# Patient Record
Sex: FEMALE | Race: WHITE | Employment: FULL TIME | URBAN - METROPOLITAN AREA
[De-identification: names, ages, dates, MRNs, and addresses within clinical notes are randomized per-mention and may not be internally consistent; named-entity substitution may affect disease eponyms.]

---

## 2019-05-04 ENCOUNTER — APPOINTMENT (OUTPATIENT)
Dept: CT IMAGING | Age: 39
End: 2019-05-04
Payer: COMMERCIAL

## 2019-05-04 ENCOUNTER — HOSPITAL ENCOUNTER (EMERGENCY)
Age: 39
Discharge: HOME | End: 2019-05-04
Attending: EMERGENCY MEDICINE
Payer: COMMERCIAL

## 2019-05-04 ENCOUNTER — APPOINTMENT (OUTPATIENT)
Dept: GENERAL RADIOLOGY | Age: 39
End: 2019-05-04
Payer: COMMERCIAL

## 2019-05-04 VITALS
HEIGHT: 63 IN | WEIGHT: 170 LBS | OXYGEN SATURATION: 100 % | RESPIRATION RATE: 16 BRPM | HEART RATE: 72 BPM | SYSTOLIC BLOOD PRESSURE: 103 MMHG | BODY MASS INDEX: 30.12 KG/M2 | TEMPERATURE: 98.6 F | DIASTOLIC BLOOD PRESSURE: 67 MMHG

## 2019-05-04 DIAGNOSIS — R07.89 ATYPICAL CHEST PAIN: Primary | ICD-10-CM

## 2019-05-04 DIAGNOSIS — R51.9 NONINTRACTABLE HEADACHE, UNSPECIFIED CHRONICITY PATTERN, UNSPECIFIED HEADACHE TYPE: ICD-10-CM

## 2019-05-04 LAB
A/G RATIO: 1.5 (ref 1.1–2.2)
ALBUMIN SERPL-MCNC: 4.5 G/DL (ref 3.4–5)
ALP BLD-CCNC: 65 U/L (ref 40–129)
ALT SERPL-CCNC: 15 U/L (ref 10–40)
ANION GAP SERPL CALCULATED.3IONS-SCNC: 14 MMOL/L (ref 3–16)
AST SERPL-CCNC: 16 U/L (ref 15–37)
BASOPHILS ABSOLUTE: 0.1 K/UL (ref 0–0.2)
BASOPHILS RELATIVE PERCENT: 1.2 %
BILIRUB SERPL-MCNC: 0.3 MG/DL (ref 0–1)
BUN BLDV-MCNC: 14 MG/DL (ref 7–20)
CALCIUM SERPL-MCNC: 9.4 MG/DL (ref 8.3–10.6)
CHLORIDE BLD-SCNC: 102 MMOL/L (ref 99–110)
CO2: 24 MMOL/L (ref 21–32)
CREAT SERPL-MCNC: 0.8 MG/DL (ref 0.6–1.1)
D DIMER: <200 NG/ML DDU (ref 0–229)
EOSINOPHILS ABSOLUTE: 0.1 K/UL (ref 0–0.6)
EOSINOPHILS RELATIVE PERCENT: 1.3 %
GFR AFRICAN AMERICAN: >60
GFR NON-AFRICAN AMERICAN: >60
GLOBULIN: 3 G/DL
GLUCOSE BLD-MCNC: 93 MG/DL (ref 70–99)
HCG QUALITATIVE: NEGATIVE
HCT VFR BLD CALC: 39.3 % (ref 36–48)
HEMOGLOBIN: 13.3 G/DL (ref 12–16)
LYMPHOCYTES ABSOLUTE: 2.5 K/UL (ref 1–5.1)
LYMPHOCYTES RELATIVE PERCENT: 27.3 %
MCH RBC QN AUTO: 30.5 PG (ref 26–34)
MCHC RBC AUTO-ENTMCNC: 34 G/DL (ref 31–36)
MCV RBC AUTO: 89.7 FL (ref 80–100)
MONOCYTES ABSOLUTE: 0.3 K/UL (ref 0–1.3)
MONOCYTES RELATIVE PERCENT: 3.8 %
NEUTROPHILS ABSOLUTE: 6.1 K/UL (ref 1.7–7.7)
NEUTROPHILS RELATIVE PERCENT: 66.4 %
PDW BLD-RTO: 14.3 % (ref 12.4–15.4)
PLATELET # BLD: 431 K/UL (ref 135–450)
PMV BLD AUTO: 8 FL (ref 5–10.5)
POTASSIUM SERPL-SCNC: 3.9 MMOL/L (ref 3.5–5.1)
RBC # BLD: 4.38 M/UL (ref 4–5.2)
SODIUM BLD-SCNC: 140 MMOL/L (ref 136–145)
TOTAL PROTEIN: 7.5 G/DL (ref 6.4–8.2)
TROPONIN: <0.01 NG/ML
WBC # BLD: 9.1 K/UL (ref 4–11)

## 2019-05-04 PROCEDURE — 80053 COMPREHEN METABOLIC PANEL: CPT

## 2019-05-04 PROCEDURE — 99285 EMERGENCY DEPT VISIT HI MDM: CPT

## 2019-05-04 PROCEDURE — 96361 HYDRATE IV INFUSION ADD-ON: CPT

## 2019-05-04 PROCEDURE — 93005 ELECTROCARDIOGRAM TRACING: CPT | Performed by: EMERGENCY MEDICINE

## 2019-05-04 PROCEDURE — 85379 FIBRIN DEGRADATION QUANT: CPT

## 2019-05-04 PROCEDURE — 84703 CHORIONIC GONADOTROPIN ASSAY: CPT

## 2019-05-04 PROCEDURE — 85025 COMPLETE CBC W/AUTO DIFF WBC: CPT

## 2019-05-04 PROCEDURE — 71046 X-RAY EXAM CHEST 2 VIEWS: CPT

## 2019-05-04 PROCEDURE — 36415 COLL VENOUS BLD VENIPUNCTURE: CPT

## 2019-05-04 PROCEDURE — 96366 THER/PROPH/DIAG IV INF ADDON: CPT

## 2019-05-04 PROCEDURE — 2580000003 HC RX 258: Performed by: EMERGENCY MEDICINE

## 2019-05-04 PROCEDURE — 6370000000 HC RX 637 (ALT 250 FOR IP): Performed by: EMERGENCY MEDICINE

## 2019-05-04 PROCEDURE — 70450 CT HEAD/BRAIN W/O DYE: CPT

## 2019-05-04 PROCEDURE — 96365 THER/PROPH/DIAG IV INF INIT: CPT

## 2019-05-04 PROCEDURE — 6360000002 HC RX W HCPCS: Performed by: EMERGENCY MEDICINE

## 2019-05-04 PROCEDURE — 96375 TX/PRO/DX INJ NEW DRUG ADDON: CPT

## 2019-05-04 PROCEDURE — 84484 ASSAY OF TROPONIN QUANT: CPT

## 2019-05-04 RX ORDER — MAGNESIUM SULFATE IN WATER 40 MG/ML
2 INJECTION, SOLUTION INTRAVENOUS ONCE
Status: COMPLETED | OUTPATIENT
Start: 2019-05-04 | End: 2019-05-04

## 2019-05-04 RX ORDER — METOCLOPRAMIDE HYDROCHLORIDE 5 MG/ML
10 INJECTION INTRAMUSCULAR; INTRAVENOUS ONCE
Status: COMPLETED | OUTPATIENT
Start: 2019-05-04 | End: 2019-05-04

## 2019-05-04 RX ORDER — DIAZEPAM 5 MG/1
10 TABLET ORAL ONCE
Status: COMPLETED | OUTPATIENT
Start: 2019-05-04 | End: 2019-05-04

## 2019-05-04 RX ORDER — AMLODIPINE BESYLATE 2.5 MG/1
5 TABLET ORAL DAILY
COMMUNITY
End: 2020-02-11

## 2019-05-04 RX ORDER — DIPHENHYDRAMINE HYDROCHLORIDE 50 MG/ML
25 INJECTION INTRAMUSCULAR; INTRAVENOUS ONCE
Status: COMPLETED | OUTPATIENT
Start: 2019-05-04 | End: 2019-05-04

## 2019-05-04 RX ORDER — MELOXICAM 7.5 MG/1
7.5 TABLET ORAL DAILY
COMMUNITY
End: 2019-10-22

## 2019-05-04 RX ORDER — 0.9 % SODIUM CHLORIDE 0.9 %
1000 INTRAVENOUS SOLUTION INTRAVENOUS ONCE
Status: COMPLETED | OUTPATIENT
Start: 2019-05-04 | End: 2019-05-04

## 2019-05-04 RX ORDER — CHLORTHALIDONE 50 MG/1
50 TABLET ORAL DAILY
COMMUNITY
End: 2020-02-11

## 2019-05-04 RX ORDER — ACETAMINOPHEN 500 MG
1000 TABLET ORAL ONCE
Status: DISCONTINUED | OUTPATIENT
Start: 2019-05-04 | End: 2019-05-04 | Stop reason: HOSPADM

## 2019-05-04 RX ORDER — KETOROLAC TROMETHAMINE 30 MG/ML
15 INJECTION, SOLUTION INTRAMUSCULAR; INTRAVENOUS ONCE
Status: COMPLETED | OUTPATIENT
Start: 2019-05-04 | End: 2019-05-04

## 2019-05-04 RX ADMIN — METOCLOPRAMIDE 10 MG: 5 INJECTION, SOLUTION INTRAMUSCULAR; INTRAVENOUS at 15:31

## 2019-05-04 RX ADMIN — SODIUM CHLORIDE 1000 ML: 9 INJECTION, SOLUTION INTRAVENOUS at 15:31

## 2019-05-04 RX ADMIN — DIAZEPAM 10 MG: 5 TABLET ORAL at 18:09

## 2019-05-04 RX ADMIN — MAGNESIUM SULFATE HEPTAHYDRATE 2 G: 40 INJECTION, SOLUTION INTRAVENOUS at 18:09

## 2019-05-04 RX ADMIN — KETOROLAC TROMETHAMINE 15 MG: 30 INJECTION, SOLUTION INTRAMUSCULAR at 15:31

## 2019-05-04 RX ADMIN — DIPHENHYDRAMINE HYDROCHLORIDE 25 MG: 50 INJECTION, SOLUTION INTRAMUSCULAR; INTRAVENOUS at 15:31

## 2019-05-04 ASSESSMENT — PAIN DESCRIPTION - LOCATION: LOCATION: ARM

## 2019-05-04 ASSESSMENT — PAIN SCALES - GENERAL
PAINLEVEL_OUTOF10: 3
PAINLEVEL_OUTOF10: 5

## 2019-05-04 ASSESSMENT — PAIN DESCRIPTION - PAIN TYPE: TYPE: ACUTE PAIN

## 2019-05-04 ASSESSMENT — PAIN DESCRIPTION - ORIENTATION: ORIENTATION: LEFT

## 2019-05-04 NOTE — ED PROVIDER NOTES
Emergency Department Attending Note    Ki Cheng DO    Date of ED VIsit: 5/4/2019    CHIEF COMPLAINT  Chest Pain (RECENT CHANGE IN BLOOD PRESSURE MEDICATION); Headache; and Arm Pain      HISTORY OF PRESENT ILLNESS  Eleno Ma is a 41 y.o. female  With Vital signs of /67   Pulse 72   Temp 98.6 °F (37 °C) (Oral)   Resp 16   Ht 5' 3\" (1.6 m)   Wt 170 lb (77.1 kg)   LMP 05/04/2019   SpO2 100%   BMI 30.11 kg/m²  who presents to the ED with a complaint of intermittent L sided sharp cp and shoulder pain. No exertional component. No change with deep breaths. No change with movement. No f/c. Assoc with gen ha wrapping around head worse with stress. No f/c. No neck stiffness. No sudden onset. Not most severe. .  No other complaints, modifying factors or associated symptoms. I have reviewed the following from the nursing documentation.     Past Medical History:   Diagnosis Date    Abnormal Pap smear     WNL since    Anxiety     Back pain     herniated disc    Back pain     had back surgery (fusion of 3 discs) in march 2011    Placenta previa     resolved at 28 weeks with this 3rd pregnancy    Tubal pregnancy      Past Surgical History:   Procedure Laterality Date    ANKLE FRACTURE SURGERY      BACK SURGERY  3-2011    ECTOPIC PREGNANCY SURGERY      LAPAROSCOPY  4/20/10    with salpingostomy  ( ectopic pregnancy)    LUMBAR DISC SURGERY      LUMBAR FUSION  3-2011    L3-L5 fusion    TONSILLECTOMY  2000    WISDOM TOOTH EXTRACTION  1998     Family History   Problem Relation Age of Onset    High Blood Pressure Father     Diabetes Maternal Aunt      Social History     Socioeconomic History    Marital status:      Spouse name: Not on file    Number of children: Not on file    Years of education: Not on file    Highest education level: Not on file   Occupational History    Not on file   Social Needs    Financial resource strain: Not on file    Food insecurity: Worry: Not on file     Inability: Not on file    Transportation needs:     Medical: Not on file     Non-medical: Not on file   Tobacco Use    Smoking status: Former Smoker     Packs/day: 0.50     Years: 12.00     Pack years: 6.00     Types: Cigarettes     Last attempt to quit: 2011     Years since quittin.7    Smokeless tobacco: Never Used   Substance and Sexual Activity    Alcohol use: No     Comment: every now and then    Drug use: No     Comment: vicodin use for back pain    Sexual activity: Yes     Partners: Male   Lifestyle    Physical activity:     Days per week: Not on file     Minutes per session: Not on file    Stress: Not on file   Relationships    Social connections:     Talks on phone: Not on file     Gets together: Not on file     Attends Mu-ism service: Not on file     Active member of club or organization: Not on file     Attends meetings of clubs or organizations: Not on file     Relationship status: Not on file    Intimate partner violence:     Fear of current or ex partner: Not on file     Emotionally abused: Not on file     Physically abused: Not on file     Forced sexual activity: Not on file   Other Topics Concern    Not on file   Social History Narrative    Not on file     Current Facility-Administered Medications   Medication Dose Route Frequency Provider Last Rate Last Dose    acetaminophen (TYLENOL) tablet 1,000 mg  1,000 mg Oral Once Dallin Garcia DO   Stopped at 19 1536     Current Outpatient Medications   Medication Sig Dispense Refill    meloxicam (MOBIC) 7.5 MG tablet Take 7.5 mg by mouth daily      chlorthalidone (HYGROTEN) 50 MG tablet Take 50 mg by mouth daily      amLODIPine (NORVASC) 2.5 MG tablet Take 2.5 mg by mouth daily      ibuprofen (ADVIL;MOTRIN) 800 MG tablet Take 1 tablet by mouth every 8 hours as needed.  120 tablet 1     Allergies   Allergen Reactions    Demerol Hives    Doxycycline Nausea And Vomiting    Lorazepam Other (See Comments)     Makes her anxiety worsen    Vancomycin Hives       REVIEW OF SYSTEMS  10 systems reviewed, pertinent positives per HPI otherwise noted to be negative     PHYSICAL EXAM  /67   Pulse 72   Temp 98.6 °F (37 °C) (Oral)   Resp 16   Ht 5' 3\" (1.6 m)   Wt 170 lb (77.1 kg)   LMP 05/04/2019   SpO2 100%   BMI 30.11 kg/m²   GENERAL APPEARANCE: Awake and alert. Cooperative. In mild pain distress. HEAD: Normocephalic. Atraumatic. EYES: PERRL. EOM's grossly intact. ENT: Mucous membranes are pink and moist.   NECK: Supple. Tender over paraspinals/scalenes/posterior insertion which reproduces sx  HEART: RRR. No murmurs. LUNGS: Respirations unlabored. CTAB. Good air exchange. ABDOMEN: Soft. Non-distended. Non-tender. No masses. No organomegaly. No guarding or rebound. EXTREMITIES: No peripheral edema. Moves all extremities equally. All extremities neurovascularly intact. SKIN: Warm and dry. No acute rashes. NEUROLOGICAL: Alert and oriented. Strength 5/5, sensation intact. Gait normal.   PSYCHIATRIC: Normal mood and affect. No HI or SI expressed to me. RADIOLOGY    See below     EKG:     See below      ED COURSE/MDM    No acute findings on labs/CT. Atypical cp intermittent for several days with none now. Will f/u cardio.   Ha pattern most c/w tension, trial migraine cocktail with minimal relief, muscle relaxant given, CT head reviewed    ED Course as of May 04 2010   Sat May 04, 2019   1514 Comprehensive Metabolic Panel:    Sodium 140   Potassium 3.9   Chloride 102   CO2 24   Anion Gap 14   Glucose 93   BUN 14   Creatinine 0.8   GFR Non- >60   GFR African American >60   Calcium 9.4   Total Protein 7.5   Albumin 4.5   Albumin/Globulin Ratio 1.5   Bilirubin 0.3   Alk Phos 65   ALT 15   AST 16   Globulin 3.0 [WL]   1514 Troponin:    Troponin <0.01 [WL]   1514 CBC Auto Differential:    WBC 9.1   RBC 4.38   Hemoglobin Quant 13.3   Hematocrit 39.3   MCV 89.7   MCH 30.5   MCHC 34.0   RDW 14.3   Platelet Count 060   MPV 8.0   Neutrophils % 66.4   Lymphocyte % 27.3   Monocytes % 3.8   Eosinophils % 1.3   Basophils % 1.2   Neutrophils # 6.1   Lymphocytes # 2.5   Monocytes # 0.3   Eosinophils # 0.1   Basophils # 0.1 [WL]   6049 [email or web address]. Nl axis. Qtc 110. No acute st t changes   EKG 12 Lead [WL]   1515 XR CHEST STANDARD (2 VW) [WL]   0886 D-Dimer, Quantitative:    D-Dimer, Quant <200 [WL]   2010 CT HEAD WO CONTRAST [WL]      ED Course User Index  [WL] Lin Aaron,        Old records were reviewed when applicable.  The ED course and plan were reviewed and results discussed with the pt    CLINICAL IMPRESSION and DISPOSITION  Omayra Freitas was stable and diagnosed with headache, chest pain    Patient was treated with  Ivf, tylenol, valium, benadryl, toradol, magnesium, reglan      CRITICAL CARE TIME:   N/A                      Lin Aaron,   05/04/19 [address] [facility],   05/04/19 2011

## 2019-05-05 LAB
EKG ATRIAL RATE: 110 BPM
EKG DIAGNOSIS: NORMAL
EKG P AXIS: 42 DEGREES
EKG P-R INTERVAL: 138 MS
EKG Q-T INTERVAL: 334 MS
EKG QRS DURATION: 76 MS
EKG QTC CALCULATION (BAZETT): 452 MS
EKG R AXIS: 28 DEGREES
EKG T AXIS: 31 DEGREES
EKG VENTRICULAR RATE: 110 BPM

## 2019-05-05 PROCEDURE — 93010 ELECTROCARDIOGRAM REPORT: CPT | Performed by: INTERNAL MEDICINE

## 2019-05-05 NOTE — ED NOTES
Verbal and written discharge instructions given. IV removed. Patient in stable condition, discharged home with parents.      Dylan Reynoso RN  05/04/19 2014

## 2019-06-30 ENCOUNTER — APPOINTMENT (OUTPATIENT)
Dept: GENERAL RADIOLOGY | Age: 39
End: 2019-06-30
Payer: COMMERCIAL

## 2019-06-30 ENCOUNTER — HOSPITAL ENCOUNTER (EMERGENCY)
Age: 39
Discharge: HOME | End: 2019-06-30
Attending: EMERGENCY MEDICINE
Payer: COMMERCIAL

## 2019-06-30 VITALS
OXYGEN SATURATION: 96 % | WEIGHT: 160 LBS | RESPIRATION RATE: 16 BRPM | HEART RATE: 76 BPM | TEMPERATURE: 97.9 F | BODY MASS INDEX: 28.35 KG/M2 | SYSTOLIC BLOOD PRESSURE: 98 MMHG | HEIGHT: 63 IN | DIASTOLIC BLOOD PRESSURE: 68 MMHG

## 2019-06-30 DIAGNOSIS — I10 HYPERTENSION, UNSPECIFIED TYPE: Primary | ICD-10-CM

## 2019-06-30 DIAGNOSIS — E83.42 HYPOMAGNESEMIA: ICD-10-CM

## 2019-06-30 DIAGNOSIS — R07.89 CHEST TIGHTNESS: ICD-10-CM

## 2019-06-30 DIAGNOSIS — E87.6 HYPOKALEMIA: ICD-10-CM

## 2019-06-30 LAB
A/G RATIO: 1.4 (ref 1.1–2.2)
ALBUMIN SERPL-MCNC: 4 G/DL (ref 3.4–5)
ALP BLD-CCNC: 60 U/L (ref 40–129)
ALT SERPL-CCNC: 10 U/L (ref 10–40)
AMPHETAMINE SCREEN, URINE: ABNORMAL
ANION GAP SERPL CALCULATED.3IONS-SCNC: 11 MMOL/L (ref 3–16)
AST SERPL-CCNC: 12 U/L (ref 15–37)
BACTERIA: ABNORMAL /HPF
BARBITURATE SCREEN URINE: ABNORMAL
BASOPHILS ABSOLUTE: 0.1 K/UL (ref 0–0.2)
BASOPHILS RELATIVE PERCENT: 1.2 %
BENZODIAZEPINE SCREEN, URINE: POSITIVE
BILIRUB SERPL-MCNC: <0.2 MG/DL (ref 0–1)
BILIRUBIN URINE: NEGATIVE
BLOOD, URINE: ABNORMAL
BUN BLDV-MCNC: 18 MG/DL (ref 7–20)
CALCIUM SERPL-MCNC: 9.6 MG/DL (ref 8.3–10.6)
CANNABINOID SCREEN URINE: ABNORMAL
CHLORIDE BLD-SCNC: 100 MMOL/L (ref 99–110)
CLARITY: CLEAR
CO2: 29 MMOL/L (ref 21–32)
COCAINE METABOLITE SCREEN URINE: ABNORMAL
COLOR: ABNORMAL
CREAT SERPL-MCNC: 0.8 MG/DL (ref 0.6–1.1)
D DIMER: <200 NG/ML DDU (ref 0–229)
EOSINOPHILS ABSOLUTE: 0.2 K/UL (ref 0–0.6)
EOSINOPHILS RELATIVE PERCENT: 1.4 %
EPITHELIAL CELLS, UA: ABNORMAL /HPF
GFR AFRICAN AMERICAN: >60
GFR NON-AFRICAN AMERICAN: >60
GLOBULIN: 2.9 G/DL
GLUCOSE BLD-MCNC: 81 MG/DL (ref 70–99)
GLUCOSE URINE: 100 MG/DL
HCG QUALITATIVE: NEGATIVE
HCT VFR BLD CALC: 37.9 % (ref 36–48)
HEMOGLOBIN: 12.5 G/DL (ref 12–16)
INR BLD: 1 (ref 0.86–1.14)
KETONES, URINE: NEGATIVE MG/DL
LEUKOCYTE ESTERASE, URINE: NEGATIVE
LYMPHOCYTES ABSOLUTE: 2.6 K/UL (ref 1–5.1)
LYMPHOCYTES RELATIVE PERCENT: 21.4 %
Lab: ABNORMAL
MAGNESIUM: 1.7 MG/DL (ref 1.8–2.4)
MCH RBC QN AUTO: 29.3 PG (ref 26–34)
MCHC RBC AUTO-ENTMCNC: 33 G/DL (ref 31–36)
MCV RBC AUTO: 88.7 FL (ref 80–100)
METHADONE SCREEN, URINE: ABNORMAL
MICROSCOPIC EXAMINATION: YES
MONOCYTES ABSOLUTE: 0.5 K/UL (ref 0–1.3)
MONOCYTES RELATIVE PERCENT: 4.3 %
NEUTROPHILS ABSOLUTE: 8.8 K/UL (ref 1.7–7.7)
NEUTROPHILS RELATIVE PERCENT: 71.7 %
NITRITE, URINE: NEGATIVE
OPIATE SCREEN URINE: ABNORMAL
OXYCODONE URINE: ABNORMAL
PDW BLD-RTO: 14 % (ref 12.4–15.4)
PH UA: 6
PH UA: 6 (ref 5–8)
PHENCYCLIDINE SCREEN URINE: ABNORMAL
PLATELET # BLD: 361 K/UL (ref 135–450)
PMV BLD AUTO: 8 FL (ref 5–10.5)
POTASSIUM SERPL-SCNC: 3.4 MMOL/L (ref 3.5–5.1)
PRO-BNP: 29 PG/ML (ref 0–124)
PROPOXYPHENE SCREEN: ABNORMAL
PROTEIN UA: NEGATIVE MG/DL
PROTHROMBIN TIME: 11.4 SEC (ref 9.8–13)
RBC # BLD: 4.28 M/UL (ref 4–5.2)
RBC UA: ABNORMAL /HPF (ref 0–2)
SODIUM BLD-SCNC: 140 MMOL/L (ref 136–145)
SPECIFIC GRAVITY UA: 1.01 (ref 1–1.03)
TOTAL PROTEIN: 6.9 G/DL (ref 6.4–8.2)
TROPONIN: <0.01 NG/ML
TROPONIN: <0.01 NG/ML
URINE TYPE: ABNORMAL
UROBILINOGEN, URINE: 0.2 E.U./DL
WBC # BLD: 12.2 K/UL (ref 4–11)
WBC UA: ABNORMAL /HPF (ref 0–5)

## 2019-06-30 PROCEDURE — 71046 X-RAY EXAM CHEST 2 VIEWS: CPT

## 2019-06-30 PROCEDURE — 84484 ASSAY OF TROPONIN QUANT: CPT

## 2019-06-30 PROCEDURE — 83735 ASSAY OF MAGNESIUM: CPT

## 2019-06-30 PROCEDURE — 83880 ASSAY OF NATRIURETIC PEPTIDE: CPT

## 2019-06-30 PROCEDURE — 85025 COMPLETE CBC W/AUTO DIFF WBC: CPT

## 2019-06-30 PROCEDURE — 80053 COMPREHEN METABOLIC PANEL: CPT

## 2019-06-30 PROCEDURE — 80307 DRUG TEST PRSMV CHEM ANLYZR: CPT

## 2019-06-30 PROCEDURE — 85610 PROTHROMBIN TIME: CPT

## 2019-06-30 PROCEDURE — 84703 CHORIONIC GONADOTROPIN ASSAY: CPT

## 2019-06-30 PROCEDURE — 81001 URINALYSIS AUTO W/SCOPE: CPT

## 2019-06-30 PROCEDURE — 96365 THER/PROPH/DIAG IV INF INIT: CPT

## 2019-06-30 PROCEDURE — 6360000002 HC RX W HCPCS: Performed by: EMERGENCY MEDICINE

## 2019-06-30 PROCEDURE — 6370000000 HC RX 637 (ALT 250 FOR IP): Performed by: EMERGENCY MEDICINE

## 2019-06-30 PROCEDURE — 85379 FIBRIN DEGRADATION QUANT: CPT

## 2019-06-30 PROCEDURE — 99283 EMERGENCY DEPT VISIT LOW MDM: CPT

## 2019-06-30 PROCEDURE — 93005 ELECTROCARDIOGRAM TRACING: CPT | Performed by: EMERGENCY MEDICINE

## 2019-06-30 RX ORDER — POTASSIUM CHLORIDE 20 MEQ/1
40 TABLET, EXTENDED RELEASE ORAL ONCE
Status: COMPLETED | OUTPATIENT
Start: 2019-06-30 | End: 2019-06-30

## 2019-06-30 RX ORDER — ACETAMINOPHEN 500 MG
1000 TABLET ORAL ONCE
Status: COMPLETED | OUTPATIENT
Start: 2019-06-30 | End: 2019-06-30

## 2019-06-30 RX ORDER — MAGNESIUM SULFATE 1 G/100ML
1 INJECTION INTRAVENOUS ONCE
Status: COMPLETED | OUTPATIENT
Start: 2019-06-30 | End: 2019-06-30

## 2019-06-30 RX ORDER — ALPRAZOLAM 1 MG/1
1 TABLET ORAL NIGHTLY
COMMUNITY
End: 2022-08-31

## 2019-06-30 RX ADMIN — MAGNESIUM SULFATE HEPTAHYDRATE 1 G: 1 INJECTION, SOLUTION INTRAVENOUS at 17:01

## 2019-06-30 RX ADMIN — POTASSIUM CHLORIDE 40 MEQ: 20 TABLET, EXTENDED RELEASE ORAL at 16:52

## 2019-06-30 RX ADMIN — ACETAMINOPHEN 1000 MG: 500 TABLET ORAL at 17:22

## 2019-06-30 ASSESSMENT — HEART SCORE: ECG: 0

## 2019-06-30 ASSESSMENT — PAIN SCALES - GENERAL: PAINLEVEL_OUTOF10: 3

## 2019-07-01 LAB
EKG ATRIAL RATE: 109 BPM
EKG DIAGNOSIS: NORMAL
EKG P AXIS: 46 DEGREES
EKG P-R INTERVAL: 146 MS
EKG Q-T INTERVAL: 336 MS
EKG QRS DURATION: 82 MS
EKG QTC CALCULATION (BAZETT): 452 MS
EKG R AXIS: 45 DEGREES
EKG T AXIS: 45 DEGREES
EKG VENTRICULAR RATE: 109 BPM

## 2019-07-01 PROCEDURE — 93010 ELECTROCARDIOGRAM REPORT: CPT | Performed by: INTERNAL MEDICINE

## 2019-07-02 ENCOUNTER — APPOINTMENT (OUTPATIENT)
Dept: CT IMAGING | Age: 39
End: 2019-07-02

## 2019-07-02 ENCOUNTER — HOSPITAL ENCOUNTER (EMERGENCY)
Age: 39
Discharge: HOME | End: 2019-07-02
Attending: EMERGENCY MEDICINE

## 2019-07-02 VITALS
WEIGHT: 160 LBS | SYSTOLIC BLOOD PRESSURE: 105 MMHG | BODY MASS INDEX: 28.35 KG/M2 | RESPIRATION RATE: 20 BRPM | HEIGHT: 63 IN | TEMPERATURE: 98.1 F | HEART RATE: 70 BPM | DIASTOLIC BLOOD PRESSURE: 71 MMHG | OXYGEN SATURATION: 100 %

## 2019-07-02 DIAGNOSIS — R53.1 GENERAL WEAKNESS: ICD-10-CM

## 2019-07-02 DIAGNOSIS — H53.143 PHOTOPHOBIA OF BOTH EYES: ICD-10-CM

## 2019-07-02 DIAGNOSIS — R00.2 PALPITATIONS: ICD-10-CM

## 2019-07-02 DIAGNOSIS — R11.0 NAUSEA WITHOUT VOMITING: ICD-10-CM

## 2019-07-02 DIAGNOSIS — R51.9 GENERALIZED HEADACHE: Primary | ICD-10-CM

## 2019-07-02 LAB
A/G RATIO: 1.3 (ref 1.1–2.2)
ALBUMIN SERPL-MCNC: 4.4 G/DL (ref 3.4–5)
ALP BLD-CCNC: 64 U/L (ref 40–129)
ALT SERPL-CCNC: 12 U/L (ref 10–40)
AMPHETAMINE SCREEN, URINE: ABNORMAL
ANION GAP SERPL CALCULATED.3IONS-SCNC: 11 MMOL/L (ref 3–16)
AST SERPL-CCNC: 16 U/L (ref 15–37)
BACTERIA: ABNORMAL /HPF
BARBITURATE SCREEN URINE: ABNORMAL
BASOPHILS ABSOLUTE: 0.1 K/UL (ref 0–0.2)
BASOPHILS RELATIVE PERCENT: 1.1 %
BENZODIAZEPINE SCREEN, URINE: POSITIVE
BILIRUB SERPL-MCNC: <0.2 MG/DL (ref 0–1)
BILIRUBIN URINE: NEGATIVE
BLOOD, URINE: ABNORMAL
BUN BLDV-MCNC: 10 MG/DL (ref 7–20)
CALCIUM SERPL-MCNC: 9.7 MG/DL (ref 8.3–10.6)
CANNABINOID SCREEN URINE: ABNORMAL
CHLORIDE BLD-SCNC: 100 MMOL/L (ref 99–110)
CLARITY: CLEAR
CO2: 28 MMOL/L (ref 21–32)
COCAINE METABOLITE SCREEN URINE: ABNORMAL
COLOR: ABNORMAL
CREAT SERPL-MCNC: 0.7 MG/DL (ref 0.6–1.1)
EOSINOPHILS ABSOLUTE: 0.1 K/UL (ref 0–0.6)
EOSINOPHILS RELATIVE PERCENT: 1.1 %
GFR AFRICAN AMERICAN: >60
GFR NON-AFRICAN AMERICAN: >60
GLOBULIN: 3.4 G/DL
GLUCOSE BLD-MCNC: 80 MG/DL (ref 70–99)
GLUCOSE URINE: NEGATIVE MG/DL
HCT VFR BLD CALC: 41.1 % (ref 36–48)
HEMOGLOBIN: 13.8 G/DL (ref 12–16)
KETONES, URINE: NEGATIVE MG/DL
LEUKOCYTE ESTERASE, URINE: NEGATIVE
LYMPHOCYTES ABSOLUTE: 2.4 K/UL (ref 1–5.1)
LYMPHOCYTES RELATIVE PERCENT: 32.6 %
Lab: ABNORMAL
MCH RBC QN AUTO: 29.4 PG (ref 26–34)
MCHC RBC AUTO-ENTMCNC: 33.5 G/DL (ref 31–36)
MCV RBC AUTO: 87.8 FL (ref 80–100)
METHADONE SCREEN, URINE: ABNORMAL
MICROSCOPIC EXAMINATION: YES
MONO TEST: NEGATIVE
MONOCYTES ABSOLUTE: 0.4 K/UL (ref 0–1.3)
MONOCYTES RELATIVE PERCENT: 5 %
NEUTROPHILS ABSOLUTE: 4.3 K/UL (ref 1.7–7.7)
NEUTROPHILS RELATIVE PERCENT: 60.2 %
NITRITE, URINE: NEGATIVE
OPIATE SCREEN URINE: ABNORMAL
OXYCODONE URINE: ABNORMAL
PDW BLD-RTO: 13.9 % (ref 12.4–15.4)
PH UA: 6
PH UA: 6 (ref 5–8)
PHENCYCLIDINE SCREEN URINE: ABNORMAL
PLATELET # BLD: 396 K/UL (ref 135–450)
PMV BLD AUTO: 8 FL (ref 5–10.5)
POTASSIUM SERPL-SCNC: 3.6 MMOL/L (ref 3.5–5.1)
PROPOXYPHENE SCREEN: ABNORMAL
PROTEIN UA: NEGATIVE MG/DL
RBC # BLD: 4.68 M/UL (ref 4–5.2)
RBC UA: ABNORMAL /HPF (ref 0–2)
SODIUM BLD-SCNC: 139 MMOL/L (ref 136–145)
SPECIFIC GRAVITY UA: <=1.005 (ref 1–1.03)
T3 TOTAL: 1 NG/ML (ref 0.8–2)
T4 TOTAL: 6.1 UG/DL (ref 4.5–10.9)
TOTAL PROTEIN: 7.8 G/DL (ref 6.4–8.2)
TSH SERPL DL<=0.05 MIU/L-ACNC: 1.6 UIU/ML (ref 0.27–4.2)
URINE TYPE: ABNORMAL
UROBILINOGEN, URINE: 0.2 E.U./DL
WBC # BLD: 7.2 K/UL (ref 4–11)
WBC UA: ABNORMAL /HPF (ref 0–5)

## 2019-07-02 PROCEDURE — 96374 THER/PROPH/DIAG INJ IV PUSH: CPT

## 2019-07-02 PROCEDURE — 96361 HYDRATE IV INFUSION ADD-ON: CPT

## 2019-07-02 PROCEDURE — 6360000004 HC RX CONTRAST MEDICATION: Performed by: NURSE PRACTITIONER

## 2019-07-02 PROCEDURE — 70450 CT HEAD/BRAIN W/O DYE: CPT

## 2019-07-02 PROCEDURE — 84436 ASSAY OF TOTAL THYROXINE: CPT

## 2019-07-02 PROCEDURE — 84443 ASSAY THYROID STIM HORMONE: CPT

## 2019-07-02 PROCEDURE — 99285 EMERGENCY DEPT VISIT HI MDM: CPT

## 2019-07-02 PROCEDURE — 6360000002 HC RX W HCPCS: Performed by: NURSE PRACTITIONER

## 2019-07-02 PROCEDURE — 82530 CORTISOL FREE: CPT

## 2019-07-02 PROCEDURE — 80053 COMPREHEN METABOLIC PANEL: CPT

## 2019-07-02 PROCEDURE — 70496 CT ANGIOGRAPHY HEAD: CPT

## 2019-07-02 PROCEDURE — 36415 COLL VENOUS BLD VENIPUNCTURE: CPT

## 2019-07-02 PROCEDURE — 96375 TX/PRO/DX INJ NEW DRUG ADDON: CPT

## 2019-07-02 PROCEDURE — 81001 URINALYSIS AUTO W/SCOPE: CPT

## 2019-07-02 PROCEDURE — 2580000003 HC RX 258: Performed by: NURSE PRACTITIONER

## 2019-07-02 PROCEDURE — 86308 HETEROPHILE ANTIBODY SCREEN: CPT

## 2019-07-02 PROCEDURE — 80307 DRUG TEST PRSMV CHEM ANLYZR: CPT

## 2019-07-02 PROCEDURE — 85025 COMPLETE CBC W/AUTO DIFF WBC: CPT

## 2019-07-02 PROCEDURE — 84480 ASSAY TRIIODOTHYRONINE (T3): CPT

## 2019-07-02 RX ORDER — DEXAMETHASONE SODIUM PHOSPHATE 4 MG/ML
10 INJECTION, SOLUTION INTRA-ARTICULAR; INTRALESIONAL; INTRAMUSCULAR; INTRAVENOUS; SOFT TISSUE ONCE
Status: COMPLETED | OUTPATIENT
Start: 2019-07-02 | End: 2019-07-02

## 2019-07-02 RX ORDER — METOCLOPRAMIDE HYDROCHLORIDE 5 MG/ML
10 INJECTION INTRAMUSCULAR; INTRAVENOUS ONCE
Status: COMPLETED | OUTPATIENT
Start: 2019-07-02 | End: 2019-07-02

## 2019-07-02 RX ORDER — BUTALBITAL, ASPIRIN, AND CAFFEINE 325; 50; 40 MG/1; MG/1; MG/1
1 CAPSULE ORAL EVERY 4 HOURS PRN
Qty: 20 CAPSULE | Refills: 0 | Status: SHIPPED | OUTPATIENT
Start: 2019-07-02 | End: 2019-10-24 | Stop reason: ALTCHOICE

## 2019-07-02 RX ORDER — ONDANSETRON 4 MG/1
4 TABLET, ORALLY DISINTEGRATING ORAL EVERY 8 HOURS PRN
Qty: 20 TABLET | Refills: 0 | Status: SHIPPED | OUTPATIENT
Start: 2019-07-02 | End: 2020-02-28 | Stop reason: ALTCHOICE

## 2019-07-02 RX ORDER — 0.9 % SODIUM CHLORIDE 0.9 %
1000 INTRAVENOUS SOLUTION INTRAVENOUS ONCE
Status: COMPLETED | OUTPATIENT
Start: 2019-07-02 | End: 2019-07-02

## 2019-07-02 RX ADMIN — DEXAMETHASONE SODIUM PHOSPHATE 10 MG: 4 INJECTION, SOLUTION INTRAMUSCULAR; INTRAVENOUS at 10:59

## 2019-07-02 RX ADMIN — SODIUM CHLORIDE 1000 ML: 9 INJECTION, SOLUTION INTRAVENOUS at 10:59

## 2019-07-02 RX ADMIN — METOCLOPRAMIDE 10 MG: 5 INJECTION, SOLUTION INTRAMUSCULAR; INTRAVENOUS at 10:59

## 2019-07-02 RX ADMIN — IOPAMIDOL 75 ML: 755 INJECTION, SOLUTION INTRAVENOUS at 12:14

## 2019-07-02 ASSESSMENT — PAIN SCALES - GENERAL: PAINLEVEL_OUTOF10: 5

## 2019-07-02 ASSESSMENT — PAIN DESCRIPTION - PAIN TYPE: TYPE: ACUTE PAIN

## 2019-07-02 NOTE — ED PROVIDER NOTES
available at the time of this note:    CTA HEAD NECK W CONTRAST   Final Result   Unremarkable CTA of the head and neck. CT HEAD WO CONTRAST   Final Result   Stable negative CT brain with no acute intracranial abnormality. Xr Chest Standard (2 Vw)    Result Date: 6/30/2019  EXAMINATION: TWO XRAY VIEWS OF THE CHEST 6/30/2019 3:47 pm COMPARISON: 05/04/2019 HISTORY: ORDERING SYSTEM PROVIDED HISTORY: Chest tightness TECHNOLOGIST PROVIDED HISTORY: Reason for exam:->Chest tightness Ordering Physician Provided Reason for Exam: CHEST TIGHTNESS Acuity: Acute Type of Exam: Initial FINDINGS: The lungs are without acute focal process. There is no effusion or pneumothorax. The cardiomediastinal silhouette is without acute process. The osseous structures are without acute process. No acute process. PROCEDURES   Unless otherwise noted below, none     Procedures    CRITICAL CARE TIME   N/A    CONSULTS:  None      EMERGENCY DEPARTMENT COURSE and DIFFERENTIAL DIAGNOSIS/MDM:   Vitals:    Vitals:    07/02/19 0933 07/02/19 1102 07/02/19 1343   BP: 126/81 107/79 105/71   Pulse: 102 77 70   Resp: 17 21 20   Temp: 98.1 °F (36.7 °C)     TempSrc: Oral     SpO2:  100% 100%   Weight: 160 lb (72.6 kg)     Height: 5' 3\" (1.6 m)         Patient was given thefollowing medications:  Medications   metoclopramide (REGLAN) injection 10 mg (10 mg Intravenous Given 7/2/19 1059)   Dexamethasone Sodium Phosphate injection 10 mg (10 mg Intravenous Given 7/2/19 1059)   0.9 % sodium chloride bolus (0 mLs Intravenous Stopped 7/2/19 1159)   iopamidol (ISOVUE-370) 76 % injection 75 mL (75 mLs Intravenous Given 7/2/19 1214)       Patient presents today with feeling fatigued and run down for the past month. She complains of HA, states the HA has been off and on for the past year. She states she was seen in the ER two nights ago and was told she had low magnesium and low sodium. She was discharged home but is not feeling any better. Her HA is is in the front of her head, where it always. After evaluation and estimates the patient she was given IV access, IV fluids, Decadron, Reglan for headache along with a CT scan of the head and neck and CTA of the head and neck, urinalysis and blood work. Urinalysis shows no acute infection. Drug screen is negative. CBC shows no sepsis or anemia. Metabolic panel shows no letter light disturbances or renal insufficiency. Liver functions are normal.  Because the patient has been having some palpitations, I did add on thyroid studies and an EKG. CT shows no acute intracranial abnormality. CTA of the head and neck is unremarkable. Please see attending physician documentation. Upon reevaluation vital signs are stable, she reports complete resolution of headache and is feeling much better. I then spoke with the attending about discharging the patient home. At this time I am no concerns for acute intracranial hemorrhage, mass-effect, metabolic disturbances or abnormalities, dehydration, sepsis or other emergent etiology. Therefore, shared medical decision was made between the attending physician, the patient myself and we agreed she would be discharged home. I discussed with her about following up with family doctor in regards to her headaches, also instructed her to follow-up with cardiology in regards to her palpitations, it was agreed to do a Holter monitor. She was given referral to cardiology and educated to call tomorrow for an appointment. She is also given referral to neurology secondary to headaches, educated to call tomorrow for an appointment. She was discharged home with Fioricet and Zofran to take as needed. Educated to return for any worsening symptoms. Patient verbalized understanding of discharge instructions and was discharged from the department in stable condition. FINAL IMPRESSION      1. Generalized headache    2. Palpitations    3. Photophobia of both eyes    4.

## 2019-07-02 NOTE — LETTER
[facility]  ED  [address] [address]  Phone: [phone]  Fax: [phone]             July 2, 2019    Patient: Michel Randhawa   YOB: 1980   Date of Visit: 7/2/2019       To Whom It May Concern:    Elsa Lara was seen and treated in our emergency department on 7/2/2019. She may return to work on 07/03/19.       Sincerely,       Nurse          Signature:__________________________________

## 2019-07-02 NOTE — ED PROVIDER NOTES
BONES: No lytic or blastic osseous lesions are identified. CTA HEAD: ANTERIOR CIRCULATION: The intracranial segments of the internal carotid arteries are normal.  There is no significant stenosis or aneurysm identified. The anterior and middle cerebral arteries are normal in appearance. No significant stenosis or aneurysm is identified. POSTERIOR CIRCULATION: The distal vertebral arteries are normal in appearance. The basilar artery is normal.  No significant stenosis or aneurysm is identified. The posterior cerebral arteries are normal in appearance. BRAIN: There is no vascular malformation identified. There is no venous sinus thrombosis evident. Unremarkable CTA of the head and neck.         Noelle Paniagua DO  07/04/19 1704

## 2019-07-05 ASSESSMENT — ENCOUNTER SYMPTOMS
WHEEZING: 0
BACK PAIN: 0
COLOR CHANGE: 0
VOMITING: 0
SORE THROAT: 0
ABDOMINAL PAIN: 0
SHORTNESS OF BREATH: 0
COUGH: 0
DIARRHEA: 0
PHOTOPHOBIA: 0
NAUSEA: 1

## 2019-07-06 LAB — CORTISOL FREE, SERUM: 0.71 UG/DL

## 2019-10-22 ENCOUNTER — APPOINTMENT (OUTPATIENT)
Dept: CT IMAGING | Age: 39
End: 2019-10-22
Payer: COMMERCIAL

## 2019-10-22 ENCOUNTER — HOSPITAL ENCOUNTER (EMERGENCY)
Age: 39
Discharge: HOME | End: 2019-10-22
Attending: EMERGENCY MEDICINE
Payer: COMMERCIAL

## 2019-10-22 ENCOUNTER — APPOINTMENT (OUTPATIENT)
Dept: GENERAL RADIOLOGY | Age: 39
End: 2019-10-22
Payer: COMMERCIAL

## 2019-10-22 VITALS
HEART RATE: 83 BPM | WEIGHT: 160 LBS | RESPIRATION RATE: 16 BRPM | OXYGEN SATURATION: 100 % | TEMPERATURE: 98.2 F | BODY MASS INDEX: 28.34 KG/M2 | DIASTOLIC BLOOD PRESSURE: 76 MMHG | SYSTOLIC BLOOD PRESSURE: 109 MMHG

## 2019-10-22 DIAGNOSIS — R03.0 ELEVATED BLOOD PRESSURE READING: ICD-10-CM

## 2019-10-22 DIAGNOSIS — M54.2 CHRONIC NECK PAIN: ICD-10-CM

## 2019-10-22 DIAGNOSIS — G89.29 CHRONIC NECK PAIN: ICD-10-CM

## 2019-10-22 DIAGNOSIS — R07.89 CHEST DISCOMFORT: ICD-10-CM

## 2019-10-22 DIAGNOSIS — R51.9 ACUTE NONINTRACTABLE HEADACHE, UNSPECIFIED HEADACHE TYPE: Primary | ICD-10-CM

## 2019-10-22 LAB
A/G RATIO: 1.4 (ref 1.1–2.2)
ALBUMIN SERPL-MCNC: 4.6 G/DL (ref 3.4–5)
ALP BLD-CCNC: 77 U/L (ref 40–129)
ALT SERPL-CCNC: 9 U/L (ref 10–40)
AMPHETAMINE SCREEN, URINE: ABNORMAL
ANION GAP SERPL CALCULATED.3IONS-SCNC: 16 MMOL/L (ref 3–16)
AST SERPL-CCNC: 13 U/L (ref 15–37)
BARBITURATE SCREEN URINE: ABNORMAL
BASOPHILS ABSOLUTE: 0.1 K/UL (ref 0–0.2)
BASOPHILS RELATIVE PERCENT: 0.6 %
BENZODIAZEPINE SCREEN, URINE: POSITIVE
BILIRUB SERPL-MCNC: <0.2 MG/DL (ref 0–1)
BILIRUBIN URINE: NEGATIVE
BLOOD, URINE: ABNORMAL
BUN BLDV-MCNC: 16 MG/DL (ref 7–20)
CALCIUM SERPL-MCNC: 9.5 MG/DL (ref 8.3–10.6)
CANNABINOID SCREEN URINE: ABNORMAL
CHLORIDE BLD-SCNC: 95 MMOL/L (ref 99–110)
CLARITY: CLEAR
CO2: 26 MMOL/L (ref 21–32)
COCAINE METABOLITE SCREEN URINE: ABNORMAL
COLOR: YELLOW
CREAT SERPL-MCNC: 0.8 MG/DL (ref 0.6–1.1)
EOSINOPHILS ABSOLUTE: 0.1 K/UL (ref 0–0.6)
EOSINOPHILS RELATIVE PERCENT: 0.5 %
ETHANOL: NORMAL MG/DL (ref 0–0.08)
GFR AFRICAN AMERICAN: >60
GFR NON-AFRICAN AMERICAN: >60
GLOBULIN: 3.4 G/DL
GLUCOSE BLD-MCNC: 79 MG/DL (ref 70–99)
GLUCOSE URINE: NEGATIVE MG/DL
HCG QUALITATIVE: NEGATIVE
HCT VFR BLD CALC: 42.4 % (ref 36–48)
HEMOGLOBIN: 14.1 G/DL (ref 12–16)
INR BLD: 1 (ref 0.86–1.14)
KETONES, URINE: NEGATIVE MG/DL
LEUKOCYTE ESTERASE, URINE: NEGATIVE
LYMPHOCYTES ABSOLUTE: 3.3 K/UL (ref 1–5.1)
LYMPHOCYTES RELATIVE PERCENT: 32.4 %
Lab: ABNORMAL
MAGNESIUM: 2.1 MG/DL (ref 1.8–2.4)
MCH RBC QN AUTO: 28.6 PG (ref 26–34)
MCHC RBC AUTO-ENTMCNC: 33.3 G/DL (ref 31–36)
MCV RBC AUTO: 85.9 FL (ref 80–100)
METHADONE SCREEN, URINE: ABNORMAL
MICROSCOPIC EXAMINATION: YES
MONOCYTES ABSOLUTE: 0.8 K/UL (ref 0–1.3)
MONOCYTES RELATIVE PERCENT: 7.5 %
NEUTROPHILS ABSOLUTE: 6.1 K/UL (ref 1.7–7.7)
NEUTROPHILS RELATIVE PERCENT: 59 %
NITRITE, URINE: NEGATIVE
OPIATE SCREEN URINE: ABNORMAL
OXYCODONE URINE: ABNORMAL
PDW BLD-RTO: 14.9 % (ref 12.4–15.4)
PH UA: 6
PH UA: 6 (ref 5–8)
PHENCYCLIDINE SCREEN URINE: ABNORMAL
PLATELET # BLD: 373 K/UL (ref 135–450)
PMV BLD AUTO: 8.8 FL (ref 5–10.5)
POTASSIUM SERPL-SCNC: 3.4 MMOL/L (ref 3.5–5.1)
PROPOXYPHENE SCREEN: ABNORMAL
PROTEIN UA: NEGATIVE MG/DL
PROTHROMBIN TIME: 11.4 SEC (ref 9.8–13)
RBC # BLD: 4.93 M/UL (ref 4–5.2)
RBC UA: NORMAL /HPF (ref 0–2)
SODIUM BLD-SCNC: 137 MMOL/L (ref 136–145)
SPECIFIC GRAVITY UA: <=1.005 (ref 1–1.03)
TOTAL PROTEIN: 8 G/DL (ref 6.4–8.2)
TROPONIN: <0.01 NG/ML
TROPONIN: <0.01 NG/ML
URINE REFLEX TO CULTURE: ABNORMAL
URINE TYPE: ABNORMAL
UROBILINOGEN, URINE: 0.2 E.U./DL
WBC # BLD: 10.3 K/UL (ref 4–11)
WBC UA: NORMAL /HPF (ref 0–5)

## 2019-10-22 PROCEDURE — 96374 THER/PROPH/DIAG INJ IV PUSH: CPT

## 2019-10-22 PROCEDURE — 99284 EMERGENCY DEPT VISIT MOD MDM: CPT

## 2019-10-22 PROCEDURE — 2580000003 HC RX 258: Performed by: NURSE PRACTITIONER

## 2019-10-22 PROCEDURE — 6360000002 HC RX W HCPCS: Performed by: EMERGENCY MEDICINE

## 2019-10-22 PROCEDURE — 81001 URINALYSIS AUTO W/SCOPE: CPT

## 2019-10-22 PROCEDURE — 84703 CHORIONIC GONADOTROPIN ASSAY: CPT

## 2019-10-22 PROCEDURE — G0480 DRUG TEST DEF 1-7 CLASSES: HCPCS

## 2019-10-22 PROCEDURE — 6370000000 HC RX 637 (ALT 250 FOR IP): Performed by: EMERGENCY MEDICINE

## 2019-10-22 PROCEDURE — 71046 X-RAY EXAM CHEST 2 VIEWS: CPT

## 2019-10-22 PROCEDURE — 70496 CT ANGIOGRAPHY HEAD: CPT

## 2019-10-22 PROCEDURE — 84484 ASSAY OF TROPONIN QUANT: CPT

## 2019-10-22 PROCEDURE — 85025 COMPLETE CBC W/AUTO DIFF WBC: CPT

## 2019-10-22 PROCEDURE — 83735 ASSAY OF MAGNESIUM: CPT

## 2019-10-22 PROCEDURE — 6360000002 HC RX W HCPCS: Performed by: NURSE PRACTITIONER

## 2019-10-22 PROCEDURE — 6360000004 HC RX CONTRAST MEDICATION: Performed by: EMERGENCY MEDICINE

## 2019-10-22 PROCEDURE — 85610 PROTHROMBIN TIME: CPT

## 2019-10-22 PROCEDURE — 80307 DRUG TEST PRSMV CHEM ANLYZR: CPT

## 2019-10-22 PROCEDURE — 70450 CT HEAD/BRAIN W/O DYE: CPT

## 2019-10-22 PROCEDURE — 80053 COMPREHEN METABOLIC PANEL: CPT

## 2019-10-22 PROCEDURE — 96361 HYDRATE IV INFUSION ADD-ON: CPT

## 2019-10-22 PROCEDURE — 93005 ELECTROCARDIOGRAM TRACING: CPT | Performed by: EMERGENCY MEDICINE

## 2019-10-22 PROCEDURE — 36415 COLL VENOUS BLD VENIPUNCTURE: CPT

## 2019-10-22 PROCEDURE — 96375 TX/PRO/DX INJ NEW DRUG ADDON: CPT

## 2019-10-22 RX ORDER — BUTALBITAL, ACETAMINOPHEN AND CAFFEINE 50; 325; 40 MG/1; MG/1; MG/1
1 TABLET ORAL ONCE
Status: COMPLETED | OUTPATIENT
Start: 2019-10-22 | End: 2019-10-22

## 2019-10-22 RX ORDER — ACETAMINOPHEN 325 MG/1
650 TABLET ORAL ONCE
Status: COMPLETED | OUTPATIENT
Start: 2019-10-22 | End: 2019-10-22

## 2019-10-22 RX ORDER — KETOROLAC TROMETHAMINE 30 MG/ML
30 INJECTION, SOLUTION INTRAMUSCULAR; INTRAVENOUS ONCE
Status: COMPLETED | OUTPATIENT
Start: 2019-10-22 | End: 2019-10-22

## 2019-10-22 RX ORDER — DEXAMETHASONE SODIUM PHOSPHATE 4 MG/ML
6 INJECTION, SOLUTION INTRA-ARTICULAR; INTRALESIONAL; INTRAMUSCULAR; INTRAVENOUS; SOFT TISSUE ONCE
Status: COMPLETED | OUTPATIENT
Start: 2019-10-22 | End: 2019-10-22

## 2019-10-22 RX ORDER — DIPHENHYDRAMINE HYDROCHLORIDE 50 MG/ML
12.5 INJECTION INTRAMUSCULAR; INTRAVENOUS ONCE
Status: COMPLETED | OUTPATIENT
Start: 2019-10-22 | End: 2019-10-22

## 2019-10-22 RX ORDER — 0.9 % SODIUM CHLORIDE 0.9 %
1000 INTRAVENOUS SOLUTION INTRAVENOUS ONCE
Status: COMPLETED | OUTPATIENT
Start: 2019-10-22 | End: 2019-10-22

## 2019-10-22 RX ORDER — METOCLOPRAMIDE HYDROCHLORIDE 5 MG/ML
10 INJECTION INTRAMUSCULAR; INTRAVENOUS ONCE
Status: COMPLETED | OUTPATIENT
Start: 2019-10-22 | End: 2019-10-22

## 2019-10-22 RX ORDER — BUTALBITAL, ACETAMINOPHEN AND CAFFEINE 50; 325; 40 MG/1; MG/1; MG/1
1 TABLET ORAL EVERY 6 HOURS PRN
Qty: 5 TABLET | Refills: 0 | Status: SHIPPED | OUTPATIENT
Start: 2019-10-22 | End: 2019-10-24 | Stop reason: ALTCHOICE

## 2019-10-22 RX ADMIN — ACETAMINOPHEN 650 MG: 325 TABLET ORAL at 18:52

## 2019-10-22 RX ADMIN — IOPAMIDOL 75 ML: 755 INJECTION, SOLUTION INTRAVENOUS at 18:27

## 2019-10-22 RX ADMIN — SODIUM CHLORIDE 1000 ML: 9 INJECTION, SOLUTION INTRAVENOUS at 15:20

## 2019-10-22 RX ADMIN — BUTALBITAL, ACETAMINOPHEN, AND CAFFEINE 1 TABLET: 50; 325; 40 TABLET ORAL at 18:52

## 2019-10-22 RX ADMIN — METOCLOPRAMIDE 10 MG: 5 INJECTION, SOLUTION INTRAMUSCULAR; INTRAVENOUS at 15:20

## 2019-10-22 RX ADMIN — DIPHENHYDRAMINE HYDROCHLORIDE 12.5 MG: 50 INJECTION, SOLUTION INTRAMUSCULAR; INTRAVENOUS at 15:20

## 2019-10-22 RX ADMIN — KETOROLAC TROMETHAMINE 30 MG: 30 INJECTION, SOLUTION INTRAMUSCULAR at 15:20

## 2019-10-22 RX ADMIN — DEXAMETHASONE SODIUM PHOSPHATE 6 MG: 4 INJECTION, SOLUTION INTRAMUSCULAR; INTRAVENOUS at 18:52

## 2019-10-22 ASSESSMENT — PAIN SCALES - GENERAL
PAINLEVEL_OUTOF10: 6
PAINLEVEL_OUTOF10: 4
PAINLEVEL_OUTOF10: 7

## 2019-10-22 ASSESSMENT — PAIN DESCRIPTION - LOCATION: LOCATION: NECK

## 2019-10-22 ASSESSMENT — HEART SCORE: ECG: 1

## 2019-10-23 LAB
EKG ATRIAL RATE: 117 BPM
EKG DIAGNOSIS: NORMAL
EKG P AXIS: 47 DEGREES
EKG P-R INTERVAL: 144 MS
EKG Q-T INTERVAL: 332 MS
EKG QRS DURATION: 74 MS
EKG QTC CALCULATION (BAZETT): 463 MS
EKG R AXIS: 36 DEGREES
EKG T AXIS: 39 DEGREES
EKG VENTRICULAR RATE: 117 BPM

## 2019-10-23 PROCEDURE — 93010 ELECTROCARDIOGRAM REPORT: CPT | Performed by: INTERNAL MEDICINE

## 2019-10-24 ENCOUNTER — HOSPITAL ENCOUNTER (EMERGENCY)
Age: 39
Discharge: HOME | End: 2019-10-24
Attending: EMERGENCY MEDICINE
Payer: COMMERCIAL

## 2019-10-24 ENCOUNTER — APPOINTMENT (OUTPATIENT)
Dept: CT IMAGING | Age: 39
End: 2019-10-24

## 2019-10-24 VITALS
WEIGHT: 160 LBS | HEIGHT: 63 IN | OXYGEN SATURATION: 97 % | DIASTOLIC BLOOD PRESSURE: 78 MMHG | BODY MASS INDEX: 28.35 KG/M2 | TEMPERATURE: 98.1 F | HEART RATE: 95 BPM | RESPIRATION RATE: 20 BRPM | SYSTOLIC BLOOD PRESSURE: 112 MMHG

## 2019-10-24 DIAGNOSIS — I10 HYPERTENSION, UNSPECIFIED TYPE: Primary | ICD-10-CM

## 2019-10-24 DIAGNOSIS — R07.9 CHEST PAIN, UNSPECIFIED TYPE: ICD-10-CM

## 2019-10-24 DIAGNOSIS — R42 DIZZINESS: ICD-10-CM

## 2019-10-24 LAB
A/G RATIO: 1.6 (ref 1.1–2.2)
ALBUMIN SERPL-MCNC: 4.7 G/DL (ref 3.4–5)
ALP BLD-CCNC: 69 U/L (ref 40–129)
ALT SERPL-CCNC: 10 U/L (ref 10–40)
ANION GAP SERPL CALCULATED.3IONS-SCNC: 16 MMOL/L (ref 3–16)
AST SERPL-CCNC: 15 U/L (ref 15–37)
BACTERIA: ABNORMAL /HPF
BASOPHILS ABSOLUTE: 0.2 K/UL (ref 0–0.2)
BASOPHILS RELATIVE PERCENT: 1.2 %
BILIRUB SERPL-MCNC: 0.3 MG/DL (ref 0–1)
BILIRUBIN URINE: NEGATIVE
BLOOD, URINE: ABNORMAL
BUN BLDV-MCNC: 17 MG/DL (ref 7–20)
CALCIUM SERPL-MCNC: 10.1 MG/DL (ref 8.3–10.6)
CHLORIDE BLD-SCNC: 97 MMOL/L (ref 99–110)
CLARITY: CLEAR
CO2: 25 MMOL/L (ref 21–32)
COLOR: YELLOW
CREAT SERPL-MCNC: 0.9 MG/DL (ref 0.6–1.1)
EOSINOPHILS ABSOLUTE: 0.1 K/UL (ref 0–0.6)
EOSINOPHILS RELATIVE PERCENT: 0.5 %
GFR AFRICAN AMERICAN: >60
GFR NON-AFRICAN AMERICAN: >60
GLOBULIN: 3 G/DL
GLUCOSE BLD-MCNC: 98 MG/DL (ref 70–99)
GLUCOSE URINE: NEGATIVE MG/DL
HCG QUALITATIVE: NEGATIVE
HCT VFR BLD CALC: 42.8 % (ref 36–48)
HEMOGLOBIN: 14.4 G/DL (ref 12–16)
KETONES, URINE: NEGATIVE MG/DL
LEUKOCYTE ESTERASE, URINE: NEGATIVE
LYMPHOCYTES ABSOLUTE: 3.7 K/UL (ref 1–5.1)
LYMPHOCYTES RELATIVE PERCENT: 28.6 %
MAGNESIUM: 1.6 MG/DL (ref 1.8–2.4)
MCH RBC QN AUTO: 28.7 PG (ref 26–34)
MCHC RBC AUTO-ENTMCNC: 33.6 G/DL (ref 31–36)
MCV RBC AUTO: 85.5 FL (ref 80–100)
MICROSCOPIC EXAMINATION: YES
MONOCYTES ABSOLUTE: 0.8 K/UL (ref 0–1.3)
MONOCYTES RELATIVE PERCENT: 6.6 %
NEUTROPHILS ABSOLUTE: 8.1 K/UL (ref 1.7–7.7)
NEUTROPHILS RELATIVE PERCENT: 63.1 %
NITRITE, URINE: NEGATIVE
PDW BLD-RTO: 14.9 % (ref 12.4–15.4)
PH UA: 6 (ref 5–8)
PLATELET # BLD: 392 K/UL (ref 135–450)
PMV BLD AUTO: 8.9 FL (ref 5–10.5)
POTASSIUM SERPL-SCNC: 3.4 MMOL/L (ref 3.5–5.1)
PROTEIN UA: NEGATIVE MG/DL
RBC # BLD: 5 M/UL (ref 4–5.2)
RBC UA: ABNORMAL /HPF (ref 0–2)
SODIUM BLD-SCNC: 138 MMOL/L (ref 136–145)
SPECIFIC GRAVITY UA: <=1.005 (ref 1–1.03)
TOTAL PROTEIN: 7.7 G/DL (ref 6.4–8.2)
TROPONIN: <0.01 NG/ML
URINE TYPE: ABNORMAL
UROBILINOGEN, URINE: 0.2 E.U./DL
WBC # BLD: 12.8 K/UL (ref 4–11)
WBC UA: ABNORMAL /HPF (ref 0–5)

## 2019-10-24 PROCEDURE — 6360000004 HC RX CONTRAST MEDICATION: Performed by: NURSE PRACTITIONER

## 2019-10-24 PROCEDURE — 93005 ELECTROCARDIOGRAM TRACING: CPT | Performed by: NURSE PRACTITIONER

## 2019-10-24 PROCEDURE — 96365 THER/PROPH/DIAG IV INF INIT: CPT

## 2019-10-24 PROCEDURE — 70450 CT HEAD/BRAIN W/O DYE: CPT

## 2019-10-24 PROCEDURE — 83735 ASSAY OF MAGNESIUM: CPT

## 2019-10-24 PROCEDURE — 6370000000 HC RX 637 (ALT 250 FOR IP): Performed by: NURSE PRACTITIONER

## 2019-10-24 PROCEDURE — 85025 COMPLETE CBC W/AUTO DIFF WBC: CPT

## 2019-10-24 PROCEDURE — 84703 CHORIONIC GONADOTROPIN ASSAY: CPT

## 2019-10-24 PROCEDURE — 99284 EMERGENCY DEPT VISIT MOD MDM: CPT

## 2019-10-24 PROCEDURE — 71275 CT ANGIOGRAPHY CHEST: CPT

## 2019-10-24 PROCEDURE — 84484 ASSAY OF TROPONIN QUANT: CPT

## 2019-10-24 PROCEDURE — 80053 COMPREHEN METABOLIC PANEL: CPT

## 2019-10-24 PROCEDURE — 2580000003 HC RX 258: Performed by: NURSE PRACTITIONER

## 2019-10-24 PROCEDURE — 96361 HYDRATE IV INFUSION ADD-ON: CPT

## 2019-10-24 PROCEDURE — 6360000002 HC RX W HCPCS: Performed by: NURSE PRACTITIONER

## 2019-10-24 PROCEDURE — 36415 COLL VENOUS BLD VENIPUNCTURE: CPT

## 2019-10-24 PROCEDURE — 81001 URINALYSIS AUTO W/SCOPE: CPT

## 2019-10-24 RX ORDER — MAGNESIUM SULFATE 1 G/100ML
1 INJECTION INTRAVENOUS ONCE
Status: COMPLETED | OUTPATIENT
Start: 2019-10-24 | End: 2019-10-24

## 2019-10-24 RX ORDER — MAGNESIUM SULFATE HEPTAHYDRATE 500 MG/ML
1 INJECTION, SOLUTION INTRAMUSCULAR; INTRAVENOUS ONCE
Status: DISCONTINUED | OUTPATIENT
Start: 2019-10-24 | End: 2019-10-24 | Stop reason: CLARIF

## 2019-10-24 RX ORDER — 0.9 % SODIUM CHLORIDE 0.9 %
1000 INTRAVENOUS SOLUTION INTRAVENOUS ONCE
Status: COMPLETED | OUTPATIENT
Start: 2019-10-24 | End: 2019-10-24

## 2019-10-24 RX ORDER — POTASSIUM CHLORIDE 20 MEQ/1
20 TABLET, EXTENDED RELEASE ORAL ONCE
Status: COMPLETED | OUTPATIENT
Start: 2019-10-24 | End: 2019-10-24

## 2019-10-24 RX ADMIN — SODIUM CHLORIDE 1000 ML: 9 INJECTION, SOLUTION INTRAVENOUS at 17:23

## 2019-10-24 RX ADMIN — POTASSIUM CHLORIDE 20 MEQ: 20 TABLET, EXTENDED RELEASE ORAL at 18:15

## 2019-10-24 RX ADMIN — MAGNESIUM SULFATE HEPTAHYDRATE 1 G: 1 INJECTION, SOLUTION INTRAVENOUS at 18:15

## 2019-10-24 RX ADMIN — IOPAMIDOL 75 ML: 755 INJECTION, SOLUTION INTRAVENOUS at 18:30

## 2019-10-24 ASSESSMENT — PAIN SCALES - GENERAL: PAINLEVEL_OUTOF10: 5

## 2019-10-24 ASSESSMENT — PAIN DESCRIPTION - ORIENTATION: ORIENTATION: LOWER

## 2019-10-24 ASSESSMENT — PAIN DESCRIPTION - PAIN TYPE: TYPE: ACUTE PAIN

## 2019-10-24 ASSESSMENT — PAIN DESCRIPTION - LOCATION: LOCATION: BACK

## 2019-10-25 LAB
EKG ATRIAL RATE: 136 BPM
EKG DIAGNOSIS: NORMAL
EKG P AXIS: 36 DEGREES
EKG P-R INTERVAL: 112 MS
EKG Q-T INTERVAL: 378 MS
EKG QRS DURATION: 74 MS
EKG QTC CALCULATION (BAZETT): 568 MS
EKG R AXIS: 44 DEGREES
EKG T AXIS: 52 DEGREES
EKG VENTRICULAR RATE: 136 BPM

## 2019-10-25 PROCEDURE — 93010 ELECTROCARDIOGRAM REPORT: CPT | Performed by: INTERNAL MEDICINE

## 2019-12-19 ENCOUNTER — OFFICE VISIT (OUTPATIENT)
Dept: CARDIOLOGY CLINIC | Age: 39
End: 2019-12-19
Payer: COMMERCIAL

## 2019-12-19 ENCOUNTER — TELEPHONE (OUTPATIENT)
Dept: CARDIOLOGY CLINIC | Age: 39
End: 2019-12-19

## 2019-12-19 VITALS
DIASTOLIC BLOOD PRESSURE: 68 MMHG | WEIGHT: 176.8 LBS | RESPIRATION RATE: 16 BRPM | HEART RATE: 116 BPM | OXYGEN SATURATION: 98 % | BODY MASS INDEX: 31.33 KG/M2 | SYSTOLIC BLOOD PRESSURE: 110 MMHG | HEIGHT: 63 IN

## 2019-12-19 DIAGNOSIS — R00.2 PALPITATIONS: ICD-10-CM

## 2019-12-19 DIAGNOSIS — R55 VASOVAGAL SYNCOPE: ICD-10-CM

## 2019-12-19 PROCEDURE — 99244 OFF/OP CNSLTJ NEW/EST MOD 40: CPT | Performed by: INTERNAL MEDICINE

## 2019-12-19 RX ORDER — METOPROLOL SUCCINATE 25 MG/1
25 TABLET, EXTENDED RELEASE ORAL DAILY
Qty: 30 TABLET | Refills: 11 | Status: SHIPPED | OUTPATIENT
Start: 2019-12-19 | End: 2020-02-11

## 2019-12-30 ENCOUNTER — TELEPHONE (OUTPATIENT)
Dept: CARDIOLOGY CLINIC | Age: 39
End: 2019-12-30

## 2020-01-07 ENCOUNTER — TELEPHONE (OUTPATIENT)
Dept: CARDIOLOGY CLINIC | Age: 40
End: 2020-01-07

## 2020-01-08 PROCEDURE — 93272 ECG/REVIEW INTERPRET ONLY: CPT | Performed by: INTERNAL MEDICINE

## 2020-01-14 ENCOUNTER — TELEPHONE (OUTPATIENT)
Dept: CARDIOLOGY CLINIC | Age: 40
End: 2020-01-14

## 2020-01-15 ENCOUNTER — TELEPHONE (OUTPATIENT)
Dept: CARDIOLOGY CLINIC | Age: 40
End: 2020-01-15

## 2020-01-15 ENCOUNTER — HOSPITAL ENCOUNTER (OUTPATIENT)
Dept: CARDIOLOGY | Age: 40
Discharge: HOME | End: 2020-01-15
Payer: COMMERCIAL

## 2020-01-15 LAB
LV EF: 60 %
LVEF MODALITY: NORMAL

## 2020-01-15 PROCEDURE — 93320 DOPPLER ECHO COMPLETE: CPT

## 2020-01-15 PROCEDURE — 93351 STRESS TTE COMPLETE: CPT

## 2020-01-16 ENCOUNTER — TELEPHONE (OUTPATIENT)
Dept: CARDIOLOGY CLINIC | Age: 40
End: 2020-01-16

## 2020-01-16 NOTE — TELEPHONE ENCOUNTER
Call  Everything checking out ok  Next step is to see EP for further eval possible SVT and have ILR placed  Sched OV w/ EP  In meantime, can try increasing toprol to 50 daily to see if helps negative Soft, non-tender, no hepatosplenomegaly, normal bowel sounds

## 2020-01-16 NOTE — TELEPHONE ENCOUNTER
I called and left patient a detailed message regarding EP apt and medication increase. Please call patient to schedule EP apt. New patient for EP.

## 2020-01-21 ENCOUNTER — OFFICE VISIT (OUTPATIENT)
Dept: CARDIOLOGY CLINIC | Age: 40
End: 2020-01-21
Payer: COMMERCIAL

## 2020-01-21 ENCOUNTER — TELEPHONE (OUTPATIENT)
Dept: CARDIOLOGY CLINIC | Age: 40
End: 2020-01-21

## 2020-01-21 VITALS
DIASTOLIC BLOOD PRESSURE: 74 MMHG | OXYGEN SATURATION: 99 % | HEART RATE: 120 BPM | BODY MASS INDEX: 31.01 KG/M2 | SYSTOLIC BLOOD PRESSURE: 136 MMHG | WEIGHT: 175 LBS | HEIGHT: 63 IN

## 2020-01-21 PROCEDURE — 99244 OFF/OP CNSLTJ NEW/EST MOD 40: CPT | Performed by: INTERNAL MEDICINE

## 2020-01-21 PROCEDURE — 93000 ELECTROCARDIOGRAM COMPLETE: CPT | Performed by: INTERNAL MEDICINE

## 2020-01-21 NOTE — PROGRESS NOTES
tenderness  · Bowel sounds present  Extremities:  ·  No Cyanosis or Clubbing  ·  Lower extremity edema: No  · Skin: Warm and dry  Neurological:  · Alert and oriented. · Moves all extremities well  · No abnormalities of mood, affect, memory, mentation, or behavior are noted    DATA:    ECG:  Sinus rhythm 90  ECHO: 1/15/2020    Summary   Normal left ventricle systolic function with an estimated ejection fraction   of 55%. No regional wall motion abnormalities are seen. Normal left ventricular diastolic filling pressure. Trace mitral, aortic and tricuspid regurgitation. Inadequate tricuspid regurgitation to estimate systolic pulmonary artery   pressure. IMPRESSION:    1. Sinus tachycardia. Mrs. Nida Hodgkins is a pleasant 46year old female with a medical history significant for palpitations. Her monitor showed predominantly sinus rhythm with asymptomatic PACs without significant arrhythmias. She states that during what looks like sinus tachycardia she wasn't very active. We discussed ablation and EPS for inappropriate sinus tachycardia verses atrial tachycardia. She would like to proceed with EPS to evaluate her sinus node function and possible AT. We will arrange given her symptoms. 2. PACs  - Plan as per above. 3. Hypertension   ~BP: (136)/(74)      RECOMMENDATIONS:  1. Discussed medical therapy vs EP study and possible ablation for your tachycardia.    2. Risk, benefit, alternative, rationale of the procedure were discussed with the patient which included but were not limited to allergic reaction to the medications, pain, bleeding, infection, nerve injury, injury to diaphragm(breathing muscle), pulmonary embolus(blood clot in lungs), deep vein blood clot, pneumothorax, hemothorax, acute renal failure, cardiac perforation,  tamponade, need for emergent surgery (open heart), need for any future surgery, pulmonary vein stenosis requiring stent or angioplasty, left atrial to esophageal fistula requiring emergent surgery, stroke, myocardial infarction, need for permanent pacemaker, lead dislodgement and death. Given the complex nature of the disease no guarantee was given on the success of the procedure. Explained to patient that discontinuation of anticoagulation is not an indication for the procedure. 3. Serafin Landau would like to proceed with an EP study at this time. 4. Follow up with me after your test.     QUALITY MEASURES  1. Tobacco Cessation Counseling: Yes  2. Retake of BP if >140/90:   NA  3. Documentation to PCP/referring for new patient:  Sent to PCP at close of office visit  4. CAD patient on anti-platelet: NA  5. CAD patient on STATIN therapy:  NA  6. Patient with CHF and aFib on anticoagulation:  NA     All questions and concerns were addressed to the patient/family. Alternatives to my treatment were discussed. Dr. Zoe Jones MD  Electrophysiology  [facility]. [address] [place] [address]. [address]. [place], [address]  Phone: [phone]  Fax: [phone]     This note was scribed in the presence of Dr. Alex Shah MD by Mikki Fleming, MINH. NOTE: This report was transcribed using voice recognition software. Every effort was made to ensure accuracy, however, inadvertent computerized transcription errors may be present. The above documentation has been prepared under my direction and personally reviewed by me in its entirety. I confirm the note above accurately reflects the work, physical examination, discussion of treatments and procedures, and medical decision making performed by the nurse and myself.      Electronically signed by Jordan Merino MD on 1/23/2020 at 7:02 PM

## 2020-01-21 NOTE — PATIENT INSTRUCTIONS
sure you have someone to drive you home. Anesthesia and pain medicine make it unsafe for you to drive.     · You will be given more specific instructions about recovering from your procedure. They will cover things like diet, wound care, follow-up care, driving, and getting back to your normal routine. When should you call your doctor? · You have questions or concerns.     · You don't understand how to prepare for your procedure.     · You become ill before the procedure (such as fever, flu, or a cold).     · You need to reschedule or have changed your mind about having the procedure. Where can you learn more? Go to https://[email or web address] [email or web address] org and sign in to your [employer] account. Enter T202 in the PingStamp box to learn more about \"Electrophysiology Study and Catheter Ablation: Before Your Procedure. \"     If you do not have an account, please click on the \"Sign Up Now\" link. Current as of: April 9, 2019  Content Version: 12.3  © 0558-7510 [employer]. Care instructions adapted under license by [facility] ([facility]). If you have questions about a medical condition or this instruction, always ask your healthcare professional. [place] [address] any warranty or liability for your use of this information. Patient Education        Supraventricular Tachycardia: Care Instructions  Your Care Instructions    Having supraventricular tachycardia (SVT) means that from time to time your heart beats abnormally fast. This fast rhythm is caused by changes in the electrical system of your heart. You may feel a fluttering in your chest (palpitations) and have a fast pulse. When your heart is beating fast, you may feel anxious and lightheaded, be short of breath, and feel discomfort in the chest.  Your doctor may prescribe medicines to help slow down your heartbeat. Your doctor may also suggest you try vagal maneuvers when having an episode of SVT.  These are things, like alcohol or drinks with caffeine. · Do not use over-the-counter decongestants, herbal remedies, diet pills, or \"pep\" pills, which often contain stimulants. · Do not use illegal drugs, such as cocaine, ecstasy, or methamphetamine, which can speed up your heart's rhythm. · Do not smoke. Smoking can make this condition worse. If you need help quitting, talk to your doctor about stop-smoking programs and medicines. These can increase your chances of quitting for good. · Be alert for new or worsening symptoms, such as shortness of breath, pounding of your heart, or unusual tiredness. If new symptoms develop or your symptoms become worse, call your doctor. When should you call for help? Call 911 anytime you think you may need emergency care. For example, call if:    · You passed out (lost consciousness).     · You are short of breath.    Call your doctor now or seek immediate medical care if:    · You have a fast heartbeat.     · You are dizzy or lightheaded, or feel like you may faint.    Watch closely for changes in your health, and be sure to contact your doctor if:    · You do not get better as expected. Where can you learn more? Go to https://[employer]. org and sign in to your LATTO account. Enter G244 in the [employer]W Squared box to learn more about \"Supraventricular Tachycardia: Care Instructions. \"     If you do not have an account, please click on the \"Sign Up Now\" link. Current as of: April 9, 2019  Content Version: 12.3  © 4291-0952 [employer]. Care instructions adapted under license by [facility] ([facility]). If you have questions about a medical condition or this instruction, always ask your healthcare professional. [place] [address] any warranty or liability for your use of this information.

## 2020-01-22 ENCOUNTER — TELEPHONE (OUTPATIENT)
Dept: CARDIOLOGY CLINIC | Age: 40
End: 2020-01-22

## 2020-01-22 NOTE — TELEPHONE ENCOUNTER
Pt was told at [address] that she would receive a call to schedule EP testing. Pt has not heard from our office. Pt wants to get scheduled ASAP, so she can adjust her work schedule.

## 2020-01-28 ENCOUNTER — TELEPHONE (OUTPATIENT)
Dept: CARDIOLOGY CLINIC | Age: 40
End: 2020-01-28

## 2020-01-28 NOTE — TELEPHONE ENCOUNTER
Pt seen [address] 1/21/2020. AGK are you ok with a letter stating this?
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
## 2020-02-05 ENCOUNTER — ANESTHESIA EVENT (OUTPATIENT)
Dept: CARDIAC CATH/INVASIVE PROCEDURES | Age: 40
End: 2020-02-05
Payer: COMMERCIAL

## 2020-02-05 ENCOUNTER — ANESTHESIA (OUTPATIENT)
Dept: CARDIAC CATH/INVASIVE PROCEDURES | Age: 40
End: 2020-02-05
Payer: COMMERCIAL

## 2020-02-05 ENCOUNTER — HOSPITAL ENCOUNTER (OUTPATIENT)
Dept: CARDIAC CATH/INVASIVE PROCEDURES | Age: 40
Discharge: HOME | End: 2020-02-05
Attending: INTERNAL MEDICINE | Admitting: INTERNAL MEDICINE
Payer: COMMERCIAL

## 2020-02-05 VITALS — OXYGEN SATURATION: 100 % | SYSTOLIC BLOOD PRESSURE: 106 MMHG | DIASTOLIC BLOOD PRESSURE: 76 MMHG

## 2020-02-05 VITALS — HEIGHT: 63 IN | WEIGHT: 171.5 LBS | BODY MASS INDEX: 30.39 KG/M2

## 2020-02-05 LAB
ANION GAP SERPL CALCULATED.3IONS-SCNC: 17 MMOL/L (ref 3–16)
BUN BLDV-MCNC: 13 MG/DL (ref 7–20)
CALCIUM SERPL-MCNC: 10.1 MG/DL (ref 8.3–10.6)
CHLORIDE BLD-SCNC: 92 MMOL/L (ref 99–110)
CO2: 26 MMOL/L (ref 21–32)
CREAT SERPL-MCNC: 0.7 MG/DL (ref 0.6–1.1)
EKG ATRIAL RATE: 67 BPM
EKG DIAGNOSIS: NORMAL
EKG P AXIS: 50 DEGREES
EKG P-R INTERVAL: 140 MS
EKG Q-T INTERVAL: 416 MS
EKG QRS DURATION: 88 MS
EKG QTC CALCULATION (BAZETT): 439 MS
EKG R AXIS: 40 DEGREES
EKG T AXIS: 38 DEGREES
EKG VENTRICULAR RATE: 67 BPM
GFR AFRICAN AMERICAN: >60
GFR NON-AFRICAN AMERICAN: >60
GLUCOSE BLD-MCNC: 108 MG/DL (ref 70–99)
HCT VFR BLD CALC: 43 % (ref 36–48)
HEMOGLOBIN: 14.5 G/DL (ref 12–16)
INR BLD: 1.03 (ref 0.86–1.14)
MAGNESIUM: 1.9 MG/DL (ref 1.8–2.4)
MCH RBC QN AUTO: 28.8 PG (ref 26–34)
MCHC RBC AUTO-ENTMCNC: 33.7 G/DL (ref 31–36)
MCV RBC AUTO: 85.4 FL (ref 80–100)
PDW BLD-RTO: 14.7 % (ref 12.4–15.4)
PLATELET # BLD: 406 K/UL (ref 135–450)
PMV BLD AUTO: 7.9 FL (ref 5–10.5)
POTASSIUM REFLEX MAGNESIUM: 2.7 MMOL/L (ref 3.5–5.1)
PREGNANCY, URINE: NEGATIVE
PROTHROMBIN TIME: 12 SEC (ref 10–13.2)
RBC # BLD: 5.03 M/UL (ref 4–5.2)
SODIUM BLD-SCNC: 135 MMOL/L (ref 136–145)
WBC # BLD: 9.2 K/UL (ref 4–11)

## 2020-02-05 PROCEDURE — 6360000002 HC RX W HCPCS

## 2020-02-05 PROCEDURE — 6360000002 HC RX W HCPCS: Performed by: NURSE ANESTHETIST, CERTIFIED REGISTERED

## 2020-02-05 PROCEDURE — 2580000003 HC RX 258

## 2020-02-05 PROCEDURE — C1894 INTRO/SHEATH, NON-LASER: HCPCS

## 2020-02-05 PROCEDURE — 2500000003 HC RX 250 WO HCPCS: Performed by: NURSE ANESTHETIST, CERTIFIED REGISTERED

## 2020-02-05 PROCEDURE — 2580000003 HC RX 258: Performed by: NURSE ANESTHETIST, CERTIFIED REGISTERED

## 2020-02-05 PROCEDURE — 6370000000 HC RX 637 (ALT 250 FOR IP)

## 2020-02-05 PROCEDURE — 93620 COMP EP EVL R AT VEN PAC&REC: CPT

## 2020-02-05 PROCEDURE — 6360000002 HC RX W HCPCS: Performed by: INTERNAL MEDICINE

## 2020-02-05 PROCEDURE — 85027 COMPLETE CBC AUTOMATED: CPT

## 2020-02-05 PROCEDURE — 93005 ELECTROCARDIOGRAM TRACING: CPT | Performed by: INTERNAL MEDICINE

## 2020-02-05 PROCEDURE — 2500000003 HC RX 250 WO HCPCS

## 2020-02-05 PROCEDURE — 83735 ASSAY OF MAGNESIUM: CPT

## 2020-02-05 PROCEDURE — 85610 PROTHROMBIN TIME: CPT

## 2020-02-05 PROCEDURE — 93623 PRGRMD STIMJ&PACG IV RX NFS: CPT

## 2020-02-05 PROCEDURE — 3700000000 HC ANESTHESIA ATTENDED CARE

## 2020-02-05 PROCEDURE — 93621 COMP EP EVL L PAC&REC C SINS: CPT

## 2020-02-05 PROCEDURE — 2709999900 HC NON-CHARGEABLE SUPPLY

## 2020-02-05 PROCEDURE — 93620 COMP EP EVL R AT VEN PAC&REC: CPT | Performed by: INTERNAL MEDICINE

## 2020-02-05 PROCEDURE — 84703 CHORIONIC GONADOTROPIN ASSAY: CPT

## 2020-02-05 PROCEDURE — 3700000001 HC ADD 15 MINUTES (ANESTHESIA)

## 2020-02-05 PROCEDURE — C1730 CATH, EP, 19 OR FEW ELECT: HCPCS

## 2020-02-05 PROCEDURE — 93623 PRGRMD STIMJ&PACG IV RX NFS: CPT | Performed by: INTERNAL MEDICINE

## 2020-02-05 PROCEDURE — 80048 BASIC METABOLIC PNL TOTAL CA: CPT

## 2020-02-05 RX ORDER — POTASSIUM CHLORIDE 20 MEQ/1
20 TABLET, EXTENDED RELEASE ORAL DAILY
Qty: 90 TABLET | Refills: 1 | Status: SHIPPED | OUTPATIENT
Start: 2020-02-05 | End: 2020-02-11

## 2020-02-05 RX ORDER — OXYCODONE HYDROCHLORIDE 5 MG/1
5 TABLET ORAL EVERY 4 HOURS PRN
Status: DISCONTINUED | OUTPATIENT
Start: 2020-02-05 | End: 2020-02-05 | Stop reason: HOSPADM

## 2020-02-05 RX ORDER — FENTANYL CITRATE 50 UG/ML
INJECTION, SOLUTION INTRAMUSCULAR; INTRAVENOUS PRN
Status: DISCONTINUED | OUTPATIENT
Start: 2020-02-05 | End: 2020-02-05 | Stop reason: SDUPTHER

## 2020-02-05 RX ORDER — POTASSIUM CHLORIDE 29.8 MG/ML
20 INJECTION INTRAVENOUS
Status: COMPLETED | OUTPATIENT
Start: 2020-02-05 | End: 2020-02-05

## 2020-02-05 RX ORDER — SODIUM CHLORIDE 0.9 % (FLUSH) 0.9 %
10 SYRINGE (ML) INJECTION EVERY 12 HOURS SCHEDULED
Status: DISCONTINUED | OUTPATIENT
Start: 2020-02-05 | End: 2020-02-05 | Stop reason: HOSPADM

## 2020-02-05 RX ORDER — LIDOCAINE HYDROCHLORIDE 20 MG/ML
INJECTION, SOLUTION INFILTRATION; PERINEURAL PRN
Status: DISCONTINUED | OUTPATIENT
Start: 2020-02-05 | End: 2020-02-05 | Stop reason: SDUPTHER

## 2020-02-05 RX ORDER — MIDAZOLAM HYDROCHLORIDE 1 MG/ML
INJECTION INTRAMUSCULAR; INTRAVENOUS PRN
Status: DISCONTINUED | OUTPATIENT
Start: 2020-02-05 | End: 2020-02-05 | Stop reason: SDUPTHER

## 2020-02-05 RX ORDER — MAGNESIUM SULFATE IN WATER 40 MG/ML
2 INJECTION, SOLUTION INTRAVENOUS ONCE
Status: COMPLETED | OUTPATIENT
Start: 2020-02-05 | End: 2020-02-05

## 2020-02-05 RX ORDER — OXYCODONE HYDROCHLORIDE 5 MG/1
5 TABLET ORAL EVERY 6 HOURS PRN
Qty: 10 TABLET | Refills: 0 | Status: SHIPPED | OUTPATIENT
Start: 2020-02-05 | End: 2020-02-08

## 2020-02-05 RX ORDER — SODIUM CHLORIDE 9 MG/ML
INJECTION, SOLUTION INTRAVENOUS CONTINUOUS PRN
Status: DISCONTINUED | OUTPATIENT
Start: 2020-02-05 | End: 2020-02-05 | Stop reason: SDUPTHER

## 2020-02-05 RX ORDER — SODIUM CHLORIDE 0.9 % (FLUSH) 0.9 %
10 SYRINGE (ML) INJECTION PRN
Status: DISCONTINUED | OUTPATIENT
Start: 2020-02-05 | End: 2020-02-05 | Stop reason: HOSPADM

## 2020-02-05 RX ADMIN — OXYCODONE HYDROCHLORIDE 5 MG: 5 TABLET ORAL at 13:33

## 2020-02-05 RX ADMIN — LIDOCAINE HYDROCHLORIDE 100 MG: 20 INJECTION, SOLUTION INFILTRATION; PERINEURAL at 10:51

## 2020-02-05 RX ADMIN — POTASSIUM CHLORIDE 20 MEQ: 29.8 INJECTION, SOLUTION INTRAVENOUS at 12:59

## 2020-02-05 RX ADMIN — FENTANYL CITRATE 50 MCG: 50 INJECTION INTRAMUSCULAR; INTRAVENOUS at 11:48

## 2020-02-05 RX ADMIN — FENTANYL CITRATE 50 MCG: 50 INJECTION, SOLUTION INTRAMUSCULAR; INTRAVENOUS at 11:58

## 2020-02-05 RX ADMIN — MIDAZOLAM HYDROCHLORIDE 2 MG: 2 INJECTION, SOLUTION INTRAMUSCULAR; INTRAVENOUS at 11:58

## 2020-02-05 RX ADMIN — FENTANYL CITRATE 50 MCG: 50 INJECTION INTRAMUSCULAR; INTRAVENOUS at 10:54

## 2020-02-05 RX ADMIN — MIDAZOLAM HYDROCHLORIDE 2 MG: 2 INJECTION, SOLUTION INTRAMUSCULAR; INTRAVENOUS at 10:54

## 2020-02-05 RX ADMIN — MAGNESIUM SULFATE IN WATER 2 G: 40 INJECTION, SOLUTION INTRAVENOUS at 11:50

## 2020-02-05 RX ADMIN — FENTANYL CITRATE 50 MCG: 50 INJECTION INTRAMUSCULAR; INTRAVENOUS at 11:06

## 2020-02-05 RX ADMIN — MIDAZOLAM HYDROCHLORIDE 2 MG: 2 INJECTION, SOLUTION INTRAMUSCULAR; INTRAVENOUS at 11:44

## 2020-02-05 RX ADMIN — POTASSIUM CHLORIDE 20 MEQ: 29.8 INJECTION, SOLUTION INTRAVENOUS at 12:00

## 2020-02-05 RX ADMIN — SODIUM CHLORIDE: 9 INJECTION, SOLUTION INTRAVENOUS at 10:51

## 2020-02-05 RX ADMIN — MIDAZOLAM HYDROCHLORIDE 5 MG: 2 INJECTION, SOLUTION INTRAMUSCULAR; INTRAVENOUS at 11:08

## 2020-02-05 RX ADMIN — FENTANYL CITRATE 50 MCG: 50 INJECTION INTRAMUSCULAR; INTRAVENOUS at 11:29

## 2020-02-05 RX ADMIN — MIDAZOLAM HYDROCHLORIDE 5 MG: 2 INJECTION, SOLUTION INTRAMUSCULAR; INTRAVENOUS at 11:18

## 2020-02-05 RX ADMIN — MIDAZOLAM HYDROCHLORIDE 2 MG: 2 INJECTION, SOLUTION INTRAMUSCULAR; INTRAVENOUS at 10:47

## 2020-02-05 RX ADMIN — SODIUM CHLORIDE: 9 INJECTION, SOLUTION INTRAVENOUS at 11:24

## 2020-02-05 ASSESSMENT — PULMONARY FUNCTION TESTS
PIF_VALUE: 0

## 2020-02-05 ASSESSMENT — PAIN SCALES - GENERAL: PAINLEVEL_OUTOF10: 0

## 2020-02-05 ASSESSMENT — LIFESTYLE VARIABLES: SMOKING_STATUS: 1

## 2020-02-05 NOTE — ANESTHESIA PRE PROCEDURE
CO2 25 10/24/2019    BUN 17 10/24/2019    CREATININE 0.9 10/24/2019    GFRAA >60 10/24/2019    GFRAA >60 04/17/2012    AGRATIO 1.6 10/24/2019    LABGLOM >60 10/24/2019    GLUCOSE 98 10/24/2019    PROT 7.7 10/24/2019    PROT 5.8 04/17/2012    CALCIUM 10.1 10/24/2019    BILITOT 0.3 10/24/2019    ALKPHOS 69 10/24/2019    AST 15 10/24/2019    ALT 10 10/24/2019       POC Tests: No results for input(s): POCGLU, POCNA, POCK, POCCL, POCBUN, POCHEMO, POCHCT in the last 72 hours. Coags:   Lab Results   Component Value Date    PROTIME 12.0 02/05/2020    INR 1.03 02/05/2020       HCG (If Applicable):   Lab Results   Component Value Date    PREGTESTUR Negative 02/05/2020        ABGs: No results found for: PHART, PO2ART, KBH9TTI, SPJ5VPY, BEART, T7WRXXCV     Type & Screen (If Applicable):  Lab Results   Component Value Date    LABABO A 04/15/2012    [address] Positive 04/15/2012       Anesthesia Evaluation  Patient summary reviewed and Nursing notes reviewed no history of anesthetic complications:   Airway: Mallampati: I  TM distance: >3 FB   Neck ROM: full  Mouth opening: > = 3 FB Dental: normal exam         Pulmonary: breath sounds clear to auscultation  (+) current smoker (quit 3 days ago  was 1/4 ppd )                           Cardiovascular:  Exercise tolerance: good (>4 METS),   (+) dysrhythmias: SVT,       NYHA Classification: I    Rhythm: regular  Rate: normal           Beta Blocker:  Not on Beta Blocker         Neuro/Psych:   Negative Neuro/Psych ROS              GI/Hepatic/Renal:        (-) GERD       Endo/Other: Negative Endo/Other ROS                    Abdominal:           Vascular: negative vascular ROS. Anesthesia Plan      general     ASA 2       Induction: intravenous. MIPS: Prophylactic antiemetics administered. Anesthetic plan and risks discussed with patient. Plan discussed with CRNA.     Attending anesthesiologist reviewed and agrees with Pre Eval

## 2020-02-07 ENCOUNTER — TELEPHONE (OUTPATIENT)
Dept: CARDIOLOGY CLINIC | Age: 40
End: 2020-02-07

## 2020-02-07 NOTE — TELEPHONE ENCOUNTER
Pt sts that she has a knot in her groin area from the entrance site from her cath.  Pt wants to know if this is normal? Please advise, thank you

## 2020-02-08 ENCOUNTER — HOSPITAL ENCOUNTER (EMERGENCY)
Age: 40
Discharge: HOME | End: 2020-02-08

## 2020-02-08 ENCOUNTER — APPOINTMENT (OUTPATIENT)
Dept: CT IMAGING | Age: 40
End: 2020-02-08

## 2020-02-08 VITALS
OXYGEN SATURATION: 98 % | HEART RATE: 71 BPM | WEIGHT: 168 LBS | BODY MASS INDEX: 29.77 KG/M2 | DIASTOLIC BLOOD PRESSURE: 71 MMHG | SYSTOLIC BLOOD PRESSURE: 113 MMHG | TEMPERATURE: 98.5 F | HEIGHT: 63 IN | RESPIRATION RATE: 16 BRPM

## 2020-02-08 LAB
A/G RATIO: 1.6 (ref 1.1–2.2)
ALBUMIN SERPL-MCNC: 4.2 G/DL (ref 3.4–5)
ALP BLD-CCNC: 87 U/L (ref 40–129)
ALT SERPL-CCNC: 35 U/L (ref 10–40)
ANION GAP SERPL CALCULATED.3IONS-SCNC: 16 MMOL/L (ref 3–16)
AST SERPL-CCNC: 15 U/L (ref 15–37)
BILIRUB SERPL-MCNC: <0.2 MG/DL (ref 0–1)
BUN BLDV-MCNC: 14 MG/DL (ref 7–20)
CALCIUM SERPL-MCNC: 9.1 MG/DL (ref 8.3–10.6)
CHLORIDE BLD-SCNC: 99 MMOL/L (ref 99–110)
CO2: 24 MMOL/L (ref 21–32)
CREAT SERPL-MCNC: 0.6 MG/DL (ref 0.6–1.1)
GFR AFRICAN AMERICAN: >60
GFR NON-AFRICAN AMERICAN: >60
GLOBULIN: 2.6 G/DL
GLUCOSE BLD-MCNC: 95 MG/DL (ref 70–99)
HCG QUALITATIVE: NEGATIVE
HCT VFR BLD CALC: 38.7 % (ref 36–48)
HEMOGLOBIN: 12.9 G/DL (ref 12–16)
INR BLD: 1.04 (ref 0.86–1.14)
MAGNESIUM: 1.6 MG/DL (ref 1.8–2.4)
MCH RBC QN AUTO: 28.5 PG (ref 26–34)
MCHC RBC AUTO-ENTMCNC: 33.3 G/DL (ref 31–36)
MCV RBC AUTO: 85.7 FL (ref 80–100)
PDW BLD-RTO: 15 % (ref 12.4–15.4)
PLATELET # BLD: 401 K/UL (ref 135–450)
PMV BLD AUTO: 8.5 FL (ref 5–10.5)
POTASSIUM SERPL-SCNC: 3.1 MMOL/L (ref 3.5–5.1)
PROTHROMBIN TIME: 12.1 SEC (ref 10–13.2)
RBC # BLD: 4.52 M/UL (ref 4–5.2)
SODIUM BLD-SCNC: 139 MMOL/L (ref 136–145)
TOTAL PROTEIN: 6.8 G/DL (ref 6.4–8.2)
WBC # BLD: 9.5 K/UL (ref 4–11)

## 2020-02-08 PROCEDURE — 85610 PROTHROMBIN TIME: CPT

## 2020-02-08 PROCEDURE — 80053 COMPREHEN METABOLIC PANEL: CPT

## 2020-02-08 PROCEDURE — 6360000004 HC RX CONTRAST MEDICATION: Performed by: PHYSICIAN ASSISTANT

## 2020-02-08 PROCEDURE — 96374 THER/PROPH/DIAG INJ IV PUSH: CPT

## 2020-02-08 PROCEDURE — 2580000003 HC RX 258: Performed by: PHYSICIAN ASSISTANT

## 2020-02-08 PROCEDURE — 84703 CHORIONIC GONADOTROPIN ASSAY: CPT

## 2020-02-08 PROCEDURE — 36415 COLL VENOUS BLD VENIPUNCTURE: CPT

## 2020-02-08 PROCEDURE — 6370000000 HC RX 637 (ALT 250 FOR IP): Performed by: PHYSICIAN ASSISTANT

## 2020-02-08 PROCEDURE — 6360000002 HC RX W HCPCS: Performed by: PHYSICIAN ASSISTANT

## 2020-02-08 PROCEDURE — 83735 ASSAY OF MAGNESIUM: CPT

## 2020-02-08 PROCEDURE — 74177 CT ABD & PELVIS W/CONTRAST: CPT

## 2020-02-08 PROCEDURE — 99284 EMERGENCY DEPT VISIT MOD MDM: CPT

## 2020-02-08 PROCEDURE — 96375 TX/PRO/DX INJ NEW DRUG ADDON: CPT

## 2020-02-08 PROCEDURE — 85027 COMPLETE CBC AUTOMATED: CPT

## 2020-02-08 RX ORDER — MORPHINE SULFATE 4 MG/ML
4 INJECTION, SOLUTION INTRAMUSCULAR; INTRAVENOUS ONCE
Status: COMPLETED | OUTPATIENT
Start: 2020-02-08 | End: 2020-02-08

## 2020-02-08 RX ORDER — 0.9 % SODIUM CHLORIDE 0.9 %
1000 INTRAVENOUS SOLUTION INTRAVENOUS ONCE
Status: COMPLETED | OUTPATIENT
Start: 2020-02-08 | End: 2020-02-08

## 2020-02-08 RX ORDER — ONDANSETRON 2 MG/ML
4 INJECTION INTRAMUSCULAR; INTRAVENOUS ONCE
Status: COMPLETED | OUTPATIENT
Start: 2020-02-08 | End: 2020-02-08

## 2020-02-08 RX ORDER — POTASSIUM CHLORIDE 20 MEQ/1
40 TABLET, EXTENDED RELEASE ORAL ONCE
Status: COMPLETED | OUTPATIENT
Start: 2020-02-08 | End: 2020-02-08

## 2020-02-08 RX ORDER — OXYCODONE HYDROCHLORIDE 5 MG/1
5 TABLET ORAL EVERY 6 HOURS PRN
Qty: 8 TABLET | Refills: 0 | Status: SHIPPED | OUTPATIENT
Start: 2020-02-08 | End: 2020-02-11

## 2020-02-08 RX ADMIN — IOPAMIDOL 75 ML: 755 INJECTION, SOLUTION INTRAVENOUS at 21:25

## 2020-02-08 RX ADMIN — POTASSIUM CHLORIDE 40 MEQ: 20 TABLET, EXTENDED RELEASE ORAL at 21:04

## 2020-02-08 RX ADMIN — SODIUM CHLORIDE 1000 ML: 9 INJECTION, SOLUTION INTRAVENOUS at 19:35

## 2020-02-08 RX ADMIN — MAGNESIUM GLUCONATE 500 MG ORAL TABLET 400 MG: 500 TABLET ORAL at 21:04

## 2020-02-08 RX ADMIN — MORPHINE SULFATE 4 MG: 4 INJECTION, SOLUTION INTRAMUSCULAR; INTRAVENOUS at 19:35

## 2020-02-08 RX ADMIN — ONDANSETRON 4 MG: 2 INJECTION INTRAMUSCULAR; INTRAVENOUS at 19:35

## 2020-02-08 ASSESSMENT — PAIN DESCRIPTION - PAIN TYPE: TYPE: ACUTE PAIN

## 2020-02-08 ASSESSMENT — PAIN SCALES - GENERAL
PAINLEVEL_OUTOF10: 4
PAINLEVEL_OUTOF10: 3
PAINLEVEL_OUTOF10: 4
PAINLEVEL_OUTOF10: 3

## 2020-02-08 ASSESSMENT — PAIN DESCRIPTION - DESCRIPTORS: DESCRIPTORS: ACHING

## 2020-02-08 ASSESSMENT — PAIN DESCRIPTION - LOCATION: LOCATION: GROIN

## 2020-02-08 ASSESSMENT — PAIN DESCRIPTION - ORIENTATION: ORIENTATION: RIGHT

## 2020-02-09 NOTE — ED PROVIDER NOTES
[facility] Emergency Department    CHIEF COMPLAINT  Other (EP study [place]; increase swelling to Right groin site; and tenderness)      HISTORY OF PRESENT ILLNESS  Jackelyn Daniel is a 46 y.o. female who presents to the ED complaining of right groin pain. Accompanied by boyfriend for evaluation. Patient states that she had EP procedure done 2 days ago. Bilateral groin approach. States that she had some discomfort at both sites and left has resolved. Right appears to be worsening. Has noted lump at site. She denies any redness or warmth. No fevers chills. No abdominal pain. No nausea or vomiting. No urinary symptoms. No other complaints, modifying factors or associated symptoms. Nursing notes reviewed.    Past Medical History:   Diagnosis Date    Abnormal Pap smear     WNL since    Anxiety     Back pain     herniated disc    Back pain     had back surgery (fusion of 3 discs) in march 2011    Placenta previa     resolved at 28 weeks with this 3rd pregnancy    Tubal pregnancy      Past Surgical History:   Procedure Laterality Date    ANKLE FRACTURE SURGERY      BACK SURGERY  3-2011    ECTOPIC PREGNANCY SURGERY      LAPAROSCOPY  4/20/10    with salpingostomy  ( ectopic pregnancy)    LUMBAR DISC SURGERY      LUMBAR FUSION  3-2011    L3-L5 fusion    TONSILLECTOMY  2000    WISDOM TOOTH EXTRACTION  1998     Family History   Problem Relation Age of Onset    High Blood Pressure Father     Diabetes Maternal Aunt      Social History     Socioeconomic History    Marital status:      Spouse name: Not on file    Number of children: Not on file    Years of education: Not on file    Highest education level: Not on file   Occupational History    Not on file   Social Needs    Financial resource strain: Not on file    Food insecurity:     Worry: Not on file     Inability: Not on file    Transportation needs:     Medical: Not on file     Non-medical: mouth every 8 hours as needed for Pain for up to 29 days. 87 tablet 0    metoprolol succinate (TOPROL XL) 25 MG extended release tablet Take 1 tablet by mouth daily 30 tablet 11    ondansetron (ZOFRAN ODT) 4 MG disintegrating tablet Take 1 tablet by mouth every 8 hours as needed for Nausea 20 tablet 0    ALPRAZolam (XANAX) 1 MG tablet Take 1 mg by mouth 4 times daily as needed for Anxiety.  chlorthalidone (HYGROTEN) 50 MG tablet Take 50 mg by mouth daily      amLODIPine (NORVASC) 2.5 MG tablet Take 5 mg by mouth daily        Allergies   Allergen Reactions    Demerol Hives    Doxycycline Nausea And Vomiting    Lorazepam Other (See Comments)     Makes her anxiety worsen    Vancomycin Hives       REVIEW OF SYSTEMS  10 systems reviewed, pertinent positives per HPI otherwise noted to be negative    PHYSICAL EXAM  /77   Pulse 87   Temp 98.5 °F (36.9 °C) (Oral)   Resp 18   Ht 5' 3\" (1.6 m)   Wt 168 lb (76.2 kg)   SpO2 100%   BMI 29.76 kg/m²   GENERAL APPEARANCE: Awake and alert. Cooperative. No acute distress. HEAD: Normocephalic. Atraumatic. EYES: PERRL. EOM's grossly intact. ENT: Mucous membranes are moist.   NECK: Supple. HEART: RRR. No murmurs. LUNGS: Respirations unlabored. CTAB. Good air exchange. Speaking comfortably in full sentences. ABDOMEN: Soft. Non-distended. Non-tender. No guarding or rebound. EXTREMITIES: No peripheral edema. Patient with some soft tissue swelling and bruising noted to right inguinal canal.  No obvious areas of fluctuance or induration. No overlying redness or warmth. Moves all extremities equally. All extremities neurovascularly intact. SKIN: Warm and dry. No acute rashes. NEUROLOGICAL: Alert and oriented. CN's 2-12 intact. No gross facial drooping. Strength 5/5, sensation intact. PSYCHIATRIC: Normal mood and affect.     RADIOLOGY  Ct Abdomen Pelvis W Iv Contrast Additional Contrast? None    Result Date: 2/8/2020  EXAMINATION: CT OF THE ABDOMEN hypokalemia at with potassium 3.1. She was given 40 mEq of K-Dur p.o. Magnesium found to be 1.6. She was also given dose of magnesium oxide. INR 1.04. Pregnancy was negative. CT abdomen pelvis without evidence for acute intra-abdominal findings. Mild induration of right groin which appears consistent with recent instrumentation. No evidence for drainable abscess. No evidence for infectious process at site. Discussed recommendations for continuation of warm compresses and anti-inflammatories. Patient questioning electrolyte abnormalities. She is on diuretics and was discussed that this is probably root of abnormal labs as she has experienced in the past.  She will follow with primary care physician regarding diuretic use. Have discussed return precautions otherwise and patient is in agreement and comfortable at discharge. A discussion was had with Ms. LYNNMattie regarding groin pain, ED findings and recommendations for follow-up. All questions were answered. Patient will follow up with PCP within 2 to 3 days for further evaluation/treatment. Patient will return to ED for new/worsening symptoms. Patient was informed to continue home medications as prescribed.     MDM  Results for orders placed or performed during the hospital encounter of 02/08/20   CBC   Result Value Ref Range    WBC 9.5 4.0 - 11.0 K/uL    RBC 4.52 4.00 - 5.20 M/uL    Hemoglobin 12.9 12.0 - 16.0 g/dL    Hematocrit 38.7 36.0 - 48.0 %    MCV 85.7 80.0 - 100.0 fL    MCH 28.5 26.0 - 34.0 pg    MCHC 33.3 31.0 - 36.0 g/dL    RDW 15.0 12.4 - 15.4 %    Platelets 742 175 - 598 K/uL    MPV 8.5 5.0 - 10.5 fL   Comprehensive metabolic panel   Result Value Ref Range    Sodium 139 136 - 145 mmol/L    Potassium 3.1 (L) 3.5 - 5.1 mmol/L    Chloride 99 99 - 110 mmol/L    CO2 24 21 - 32 mmol/L    Anion Gap 16 3 - 16    Glucose 95 70 - 99 mg/dL    BUN 14 7 - 20 mg/dL    CREATININE 0.6 0.6 - 1.1 mg/dL    GFR Non-African American >60 >60    GFR  American >60 >60    Calcium 9.1 8.3 - 10.6 mg/dL    Total Protein 6.8 6.4 - 8.2 g/dL    Alb 4.2 3.4 - 5.0 g/dL    Albumin/Globulin Ratio 1.6 1.1 - 2.2    Total Bilirubin <0.2 0.0 - 1.0 mg/dL    Alkaline Phosphatase 87 40 - 129 U/L    ALT 35 10 - 40 U/L    AST 15 15 - 37 U/L    Globulin 2.6 g/dL   Protime-INR   Result Value Ref Range    Protime 12.1 10.0 - 13.2 sec    INR 1.04 0.86 - 1.14   Magnesium   Result Value Ref Range    Magnesium 1.60 (L) 1.80 - 2.40 mg/dL   HCG Qualitative, Serum   Result Value Ref Range    hCG Qual Negative Detects HCG level >10 MIU/mL     I estimate there is LOW risk for ACUTE APPENDICITIS, BOWEL OBSTRUCTION, CHOLECYSTITIS, DIVERTICULITIS, INCARCERATED HERNIA, PANCREATITIS, or PERFORATED BOWEL or ULCER, thus I consider the discharge disposition reasonable. Also, there is no evidence or peritonitis, sepsis, or toxicity. Gina Fitzgerald and I have discussed the diagnosis and risks, and we agree with discharging home to follow-up with their primary doctor. We also discussed returning to the Emergency Department immediately if new or worsening symptoms occur. We have discussed the symptoms which are most concerning (e.g., bloody stool, fever, changing or worsening pain, vomiting) that necessitate immediate return. FINAL Impression  1. Right groin pain    2. Hypokalemia    3. Hypomagnesemia      Blood pressure 113/71, pulse 71, temperature 98.5 °F (36.9 °C), temperature source Oral, resp. rate 16, height 5' 3\" (1.6 m), weight 168 lb (76.2 kg), SpO2 98 %. DISPOSITION  Patient was discharged to home in good condition.          Ofelia Ortiz, [address]  02/09/20 [id number]

## 2020-02-09 NOTE — ED NOTES
Patient ready to be discharged. Daughter and son @ bedside to drive patient home.       Darylene Males, RN  02/08/20 [id number]

## 2020-02-10 ENCOUNTER — TELEPHONE (OUTPATIENT)
Dept: CARDIOLOGY CLINIC | Age: 40
End: 2020-02-10

## 2020-02-10 DIAGNOSIS — R10.31 CHRONIC PAIN OF RIGHT GROIN: Primary | ICD-10-CM

## 2020-02-10 DIAGNOSIS — G89.29 CHRONIC PAIN OF RIGHT GROIN: Primary | ICD-10-CM

## 2020-02-10 NOTE — TELEPHONE ENCOUNTER
Pt states that she has a knot in her groin that she called about Friday, which increased to double the size over the weekend. The on-call doctor told her to go to the ER; which she did. Pt wanted AGK to be aware. She states that it is currently the size of a couple marbles and is really hard and very painful, to the point that she is limp on the right side. Please call pt to advise.

## 2020-02-10 NOTE — TELEPHONE ENCOUNTER
Spoke to pt. She said that her cath site in her right groin has doubled in size, is very hard painful knot, no foul discharge, and pt got a ct scan at hospital on Friday resulting in no bleeding. Pt is concerned because her hematoma is not getting any better and seems to be getting worse. Pt can be reached at [phone].         TY

## 2020-02-11 ENCOUNTER — HOSPITAL ENCOUNTER (EMERGENCY)
Age: 40
Discharge: HOME | End: 2020-02-11
Attending: EMERGENCY MEDICINE
Payer: COMMERCIAL

## 2020-02-11 ENCOUNTER — TELEPHONE (OUTPATIENT)
Dept: CARDIOLOGY CLINIC | Age: 40
End: 2020-02-11

## 2020-02-11 VITALS
OXYGEN SATURATION: 100 % | HEART RATE: 81 BPM | DIASTOLIC BLOOD PRESSURE: 75 MMHG | WEIGHT: 168 LBS | BODY MASS INDEX: 29.77 KG/M2 | TEMPERATURE: 98.3 F | HEIGHT: 63 IN | RESPIRATION RATE: 16 BRPM | SYSTOLIC BLOOD PRESSURE: 117 MMHG

## 2020-02-11 LAB
ANION GAP SERPL CALCULATED.3IONS-SCNC: 13 MMOL/L (ref 3–16)
BUN BLDV-MCNC: 13 MG/DL (ref 7–20)
CALCIUM SERPL-MCNC: 9.3 MG/DL (ref 8.3–10.6)
CHLORIDE BLD-SCNC: 102 MMOL/L (ref 99–110)
CO2: 24 MMOL/L (ref 21–32)
CREAT SERPL-MCNC: 0.7 MG/DL (ref 0.6–1.1)
GFR AFRICAN AMERICAN: >60
GFR NON-AFRICAN AMERICAN: >60
GLUCOSE BLD-MCNC: 92 MG/DL (ref 70–99)
POTASSIUM SERPL-SCNC: 3.6 MMOL/L (ref 3.5–5.1)
SODIUM BLD-SCNC: 139 MMOL/L (ref 136–145)

## 2020-02-11 PROCEDURE — 93926 LOWER EXTREMITY STUDY: CPT

## 2020-02-11 PROCEDURE — 80048 BASIC METABOLIC PNL TOTAL CA: CPT

## 2020-02-11 PROCEDURE — 99284 EMERGENCY DEPT VISIT MOD MDM: CPT

## 2020-02-11 PROCEDURE — 96372 THER/PROPH/DIAG INJ SC/IM: CPT

## 2020-02-11 PROCEDURE — 6370000000 HC RX 637 (ALT 250 FOR IP): Performed by: EMERGENCY MEDICINE

## 2020-02-11 PROCEDURE — 6360000002 HC RX W HCPCS: Performed by: EMERGENCY MEDICINE

## 2020-02-11 PROCEDURE — 99245 OFF/OP CONSLTJ NEW/EST HI 55: CPT | Performed by: INTERNAL MEDICINE

## 2020-02-11 RX ORDER — CARVEDILOL 12.5 MG/1
12.5 TABLET ORAL 2 TIMES DAILY
Qty: 60 TABLET | Refills: 0 | Status: SHIPPED | OUTPATIENT
Start: 2020-02-11 | End: 2020-02-28 | Stop reason: ALTCHOICE

## 2020-02-11 RX ORDER — OXYCODONE HYDROCHLORIDE AND ACETAMINOPHEN 5; 325 MG/1; MG/1
1 TABLET ORAL EVERY 8 HOURS PRN
Qty: 9 TABLET | Refills: 0 | Status: SHIPPED | OUTPATIENT
Start: 2020-02-11 | End: 2020-02-14

## 2020-02-11 RX ORDER — OXYCODONE AND ACETAMINOPHEN 7.5; 325 MG/1; MG/1
1 TABLET ORAL ONCE
Status: COMPLETED | OUTPATIENT
Start: 2020-02-11 | End: 2020-02-11

## 2020-02-11 RX ORDER — MORPHINE SULFATE 4 MG/ML
INJECTION, SOLUTION INTRAMUSCULAR; INTRAVENOUS
Status: DISCONTINUED
Start: 2020-02-11 | End: 2020-02-11 | Stop reason: HOSPADM

## 2020-02-11 RX ORDER — MORPHINE SULFATE 2 MG/ML
4 INJECTION, SOLUTION INTRAMUSCULAR; INTRAVENOUS ONCE
Status: COMPLETED | OUTPATIENT
Start: 2020-02-11 | End: 2020-02-11

## 2020-02-11 RX ADMIN — MORPHINE SULFATE 4 MG: 2 INJECTION, SOLUTION INTRAMUSCULAR; INTRAVENOUS at 12:51

## 2020-02-11 RX ADMIN — OXYCODONE HYDROCHLORIDE AND ACETAMINOPHEN 1 TABLET: 7.5; 325 TABLET ORAL at 16:15

## 2020-02-11 ASSESSMENT — PAIN SCALES - GENERAL
PAINLEVEL_OUTOF10: 5
PAINLEVEL_OUTOF10: 8

## 2020-02-11 ASSESSMENT — PAIN DESCRIPTION - LOCATION
LOCATION: GROIN

## 2020-02-11 ASSESSMENT — PAIN DESCRIPTION - DESCRIPTORS
DESCRIPTORS: SHARP
DESCRIPTORS: SHARP

## 2020-02-11 ASSESSMENT — PAIN DESCRIPTION - ORIENTATION: ORIENTATION: RIGHT

## 2020-02-11 ASSESSMENT — PAIN DESCRIPTION - FREQUENCY: FREQUENCY: CONTINUOUS

## 2020-02-11 ASSESSMENT — PAIN DESCRIPTION - PAIN TYPE: TYPE: ACUTE PAIN

## 2020-02-11 NOTE — PLAN OF CARE
- Stop metoprolol.  - Stop chlorthaladone.  - Stop amlodipine.  - Order BMP. - Start coreg 12.5 mg BID.  - Pain control management. - Full note to come.     Orlin Huddleston MD  Cardiac Electrophysiology  [address] [facility]  [phone] [facility]

## 2020-02-11 NOTE — CONSULTS
77   Resp: 16   Temp: 98.3 °F (36.8 °C)   SpO2: 98%      No intake or output data in the 24 hours ending 20 1516  No intake/output data recorded. Wt Readings from Last 3 Encounters:   20 168 lb (76.2 kg)   20 168 lb (76.2 kg)   20 171 lb 8 oz (77.8 kg)     Temp  Av.2 °F (36.8 °C)  Min: 98.1 °F (36.7 °C)  Max: 98.3 °F (36.8 °C)  Pulse  Av.8  Min: 77  Max: 100  BP  Min: 109/73  Max: 114/77  SpO2  Av %  Min: 98 %  Max: 98 %    · Telemetry: Sinus rhythm   · Constitutional: Alert. Oriented to person, place, and time. No distress. · Head: Normocephalic and atraumatic. · Mouth/Throat: Lips appear moist. Oropharynx is clear and moist.  · Eyes: Conjunctivae normal. EOM are normal.   · Neck: Neck supple. No lymphadenopathy. No rigidity. No JVD present. · Cardiovascular: Normal rate, regular rhythm. Normal S1&S2. Carotid pulse 2+ bilaterally. · Pulmonary/Chest: Bilateral respiratory sounds present. No respiratory accessory muscle use. No wheezes, No rhonchi. · Abdominal: Soft. Normal bowel sounds present. No distension, No tenderness. · Musculoskeletal: No tenderness. No edema    · Lymphadenopathy: Right groin lymphadenopathy that is tender. · Neurological: Alert and oriented. Cranial nerve II-XII grossly intact. · Skin: Skin is warm and dry. No rash, lesions, ulcerations noted. · Psychiatric: No anxiety nor agitation. Labs:  Reviewed.    Recent Labs     20      K 3.1*   CL 99   CO2 24   BUN 14   CREATININE 0.6     Recent Labs     20   WBC 9.5   HGB 12.9   HCT 38.7   MCV 85.7        Lab Results   Component Value Date    TROPONINI <0.01 10/24/2019     No results found for: BNP  Lab Results   Component Value Date    PROTIME 12.1 2020    PROTIME 12.0 2020    PROTIME 11.4 10/22/2019    INR 1.04 2020    INR 1.03 2020    INR 1.00 10/22/2019     No results found for: CHOL, HDL, TRIG    Diagnostic and imaging results reviewed. I independently reviewed the ECG and telemetry. Scheduled Meds:   morphine         Continuous Infusions:  PRN Meds:. Assessment:   Patient Active Problem List    Diagnosis Date Noted    Vasovagal syncope 12/19/2019    Palpitations 12/19/2019    Displacement of lumbar intervertebral disc without myelopathy 05/10/2012    Displacement of lumbar intervertebral disc without myelopathy 05/10/2012    Sprain of neck 05/10/2012    Normal vaginal delivery 04/17/2012    False labor after 37 weeks of gestation without delivery 04/08/2012    Bursitis of hip 08/15/2011      There are no active hospital problems to display for this patient. Mrs. Jaimie Barlow is a pleasant 46year old female with a medical history significant for hypertension and palpitations who presents from home with pain at right groin near access points. Problem List:  1. Groin hematoma. 2. Hypokalemia. 3. Palpitations. Assessment and Plan:  1. Groin hematoma. Mrs. Jaimie Barlow is a pleasant 46year old female with a history of palpitations status post negative EPS who presents with groin pain consistent with reactive lymphadenopathy on the right. Vascular study reassuring. Exam site reassuring. Suspect pain secondary to reactive lymphadenopathy. This is improve with tincture of time. No further recommendations outside of pain control.   - Pain control. 2. Hypokalemia. Patient with history of hypokalemia on chlorthalidone. We repleted her during her EPS and started her on oral potassium however her potassium levels still low. We discussed options and turns out her chlorthalidone was started to counteract the swelling she was having with her amlodipine, which has been controlling her blood pressure. We will stop her chlorthalidone, metoprolol and amlodipine and start her on coreg 12.5 mg BID. She should monitor her blood pressure and pulse daily or every other day to see trend. - Please order BMP.   -

## 2020-02-11 NOTE — TELEPHONE ENCOUNTER
Pt states that she tried to call to schedule the ultrasound and they could not get her in until the 25th. She is wondering if she can just go through the emergency room and states that she will need something for pain anyway before anyone can even touch it for an ultrasound because she is in that mush pain. Please advise.

## 2020-02-11 NOTE — ED NOTES
--Patient provided with discharge instructions and prescriptions. --Instructions, prescriptions, and follow-up appointments reviewed with patient. No further questions or needs at this time. --Vital signs and patient stable upon discharge.   --Patient ambulatory to [facility] with shanda Downey RN  02/11/20 [id number]

## 2020-02-11 NOTE — ED PROVIDER NOTES
[facility]  ED  EMERGENCYDEPARTMENT ENCOUNTER      Pt Name: Susanne Hoover  MRN: [id number]  [place] 1980  Date of evaluation: 2/11/2020  Severa Sailors, MD    [address] [place] [address]       Chief Complaint   Patient presents with    Groin Pain     had EP study done on wednesday. MD went in through the R and L groin, per patient. The right site has hurt since then, came to the ER saturday because of the pain and had CT scan done. Was told she had a hematoma and it would go away. Did not go away so she went to her PCP who told her she needed to have an ultrasound done but they can't get her in for another week. Wants pain medication         HISTORY OF PRESENT ILLNESS   (Location/Symptom, Timing/Onset,Context/Setting, Quality, Duration, Modifying Factors, Severity)  Note limiting factors. Susanne Hoover is a 46 y.o. female who presents to the emergency department for right groin pain. Patient reports 'aching' to right groin with sharp pains that comes and goes. Patient states had R/L EP study this past Wednesday, and initially had pain to bilateral groin. Noticed knot on Friday to right side, called cardiologist who told her to go to ED. Had a CT study ad was told it was hematoma. However pain worsened throughout the weekend and called PCP who ordered an ultrasound to rule out a pseudoaneurysm, however could not be done until 2/25 and was told to go to ED. Denies numbness, tingling, however reports pain causes her to limp. HPI    Nursing Notes were reviewed. REVIEW OF SYSTEMS    (2-9 systems for level 4, 10 or more for level 5)     Review of Systems   Constitutional: Negative for activity change, appetite change, diaphoresis and fever. HENT: Negative for congestion, rhinorrhea, sore throat and trouble swallowing. Respiratory: Negative for cough, chest tightness, shortness of breath, wheezing and stridor. Cardiovascular: Negative for chest pain and palpitations. Gastrointestinal: Negative for abdominal pain, diarrhea, nausea and vomiting. Genitourinary: Positive for pelvic pain. Negative for dysuria and flank pain. Skin: Negative for rash and wound. Neurological: Negative for dizziness, weakness, light-headedness, numbness and headaches. Except as noted above the remainder of the review of systems was reviewedand negative. PAST MEDICAL HISTORY     Past Medical History:   Diagnosis Date    Abnormal Pap smear     WNL since    Anxiety     Back pain     herniated disc    Back pain     had back surgery (fusion of 3 discs) in march 2011    Placenta previa     resolved at 28 weeks with this 3rd pregnancy    Tubal pregnancy          SURGICAL HISTORY       Past Surgical History:   Procedure Laterality Date    ANKLE FRACTURE SURGERY      BACK SURGERY  3-2011    ECTOPIC PREGNANCY SURGERY      LAPAROSCOPY  4/20/10    with salpingostomy  ( ectopic pregnancy)    LUMBAR DISC SURGERY      LUMBAR FUSION  3-2011    L3-L5 fusion    TONSILLECTOMY  2000    WISDOM TOOTH EXTRACTION  1998         CURRENT MEDICATIONS       Discharge Medication List as of 2/11/2020  4:46 PM      CONTINUE these medications which have NOT CHANGED    Details   !! oxyCODONE-acetaminophen (PERCOCET) 5-325 MG per tablet Take 1 tablet by mouth every 8 hours as needed for Pain for up to 29 days. , Disp-87 tablet, R-0Normal      ondansetron (ZOFRAN ODT) 4 MG disintegrating tablet Take 1 tablet by mouth every 8 hours as needed for Nausea, Disp-20 tablet, R-0Print      ALPRAZolam (XANAX) 1 MG tablet Take 1 mg by mouth 4 times daily as needed for Anxiety. Historical Med       !! - Potential duplicate medications found. Please discuss with provider.           ALLERGIES     Demerol; Doxycycline; Lorazepam; and Vancomycin    FAMILY HISTORY       Family History   Problem Relation Age of Onset    High Blood Pressure Father     Diabetes Maternal Aunt           SOCIAL HISTORY       Social History Socioeconomic History    Marital status:      Spouse name: None    Number of children: None    Years of education: None    Highest education level: None   Occupational History    None   Social Needs    Financial resource strain: None    Food insecurity:     Worry: None     Inability: None    Transportation needs:     Medical: None     Non-medical: None   Tobacco Use    Smoking status: Current Every Day Smoker     Packs/day: 0.50     Years: 12.00     Pack years: 6.00     Types: Cigarettes     Last attempt to quit: 2011     Years since quittin.4    Smokeless tobacco: Never Used   Substance and Sexual Activity    Alcohol use: No     Comment: every now and then    Drug use: No     Comment: vicodin use for back pain    Sexual activity: Yes     Partners: Male   Lifestyle    Physical activity:     Days per week: None     Minutes per session: None    Stress: None   Relationships    Social connections:     Talks on phone: None     Gets together: None     Attends Mandaeism service: None     Active member of club or organization: None     Attends meetings of clubs or organizations: None     Relationship status: None    Intimate partner violence:     Fear of current or ex partner: None     Emotionally abused: None     Physically abused: None     Forced sexual activity: None   Other Topics Concern    None   Social History Narrative    None       SCREENINGS             PHYSICAL EXAM    (up to 7 for level 4, 8 ormore for level 5)     ED Triage Vitals [20 1135]   BP Temp Temp Source Pulse Resp SpO2 Height Weight   109/73 98.1 °F (36.7 °C) Oral 100 14 98 % 5' 3\" (1.6 m) 168 lb (76.2 kg)       Physical Exam  Vitals signs and nursing note reviewed. Constitutional:       General: She is not in acute distress. Appearance: She is well-developed. She is not ill-appearing, toxic-appearing or diaphoretic. Comments: Well-appearing, nontoxic, not in acute distress.   Answers questions in full sentences and following verbal commands appropriately   HENT:      Head: Normocephalic and atraumatic. Eyes:      General:         Right eye: No discharge. Left eye: No discharge. Conjunctiva/sclera: Conjunctivae normal.   Neck:      Musculoskeletal: Normal range of motion and neck supple. Cardiovascular:      Rate and Rhythm: Normal rate and regular rhythm. Pulses:           Radial pulses are 2+ on the right side and 2+ on the left side. Heart sounds: Normal heart sounds. No murmur. No friction rub. No gallop. Pulmonary:      Effort: Pulmonary effort is normal. No accessory muscle usage or respiratory distress. Breath sounds: Normal breath sounds. No decreased breath sounds, wheezing, rhonchi or rales. Chest:      Chest wall: No tenderness. Abdominal:       Musculoskeletal: Normal range of motion. Skin:     Coloration: Skin is not pale. Neurological:      Mental Status: She is alert and oriented to person, place, and time. Psychiatric:         Behavior: Behavior normal. Behavior is cooperative.          DIAGNOSTIC RESULTS     EKG: All EKG's are interpreted by the Emergency Department Physicianwho either signs or Co-signs this chart in the absence of a cardiologist.      RADIOLOGY:   Non-plain film images such as CT, Ultrasound and MRI are read by the radiologist. Plain radiographic images are visualized and preliminarily interpreted by the emergency physician with the below findings:      Interpretation per the Radiologist below, if available at the time of this note:    VL DUP LOWER EXTREMITY ARTERIES RIGHT   Final Result            ED BEDSIDE ULTRASOUND:   Performed by ED Physician - none    LABS:  Labs Reviewed   BASIC METABOLIC PANEL    Narrative:     Performed at:  [facility]) - [address], [address] [place] [address]   Phone [phone]       All other labs were within normal range ornot returned as of this dictation. EMERGENCY DEPARTMENT COURSE and DIFFERENTIAL DIAGNOSIS/MDM:   Vitals:    Vitals:    02/11/20 1357 02/11/20 1507 02/11/20 1615 02/11/20 1617   BP: 112/77 111/70 118/75 117/75   Pulse: 79 77 83 81   Resp: 18 16 16 16   Temp:  98.3 °F (36.8 °C)     TempSrc:       SpO2: 98% 98% 98% 100%   Weight:       Height:             [facility]    ED COURSE/MDM    -Mariusz Kent is a 46 y.o. female with a history of HTN presents to the ED for right groin pain. Patient had EP study done on Wednesday, started to have right groin pain on Friday was seen in the ED on Saturday with a CT scan showing possible hematoma. However patient states the pain has worsened over the weekend and is now causing her to limp.  -On exam, palpable swelling to right groin with tenderness. 2+ DP pulses bilaterally.  -Dr. Concepcion Srinivasan had originally ordered an ultrasound of the right groin to rule out a pseudoaneurysm however as patient states it could not be done for another few days she came to the ED.  -Lower extremity ultrasound shows there is no evidence of pseudoaneurysm in the right groin/pelvis. There is normal multiphasic flow noted in the right common femoral artery and normal spontaneous and phasic venous flow noted in the right common femoral vein. There is evidence of a small fluid collection measuring 1.2 x 0.37 involving the right groin.  -Consulted cardiology spoke with Dr. Lauren Pham, whom results of the ultrasound. States that it is not common to have enlarged lymph nodes after the study however it can happen especially if there was a residual hematoma which may have caused inflammation to the surrounding area. States he will come and evaluate patient in the ED.   After evaluating the patient, recommends that she get a BMP to evaluate for possible hypokalemia as she had it in the past, states that he recommend she be taken off her cardiology medication and started on carvedilol and discharged with pain medication  -BMP was negative for hypokalemia.  -Per Bailey Clancy, to be discharged instructions to discontinue triple amlodipine and Chlorthalidone as well as supplemental potassium. Start carvedilol 12.5 mg twice daily.  -Labs and imaging reviewed and results discussed with patient. Will discharge as noted above. Strict ED return precautions given for new/worsending symptoms. Patient in agreement withplan, verbally confirm understanding and have no further questions/concerns. REASSESSMENT      Well appearing, non toxic, alert, oriented speaking in full sentences and hemodynamically stable upon discharge      CRITICAL CARE TIME   Total Critical Care time was 0 minutes, excluding separately reportableprocedures. There was a high probability of clinicallysignificant/life threatening deterioration in the patient's condition which required my urgent intervention. CONSULTS:  IP CONSULT TO CARDIOLOGY    PROCEDURES:  Unless otherwise noted below, none     Procedures    FINAL IMPRESSION      1. Right groin pain    2. Lymph nodes enlarged          DISPOSITION/PLAN   DISPOSITION Decision To Discharge 02/11/2020 04:11:47 PM      PATIENT REFERREDTO:  DAQUAN Chua - CNP  [address]  [phone]    In 1 day        DISCHARGE MEDICATIONS:  Discharge Medication List as of 2/11/2020  4:46 PM      START taking these medications    Details   carvedilol (COREG) 12.5 MG tablet Take 1 tablet by mouth 2 times daily, Disp-60 tablet, R-0Print      !! oxyCODONE-acetaminophen (PERCOCET) 5-325 MG per tablet Take 1 tablet by mouth every 8 hours as needed for Pain for up to 3 days. Intended supply: 3 days. Take lowest dose possible to manage pain, Disp-9 tablet, R-0Print       !! - Potential duplicate medications found. Please discuss with provider.              (Please note that portions of this note were completed with a voice recognition program.  Efforts were made to edit the dictations but occasionally

## 2020-02-12 ENCOUNTER — TELEPHONE (OUTPATIENT)
Dept: CARDIOLOGY CLINIC | Age: 40
End: 2020-02-12

## 2020-02-12 ASSESSMENT — ENCOUNTER SYMPTOMS
COUGH: 0
RHINORRHEA: 0
CHEST TIGHTNESS: 0
ABDOMINAL PAIN: 0
WHEEZING: 0
SHORTNESS OF BREATH: 0
SORE THROAT: 0
DIARRHEA: 0
STRIDOR: 0
TROUBLE SWALLOWING: 0
NAUSEA: 0
VOMITING: 0

## 2020-02-13 ENCOUNTER — TELEPHONE (OUTPATIENT)
Dept: CARDIOLOGY CLINIC | Age: 40
End: 2020-02-13

## 2020-02-13 RX ORDER — OXYCODONE HYDROCHLORIDE 5 MG/1
5 TABLET ORAL EVERY 6 HOURS PRN
Qty: 12 TABLET | Refills: 0 | Status: SHIPPED | OUTPATIENT
Start: 2020-02-13 | End: 2020-02-16

## 2020-02-13 NOTE — TELEPHONE ENCOUNTER
[address] [place],[address] I would love to help, but unfortunately I do not have access to the oars report.         TY

## 2020-02-13 NOTE — TELEPHONE ENCOUNTER
Per Dr Cornell Kendrick, due to multiple refills from multiple providers filled at multiple pharmacies, will not prescribe narcotics from this point onward.

## 2020-02-20 ENCOUNTER — TELEPHONE (OUTPATIENT)
Dept: CARDIOLOGY CLINIC | Age: 40
End: 2020-02-20

## 2020-02-20 NOTE — TELEPHONE ENCOUNTER
Pt states that she is experiencing a headache since starting carvedilol. Also, she states that she is still sore in the groin area from EP study. She states she works at [employer] and is on her feet all day, which aggravates it. She states that the pain is not as intense as it was, but is still there and she wants to know when this will go away. Please call to advise about these 2 things.

## 2020-02-25 NOTE — TELEPHONE ENCOUNTER
Unfortunately time will make pain go away. I know it hurts and I'm sorry about that. As far as pain control I can not prescribe anything else for pain control beyond what I've already prescribed. If she would like to discuss with me personally let me know and I'll give her a call. Thanks.

## 2020-02-28 ENCOUNTER — HOSPITAL ENCOUNTER (OUTPATIENT)
Age: 40
Setting detail: OBSERVATION
Discharge: LEFT BEFORE DISCHARGE | End: 2020-02-29
Attending: EMERGENCY MEDICINE | Admitting: INTERNAL MEDICINE
Payer: COMMERCIAL

## 2020-02-28 ENCOUNTER — HOSPITAL ENCOUNTER (EMERGENCY)
Age: 40
Discharge: LEFT BEFORE DISCHARGE | End: 2020-02-28
Payer: COMMERCIAL

## 2020-02-28 ENCOUNTER — APPOINTMENT (OUTPATIENT)
Dept: GENERAL RADIOLOGY | Age: 40
End: 2020-02-28
Payer: COMMERCIAL

## 2020-02-28 ENCOUNTER — APPOINTMENT (OUTPATIENT)
Dept: CT IMAGING | Age: 40
End: 2020-02-28
Payer: COMMERCIAL

## 2020-02-28 VITALS
WEIGHT: 168 LBS | TEMPERATURE: 98.6 F | HEIGHT: 63 IN | SYSTOLIC BLOOD PRESSURE: 90 MMHG | OXYGEN SATURATION: 93 % | RESPIRATION RATE: 14 BRPM | DIASTOLIC BLOOD PRESSURE: 60 MMHG | HEART RATE: 79 BPM | BODY MASS INDEX: 29.77 KG/M2

## 2020-02-28 LAB
A/G RATIO: 1.5 (ref 1.1–2.2)
ACETAMINOPHEN LEVEL: <5 UG/ML (ref 10–30)
ALBUMIN SERPL-MCNC: 4.4 G/DL (ref 3.4–5)
ALP BLD-CCNC: 167 U/L (ref 40–129)
ALT SERPL-CCNC: 76 U/L (ref 10–40)
AMPHETAMINE SCREEN, URINE: ABNORMAL
ANION GAP SERPL CALCULATED.3IONS-SCNC: 14 MMOL/L (ref 3–16)
AST SERPL-CCNC: 74 U/L (ref 15–37)
BACTERIA: ABNORMAL /HPF
BARBITURATE SCREEN URINE: ABNORMAL
BASE EXCESS VENOUS: -7.2 MMOL/L (ref -3–3)
BASOPHILS ABSOLUTE: 0 K/UL (ref 0–0.2)
BASOPHILS RELATIVE PERCENT: 0.5 %
BENZODIAZEPINE SCREEN, URINE: POSITIVE
BILIRUB SERPL-MCNC: <0.2 MG/DL (ref 0–1)
BILIRUBIN URINE: NEGATIVE
BLOOD, URINE: ABNORMAL
BUN BLDV-MCNC: 12 MG/DL (ref 7–20)
CALCIUM SERPL-MCNC: 9.1 MG/DL (ref 8.3–10.6)
CANNABINOID SCREEN URINE: ABNORMAL
CARBOXYHEMOGLOBIN: 2.2 % (ref 0–1.5)
CHLORIDE BLD-SCNC: 108 MMOL/L (ref 99–110)
CLARITY: CLEAR
CO2: 21 MMOL/L (ref 21–32)
COCAINE METABOLITE SCREEN URINE: ABNORMAL
COLOR: ABNORMAL
CREAT SERPL-MCNC: 1 MG/DL (ref 0.6–1.1)
EOSINOPHILS ABSOLUTE: 0 K/UL (ref 0–0.6)
EOSINOPHILS RELATIVE PERCENT: 0.4 %
EPITHELIAL CELLS, UA: ABNORMAL /HPF (ref 0–5)
GFR AFRICAN AMERICAN: >60
GFR NON-AFRICAN AMERICAN: >60
GLOBULIN: 2.9 G/DL
GLUCOSE BLD-MCNC: 138 MG/DL (ref 70–99)
GLUCOSE URINE: NEGATIVE MG/DL
HCG QUALITATIVE: NEGATIVE
HCO3 VENOUS: 18.4 MMOL/L (ref 23–29)
HCT VFR BLD CALC: 42.5 % (ref 36–48)
HEMOGLOBIN: 13.9 G/DL (ref 12–16)
KETONES, URINE: NEGATIVE MG/DL
LACTIC ACID: 0.7 MMOL/L (ref 0.4–2)
LEUKOCYTE ESTERASE, URINE: ABNORMAL
LYMPHOCYTES ABSOLUTE: 0.9 K/UL (ref 1–5.1)
LYMPHOCYTES RELATIVE PERCENT: 19 %
Lab: ABNORMAL
MAGNESIUM: 1.9 MG/DL (ref 1.8–2.4)
MCH RBC QN AUTO: 29 PG (ref 26–34)
MCHC RBC AUTO-ENTMCNC: 32.8 G/DL (ref 31–36)
MCV RBC AUTO: 88.5 FL (ref 80–100)
METHADONE SCREEN, URINE: ABNORMAL
METHEMOGLOBIN VENOUS: 0.6 %
MICROSCOPIC EXAMINATION: YES
MONOCYTES ABSOLUTE: 0.3 K/UL (ref 0–1.3)
MONOCYTES RELATIVE PERCENT: 6.1 %
MUCUS: ABNORMAL /LPF
NEUTROPHILS ABSOLUTE: 3.5 K/UL (ref 1.7–7.7)
NEUTROPHILS RELATIVE PERCENT: 74 %
NITRITE, URINE: NEGATIVE
O2 CONTENT, VEN: 12 VOL %
O2 SAT, VEN: 67 %
O2 THERAPY: ABNORMAL
OPIATE SCREEN URINE: POSITIVE
OXYCODONE URINE: ABNORMAL
PCO2, VEN: 37.2 MMHG (ref 40–50)
PDW BLD-RTO: 14.5 % (ref 12.4–15.4)
PH UA: 5.5
PH UA: 5.5 (ref 5–8)
PH VENOUS: 7.31 (ref 7.35–7.45)
PHENCYCLIDINE SCREEN URINE: ABNORMAL
PLATELET # BLD: 170 K/UL (ref 135–450)
PMV BLD AUTO: 9.5 FL (ref 5–10.5)
PO2, VEN: 36.1 MMHG (ref 25–40)
POTASSIUM SERPL-SCNC: 3.5 MMOL/L (ref 3.5–5.1)
PROPOXYPHENE SCREEN: ABNORMAL
PROTEIN UA: NEGATIVE MG/DL
RAPID INFLUENZA  B AGN: NEGATIVE
RAPID INFLUENZA A AGN: NEGATIVE
RBC # BLD: 4.8 M/UL (ref 4–5.2)
RBC UA: ABNORMAL /HPF (ref 0–4)
S PYO AG THROAT QL: NEGATIVE
SODIUM BLD-SCNC: 143 MMOL/L (ref 136–145)
SPECIFIC GRAVITY UA: 1.02 (ref 1–1.03)
TCO2 CALC VENOUS: 20 MMOL/L
TOTAL PROTEIN: 7.3 G/DL (ref 6.4–8.2)
TROPONIN: <0.01 NG/ML
URINE REFLEX TO CULTURE: YES
URINE TYPE: ABNORMAL
UROBILINOGEN, URINE: 0.2 E.U./DL
WBC # BLD: 4.8 K/UL (ref 4–11)
WBC UA: ABNORMAL /HPF (ref 0–5)

## 2020-02-28 PROCEDURE — 87081 CULTURE SCREEN ONLY: CPT

## 2020-02-28 PROCEDURE — 96375 TX/PRO/DX INJ NEW DRUG ADDON: CPT

## 2020-02-28 PROCEDURE — 99285 EMERGENCY DEPT VISIT HI MDM: CPT

## 2020-02-28 PROCEDURE — 82803 BLOOD GASES ANY COMBINATION: CPT

## 2020-02-28 PROCEDURE — 6370000000 HC RX 637 (ALT 250 FOR IP): Performed by: NURSE PRACTITIONER

## 2020-02-28 PROCEDURE — 84703 CHORIONIC GONADOTROPIN ASSAY: CPT

## 2020-02-28 PROCEDURE — 84484 ASSAY OF TROPONIN QUANT: CPT

## 2020-02-28 PROCEDURE — 85025 COMPLETE CBC W/AUTO DIFF WBC: CPT

## 2020-02-28 PROCEDURE — 83735 ASSAY OF MAGNESIUM: CPT

## 2020-02-28 PROCEDURE — 6360000002 HC RX W HCPCS: Performed by: NURSE PRACTITIONER

## 2020-02-28 PROCEDURE — 70450 CT HEAD/BRAIN W/O DYE: CPT

## 2020-02-28 PROCEDURE — 71046 X-RAY EXAM CHEST 2 VIEWS: CPT

## 2020-02-28 PROCEDURE — 83605 ASSAY OF LACTIC ACID: CPT

## 2020-02-28 PROCEDURE — 87086 URINE CULTURE/COLONY COUNT: CPT

## 2020-02-28 PROCEDURE — 87804 INFLUENZA ASSAY W/OPTIC: CPT

## 2020-02-28 PROCEDURE — 80053 COMPREHEN METABOLIC PANEL: CPT

## 2020-02-28 PROCEDURE — 94640 AIRWAY INHALATION TREATMENT: CPT

## 2020-02-28 PROCEDURE — 2580000003 HC RX 258: Performed by: NURSE PRACTITIONER

## 2020-02-28 PROCEDURE — 96374 THER/PROPH/DIAG INJ IV PUSH: CPT

## 2020-02-28 PROCEDURE — 87880 STREP A ASSAY W/OPTIC: CPT

## 2020-02-28 PROCEDURE — 36415 COLL VENOUS BLD VENIPUNCTURE: CPT

## 2020-02-28 PROCEDURE — 93005 ELECTROCARDIOGRAM TRACING: CPT | Performed by: EMERGENCY MEDICINE

## 2020-02-28 PROCEDURE — G0480 DRUG TEST DEF 1-7 CLASSES: HCPCS

## 2020-02-28 PROCEDURE — 81001 URINALYSIS AUTO W/SCOPE: CPT

## 2020-02-28 PROCEDURE — 99283 EMERGENCY DEPT VISIT LOW MDM: CPT

## 2020-02-28 PROCEDURE — 80307 DRUG TEST PRSMV CHEM ANLYZR: CPT

## 2020-02-28 RX ORDER — 0.9 % SODIUM CHLORIDE 0.9 %
1000 INTRAVENOUS SOLUTION INTRAVENOUS ONCE
Status: DISCONTINUED | OUTPATIENT
Start: 2020-02-28 | End: 2020-02-28

## 2020-02-28 RX ORDER — KETOROLAC TROMETHAMINE 30 MG/ML
15 INJECTION, SOLUTION INTRAMUSCULAR; INTRAVENOUS ONCE
Status: COMPLETED | OUTPATIENT
Start: 2020-02-28 | End: 2020-02-29

## 2020-02-28 RX ORDER — ACETAMINOPHEN 500 MG
1000 TABLET ORAL ONCE
Status: DISCONTINUED | OUTPATIENT
Start: 2020-02-28 | End: 2020-02-28

## 2020-02-28 RX ORDER — BUTALBITAL, ACETAMINOPHEN AND CAFFEINE 50; 325; 40 MG/1; MG/1; MG/1
1 TABLET ORAL ONCE
Status: COMPLETED | OUTPATIENT
Start: 2020-02-28 | End: 2020-02-28

## 2020-02-28 RX ORDER — 0.9 % SODIUM CHLORIDE 0.9 %
1000 INTRAVENOUS SOLUTION INTRAVENOUS ONCE
Status: COMPLETED | OUTPATIENT
Start: 2020-02-28 | End: 2020-02-28

## 2020-02-28 RX ORDER — DIPHENHYDRAMINE HYDROCHLORIDE 50 MG/ML
25 INJECTION INTRAMUSCULAR; INTRAVENOUS ONCE
Status: COMPLETED | OUTPATIENT
Start: 2020-02-28 | End: 2020-02-28

## 2020-02-28 RX ORDER — METOCLOPRAMIDE HYDROCHLORIDE 5 MG/ML
10 INJECTION INTRAMUSCULAR; INTRAVENOUS ONCE
Status: COMPLETED | OUTPATIENT
Start: 2020-02-28 | End: 2020-02-28

## 2020-02-28 RX ORDER — IPRATROPIUM BROMIDE AND ALBUTEROL SULFATE 2.5; .5 MG/3ML; MG/3ML
1 SOLUTION RESPIRATORY (INHALATION) ONCE
Status: COMPLETED | OUTPATIENT
Start: 2020-02-28 | End: 2020-02-28

## 2020-02-28 RX ORDER — KETOROLAC TROMETHAMINE 30 MG/ML
15 INJECTION, SOLUTION INTRAMUSCULAR; INTRAVENOUS ONCE
Status: COMPLETED | OUTPATIENT
Start: 2020-02-28 | End: 2020-02-28

## 2020-02-28 RX ORDER — DROPERIDOL 2.5 MG/ML
0.62 INJECTION, SOLUTION INTRAMUSCULAR; INTRAVENOUS ONCE
Status: DISCONTINUED | OUTPATIENT
Start: 2020-02-28 | End: 2020-02-28

## 2020-02-28 RX ADMIN — BUTALBITAL, ACETAMINOPHEN, AND CAFFEINE 1 TABLET: 50; 325; 40 TABLET ORAL at 20:29

## 2020-02-28 RX ADMIN — DIPHENHYDRAMINE HYDROCHLORIDE 25 MG: 50 INJECTION, SOLUTION INTRAMUSCULAR; INTRAVENOUS at 13:40

## 2020-02-28 RX ADMIN — SODIUM CHLORIDE 1000 ML: 9 INJECTION, SOLUTION INTRAVENOUS at 13:36

## 2020-02-28 RX ADMIN — IPRATROPIUM BROMIDE AND ALBUTEROL SULFATE 1 AMPULE: .5; 3 SOLUTION RESPIRATORY (INHALATION) at 13:12

## 2020-02-28 RX ADMIN — METOCLOPRAMIDE 10 MG: 5 INJECTION, SOLUTION INTRAMUSCULAR; INTRAVENOUS at 13:36

## 2020-02-28 RX ADMIN — KETOROLAC TROMETHAMINE 15 MG: 30 INJECTION, SOLUTION INTRAMUSCULAR at 13:36

## 2020-02-28 RX ADMIN — SODIUM CHLORIDE 1000 ML: 9 INJECTION, SOLUTION INTRAVENOUS at 21:10

## 2020-02-28 RX ADMIN — SODIUM CHLORIDE 1000 ML: 9 INJECTION, SOLUTION INTRAVENOUS at 15:19

## 2020-02-28 ASSESSMENT — ENCOUNTER SYMPTOMS
VOMITING: 0
ABDOMINAL DISTENTION: 0
BACK PAIN: 0
WHEEZING: 0
DIARRHEA: 0
COUGH: 1
WHEEZING: 0
ABDOMINAL PAIN: 0
DIARRHEA: 0
SHORTNESS OF BREATH: 0
ABDOMINAL DISTENTION: 0
ABDOMINAL PAIN: 0
SORE THROAT: 1
CONSTIPATION: 0
SHORTNESS OF BREATH: 0
COUGH: 1
VOMITING: 0
EYES NEGATIVE: 1
NAUSEA: 1
NAUSEA: 0
BACK PAIN: 0
EYES NEGATIVE: 1
ALLERGIC/IMMUNOLOGIC NEGATIVE: 1
CONSTIPATION: 0
ALLERGIC/IMMUNOLOGIC NEGATIVE: 1

## 2020-02-28 ASSESSMENT — PAIN DESCRIPTION - LOCATION
LOCATION: HEAD
LOCATION: GENERALIZED
LOCATION: HEAD

## 2020-02-28 ASSESSMENT — PAIN SCALES - GENERAL
PAINLEVEL_OUTOF10: 8
PAINLEVEL_OUTOF10: 5
PAINLEVEL_OUTOF10: 8
PAINLEVEL_OUTOF10: 6
PAINLEVEL_OUTOF10: 8
PAINLEVEL_OUTOF10: 10

## 2020-02-28 ASSESSMENT — PAIN DESCRIPTION - PAIN TYPE
TYPE: ACUTE PAIN

## 2020-02-28 ASSESSMENT — PAIN DESCRIPTION - DESCRIPTORS: DESCRIPTORS: ACHING

## 2020-02-28 NOTE — ED PROVIDER NOTES
Systems   Constitutional: Positive for chills and fever. Negative for activity change, appetite change, diaphoresis and fatigue. HENT: Positive for sore throat. Eyes: Negative. Respiratory: Positive for cough. Negative for shortness of breath and wheezing. Cardiovascular: Negative for chest pain. Gastrointestinal: Positive for nausea. Negative for abdominal distention, abdominal pain, constipation, diarrhea and vomiting. Endocrine: Negative. Genitourinary: Negative for dysuria, flank pain and hematuria. Musculoskeletal: Positive for myalgias. Negative for back pain, neck pain and neck stiffness. Skin: Negative. Allergic/Immunologic: Negative. Neurological: Positive for headaches. Negative for dizziness, seizures, syncope, speech difficulty, weakness, light-headedness and numbness. Hematological: Negative. Psychiatric/Behavioral: Negative. Positives and Pertinent negatives as per HPI. Except as noted above in the ROS, problem specific ROS was completed and is negative. Physical Exam:  Physical Exam  Constitutional:       General: She is not in acute distress. Appearance: Normal appearance. She is normal weight. She is not ill-appearing, toxic-appearing or diaphoretic. HENT:      Head: Normocephalic and atraumatic. Right Ear: Tympanic membrane normal.      Left Ear: Tympanic membrane normal.      Nose: Nose normal.      Mouth/Throat:      Mouth: Mucous membranes are moist.      Pharynx: Oropharynx is clear. No oropharyngeal exudate or posterior oropharyngeal erythema. Eyes:      Extraocular Movements: Extraocular movements intact. Conjunctiva/sclera: Conjunctivae normal.      Pupils: Pupils are equal, round, and reactive to light. Neck:      Musculoskeletal: Normal range of motion and neck supple. No muscular tenderness. Cardiovascular:      Rate and Rhythm: Regular rhythm. Tachycardia present. Pulses: Normal pulses.       Heart sounds: Normal heart Blood, Urine MODERATE (*)     Leukocyte Esterase, Urine TRACE (*)     All other components within normal limits    Narrative:     Performed at:  [place] [address] [facility]   Phone [phone]   [address] - Abnormal; Notable for the following components:    Benzodiazepine Screen, Urine POSITIVE (*)     Opiate Scrn, Ur POSITIVE (*)     All other components within normal limits    Narrative:     Performed at:  [place] [address] [facility]   Phone [phone]   MICROSCOPIC URINALYSIS - Abnormal; Notable for the following components:    Mucus, UA Rare (*)     WBC, UA 6-10 (*)     Epithelial Cells, UA 6-10 (*)     Bacteria, UA 2+ (*)     All other components within normal limits    Narrative:     Performed at:  [place] [address] [facility]   Phone [phone]   RAPID INFLUENZA A/B ANTIGENS    Narrative:     Performed at:  [place] [address] [facility]   Phone [phone] A THROAT    Narrative:     Performed at:  [place] [address] [facility]   Phone [phone]   CULTURE, BETA STREP CONFIRM PLATES   CULTURE, URINE   HCG, SERUM, QUALITATIVE    Narrative:     Performed at:  [place] [address] [facility]   Phone [phone]   LACTIC ACID, PLASMA    Narrative:     Performed at:  [place] [address] [facility]   Phone  of labs reviewed and werenegative at this time or not returned at the time of this note.     RADIOLOGY:   Non-plain film images such as CT, Ultrasound and MRI are read by the radiologist. Monica TURPIN, APRN - CNP have directly visualized the radiologic REFERRED TO:  No follow-up provider specified.     DISCHARGE MEDICATIONS:  New Prescriptions    No medications on file       DISCONTINUED MEDICATIONS:  Discontinued Medications    No medications on file              (Please note the MDM and HPI sections of this note were completed with a voice recognition program.  Efforts were made to edit the dictations but occasionally words are mis-transcribed.)    Electronically signed, DAQUAN Castro CNP,        DAQUAN Castro CNP  02/28/20 [address] Elfrieda Iraj Harkins CNP  02/28/20 0780

## 2020-02-28 NOTE — ED NOTES
Pt c/o generalized body aches, intermittent fever, cough and HA x3 days. 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 Afebrile. No s/s of respiratory distress. Friend at bedside, call light within reach. Will continue to monitor.       Mimi Martinez RN  02/28/20 7731

## 2020-02-28 NOTE — ED NOTES
Patient resting comfortably upon entry into room. Easily awakened for medication administration/assessment. Patient stating pain level 8/10. Easily falling back asleep upon completion of assessment/medication administration.       Calvin Echeverria RN  02/28/20 6139

## 2020-02-28 NOTE — ED PROVIDER NOTES
Smoker     Packs/day: 0.50     Years: 12.00     Pack years: 6.00     Types: Cigarettes     Last attempt to quit: 2011     Years since quittin.5    Smokeless tobacco: Never Used   Substance Use Topics    Alcohol use: No     Comment: every now and then    Drug use: No     Comment: vicodin use for back pain       SCREENINGS             PHYSICAL EXAM    (up to 7 for level 4, 8 or more for level 5)     ED Triage Vitals [20 1823]   BP Temp Temp Source Pulse Resp SpO2 Height Weight   93/67 97.8 °F (36.6 °C) Oral 92 16 97 % 5' 3\" (1.6 m) 160 lb (72.6 kg)       Physical Exam  Constitutional:       General: She is not in acute distress. Appearance: Normal appearance. She is normal weight. She is not ill-appearing or diaphoretic. HENT:      Head: Normocephalic and atraumatic. Right Ear: Tympanic membrane normal.      Left Ear: Tympanic membrane normal.      Nose: Nose normal. No congestion. Mouth/Throat:      Mouth: Mucous membranes are moist.      Pharynx: Oropharynx is clear. No oropharyngeal exudate or posterior oropharyngeal erythema. Eyes:      Extraocular Movements: Extraocular movements intact. Conjunctiva/sclera: Conjunctivae normal.      Pupils: Pupils are equal, round, and reactive to light. Neck:      Musculoskeletal: Normal range of motion and neck supple. No muscular tenderness. Cardiovascular:      Rate and Rhythm: Normal rate and regular rhythm. Pulses: Normal pulses. Heart sounds: Normal heart sounds. No murmur. No friction rub. No gallop. Pulmonary:      Effort: Pulmonary effort is normal.      Breath sounds: Examination of the right-upper field reveals rhonchi. Examination of the left-upper field reveals rhonchi. Rhonchi present. Abdominal:      General: Abdomen is flat. Bowel sounds are normal. There is no distension. Tenderness: There is no abdominal tenderness. There is no guarding. Musculoskeletal: Normal range of motion. Lymphadenopathy:      Cervical: No cervical adenopathy. Skin:     General: Skin is dry. Capillary Refill: Capillary refill takes less than 2 seconds. Neurological:      General: No focal deficit present. Mental Status: She is alert and oriented to person, place, and time. Psychiatric:         Mood and Affect: Mood normal.         Behavior: Behavior normal.         Thought Content: Thought content normal.         Judgment: Judgment normal.         DIAGNOSTIC RESULTS   LABS:    Labs Reviewed   BLOOD GAS, VENOUS - Abnormal; Notable for the following components:       Result Value    pH, Timothy 7.311 (*)     pCO2, Timothy 37.2 (*)     HCO3, Venous 18.4 (*)     Base Excess, Timothy -7.2 (*)     Carboxyhemoglobin 2.2 (*)     All other components within normal limits    Narrative:     Performed at:  [place] [address] [facility]   Phone [phone]   MAGNESIUM    Narrative:     Performed at:  [place] [address] [facility]   Phone [phone]   TROPONIN    Narrative:     Performed at:  [place] [address] [facility]   Phone [phone]   D-DIMER, QUANTITATIVE       All other labs were within normal range or not returned as of this dictation. EKG: All EKG's are interpreted by the Emergency Department Physician in the absence of a cardiologist.  Please see their note for interpretation of EKG.       RADIOLOGY:   Non-plain film images such as CT, Ultrasound and MRI are read by the radiologist. Plain radiographic images are visualized and preliminarily interpreted by the ED Provider with the below findings:        Interpretation per the Radiologist below, if available at the time of this note:    No orders to display     Xr Chest Standard (2 Vw)    Result Date: 2/28/2020  EXAMINATION: TWO XRAY VIEWS OF THE CHEST 2/28/2020 12:38 pm COMPARISON: 10/24/2018 HISTORY: ORDERING SYSTEM PROVIDED HISTORY: cough TECHNOLOGIST PROVIDED HISTORY: Reason for exam:->cough Reason for Exam: body aches, cough Acuity: Acute Type of Exam: Initial FINDINGS: The cardiomediastinal silhouette is normal in size and contour. Minimal bibasilar atelectasis. The lungs are clear. No pleural effusion or pneumothorax is present. No acute cardiopulmonary process           PROCEDURES   Unless otherwise noted below, none     Procedures    CRITICAL CARE TIME   N/A    CONSULTS:  None      EMERGENCY DEPARTMENT COURSE and DIFFERENTIAL DIAGNOSIS/MDM:   Vitals:    Vitals:    02/28/20 1823 02/28/20 2027   BP: 93/67 96/66   Pulse: 92 78   Resp: 16 21   Temp: 97.8 °F (36.6 °C)    TempSrc: Oral    SpO2: 97% 99%   Weight: 160 lb (72.6 kg)    Height: 5' 3\" (1.6 m)        Patient was given the following medications:  Medications   0.9 % sodium chloride bolus (has no administration in time range)   butalbital-acetaminophen-caffeine (FIORICET, ESGIC) per tablet 1 tablet (has no administration in time range)     Patient did leave earlier after being told she needed to stay a little longer to get her blood pressure up and re-evaluate. However; Patient took out her IV and left without telling anyone. Nurse did see her walk outside and get into a white vehicle and pull away. Prior labs done a couple hours ago include:   Cbc, cmp, lactic, flu swab, strep swab, chest xray, urine and urine drug screen    Patient is alert and oriented x4. Skin is pwd, no cyanosis or pallor. PERRLA, EOMI. Bilateral TMs appear normal. Heart with RRR. Lungs with rhonchi in RUF and LUF. Abdomen is soft, non-tender and non-distended. Bilateral upper and lower extremities with equal strength. Distal pulses are intact  Differentials: hypotension, ACS, influenza, pneumonia, meningitis, PE, headache    Labs: normal magnesium and troponin    NS bolus ordered along with Fioricet    Patient signed out to Dr. Bob Rodriguez at 2030.  Please

## 2020-02-28 NOTE — ED NOTES
This RN entered room to recheck pt's blood pressure. Pt not in room. PIV laying on pillow on stretcher. Pt not noted to be in bathroom. This RN saw pt getting in white SUV outside but was not able to communicate with pt prior to departure. Informed Shantell Manrique NP regarding. Will consider pt to have left AMA. Informed carlie Villegas RN.       Nia Solo RN  02/28/20 2336

## 2020-02-28 NOTE — ED NOTES
Patient requesting pain medication for a headache, RN told patient that the doctor has to see her first and patient went into her purse and took out a prescription bottle and took pills, RN asked patient want she was taking and she didn't answer RN. Ignored RN. RN informed NP and Patients nurse.       Mateusz Diaz RN  02/28/20 Mo Tian RN  02/28/20 0896

## 2020-02-29 VITALS
HEART RATE: 71 BPM | HEIGHT: 63 IN | RESPIRATION RATE: 16 BRPM | OXYGEN SATURATION: 100 % | DIASTOLIC BLOOD PRESSURE: 62 MMHG | BODY MASS INDEX: 28.35 KG/M2 | WEIGHT: 160 LBS | SYSTOLIC BLOOD PRESSURE: 103 MMHG | TEMPERATURE: 98 F

## 2020-02-29 PROBLEM — R51.9 HEADACHE: Status: ACTIVE | Noted: 2020-02-29

## 2020-02-29 LAB
ANION GAP SERPL CALCULATED.3IONS-SCNC: 12 MMOL/L (ref 3–16)
BUN BLDV-MCNC: 7 MG/DL (ref 7–20)
CALCIUM SERPL-MCNC: 7.9 MG/DL (ref 8.3–10.6)
CHLORIDE BLD-SCNC: 111 MMOL/L (ref 99–110)
CO2: 15 MMOL/L (ref 21–32)
CREAT SERPL-MCNC: 0.7 MG/DL (ref 0.6–1.1)
EKG ATRIAL RATE: 86 BPM
EKG DIAGNOSIS: NORMAL
EKG P AXIS: 46 DEGREES
EKG P-R INTERVAL: 150 MS
EKG Q-T INTERVAL: 370 MS
EKG QRS DURATION: 80 MS
EKG QTC CALCULATION (BAZETT): 442 MS
EKG R AXIS: 12 DEGREES
EKG T AXIS: 23 DEGREES
EKG VENTRICULAR RATE: 86 BPM
GFR AFRICAN AMERICAN: >60
GFR NON-AFRICAN AMERICAN: >60
GLUCOSE BLD-MCNC: 80 MG/DL (ref 70–99)
LACTIC ACID: 0.7 MMOL/L (ref 0.4–2)
POTASSIUM SERPL-SCNC: 5.6 MMOL/L (ref 3.5–5.1)
SODIUM BLD-SCNC: 138 MMOL/L (ref 136–145)
URINE CULTURE, ROUTINE: NORMAL

## 2020-02-29 PROCEDURE — 2580000003 HC RX 258: Performed by: EMERGENCY MEDICINE

## 2020-02-29 PROCEDURE — 96374 THER/PROPH/DIAG INJ IV PUSH: CPT

## 2020-02-29 PROCEDURE — 80048 BASIC METABOLIC PNL TOTAL CA: CPT

## 2020-02-29 PROCEDURE — G0378 HOSPITAL OBSERVATION PER HR: HCPCS

## 2020-02-29 PROCEDURE — 93010 ELECTROCARDIOGRAM REPORT: CPT | Performed by: INTERNAL MEDICINE

## 2020-02-29 PROCEDURE — 6360000002 HC RX W HCPCS: Performed by: EMERGENCY MEDICINE

## 2020-02-29 PROCEDURE — 96375 TX/PRO/DX INJ NEW DRUG ADDON: CPT

## 2020-02-29 PROCEDURE — 83605 ASSAY OF LACTIC ACID: CPT

## 2020-02-29 RX ORDER — SODIUM CHLORIDE 9 MG/ML
1000 INJECTION, SOLUTION INTRAVENOUS CONTINUOUS
Status: DISCONTINUED | OUTPATIENT
Start: 2020-02-29 | End: 2020-02-29 | Stop reason: HOSPADM

## 2020-02-29 RX ORDER — SODIUM CHLORIDE 0.9 % (FLUSH) 0.9 %
10 SYRINGE (ML) INJECTION PRN
Status: CANCELLED | OUTPATIENT
Start: 2020-02-29

## 2020-02-29 RX ORDER — POLYETHYLENE GLYCOL 3350 17 G/17G
17 POWDER, FOR SOLUTION ORAL DAILY PRN
Status: CANCELLED | OUTPATIENT
Start: 2020-02-29

## 2020-02-29 RX ORDER — SODIUM CHLORIDE 9 MG/ML
INJECTION, SOLUTION INTRAVENOUS CONTINUOUS
Status: CANCELLED | OUTPATIENT
Start: 2020-02-29

## 2020-02-29 RX ORDER — ONDANSETRON 2 MG/ML
4 INJECTION INTRAMUSCULAR; INTRAVENOUS EVERY 6 HOURS PRN
Status: CANCELLED | OUTPATIENT
Start: 2020-02-29

## 2020-02-29 RX ORDER — ACETAMINOPHEN 650 MG/1
650 SUPPOSITORY RECTAL EVERY 6 HOURS PRN
Status: CANCELLED | OUTPATIENT
Start: 2020-02-29

## 2020-02-29 RX ORDER — PROMETHAZINE HYDROCHLORIDE 25 MG/1
12.5 TABLET ORAL EVERY 6 HOURS PRN
Status: CANCELLED | OUTPATIENT
Start: 2020-02-29

## 2020-02-29 RX ORDER — ACETAMINOPHEN 325 MG/1
650 TABLET ORAL EVERY 6 HOURS PRN
Status: CANCELLED | OUTPATIENT
Start: 2020-02-29

## 2020-02-29 RX ORDER — SODIUM CHLORIDE 0.9 % (FLUSH) 0.9 %
10 SYRINGE (ML) INJECTION EVERY 12 HOURS SCHEDULED
Status: CANCELLED | OUTPATIENT
Start: 2020-02-29

## 2020-02-29 RX ORDER — METOCLOPRAMIDE HYDROCHLORIDE 5 MG/ML
10 INJECTION INTRAMUSCULAR; INTRAVENOUS ONCE
Status: COMPLETED | OUTPATIENT
Start: 2020-02-29 | End: 2020-02-29

## 2020-02-29 RX ADMIN — SODIUM CHLORIDE 1000 ML: 9 INJECTION, SOLUTION INTRAVENOUS at 03:02

## 2020-02-29 RX ADMIN — METOCLOPRAMIDE HYDROCHLORIDE 10 MG: 5 INJECTION INTRAMUSCULAR; INTRAVENOUS at 03:02

## 2020-02-29 RX ADMIN — KETOROLAC TROMETHAMINE 15 MG: 30 INJECTION, SOLUTION INTRAMUSCULAR at 03:02

## 2020-02-29 ASSESSMENT — PAIN DESCRIPTION - FREQUENCY: FREQUENCY: CONTINUOUS

## 2020-02-29 ASSESSMENT — PAIN DESCRIPTION - PAIN TYPE
TYPE: ACUTE PAIN
TYPE: ACUTE PAIN

## 2020-02-29 ASSESSMENT — PAIN SCALES - GENERAL
PAINLEVEL_OUTOF10: 5
PAINLEVEL_OUTOF10: 6
PAINLEVEL_OUTOF10: 8

## 2020-02-29 ASSESSMENT — PAIN DESCRIPTION - LOCATION
LOCATION: HEAD
LOCATION: ABDOMEN;HEAD
LOCATION: HEAD

## 2020-02-29 ASSESSMENT — PAIN DESCRIPTION - ONSET: ONSET: ON-GOING

## 2020-02-29 ASSESSMENT — PAIN DESCRIPTION - PROGRESSION: CLINICAL_PROGRESSION: GRADUALLY WORSENING

## 2020-02-29 NOTE — ED NOTES
Patient reports that only percocet helps relieve her headache. Patient reports that pain is a 5 on 1-10 scale.  Patient reports that she has a prescription for \"breakthrough headaches\" for percocet      Radha Lam RN  02/28/20 2112

## 2020-02-29 NOTE — ED NOTES
Unable to obtain IV access at this time with US device. Pt to restroom to urinate. Labs obtained sent for analysis. MD notified.       Sumi Krueger RN  02/28/20 0664

## 2020-02-29 NOTE — ED NOTES
Nurse to room. Patient pulled IV out of her arm and fluids running on floor. Patient on cell phone and her personal belongings on the floor.  Patient sitting in stretcher texting on cell phone     Jessica Kelsey RN  02/28/20 212       Jessica Kelsey[address]  02/28/20 3144

## 2020-02-29 NOTE — PROGRESS NOTES
RN went to do admission for patient and she was upset about not getting narcotics and wants to leave AMA. RNs and pt sign and she pulled her own IV out. She is getting dressed and leaving at the moment.

## 2020-02-29 NOTE — ED NOTES
Tramaine Brizuela MD updated patient on plan of care. Patient unsteady during ortho bp.  Patient very drowsy and reports that headache is worse     Ashish Forbes RN  02/29/20 0000

## 2020-02-29 NOTE — ED NOTES

## 2020-02-29 NOTE — ED NOTES
Final set of vitals charted prior to admission. Pt asked this tech for her 2 morning Xanax and her morning blood pressure medications. I asked her if she felt ok because she seemed pretty groggy. She stated \"Well that is because I didn't sleep well last night. I just woke up\". Pt then requested the phone to call her work. She attempted 4 times to dial the phone number, but was not awake enough to dial the numbers, and was unable to turn the phone on even after coaching from tech. I dialed the number for her, and MINH Milan was notified of encounter.      Marlena White  02/29/20 0800

## 2020-02-29 NOTE — H&P
Hospital Medicine History & Physical      PCP: DAQUAN Ewing - CNP    Date of Admission: 2/28/2020    Date of Service: Pt seen/examined on 02/29/20 and placed in Observation. Chief Complaint: Headache      History Of Present Illness:     46 y.o. female who presented to [facility] with headache and low blood pressure. Was evaluated yesterday. After initial evaluation, patient left the emergency room [address] and then came back for concern of low blood pressure. At the time of the evaluation, patient's blood pressure is normal.  Headache is generalized and throbbing, no specific pattern. States Percocet is the only medication that helps her. She was prescribed some oxycodone and Percocet over past months. PDMP report shows erratic and high risk behavior, with prescriptions from multiple emergency room providers in addition to a pain specialist.   Was found hypotensive in the ED on arrival, then BP normalized with observation and IV fluids  Awake, alert and oriented, asking for narcotic medications at the time of this evaluation    Past Medical History:          Diagnosis Date    Abnormal Pap smear     WNL since    Anxiety     Back pain     herniated disc    Back pain     had back surgery (fusion of 3 discs) in march 2011    Placenta previa     resolved at 28 weeks with this 3rd pregnancy    Tubal pregnancy        Past Surgical History:          Procedure Laterality Date    ANKLE FRACTURE SURGERY      BACK SURGERY  3-2011    ECTOPIC PREGNANCY SURGERY      LAPAROSCOPY  4/20/10    with salpingostomy  ( ectopic pregnancy)    LUMBAR DISC SURGERY      LUMBAR FUSION  3-2011    L3-L5 fusion    TONSILLECTOMY  2000    WISDOM TOOTH EXTRACTION  1998       Medications Prior to Admission:      Prior to Admission medications    Medication Sig Start Date End Date Taking?  Authorizing Provider   ALPRAZolam Nemesio Schwab) 1 MG tablet Take 1 mg by mouth 4 times daily as needed for

## 2020-02-29 NOTE — ED NOTES
Patient to CT scan at this time via [address] [place] [address] [address], [address] [facility]  02/28/20 6771

## 2020-02-29 NOTE — ED NOTES
Pt is extremely drowsy, asking repetitive questions, thought today was Thursday. Asking for a work note for today, stating she works Friday. Informed patient today is Saturday. Patient pulled empty Percocet bottle from her purse and states this is the only medication that helps her headache. Pt states she was recently started on Carvedilol for blood pressure and it gives her a pounding headache.        Geo Moran RN  02/29/20 3433

## 2020-02-29 NOTE — ED PROVIDER NOTES
I independently performed a history and physical on [facility]. All diagnostic, treatment, and disposition decisions were made by myself in conjunction with the advanced practice provider. For further details of [facility] emergency department encounter, please see Christopher Cervantes NP's documentation. Patient is a 52-year-old female presenting today due to concern for chronic headaches although worsening over the last couple of days associated with reported fever as high as 103 Fahrenheit yesterday including pain from her headache extending into her neck. She denies this being the worst headache of her life but it is up there. It did come on gradually. No family history of brain aneurysm. She states that she did have a spinal fusion from L3-L5. She also was seen initially earlier today but subsequently was upset with the pain medication she was receiving per nurse practitioner Yni Tovar and ultimately ripped out her IV and left but then called her cardiologist who recommended she come back to the emergency department and reportedly was told that she needs to be admitted due to concern for hypotension with recent cardiac procedure a few weeks ago. She does have a cough but denies any current chest pain or shortness of breath. She does complain of right groin pain from the recent cardiac catheterization. No abdominal pain or vomiting or diarrhea. There was concern that her blood pressure was low. She initially denied to Jimy Giraldo, nurse practitioner, that she had been taking any benzodiazepines or opiates but after her drug screen came back positive for these, she changed her story per Jimy Giraldo. Her friend is also concerned with her based on how lethargic she is. Due to concern for significant headache and low blood pressure, she decided to return to the emergency department, supposedly at the request of her cardiologist for further evaluation. Physical:   Gen: No acute distress. Lethargic. Able to tell me name, place and situation   Psych: Depressed mood and affect  HEENT: NCAT, EOMI, PERRL, MMM  Neck: supple, normal ROM, NTTP  Cardiac: RRR, pulses 2+ in upper extremities  Lungs: C2AB, no R/R/W, occasionally coughs during exam  Abdomen: soft and nontender with no R/D/G  Neuro: no focal neuro deficits with strength and sensation 5/5 in all 4 extremities, lethargic but GCS = 15, very unsteady when attempting to stand patient and unsafe for walking    The Ekg interpreted by me shows  normal sinus rhythm with a rate of 86  Axis is   Normal  QTc is  normal  Intervals and Durations are unremarkable. ST Segments: no acute change and nonspecific changes  No significant change from prior EKG dated - 2/5/20  No STEMI       MDM: Patient was evaluated due to concern for significant headache along with reported fever at home. No documented fever in the emergency department. She was significantly lethargic. No nuchal rigidity. Story not suggestive of subarachnoid hemorrhage. CT of the head did not show any significant findings per radiologist.  She later told me that she has been having headaches since she was 10years old and states that the only thing that helps with these are Percocet. She has multiple prescriptions filled on [facility] report including benzodiazepines and opiates. Her friend ultimately found a empty pill bottle of opiates in her purse but there is also reportedly Lorazepam in the purse and [facility] did not show this. There was concern the right before she came back to the emergency department she may have taken some of these pills which would explain her lethargy, but at this time she is unsafe to go home. I did initially place an IV in her right brachial vein on first attempt but she subsequently pulled it out and stated it was an accident, which I wonder if this is the case and she was upset that I was not giving her narcotics for her headache.   I ultimately was able to place an ultrasound-guided IV into her left upper arm muscle vein and this worked appropriately and she is aware that she needs to protect this with all her might. Otherwise, she was still significantly lethargic in the emergency department and was ultimately admitted. I do not feel comfortable giving her opiates. I discussed with her if it was meningitis, it could cause severe disability or death. I feel this is unlikely with no fever in the ED and no leukocytosis. I do wonder if she is ultimately telling the true story or is trying to get medications. I would have done a lumbar puncture to evaluate for this but she stated she had a spinal fusion from L3-L5 and therefore I do not feel comfortable doing this procedure and if she does need to have a lumbar puncture, I feel that she would be better served by having radiology do it. Her headache and cough with sore throat are most likely related to a viral illness. She does not appear septic at this time. Lactic acid was negative. I spoke with Dr. José Manuel Valle at 0300 for admission. She denied any chest pain or shortness of breath to me and story not suggestive of pulmonary embolism. She was mainly concerned about her intractable headache. PROCEDURE:   Peripheral venous access using ultrasound guidance    I personally placed an 18-gauge long angiocatheter under ultrasound guidance with sterile probe cover into the right brachial vein on first attempt with good return of blood and it flushed without difficulty. Patient tolerated the procedure well. This was obtained for IV access due to poor IV access elsewhere when nurses tried.       Ultrasound guided peripheral venous access  Procedure note:  Indication: medication administration, fluid resuscitation    Billable study: yes,    Views:  Long access view of target vein: Reduced: not obtained  Short access view of target vein: Adequate      Images obtained with findings:   Vein compressible: yes  Needle tip within

## 2020-02-29 NOTE — ED NOTES
Patient sleeping at this time. Friend at bedside.       Jorge Healy [facility]  02/28/20 6565

## 2020-02-29 NOTE — ED NOTES
RN to bedside for rounding; patient sitting up in bed attempting to dial on phone. Patient states to RN \"This phone doesn't work\" while eyes closed. RN explained to patient how to dial on phone and patient fell asleep during encounter. Patient awoke to RN voice, eyes remained closed. RN asked patient if she was feeling okay, she stated \"I am just so tired and I need my medicines\".       Sriram Gonzalez RN  02/29/20 8968

## 2020-03-01 LAB — S PYO THROAT QL CULT: NORMAL

## 2020-05-18 NOTE — PROGRESS NOTES
Patient is having a/an injection wih Dr. Alirio Britton on 5/21/20 and was seen at clinic for pre op covid test. . Patient instructed to self quarantine until the procedure.

## 2020-05-19 LAB — SARS-COV-2, PCR: NOT DETECTED

## 2020-05-21 ENCOUNTER — HOSPITAL ENCOUNTER (OUTPATIENT)
Age: 40
Setting detail: OUTPATIENT SURGERY
Discharge: HOME | End: 2020-05-21
Attending: PAIN MEDICINE | Admitting: PAIN MEDICINE
Payer: COMMERCIAL

## 2020-05-21 VITALS
BODY MASS INDEX: 31.01 KG/M2 | SYSTOLIC BLOOD PRESSURE: 120 MMHG | WEIGHT: 175 LBS | TEMPERATURE: 97.9 F | HEART RATE: 75 BPM | HEIGHT: 63 IN | RESPIRATION RATE: 16 BRPM | OXYGEN SATURATION: 100 % | DIASTOLIC BLOOD PRESSURE: 87 MMHG

## 2020-05-21 LAB — PREGNANCY, URINE: NEGATIVE

## 2020-05-21 PROCEDURE — 7100000010 HC PHASE II RECOVERY - FIRST 15 MIN: Performed by: PAIN MEDICINE

## 2020-05-21 PROCEDURE — 6360000002 HC RX W HCPCS: Performed by: PAIN MEDICINE

## 2020-05-21 PROCEDURE — 3600000002 HC SURGERY LEVEL 2 BASE: Performed by: PAIN MEDICINE

## 2020-05-21 PROCEDURE — 6370000000 HC RX 637 (ALT 250 FOR IP)

## 2020-05-21 PROCEDURE — 2500000003 HC RX 250 WO HCPCS: Performed by: PAIN MEDICINE

## 2020-05-21 PROCEDURE — 84703 CHORIONIC GONADOTROPIN ASSAY: CPT

## 2020-05-21 PROCEDURE — 20610 DRAIN/INJ JOINT/BURSA W/O US: CPT | Performed by: PAIN MEDICINE

## 2020-05-21 RX ORDER — IBUPROFEN 400 MG/1
400 TABLET ORAL EVERY 6 HOURS PRN
COMMUNITY
End: 2020-10-24 | Stop reason: SDUPTHER

## 2020-05-21 RX ORDER — METHYLPREDNISOLONE ACETATE 80 MG/ML
INJECTION, SUSPENSION INTRA-ARTICULAR; INTRALESIONAL; INTRAMUSCULAR; SOFT TISSUE
Status: DISCONTINUED
Start: 2020-05-21 | End: 2020-05-21 | Stop reason: HOSPADM

## 2020-05-21 RX ORDER — METHYLPREDNISOLONE ACETATE 80 MG/ML
INJECTION, SUSPENSION INTRA-ARTICULAR; INTRALESIONAL; INTRAMUSCULAR; SOFT TISSUE PRN
Status: DISCONTINUED | OUTPATIENT
Start: 2020-05-21 | End: 2020-05-21 | Stop reason: ALTCHOICE

## 2020-05-21 RX ORDER — MIDAZOLAM HYDROCHLORIDE 2 MG/ML
SYRUP ORAL
Status: COMPLETED
Start: 2020-05-21 | End: 2020-05-21

## 2020-05-21 RX ORDER — MIDAZOLAM HYDROCHLORIDE 2 MG/ML
5 SYRUP ORAL ONCE
Status: COMPLETED | OUTPATIENT
Start: 2020-05-21 | End: 2020-05-21

## 2020-05-21 RX ORDER — METHYLPREDNISOLONE ACETATE 80 MG/ML
INJECTION, SUSPENSION INTRA-ARTICULAR; INTRALESIONAL; INTRAMUSCULAR; SOFT TISSUE
Status: DISCONTINUED
Start: 2020-05-21 | End: 2020-05-21 | Stop reason: WASHOUT

## 2020-05-21 RX ADMIN — MIDAZOLAM HYDROCHLORIDE 5 MG: 2 SYRUP ORAL at 12:08

## 2020-05-21 ASSESSMENT — PAIN - FUNCTIONAL ASSESSMENT
PAIN_FUNCTIONAL_ASSESSMENT: 0-10
PAIN_FUNCTIONAL_ASSESSMENT: ACTIVITIES ARE NOT PREVENTED

## 2020-05-21 ASSESSMENT — PAIN DESCRIPTION - DESCRIPTORS: DESCRIPTORS: ACHING;DULL

## 2020-05-21 ASSESSMENT — PAIN SCALES - GENERAL: PAINLEVEL_OUTOF10: 0

## 2020-07-25 ENCOUNTER — APPOINTMENT (OUTPATIENT)
Dept: GENERAL RADIOLOGY | Age: 40
End: 2020-07-25
Payer: MEDICARE

## 2020-07-25 ENCOUNTER — HOSPITAL ENCOUNTER (EMERGENCY)
Age: 40
Discharge: HOME | End: 2020-07-25
Payer: MEDICARE

## 2020-07-25 VITALS
RESPIRATION RATE: 18 BRPM | BODY MASS INDEX: 31.01 KG/M2 | HEIGHT: 63 IN | TEMPERATURE: 98.2 F | DIASTOLIC BLOOD PRESSURE: 80 MMHG | SYSTOLIC BLOOD PRESSURE: 113 MMHG | HEART RATE: 112 BPM | OXYGEN SATURATION: 95 % | WEIGHT: 175 LBS

## 2020-07-25 PROCEDURE — 99283 EMERGENCY DEPT VISIT LOW MDM: CPT

## 2020-07-25 PROCEDURE — 6360000002 HC RX W HCPCS: Performed by: PHYSICIAN ASSISTANT

## 2020-07-25 PROCEDURE — 96372 THER/PROPH/DIAG INJ SC/IM: CPT

## 2020-07-25 PROCEDURE — 73610 X-RAY EXAM OF ANKLE: CPT

## 2020-07-25 PROCEDURE — 6370000000 HC RX 637 (ALT 250 FOR IP): Performed by: PHYSICIAN ASSISTANT

## 2020-07-25 RX ORDER — KETOROLAC TROMETHAMINE 30 MG/ML
30 INJECTION, SOLUTION INTRAMUSCULAR; INTRAVENOUS ONCE
Status: COMPLETED | OUTPATIENT
Start: 2020-07-25 | End: 2020-07-25

## 2020-07-25 RX ORDER — OXYCODONE HYDROCHLORIDE 5 MG/1
5 TABLET ORAL ONCE
Status: COMPLETED | OUTPATIENT
Start: 2020-07-25 | End: 2020-07-25

## 2020-07-25 RX ADMIN — KETOROLAC TROMETHAMINE 30 MG: 30 INJECTION, SOLUTION INTRAMUSCULAR at 18:35

## 2020-07-25 RX ADMIN — OXYCODONE 5 MG: 5 TABLET ORAL at 18:35

## 2020-07-25 ASSESSMENT — PAIN DESCRIPTION - LOCATION
LOCATION: ANKLE
LOCATION: ANKLE

## 2020-07-25 ASSESSMENT — PAIN SCALES - GENERAL
PAINLEVEL_OUTOF10: 4

## 2020-07-25 ASSESSMENT — ENCOUNTER SYMPTOMS
SINUS PAIN: 0
SORE THROAT: 0
VOMITING: 0
COUGH: 0
ABDOMINAL PAIN: 0
DIARRHEA: 0
CHEST TIGHTNESS: 0
SHORTNESS OF BREATH: 0
EYE DISCHARGE: 0
RHINORRHEA: 0
CONSTIPATION: 0
NAUSEA: 0
SINUS PRESSURE: 0
EYE REDNESS: 0

## 2020-07-25 ASSESSMENT — PAIN DESCRIPTION - PAIN TYPE
TYPE: ACUTE PAIN
TYPE: ACUTE PAIN

## 2020-07-25 ASSESSMENT — PAIN DESCRIPTION - ORIENTATION
ORIENTATION: LEFT
ORIENTATION: LEFT

## 2020-07-25 NOTE — ED NOTES
--Patient provided with discharge instructions and any prescriptions. --Instructions, dosing, and follow-up appointments reviewed with patient/family. No further questions or needs at this time. --Vital signs and patient stable upon discharge.   --Patient ambulatory to lobby with      Hadley Walter RN  07/25/20 8933

## 2020-07-25 NOTE — ED PROVIDER NOTES
arthralgias, gait problem and myalgias. Skin: Negative. Neurological: Negative for dizziness, weakness, numbness and headaches. Psychiatric/Behavioral: Negative. All other systems reviewed and are negative. Positives and Pertinent negatives as per HPI. Except as noted above in the ROS, all other systems were reviewed and negative. PAST MEDICAL HISTORY     Past Medical History:   Diagnosis Date    Abnormal Pap smear     WNL since    Anxiety     Back pain     herniated disc    Back pain     had back surgery (fusion of 3 discs) in march 2011    Placenta previa     resolved at 28 weeks with this 3rd pregnancy    Tubal pregnancy          SURGICAL HISTORY       Past Surgical History:   Procedure Laterality Date    ANESTHESIA NERVE BLOCK Right 5/21/2020    RIGHT HIP INJECTION SITE CONFIRMED BY FLUOROSCOPY performed by Franklin Castellano MD at [place] [address]  3-2011    ECTOPIC PREGNANCY SURGERY      LAPAROSCOPY  4/20/10    with salpingostomy  ( ectopic pregnancy)    LUMBAR DISC SURGERY      LUMBAR FUSION  3-2011    L3-L5 fusion    TONSILLECTOMY  2000    WISDOM TOOTH EXTRACTION  1998         CURRENT MEDICATIONS       Discharge Medication List as of 7/25/2020  6:56 PM      CONTINUE these medications which have NOT CHANGED    Details   oxyCODONE-acetaminophen (PERCOCET) 5-325 MG per tablet Take 1 tablet by mouth 2 times daily for 30 days. , Disp-60 tablet,R-0Normal      ibuprofen (ADVIL;MOTRIN) 400 MG tablet Take 400 mg by mouth every 6 hours as needed for PainHistorical Med      ALPRAZolam (XANAX) 1 MG tablet Take 1 mg by mouth 4 times daily as needed for Anxiety.  Historical Med               ALLERGIES     Demerol; Doxycycline; Lorazepam; and Vancomycin    FAMILY HISTORY       Family History   Problem Relation Age of Onset    High Blood Pressure Father     Diabetes Maternal Aunt           SOCIAL HISTORY       Social History     Socioeconomic History    Marital status:      Spouse name: Not on file    Number of children: Not on file    Years of education: Not on file    Highest education level: Not on file   Occupational History    Not on file   Social Needs    Financial resource strain: Not on file    Food insecurity     Worry: Not on file     Inability: Not on file    Transportation needs     Medical: Not on file     Non-medical: Not on file   Tobacco Use    Smoking status: Current Every Day Smoker     Packs/day: 0.50     Years: 12.00     Pack years: 6.00     Types: Cigarettes     Last attempt to quit: 2011     Years since quittin.9    Smokeless tobacco: Never Used   Substance and Sexual Activity    Alcohol use: No     Comment: every now and then    Drug use: No     Comment: vicodin use for back pain    Sexual activity: Yes     Partners: Male   Lifestyle    Physical activity     Days per week: Not on file     Minutes per session: Not on file    Stress: Not on file   Relationships    Social connections     Talks on phone: Not on file     Gets together: Not on file     Attends Scientologist service: Not on file     Active member of club or organization: Not on file     Attends meetings of clubs or organizations: Not on file     Relationship status: Not on file    Intimate partner violence     Fear of current or ex partner: Not on file     Emotionally abused: Not on file     Physically abused: Not on file     Forced sexual activity: Not on file   Other Topics Concern    Not on file   Social History Narrative    Not on file       SCREENINGS     NIH Score       Glascow Keiko Coma Scale  Eye Opening: Spontaneous  Best Verbal Response: Oriented  Best Motor Response: Obeys commands  Brock Coma Scale Score: 15    [facility]     Heart Score         PHYSICAL EXAM  (up to 7 for level 4, 8 or more for level 5)     ED Triage Vitals [20 1726]   BP Temp Temp Source Pulse Resp SpO2 Height Weight   113/80 98.2 °F (36.8 °C) EKG.      RADIOLOGY:     Interpretation per the Radiologist below, if available at the time of this note:    XR ANKLE LEFT (MIN 3 VIEWS)   Final Result   No acute abnormality. 2 small calcific structure seen which may be loose   bodies in the joint. Advanced osteochondritis dissecans in the medial talar   dome may be present. RECOMMENDATION:   MRI would be beneficial for more thorough evaluation. EMERGENCYDEPARTMENT COURSE and DIFFERENTIAL DIAGNOSIS/MDM:   Vitals:    Vitals:    07/25/20 1726   BP: 113/80   Pulse: 112   Resp: 18   Temp: 98.2 °F (36.8 °C)   TempSrc: Oral   SpO2: 95%   Weight: 175 lb (79.4 kg)   Height: 5' 3\" (1.6 m)       Patient was given the following medications:  Medications   ketorolac (TORADOL) injection 30 mg (30 mg Intramuscular Given 7/25/20 1835)   oxyCODONE (ROXICODONE) immediate release tablet 5 mg (5 mg Oral Given 7/25/20 1835)       Afebrile, stable, patient presents to the ED for evaluation. Seen on my own, per my scope of practice, with attending ED provider available for consultation who agrees with assessment and plan. Patients SPO2 is 95% on room air they are not hypoxic, injury is improving however pain is stilll intense. Works at [employer] for 14 hours a day on her feet, she advised very painful. Tried nsaids. Patient advised that she has numerous NSAIDs at home and pain medication at home. She advised she went to a urgent care prior to coming to the ER where they told her there was fracture and it would likely heal okay on its own. They gave her a ankle support. Patient is unhappy with this. We did additional x-rays which do reveal 2 areas with callus formation. Patient insists on having an OCL placed as she advised that none of the x-ray that ankle supports are appropriate I do provide her with this and will give her referral for orthopedic follow-up.   As patient is waited 6 weeks for evaluation of her wound I do not feel any further indication for encounter          DISPOSITION/PLAN   DISPOSITION Decision To Discharge 07/25/2020 07:04:34 PM      PATIENT REFERRED TO:  [facility] [address]  [phone]    for a recheck in 1-2  days      DISCHARGE MEDICATIONS:  Discharge Medication List as of 7/25/2020  6:56 PM          DISCONTINUED MEDICATIONS:  Discharge Medication List as of 7/25/2020  6:56 PM                 (Please note that portions of this note were completed with a voice recognition program.  Efforts were made to edit the dictations but occasionally words are mis-transcribed.)    Dmitry Landaverde PA-C (electronically signed)          Dmitry Landaverde PA-C  07/26/20 9757

## 2020-07-25 NOTE — ED TRIAGE NOTES
Chief Complaint   Patient presents with    Ankle Pain     Pt states that she injured her left ankle 6 weeks ago. Pt states that she just came from urgent care.

## 2020-07-27 ENCOUNTER — OFFICE VISIT (OUTPATIENT)
Dept: ORTHOPEDIC SURGERY | Age: 40
End: 2020-07-27
Payer: MEDICARE

## 2020-07-27 PROCEDURE — 4004F PT TOBACCO SCREEN RCVD TLK: CPT | Performed by: ORTHOPAEDIC SURGERY

## 2020-07-27 PROCEDURE — 99203 OFFICE O/P NEW LOW 30 MIN: CPT | Performed by: ORTHOPAEDIC SURGERY

## 2020-07-27 PROCEDURE — G8428 CUR MEDS NOT DOCUMENT: HCPCS | Performed by: ORTHOPAEDIC SURGERY

## 2020-07-27 PROCEDURE — L4361 PNEUMA/VAC WALK BOOT PRE OTS: HCPCS | Performed by: ORTHOPAEDIC SURGERY

## 2020-07-27 PROCEDURE — G8417 CALC BMI ABV UP PARAM F/U: HCPCS | Performed by: ORTHOPAEDIC SURGERY

## 2020-07-27 NOTE — PROGRESS NOTES
Chief Complaint    Ankle Pain (LEFT ANKLE PAIN )      History of Present Illness:  Sarah Dominguez is a 46 y.o. female for evaluation of chief complaint left ankle pain. This started approximately 6 weeks ago when she had an inversion injury. .  Symptoms are worse with prolonged weightbearing and better with rest. Patient endorses ankle pain but denies numbness tingling. Treatment to date includes: Immobilization and protected weightbearing      Medical History:  Patient's medications, allergies, past medical, surgical, social and family histories were reviewed and updated as appropriate. Review of Systems:  Relevant review of systems reviewed and available in the patient's chart. They are negative or noncontributory except as per HPI. Vital Signs: There were no vitals filed for this visit. General: well-developed, well-nourished  CV: RR  RESP: Respirations unlabored on RA  Skin: No rashes or ulcerations appreciated  Psych: appropriate mood and affect  Musculoskeletal:    Extremity: Left lower    Inspection: Skin intact no swelling    Palpation: Tenderness to palpation over anterior medial joint    Range of Motion: Ankle stiff    Stability: Negative drawer    Strength: 5 out of 5    Special Tests: Negative drawer    Gait: Antalgic    Pulse/perfusion: 2+pulse foot warm and perfused    Neurological:    Sensation: Sensation intact light touch throughout left lower extremity    Radiology:     X-rays obtained and reviewed in office:  Views AP lateral oblique views weightbearing of left ankle obtained  Location left ankle  Impression on radiographs there is appears to be a large shoulder lesion of the talus. Evidence of avulsion fracture of distal fibula. 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 6 weeks out from injury    Impression:  Encounter Diagnosis   Name Primary?     Acute left ankle pain Yes       Office Procedures:  Orders Placed This Encounter   Procedures    XR ANKLE LEFT (MIN 3 VIEWS)     Order Specific Question:   Reason for exam:     Answer:   ANKLE PAIN    Breg Tall Margareth Walking Boot     Patient was prescribed a Breg Tall Margareth Walking Boot. The left ankle will require stabilization / immobilization from this semi-rigid / rigid orthosis to improve their function. The orthosis will assist in protecting the affected area, provide functional support and facilitate healing. Patient was instructed to progress ambulation  as non weight bearing in the device. The plan of care is to progress the patient to full weight bearing status. The patient was educated and fit by a healthcare professional with expert knowledge and specialization in brace application while under the direct supervision of the physician. Verbal and written instructions for the use of and application of this item were provided. They were instructed to contact the office immediately should the brace result in increased pain, decreased sensation, increased swelling or worsening of the condition. Treatment Plan:      Had a nice discussion with Ms. Daniel Gould today. She is having significant symptoms especially at night and I think this is because she has been doing too much on this, running and doing impact activities. Like cool down a bit. During that time I would like to obtain both a CT scan and an MRI of this area. The CT scan is for assessment of bony involvement. MRI is necessary for evaluation of ligamentous injuries as well as of the cartilage. These are both necessary for the treatment of this complex injury. I would like to see her back in clinic for further discussion evaluation after she has gotten the scans and after she has had a. Trial period of nonweightbearing to see how she is doing. Provide her with a boot today as well as a work note.

## 2020-07-28 ENCOUNTER — TELEPHONE (OUTPATIENT)
Dept: ORTHOPEDIC SURGERY | Age: 40
End: 2020-07-28

## 2020-07-28 NOTE — TELEPHONE ENCOUNTER
07/28/2020  SPLIT CODE  DME . NPA, B&B ONE PER MEDICAL EVENT, PER JAYNE @ 5:04PM ON 7/28/2020.  BONNY

## 2020-08-05 ENCOUNTER — TELEPHONE (OUTPATIENT)
Dept: ORTHOPEDIC SURGERY | Age: 40
End: 2020-08-05

## 2020-08-06 ENCOUNTER — TELEPHONE (OUTPATIENT)
Dept: ORTHOPEDIC SURGERY | Age: 40
End: 2020-08-06

## 2020-08-06 NOTE — TELEPHONE ENCOUNTER
I called and spoke with the patient who sounded like I just woke her up. She was offered an appointment for tomorrow with the doctor but kep asking me for pills to help her sleep. She stated the pain is unbearable. She just kept asking for something to help. I told her to ice, elevate and try Tylenol PM at night. She then stated she has en entire drug cabinet full of medication and nothing is helping. She states it was fine until we but her in the damn boot.

## 2020-08-07 ENCOUNTER — OFFICE VISIT (OUTPATIENT)
Dept: ORTHOPEDIC SURGERY | Age: 40
End: 2020-08-07
Payer: MEDICARE

## 2020-08-07 VITALS — HEIGHT: 63 IN | WEIGHT: 175 LBS | BODY MASS INDEX: 31.01 KG/M2

## 2020-08-07 PROBLEM — M25.572 CHRONIC PAIN OF LEFT ANKLE: Status: ACTIVE | Noted: 2020-08-07

## 2020-08-07 PROBLEM — G89.29 CHRONIC PAIN OF LEFT ANKLE: Status: ACTIVE | Noted: 2020-08-07

## 2020-08-07 PROCEDURE — 4004F PT TOBACCO SCREEN RCVD TLK: CPT | Performed by: ORTHOPAEDIC SURGERY

## 2020-08-07 PROCEDURE — 99213 OFFICE O/P EST LOW 20 MIN: CPT | Performed by: ORTHOPAEDIC SURGERY

## 2020-08-07 PROCEDURE — G8417 CALC BMI ABV UP PARAM F/U: HCPCS | Performed by: ORTHOPAEDIC SURGERY

## 2020-08-07 PROCEDURE — G8427 DOCREV CUR MEDS BY ELIG CLIN: HCPCS | Performed by: ORTHOPAEDIC SURGERY

## 2020-08-07 RX ORDER — KETOROLAC TROMETHAMINE 10 MG/1
10 TABLET, FILM COATED ORAL EVERY 6 HOURS PRN
Qty: 20 TABLET | Refills: 0 | Status: SHIPPED | OUTPATIENT
Start: 2020-08-07 | End: 2020-10-24

## 2020-08-07 RX ORDER — METHYLPREDNISOLONE 4 MG/1
TABLET ORAL
Qty: 21 TABLET | Refills: 0 | Status: SHIPPED | OUTPATIENT
Start: 2020-08-07 | End: 2020-10-24

## 2020-08-07 NOTE — PROGRESS NOTES
History of Present Illness:  Nava Wong is a 46 y.o. female here for evaluation chief complaint of left ankle pain. I saw her approximately 2 weeks ago. At that time she was having some increasing pain after significant sprain with a distal fibular avulsion fracture. There is some incongruity in her medial talus and she had pain over that area so we were working this up further with CT and MRI. The meantime I put her in a boot to try and knock her down until we have further information as she was attempting to get back to running and I thought that overactivity might be giving more pain since then she feels that the boot may have been making this worse and she is now concerned mostly with posterior ankle pain. She says she cannot sleep at night and her back physician will not give her further narcotics as this is the only thing that can help her sleep at night. She says that her pain is so bad that she wants me to cut her foot off. There is been no interval injury or change to this. Since her pain was worsening and I did not feel it appropriate to call her in further narcotics I offered her to come in and see me sooner so she is here today for repeat evaluation prior to obtaining the studies. She is unable to clearly tell me what seems to make the pain worse but it is in the back and is extremely bad. The boot does not help and it seems to hurt mostly at night. .        Medical History:  Patient's medications, allergies, past medical, surgical, social and family histories were reviewed and updated as appropriate.     Review of Systems:  Pertinent items are noted in HPI    Vital Signs:  Ht 5' 3\" (1.6 m)   Wt 175 lb (79.4 kg)   BMI 31.00 kg/m²     General Exam:   Constitutional: Patient is adequately groomed with no evidence of malnutrition  Cardiovascular: Regular Rate   Respiratory: Unlabored on RA       Physical Examination:    Inspection: No swelling deformity or bruising    Palpation: There is tenderness to palpation about the Achilles tendon but it is in his continuity. She also some tenderness over the peroneal tendons but there is no swelling ecchymosis    Range of Motion: Ankle range of motion largely full but patient was unable to tolerate full range of motion exam    Strength: Unable to assess secondary disc    Special Tests: None    Skin: Intact without    Gait: Not assessed secondary to discomfort    Neuro    Sensation intact but hypersensitive throughout the posterior portion of the ankle    Radiology:     No new radiographs are obtained today          Assessment : 26-DPIJ-BPJ female with uncertain for talar OCD after lateral ankle sprain equivalent almost 8 weeks ago. She is now having new and increasing pain about the back of her ankle but not her calf. Impression:  No diagnosis found. Office Procedures:  No orders of the defined types were placed in this encounter. Treatment Plan: I had a long discussion with this Elier Drake today. She certainly seems to be in significant discomfort and quite miserable. She is tried multiple pain medications in the past.  I do not think it is appropriate to consider narcotics in the setting of an 6week old injury that at one point she was trying to run on and there is not clear reason why this is getting worse. I did tell her to discontinue the boot since that did not seem to be helping and that she may walk on this as tolerated. Those things may help. We already getting a CT and an MRI which would be able to fully evaluate any further pathology in the hindfoot. Lastly I offered her Toradol and to see if that would get her a bit better. She said whatever they gave her at the ER at her visit prior to seeing me did seem to help. She was given Toradol and oxycodone. I gave her Toradol today as well as a Medrol Dosepak if that did not help.   I reiterated multiple times that I did not feel it appropriate to write her prescription for narcotics given that she has had no new clear injury and I do not have a diagnosis. The patient did not seem very happy with this but accepted the plan of treatment. I will see her back as scheduled after she is obtained her CT and MRI. Over 30 minutes was spent in direct face-to-face conversation with the patient trying to figure out this complex scenario.

## 2020-08-10 ENCOUNTER — TELEPHONE (OUTPATIENT)
Dept: ORTHOPEDIC SURGERY | Age: 40
End: 2020-08-10

## 2020-08-10 NOTE — TELEPHONE ENCOUNTER
[facility] called and left a message stating the patient was using her [facility] for the scan and not [facility].   They need an auth faxed to them or clinicals to #[phone]

## 2020-08-14 ENCOUNTER — OFFICE VISIT (OUTPATIENT)
Dept: ORTHOPEDIC SURGERY | Age: 40
End: 2020-08-14
Payer: MEDICARE

## 2020-08-14 VITALS — WEIGHT: 175 LBS | BODY MASS INDEX: 31.01 KG/M2 | HEIGHT: 63 IN

## 2020-08-14 PROBLEM — M95.8 OSTEOCHONDRAL DEFECT OF ANKLE: Status: ACTIVE | Noted: 2020-08-14

## 2020-08-14 PROCEDURE — G8427 DOCREV CUR MEDS BY ELIG CLIN: HCPCS | Performed by: ORTHOPAEDIC SURGERY

## 2020-08-14 PROCEDURE — 4004F PT TOBACCO SCREEN RCVD TLK: CPT | Performed by: ORTHOPAEDIC SURGERY

## 2020-08-14 PROCEDURE — G8417 CALC BMI ABV UP PARAM F/U: HCPCS | Performed by: ORTHOPAEDIC SURGERY

## 2020-08-14 PROCEDURE — 99212 OFFICE O/P EST SF 10 MIN: CPT | Performed by: ORTHOPAEDIC SURGERY

## 2020-08-14 NOTE — PROGRESS NOTES
History of Present Illness:  Giovanna Montilla is a 46 y.o. femalehere for evaluation chief complaint of left ankle pain after ankle sprain. She is here for follow-up of her MRI and review of those results and discussion. At her last visit I had a lengthy discussion with her about pain management and ways to manage her symptoms. She was given a prescription for oral Toradol and a Medrol Dosepak in the instance that that did not help. The Toradol worked wonders for her and she is significantly more comfortable today. Medical History:  Patient's medications, allergies, past medical, surgical, social and family histories were reviewed and updated as appropriate. Review of Systems:  Pertinent items are noted in HPI    Vital Signs:  Ht 5' 3\" (1.6 m)   Wt 175 lb (79.4 kg)   BMI 31.00 kg/m²     General Exam:   Constitutional: Patient is adequately groomed with no evidence of malnutrition  Cardiovascular: Regular Rate   Respiratory: Unlabored on RA       Physical Examination:    Inspection: No swelling or ecchymosis    Palpation: Mild tenderness to palpation over the ATFL CFL and over the Achilles tendon posteriorly but no defect appreciate    Range of Motion: Full ankle range    Strength: 5 out of 5 throughout     Special Tests: None    Skin: Intact    Gait: Antalgic    Neuro    Sensation intact light touch throughout left foot    Radiology:     No radius obtained today but MRI scan was reviewed. As well as the CT scan. 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     Chronic pain of left ankle Yes    Osteochondral defect of ankle        Office Procedures:  Orders Placed This Encounter   Procedures    [facility]     Referral Priority:   Routine     Referral Type:   Eval and Treat Referral Reason:   Specialty Services Required     Requested Specialty:   Physical Therapy     Number of Visits Requested:   1        Treatment Plan: I had a nice discussion with Ms. Daniel Gould today who is feeling a lot better. I cannot give her more Toradol because of the renal risks but I did ask explained to her to take Aleve 2 in the morning and 1 at night for the next 10 days and to stay very well-hydrated to finish off a good course of anti-inflammatories as whenever was bothering her that seems to be working. Given the fact that I am unable to find a clear source for her pain posteriorly and laterally, which actually is been bothering her more than the anterior ankle joint pain that she has from the talar lesion I would like to refer her to one of my [address] [place] [address] colleagues. I would also like her to start therapy as some of this may be from guarding. I would like her to see me back in 4 to 6 weeks as needed if she is not better does not find appropriate solution from the [address] [place] [address].

## 2020-09-22 ENCOUNTER — TELEPHONE (OUTPATIENT)
Dept: ORTHOPEDIC SURGERY | Age: 40
End: 2020-09-22

## 2020-09-22 NOTE — TELEPHONE ENCOUNTER
CALL REGARDING LT ANKLE THAT DR. Harmeet Serrano HAS TREATED IN THE PASS.  [phone] Patient is calling stating that he is having shoulder pain. Patient state that he is having pain when he bends his head to his chest it hurts his shoulder. Patient denied any injury. Patient states that it just started hurting.  Informed patient that XR

## 2020-10-24 ENCOUNTER — HOSPITAL ENCOUNTER (EMERGENCY)
Age: 40
Discharge: HOME | End: 2020-10-24
Attending: EMERGENCY MEDICINE
Payer: MEDICARE

## 2020-10-24 ENCOUNTER — APPOINTMENT (OUTPATIENT)
Dept: GENERAL RADIOLOGY | Age: 40
End: 2020-10-24
Payer: MEDICARE

## 2020-10-24 VITALS
OXYGEN SATURATION: 100 % | DIASTOLIC BLOOD PRESSURE: 87 MMHG | SYSTOLIC BLOOD PRESSURE: 138 MMHG | BODY MASS INDEX: 28.34 KG/M2 | TEMPERATURE: 98.7 F | RESPIRATION RATE: 14 BRPM | WEIGHT: 160 LBS | HEART RATE: 114 BPM

## 2020-10-24 PROCEDURE — 99283 EMERGENCY DEPT VISIT LOW MDM: CPT

## 2020-10-24 PROCEDURE — 73610 X-RAY EXAM OF ANKLE: CPT

## 2020-10-24 RX ORDER — IBUPROFEN 400 MG/1
400 TABLET ORAL EVERY 6 HOURS PRN
Qty: 40 TABLET | Refills: 0 | Status: ON HOLD | OUTPATIENT
Start: 2020-10-24 | End: 2021-08-02 | Stop reason: HOSPADM

## 2020-10-24 ASSESSMENT — PAIN DESCRIPTION - ORIENTATION: ORIENTATION: LEFT

## 2020-10-24 ASSESSMENT — PAIN DESCRIPTION - LOCATION: LOCATION: ANKLE

## 2020-10-24 ASSESSMENT — PAIN DESCRIPTION - DESCRIPTORS: DESCRIPTORS: ACHING;SHARP;SHOOTING

## 2020-10-24 ASSESSMENT — PAIN DESCRIPTION - PAIN TYPE: TYPE: ACUTE PAIN

## 2020-10-24 ASSESSMENT — PAIN SCALES - GENERAL: PAINLEVEL_OUTOF10: 6

## 2020-10-24 NOTE — ED PROVIDER NOTES
Emergency Physician Note    Chief Complaint  Ankle Pain (Left ankle pain after injury. Pt states she has broken same ankle in the past. Pt states she stepped off of the curb wrong. )       History of Present Illness  Juan Garcia is a 45 y.o. female who presents to the ED for ankle injury. Patient reports that she sustained an inversion injury of the left ankle and now has pain. She states that she injured that ankle few months ago and was scheduled for surgery but never followed up. She denies any other pain complaints or injuries. The pain is worsened by weightbearing and range of motion. 10 systems reviewed, pertinent positives per HPI otherwise noted to be negative    I have reviewed the following from the nursing documentation:      Prior to Admission medications    Medication Sig Start Date End Date Taking? Authorizing Provider   LAMOTRIGINE PO Take 100 mg by mouth   Yes Historical Provider, MD   ibuprofen (ADVIL;MOTRIN) 400 MG tablet Take 400 mg by mouth every 6 hours as needed for Pain   Yes Historical Provider, MD   ALPRAZolam (XANAX) 1 MG tablet Take 1 mg by mouth 4 times daily as needed for Anxiety.     Yes Historical Provider, MD       Allergies as of 10/24/2020 - Review Complete 10/24/2020   Allergen Reaction Noted    Demerol Hives 12/30/2009    Doxycycline Nausea And Vomiting 12/30/2009    Lorazepam Other (See Comments) 08/04/2010    Vancomycin Hives 12/22/2011       Past Medical History:   Diagnosis Date    Abnormal Pap smear     WNL since    Anxiety     Back pain     herniated disc    Back pain     had back surgery (fusion of 3 discs) in march 2011    Placenta previa     resolved at 28 weeks with this 3rd pregnancy    Tubal pregnancy         Surgical History:   Past Surgical History:   Procedure Laterality Date    ANESTHESIA NERVE BLOCK Right 5/21/2020    RIGHT HIP INJECTION SITE CONFIRMED BY FLUOROSCOPY performed by Jostin Mohr MD at [address] FRACTURE SURGERY      BACK SURGERY  3-2011    ECTOPIC PREGNANCY SURGERY      LAPAROSCOPY  4/20/10    with salpingostomy  ( ectopic pregnancy)    LUMBAR DISC SURGERY      LUMBAR FUSION  3-2011    L3-L5 fusion    TONSILLECTOMY  2000    WISDOM TOOTH EXTRACTION          Family History:    Family History   Problem Relation Age of Onset    High Blood Pressure Father     Diabetes Maternal Aunt        Social History     Socioeconomic History    Marital status:      Spouse name: Not on file    Number of children: Not on file    Years of education: Not on file    Highest education level: Not on file   Occupational History    Not on file   Social Needs    Financial resource strain: Not on file    Food insecurity     Worry: Not on file     Inability: Not on file    Transportation needs     Medical: Not on file     Non-medical: Not on file   Tobacco Use    Smoking status: Current Every Day Smoker     Packs/day: 0.50     Years: 12.00     Pack years: 6.00     Types: Cigarettes     Last attempt to quit: 2011     Years since quittin.1    Smokeless tobacco: Never Used   Substance and Sexual Activity    Alcohol use: Yes     Comment: every now and then    Drug use: No     Comment: vicodin use for back pain    Sexual activity: Yes     Partners: Male   Lifestyle    Physical activity     Days per week: Not on file     Minutes per session: Not on file    Stress: Not on file   Relationships    Social connections     Talks on phone: Not on file     Gets together: Not on file     Attends [employer] service: Not on file     Active member of club or organization: Not on file     Attends meetings of clubs or organizations: Not on file     Relationship status: Not on file    Intimate partner violence     Fear of current or ex partner: Not on file     Emotionally abused: Not on file     Physically abused: Not on file     Forced sexual activity: Not on file   Other Topics Concern    Not on file Social History Narrative    Not on file       Nursing notes reviewed. ED Triage Vitals [10/24/20 1524]   Enc Vitals Group      /87      Pulse 114      Resp 14      Temp 98.7 °F (37.1 °C)      Temp Source Oral      SpO2 100 %      Weight 160 lb (72.6 kg)      Height       Head Circumference       Peak Flow       Pain Score       Pain Loc       Pain Edu? Excl. in [address]? GENERAL:  Awake, alert. Well developed, well nourished with no apparent distress. HENT:  Normocephalic, Atraumatic, moist mucous membranes. EXTREMITIES:  Normal range of motion, slight edema of the lateral malleolus of the left ankle, neurovascular intact to the toes, Achilles tendon nontender with no palpable defect and negative Mckeon test, no proximal fibular tenderness, no other tenderness throughout the left lower extremity, no bony tenderness, no deformity, distal pulses present. SKIN: Warm, dry and intact. NEUROLOGIC: Normal mental status. Moving all extremities to command. RADIOLOGY  X-RAYS:  I have reviewed radiologic plain film image(s). ALL OTHER NON-PLAIN FILM IMAGES SUCH AS CT, ULTRASOUND AND MRI HAVE BEEN READ BY THE RADIOLOGIST. XR ANKLE LEFT (MIN 3 VIEWS)   Final Result   No acute osseous abnormality of the left ankle. Redemonstration of   osteochondral defect in the medial talar dome with probable intra-articular   loose bodies. MEDICAL DECISION MAKING       I advised the patient to return to the emergency department immediately for any new or worsening symptoms, such as increased swelling or pain. The patient voiced agreement and understanding of the treatment plan. No results found for this visit on 10/24/20. I estimate there is LOW risk for COMPARTMENT SYNDROME, DEEP VENOUS THROMBOSIS, SEPTIC ARTHRITIS, TENDON OR NEUROVASCULAR INJURY, thus I consider the discharge disposition reasonable.  Uriel Tenorio and I have discussed the diagnosis and risks, and we agree with discharging home to follow-up with their primary doctor or the referral orthopedist. We also discussed returning to the Emergency Department immediately if new or worsening symptoms occur. We have discussed the symptoms which are most concerning (e.g., changing or worsening pain, numbness, weakness) that necessitate immediate return. Final Impression    1. Sprain of left ankle, unspecified ligament, initial encounter        Blood pressure 138/87, pulse 114, temperature 98.7 °F (37.1 °C), temperature source Oral, resp. rate 14, weight 160 lb (72.6 kg), last menstrual period 09/27/2020, SpO2 100 %. Patient was given scripts for the following medications. I counseled patient how to take these medications. Discharge Medication List as of 10/24/2020  3:59 PM          Disposition  Pt is in good condition upon Discharge to home. This chart was generated using the [address] [place] [address] dictation system. I created this record but it may contain dictation errors.          Netta Ward MD  10/24/20 4554

## 2020-10-24 NOTE — ED NOTES
Wrapped pt left ankle with 4 inch ace wrap and placed ankle brace. Pt tolerated well, RN aware.      Alberto Lindacal  10/24/20 3100

## 2020-10-26 ENCOUNTER — TELEPHONE (OUTPATIENT)
Dept: ORTHOPEDIC SURGERY | Age: 40
End: 2020-10-26

## 2020-10-26 ENCOUNTER — OFFICE VISIT (OUTPATIENT)
Dept: ORTHOPEDIC SURGERY | Age: 40
End: 2020-10-26
Payer: MEDICARE

## 2020-10-26 VITALS — HEIGHT: 63 IN | WEIGHT: 160 LBS | BODY MASS INDEX: 28.35 KG/M2

## 2020-10-26 PROCEDURE — G8417 CALC BMI ABV UP PARAM F/U: HCPCS | Performed by: ORTHOPAEDIC SURGERY

## 2020-10-26 PROCEDURE — 4004F PT TOBACCO SCREEN RCVD TLK: CPT | Performed by: ORTHOPAEDIC SURGERY

## 2020-10-26 PROCEDURE — L1902 AFO ANKLE GAUNTLET PRE OTS: HCPCS | Performed by: ORTHOPAEDIC SURGERY

## 2020-10-26 PROCEDURE — 99214 OFFICE O/P EST MOD 30 MIN: CPT | Performed by: ORTHOPAEDIC SURGERY

## 2020-10-26 PROCEDURE — G8428 CUR MEDS NOT DOCUMENT: HCPCS | Performed by: ORTHOPAEDIC SURGERY

## 2020-10-26 PROCEDURE — G8484 FLU IMMUNIZE NO ADMIN: HCPCS | Performed by: ORTHOPAEDIC SURGERY

## 2020-10-26 RX ORDER — KETOROLAC TROMETHAMINE 10 MG/1
10 TABLET, FILM COATED ORAL EVERY 6 HOURS PRN
Qty: 12 TABLET | Refills: 0 | Status: SHIPPED | OUTPATIENT
Start: 2020-10-26 | End: 2020-11-03

## 2020-10-26 NOTE — TELEPHONE ENCOUNTER
Patient was referred to Dr. Cabrera  for left foot (possible rfa) and chronic back pain  MRI CT L FOOT- ORDERED. MRI L FOOT IN MEDIA 8/13/20     PT no. ORDERED  MEDICAL HX NEGATIVE  Patient is APPROVED to be seen at this time. Message sent to schedulers   Please notify referring physician if denied.

## 2020-10-26 NOTE — PROGRESS NOTES
History of Present Illness:  Bhaskar Moreno is a 45 y.o. femalehere for evaluation chief complaint of ankle pain. I initially treated her for distal fibular avulsion fracture and associated medial OCD lesion. MRI and CT were obtained which did not show any loose bodies but did show a medial shoulder lesion about the talus. She was gradually insulted but surely getting better although she did have some stiffness and occasional locking symptoms the last couple weeks which was not there in her prior visit. In the interim she had a repeat inversion type injury to her ankle which is reaggravated the pain in the front. Prior to this her anterior medial ankle pain is pretty much entirely gone and she had some anterior lateral ankle pain that responded to oral Toradol. Here for further evaluation. Per her the emergency department doctor told her that she should have had surgery prior and that she should come back and see me for surgery. Of note I never offered this patient surgical intervention. Medical History:  Patient's medications, allergies, past medical, surgical, social and family histories were reviewed and updated as appropriate. Review of Systems:  Pertinent items are noted in HPI    Vital Signs:  Ht 5' 3\" (1.6 m)   Wt 160 lb (72.6 kg)   LMP 09/27/2020   BMI 28.34 kg/m²     General Exam:   Constitutional: Patient is adequately groomed with no evidence of malnutrition  Cardiovascular: Regular Rate   Respiratory: Unlabored on RA       Physical Examination:    Inspection: There is some swelling mildly and ecchymosis to the lateral ankle    Palpation: Palpation about the lateral ankle as well as over the anterior ankle and anterior medial ankle joint. Range of Motion: Limited secondary to    Strength: Not assessed secondary to pain    Special Tests: Anterior drawer attempted but not tolerated.     Skin: Intact    Gait: Antalgic    Neuro    Sensation intact light touch throughout the left lower extremity    Radiology:     X-rays obtained and reviewed in office:  Views AP lateral oblique weightbearing left ankle  Impression there is not appear to be any interval progression at least on radiography of the talar OCD lesion. There has been some maturation of calcification of what is potentially a loose body in the medial gutter. This seems to have been present on the prior radiograph from July but was extremely faint and difficult to proceed. Assessment : 78-year-old female with 3 injury to left ankle. Known distal fibular avulsion fracture and OCD lesion. Has now started develop mechanical symptoms as well as new pain related to this reinjury. Impression:  Encounter Diagnosis   Name Primary?  Left ankle pain, unspecified chronicity Yes       Office Procedures:  Orders Placed This Encounter   Procedures    XR ANKLE LEFT (MIN 3 VIEWS)     Standing Status:   Future     Number of Occurrences:   1     Standing Expiration Date:   10/26/2021        Treatment Plan: I had a nice discussion with the patient today. She was slowly but surely getting better but was having some mechanical locking type symptoms prior to this reinjury. She is certainly exacerbated the an injury that was just taking some time to improve. Because of the mechanical symptoms I would recommend a repeat CT scan to look for loose body. However I would also like her to give this a few weeks to let her ankle come back down if she may continue to improve nonetheless. I would like to see her back in 3 to 4 weeks after we have gotten the CT and this is been able to calm down. I will give her a new PT prescription for this. We will also give her a Chris brace so that she can wean out of the boot. If there is a good surgical indication on the CT and despite giving this some more time to calm down she really still has symptoms then I may consider offering her surgery.   I am quite reticent to offer this patient surgical intervention because I have significant concerns that she may not improve. Having said that the question patient has been dealing with this injury for a number of months at this time is quite frustrated that she is not getting better. We will revisit the potential for surgical intervention the next time I see her. In the meantime the patient does have some chronic low back issues. She also may have ankle pain that I cannot improve surgically. To that extent I would like her to refer to one of my partners Dr. Tamiko Steele for further evaluation and potential treatment. She may also be appropriate for an RF nerve ablation type procedure in this left ankle if I do not feel like I can improve her symptoms surgically. For now from a pain standpoint we will provide her with a 3-day prescription of Toradol. Stands to stay well-hydrated and that she cannot take further doses of this. Greater than 25 minutes were spent in direct face-to-face time and coordination of care for this patient that is a complex issue.

## 2020-10-27 ENCOUNTER — TELEPHONE (OUTPATIENT)
Dept: ORTHOPEDIC SURGERY | Age: 40
End: 2020-10-27

## 2020-10-27 NOTE — TELEPHONE ENCOUNTER
Patient is not able to sleep or relax because of the pain and is asking for something to hold her over until she sees the pain doctor.

## 2020-10-27 NOTE — TELEPHONE ENCOUNTER
Patient called complaining of left ankle pain. She tried to schedule an appointment with pain management but was told they do not treat ankle only spine. She has called several pain management physicians and is not able to be seen for 3 weeks due to her insurance and being a new patient. She is desperate for pain meds. She is not able to sleep. She would like pain meds for 3 days to help her relax and sleep. She called into scheduling and was told this was not an appropriate referrall and would not schedule her. The patient thought she was talking to Dr. Jayne Webb called and spoke with Lydia Hampton who referenced the chart and had approved this to be scheduled in the chart. I called the patient back and got her scheduled.    She is still wanting pain meds to hold her over until she sees the pain specialist.

## 2020-10-28 ENCOUNTER — TELEPHONE (OUTPATIENT)
Dept: ORTHOPEDIC SURGERY | Age: 40
End: 2020-10-28

## 2020-10-28 NOTE — TELEPHONE ENCOUNTER
Spoke to patient and informed them that their MRI/CT has been authorized and that they can call and schedule scan at their convenience. Also told them that they can call and schedule a f/u with Dr. Lucio Ortega once they have MRI/CT scheduled, leaving at least 2-3 days for our office to receive their results.

## 2020-10-30 ENCOUNTER — HOSPITAL ENCOUNTER (OUTPATIENT)
Dept: PHYSICAL THERAPY | Age: 40
Setting detail: THERAPIES SERIES
Discharge: HOME | End: 2020-10-30
Payer: MEDICARE

## 2020-10-30 NOTE — FLOWSHEET NOTE
[facility], [address]        Physical Therapy  Cancellation/No-show Note  Patient Name:  Daniela Garcia  :  1980   Date:  10/30/2020  Cancelled visits to date: 0  No-shows to date: 1    For today's appointment patient:  ?  Cancelled  ? Rescheduled appointment  ? No-show     Reason given by patient:  ?  Patient ill  ? Conflicting appointment  ? No transportation    ? Conflict with work  ? No reason given  ?   Other:     Comments:      Electronically signed by:  Che Martinez, [address] [place] [address]

## 2020-11-03 ENCOUNTER — OFFICE VISIT (OUTPATIENT)
Dept: ORTHOPEDIC SURGERY | Age: 40
End: 2020-11-03
Payer: MEDICARE

## 2020-11-03 VITALS — WEIGHT: 160 LBS | TEMPERATURE: 98.1 F | RESPIRATION RATE: 12 BRPM | HEIGHT: 63 IN | BODY MASS INDEX: 28.35 KG/M2

## 2020-11-03 PROCEDURE — G8484 FLU IMMUNIZE NO ADMIN: HCPCS | Performed by: PHYSICIAN ASSISTANT

## 2020-11-03 PROCEDURE — G8427 DOCREV CUR MEDS BY ELIG CLIN: HCPCS | Performed by: PHYSICIAN ASSISTANT

## 2020-11-03 PROCEDURE — G8417 CALC BMI ABV UP PARAM F/U: HCPCS | Performed by: PHYSICIAN ASSISTANT

## 2020-11-03 PROCEDURE — 99244 OFF/OP CNSLTJ NEW/EST MOD 40: CPT | Performed by: PHYSICIAN ASSISTANT

## 2020-11-03 RX ORDER — TRAMADOL HYDROCHLORIDE 50 MG/1
50 TABLET ORAL 2 TIMES DAILY
Qty: 28 TABLET | Refills: 0 | Status: SHIPPED | OUTPATIENT
Start: 2020-11-03 | End: 2020-11-06

## 2020-11-03 NOTE — PROGRESS NOTES
New Patient: SPINE    Referring Provider:  Jung Roth MD    Chief Complaint   Patient presents with    Ankle Pain     NP, Left ankle pain. MRI Left ankle ordered and scheduled for 11/5/20       HISTORY OF PRESENT ILLNESS:      · The patient is being sent at the request of Jung Roth MD in consultation as a new spine patient for left ankle pain. The patient is a 45 y.o. female whom reports symptoms for 6 months. Symptoms progressed over the last  2 weeks. Patient reports there was a significant event to cause the symptoms. The patient stepped off of a curb and \"rolled\" her ankle. Today discomfort is report at 5 out of 10, describing it as sharp, aching. Symptoms are aggravated by: walking. Patient has undergone recent treatment including, brace, heat/ice, oral medications (Toradol, Ibuprofen). Patient denies previous cervical or left ankle surgery. She did have lumbar spine surgery including a lumbar discectomy and then a lumbar fusion in 2013 with Dr. Monet Sood. · The patient presents today as a new patient with left ankle pain. The patient has had pain for 6 months when she stepped off a curb and rolled her ankle. She describes that her pain began approximately 2 weeks ago again when she rolled her ankle a second time. The patient has been seen and evaluated by Dr. Bere Berrios. Two surgical options were recommended to her one more noninvasive than the other the patient is considering the least invasive option concerning surgery for her ankle. The patient does have a history of lumbar spine surgery most recently in 2013. She describes that she does not have any lower back or radiating leg pain that she has had in the past and describes that her back is feeling great at this time. The patient describes that she does have issues with walking due to the left ankle pain. The patient describes that walking increases her pain most significantly.     Pain Assessment  Location of Pain: Ankle  Location Modifiers: Left  Severity of Pain: 5  Quality of Pain: Sharp, Aching, Other (Comment)(\"Locks up\")  Duration of Pain: Persistent  Frequency of Pain: Constant  Date Pain First Started: 10/24/20  Aggravating Factors: Walking  Limiting Behavior: Yes  Relieving Factors: Rest  Result of Injury: Yes(Fx of left ankle 6 months ago)  Work-Related Injury: No  Are there other pain locations you wish to document?: No      Associated signs and symptoms:   Neurogenic bowel or bladder symptoms:  no   Perceived weakness:  yes   Difficulty walking:  yes    Recent Imaging (within past one year)   Xrays: yes   MRI or CT of spine: yes    Current/Past Treatment:   · Physical Therapy:  none  · Chiropractic:  none  · Injection:  none  · Medications:   NSAIDS:  yes , Ibuprofen   Muscle relaxer:  none   Steriods:  none   Neuropathic medications:  none   Opioids:  none  · Previous surgery:  no  · Previous surgical consult:  no  · Other:  · Infection control  · Tested positive for MRSA in past 12 months:  no  · Tested positive for MSSA \"staph infection\" in past 12 months: no  · Tested positive for VRE (Vancomycin Resistant Enterococci) in past 12 months:   no  · Currently on any antibiotics for an infection: no  · Anticoagulants:  · On a blood thinner:  no   · Any history of bleeding disorder: no   · MRI Contraindication: no   · Previous Pain Management: no       Past medical, surgical, social and family history reviewed with the patient.      Past Medical History:   Past Medical History:   Diagnosis Date    Abnormal Pap smear     WNL since    Anxiety     Back pain     herniated disc    Back pain     had back surgery (fusion of 3 discs) in march 2011    Placenta previa     resolved at 28 weeks with this 3rd pregnancy    Tubal pregnancy       Past Surgical History:     Past Surgical History:   Procedure Laterality Date    ANESTHESIA NERVE BLOCK Right 5/21/2020    RIGHT HIP INJECTION SITE CONFIRMED BY FLUOROSCOPY performed by Arnold DELATORRE MD [facility] at [place] [address]  3-2011    ECTOPIC PREGNANCY SURGERY      LAPAROSCOPY  4/20/10    with salpingostomy  ( ectopic pregnancy)    LUMBAR DISC SURGERY      LUMBAR FUSION  3-2011    L3-L5 fusion    TONSILLECTOMY  2000    WISDOM TOOTH EXTRACTION  1998     Current Medications:     Current Outpatient Medications:     LAMOTRIGINE PO, Take 100 mg by mouth, Disp: , Rfl:     ibuprofen (ADVIL;MOTRIN) 400 MG tablet, Take 1 tablet by mouth every 6 hours as needed for Pain, Disp: 40 tablet, Rfl: 0    ALPRAZolam (XANAX) 1 MG tablet, Take 1 mg by mouth 4 times daily as needed for Anxiety. , Disp: , Rfl:   Allergies:  Demerol; Doxycycline; Lorazepam; and Vancomycin  Social History:    reports that she has been smoking cigarettes. She has a 6.00 pack-year smoking history. She has never used smokeless tobacco. She reports current alcohol use. She reports that she does not use drugs. Family History:   Family History   Problem Relation Age of Onset    High Blood Pressure Father     Diabetes Maternal Aunt          REVIEW OF SYSTEMS: ROS - 14 point    Constitutional: No fevers, chills, night sweats, unexplained weight loss  Eye: No vision changes or diplopia  ENT: No nasal congestion, postnasal drip or sore throat. No tinnitus  Respiratory: No cough or SOB  CV: No chest pain or palpitations  GI: No nausea, abdominal pain, stool changes  : No dysuria or hematuria  Skin: No new or changing skin lesions, no rashes  MSK: No joint swelling, morning stiffness, unusual joint pain, + left ankle pain  Neurological: No headache, confusion, syncope  Psychiatric: No excessive anxiety or depression  Endocrine: No polyuria or polydipsia  Hematologic: No lymph node enlargement or excessive bleeding  Immunologic:No history of immune deficiency or immunomodulating drugs           PHYSICAL EXAM:    Vitals: Temperature 98.1 °F (36.7 °C), resp.  rate 12, height 5' 3\" (1.6 m), weight 160 lb (72.6 kg). GENERAL EXAM:  · General Apparence: Patient is adequately groomed with no evidence of malnutrition. · Psychiatric: Orientation: The patient is oriented to time, place and person. The patient's mood and affect are appropriate   · Vascular: Examination reveals no swelling and palpation reveals no tenderness in upper or lower extremities. Good capillary refill. · The lymphatic examination of the neck, axillae and groin reveals all areas to be without enlargement or induration. · Sensation is intact without deficit in the upper and lower extremities to light touch. · Coordination of the upper and lower extremities are normal.    CERVICAL EXAMINATION:  · Inspection: Local inspection shows no step-off or bruising. Cervical alignment is normal. No instability is noted. · Palpation and Percussion: No evidence of tenderness at the midline. Paraspinal tenderness is not present. There is no paraspinal spasm. · Range of Motion:  pain-free ROM   · Strength: 5/5 bilateral upper extremities  · Special Tests:   Spurling's and Rojas's are negative bilaterally. Kelsey and Impingement tests are negative bilaterally. · Skin:There are no rashes, ulcerations or lesions. · Reflexes: Bilaterally triceps, biceps and brachioradialis are 2+. Clonus absent bilaterally at the feet. No pathological reflexes are noted. · Gait & station:  antalgic on the left and no ataxia  · Additional Examinations:  · RIGHT UPPER EXTREMITY:  Inspection/examination of the right upper extremity does not show any tenderness, deformity or injury. Range of motion is normal and pain-free. There is no gross instability. There are no rashes, ulcerations or lesions. Strength and tone are normal. No atrophy or abnormal movements are noted. · LEFT UPPER EXTREMITY: Inspection/examination of the left upper extremity does not show any tenderness, deformity or injury. Range of motion is normal and pain-free. There is no gross instability. Ankle completed on 10/24/20:   FINDINGS:    No acute fracture or dislocation.  The ankle mortise is maintained.  There is    redemonstration of an osteochondral defect in the medial talar dome. Probable small intra-articular loose bodies adjacent to the talus medially    are noted.  Small ossicles distal to the tip of the malleoli suggesting    remote injury.  No focal soft tissue abnormality.              Impression    No acute osseous abnormality of the left ankle.  Redemonstration of    osteochondral defect in the medial talar dome with probable intra-articular    loose bodies. MRI or CT:  None     EMG:  None     Results for orders placed or performed during the hospital encounter of 20   POC Pregnancy Urine Qual   Result Value Ref Range    Pregnancy, Urine Negative Detects HCG level >20 MIU/mL       Impression (Medical Decision Making):       1. Osteochondral defect of ankle    2. Lumbar post-laminectomy syndrome    3. S/P lumbar fusion        Plan (Medical Decision Making):    I discussed the diagnosis and the treatment options with Gaston Espinoza today. In Summary:  The various treatment options were outlined and discussed with Gaston Espinoza including:  Conservative care options: physical therapy, ice, medications, bracing, and activity modification. The indications for therapeutic injections. The indications for additional imaging/laboratory studies. The indications for (possible future) interventions. After considering the various options discussed, Gaston Espinoza elected to pursue a course of treatment that includes the followin. Medications:   OARRS reviewed and appropriate. Low risk on ORT. Begin Tramadol 50 mg 1 po BID #14. Informed verbal consent was obtained.    Goals of the current treatment regimen include: improvement in pain, improvement in overall in physical performance, ability to perform daily activities, work or disability, emotional distress, health care utilization and decreased medication consumption. Progress will be monitored towards achieving/maintaining therapeutic goals:    1. Improving perceived interference of pain with ADL's, ability to perform HEP, continue to improve in overall flexibility, ROM, strength and endurance, ability to do household activities indoor and/or outdoor, work and social/leisure activities. Improve psychosocial and physical functioning. 2. Improving sleep to 6-7 hours a night. Improve mood/ anxiety and depression symptoms    3. Reduction of reliance on opioid analgesia/more appropriate opioid use. Utilization of adjuvant medications as appropriate. Risks and benefits of the medications and alternative treatments have been discussed with the patient. The patient was advised against drinking alcohol with the opioid pain medicines, advised against driving or handling machinery when starting or adjusting the dose of medicines, feeling groggy or drowsy, or if having any cognitive issues related to the current medications. The patient is fully aware of the risk of overdose and death, if medicines are misused and not taken as prescribed. If the patient develops new symptoms or if the symptoms worsen, the patient was told to call the office. RX Monitoring 6/10/2020   Periodic Controlled Substance Monitoring Possible medication side effects, risk of tolerance/dependence & alternative treatments discussed. ;No signs of potential drug abuse or diversion identified. ;Assessed functional status. ;Obtaining appropriate analgesic effect of treatment. 2. PT:  Encouraged to continue with Home exercise program.    3. Further studies: No further studies. The patient will obtain a CT Scan of the Left Ankle ordered by Dr. Sylvia Hearn. We did discuss an EMG of the left lower extremity however the patient has had these in the past and does not tolerate them well.      4. Interventional:  At this point, no interventional options are recommended. We did discuss the potential for a spinal cord stimulator. The patient was not interested at this time. She is considering ankle surgery. 5. Healthy Lifestyle Measures:  Patient education material reviewing the following was distributed to Sheri Westbrook  Anatomic drawings  Healthy lifestyle education  Osteoporosis prevention,   Back and neck pain educational information   Advanced imaging preparedness    Posture education   Proper lifting and carrying techniques,   Weight management  Quitting smoking and   Minor ways to treat back pain  For further information regarding the spine conditions and to review interventional treatments the patient was directed to [facility].    6.  Follow up:  4-6 weeks    Sheri Westbrook was instructed to call the office if her symptoms worsen or if new symptoms appear prior to the next scheduled visit. She is specifically instructed to contact the office between now & her scheduled appointment if she has concerns related to her condition or if she needs assistance in scheduling the above tests. She is welcome to call for an appointment sooner if she has any additional concerns or questions. WEN Walker, ANAHY  Board Certified by the [employer] on Certification of Physician Assistants  Asaf Dodson 59  Partner of [facility] ([facility])       This dictation was performed with a verbal recognition program [facility]) and it was checked for errors. It is possible that there are still dictated errors within this office note. If so, please bring any errors to my attention for an addendum. All efforts were made to ensure that this office note is accurate.

## 2020-11-04 ENCOUNTER — TELEPHONE (OUTPATIENT)
Dept: ORTHOPEDIC SURGERY | Age: 40
End: 2020-11-04

## 2020-11-05 ENCOUNTER — HOSPITAL ENCOUNTER (OUTPATIENT)
Dept: CT IMAGING | Age: 40
Discharge: HOME | End: 2020-11-05
Payer: MEDICARE

## 2020-11-05 ENCOUNTER — TELEPHONE (OUTPATIENT)
Dept: ORTHOPEDIC SURGERY | Age: 40
End: 2020-11-05

## 2020-11-05 PROCEDURE — 73700 CT LOWER EXTREMITY W/O DYE: CPT

## 2020-11-05 NOTE — TELEPHONE ENCOUNTER
S/W PATIENT does not wish to try non-pain medication.  Patient was transferred to schedule an OV to discuss medication request.

## 2020-11-06 ENCOUNTER — OFFICE VISIT (OUTPATIENT)
Dept: ORTHOPEDIC SURGERY | Age: 40
End: 2020-11-06
Payer: MEDICARE

## 2020-11-06 ENCOUNTER — TELEPHONE (OUTPATIENT)
Dept: ORTHOPEDIC SURGERY | Age: 40
End: 2020-11-06

## 2020-11-06 VITALS — TEMPERATURE: 97.9 F | WEIGHT: 160.05 LBS | HEIGHT: 63 IN | BODY MASS INDEX: 28.36 KG/M2

## 2020-11-06 PROCEDURE — 4004F PT TOBACCO SCREEN RCVD TLK: CPT | Performed by: PHYSICIAN ASSISTANT

## 2020-11-06 PROCEDURE — G8484 FLU IMMUNIZE NO ADMIN: HCPCS | Performed by: PHYSICIAN ASSISTANT

## 2020-11-06 PROCEDURE — G8427 DOCREV CUR MEDS BY ELIG CLIN: HCPCS | Performed by: PHYSICIAN ASSISTANT

## 2020-11-06 PROCEDURE — 99214 OFFICE O/P EST MOD 30 MIN: CPT | Performed by: PHYSICIAN ASSISTANT

## 2020-11-06 PROCEDURE — G8417 CALC BMI ABV UP PARAM F/U: HCPCS | Performed by: PHYSICIAN ASSISTANT

## 2020-11-06 RX ORDER — HYDROCODONE BITARTRATE AND IBUPROFEN 5; 200 MG/1; MG/1
1 TABLET ORAL 2 TIMES DAILY
Qty: 10 TABLET | Refills: 0 | Status: SHIPPED | OUTPATIENT
Start: 2020-11-06 | End: 2020-11-09 | Stop reason: CLARIF

## 2020-11-06 NOTE — TELEPHONE ENCOUNTER
S/W Bear Valadez at pharmacy who states Rx is not going to be discontinued they just can't get it in until Monday. S/W PATIENT who requests rx to be sent to [facility] in [place].

## 2020-11-06 NOTE — PROGRESS NOTES
Follow up: SPINE    Ruthe Lipoma  1980  [id number]         Chief Complaint   Patient presents with    Medication Check         HISTORY OF PRESENT ILLNESS:  Ms. Teresa Arnold is a 45 y.o. female returns for a follow up visit for multiple medical problems. Her current presenting problems are   1. Osteochondral defect of ankle    2. Lumbar post-laminectomy syndrome    3. S/P lumbar fusion    . As per information/history obtained from the PADT(patient assessment and documentation tool) - She complains of pain in the lower back with radiation to the ankles Left She rates the pain 5/10 and describes it as sharp, aching, throbbing. Pain is made worse by: walking, standing. She denies side effects from the current pain regimen. Patient reports that since the last follow-up visit the physical functioning is worse, family/social relationships are worse, mood is worse and sleep patterns are worse, and that overall functioning is worse. Patient denies neurological bowel or bladder. The patient presents today in follow-up after last being seen on 11/3/2020. The patient was provided with tramadol however the patient describes that this was giving her heartburn even with taking Pepcid and Tums. The patient describes that she has taken Vicoprofen in the past which does seem to work best for her as a pain reliever. She did have the CT scan of the left ankle completed yesterday and will need to follow-up with Dr. Duncan Cormier for further evaluation and possible surgical treatment. She describes that she has occasional lower back pain but today it is under control. Associated signs and symptoms:   Neurogenic bowel or bladder symptoms:  no   Perceived weakness:  yes   Difficulty walking:  yes            Past medical, surgical, social and family history reviewed with the patient.      Past Medical History:   Past Medical History:   Diagnosis Date    Abnormal Pap smear     WNL since    Anxiety     Back pain herniated disc    Back pain     had back surgery (fusion of 3 discs) in march 2011    Placenta previa     resolved at 28 weeks with this 3rd pregnancy    Tubal pregnancy       Past Surgical History:     Past Surgical History:   Procedure Laterality Date    ANESTHESIA NERVE BLOCK Right 5/21/2020    RIGHT HIP INJECTION SITE CONFIRMED BY FLUOROSCOPY performed by Grace Farley MD at [place] [address]  3-2011    ECTOPIC PREGNANCY SURGERY      LAPAROSCOPY  4/20/10    with salpingostomy  ( ectopic pregnancy)    LUMBAR DISC SURGERY      LUMBAR FUSION  3-2011    L3-L5 fusion    TONSILLECTOMY  2000    WISDOM TOOTH EXTRACTION  1998     Current Medications:     Current Outpatient Medications:     LAMOTRIGINE PO, Take 100 mg by mouth, Disp: , Rfl:     ibuprofen (ADVIL;MOTRIN) 400 MG tablet, Take 1 tablet by mouth every 6 hours as needed for Pain, Disp: 40 tablet, Rfl: 0    ALPRAZolam (XANAX) 1 MG tablet, Take 1 mg by mouth 4 times daily as needed for Anxiety. , Disp: , Rfl:     traMADol (ULTRAM) 50 MG tablet, Take 1 tablet by mouth 2 times daily for 14 days. (Patient not taking: Reported on 11/6/2020), Disp: 28 tablet, Rfl: 0  Allergies:  Demerol; Doxycycline; Lorazepam; and Vancomycin  Social History:    reports that she has been smoking cigarettes. She has a 6.00 pack-year smoking history. She has never used smokeless tobacco. She reports current alcohol use. She reports that she does not use drugs.   Family History:   Family History   Problem Relation Age of Onset    High Blood Pressure Father     Diabetes Maternal Aunt        REVIEW OF SYSTEMS:   CONSTITUTIONAL: Denies unexplained weight loss, fevers, chills or fatigue  NEUROLOGICAL: Denies unsteady gait or progressive weakness  MUSCULOSKELETAL: Denies joint swelling or redness  GI: Denies nausea, vomiting, diarrhea   : Denies bowel or bladder issues       PHYSICAL EXAM:    Vitals: Temperature 97.9 °F (36.6 °C), temperature source Infrared, height 5' 2.99\" (1.6 m), weight 160 lb 0.9 oz (72.6 kg). GENERAL EXAM:  · General Apparence: Patient is adequately groomed with no evidence of malnutrition. · Psychiatric: Orientation: The patient is oriented to time, place and person. The patient's mood and affect are appropriate   · Vascular: Examination reveals no swelling and palpation reveals no tenderness in upper or lower extremities. Good capillary refill. · The lymphatic examination of the neck and axillae reveals all areas to be without enlargement or induration. · Sensation is intact without deficit in the upper and lower extremities to light touch. · Coordination of the upper and lower extremities are normal.  · RIGHT UPPER EXTREMITY:  Inspection/examination of the right upper extremity does not show any tenderness, deformity or injury. Range of motion is unremarkable and pain-free. There is no gross instability. There are no rashes, ulcerations or lesions. Strength and tone are normal. No atrophy or abnormal movements are noted. · LEFT UPPER EXTREMITY: Inspection/examination of the left upper extremity does not show any tenderness, deformity or injury. Range of motion is unremarkable and pain-free. There is no gross instability. There are no rashes, ulcerations or lesions. Strength and tone are normal. No atrophy or abnormal movements are noted. LUMBAR/SACRAL EXAMINATION:  · Inspection: Local inspection shows no step-off or bruising. Lumbar alignment is normal. No instability is noted. · Palpation:   No evidence of tenderness at the midline. Lumbar paraspinal tenderness: No tenderness to palpation of the lumbar paraspinals  Bursal tenderness No tenderness bilaterally  There is no paraspinal spasm. · Range of Motion: pain-free ROM  · Strength:  Strength testing is 5/5 in all muscle groups tested except with plantar and dorsal flexion at the ankle at a 4/5.    · Special Tests:   Straight leg raise and crossed SLR negative. Abdirahman's testing is negative bilaterally. FADIR's testing is negative bilaterally. Bowstring test negative. Slump test negative. · Skin: There are no rashes, ulcerations or lesions. · Reflexes: Reflexes are symmetrically 2+ at the patellar and ankle tendons. Clonus absent bilaterally at the feet. · Gait & station: antalgic on the left and no ataxia  · Additional Examinations:  · RIGHT LOWER EXTREMITY: Inspection/examination of the right lower extremity does not show any tenderness, deformity or injury. Range of motion is normal and pain-free. There is no gross instability. There are no rashes, ulcerations or lesions. Strength and tone are normal. No atrophy or abnormal movements are noted. · LEFT LOWER EXTREMITY:  Inspection/examination of the left lower extremity does show left ankle tenderness, with no deformity or injury. Range of motion is limited with increased pain with ROM of the ankle including plantar and dorsal flexion as well as inversion and eversion. There is no gross instability. There are no rashes, ulcerations or lesions. Strength is 4/5 with plantar and dorsal flexion at the left ankle, but tone is normal. No atrophy or abnormal movements are noted. Diagnostic Testing:    CT Scan of the Left Ankle without contrast: From 11/5/20:       FINDINGS:    Bones: Os trigonum.  Osseous alignment is normal.  No acute fracture or    dislocation.         Osteochondral lesion at the medial talar dome measuring up to 9 mm    transversely on image 58, series 601 and image 41, series 603 measuring up to    1.3 cm.         Tiny well corticated ossific densities along the posterior joint recess    measuring up to 3 mm on image 36, series 2.  Similar tiny ossific densities    along the posterior tibialis tendon insertion on image 46, series 2.  Tiny    ossific density within the intraosseous membrane on image 28, series 2.         Soft Tissue:  Preservation of the sinus tarsi fat.  Visualized Achilles,    peroneal, flexor, and extensor tendons are seen in their expected locations.         Joint:  Small tibiotalar joint effusion.         No subtalar joint effusion.              Impression    1. Osteochondral lesion at the medial talar dome measuring 9.0 x 1.3 cm. Small loose bodies measuring up to 3 mm. 2. Small tibiotalar joint effusion. 3. No acute osseous abnormality. Results for orders placed or performed during the hospital encounter of 20   POC Pregnancy Urine Qual   Result Value Ref Range    Pregnancy, Urine Negative Detects HCG level >20 MIU/mL     Impression:       1. Osteochondral defect of ankle    2. Lumbar post-laminectomy syndrome    3. S/P lumbar fusion        Plan:  Clinical Course: Above diagnoses are worsening    I discussed the diagnosis and the treatment options with Page Shen today. In Summary:  The various treatment options were outlined and discussed with Page Shen including:  Conservative care options: physical therapy, ice, medications, bracing, and activity modification. The indications for therapeutic injections. The indications for additional imaging/laboratory studies. The indications for (possible future) interventions. After considering the various options discussed, Page Shen elected to pursue a course of treatment that includes the followin. Medications:    OARRS reviewed and appropriate. Low risk on ORT. Being Vicoprofen 5-200 mg 1 po BID #10. The patient will stop Tramadol at this time as the patient has side effects to the medication. Informed verbal consent was obtained. Goals of the current treatment regimen include: improvement in pain, improvement in overall in physical performance, ability to perform daily activities, work or disability, emotional distress, health care utilization and decreased medication consumption.      Progress will be monitored towards achieving/maintaining therapeutic goals:    1. Improving perceived interference of pain with ADL's, ability to perform HEP, continue to improve in overall flexibility, ROM, strength and endurance, ability to do household activities indoor and/or outdoor, work and social/leisure activities. Improve psychosocial and physical functioning. 2. Improving sleep to 6-7 hours a night. Improve mood/ anxiety and depression symptoms    3. Reduction of reliance on opioid analgesia/more appropriate opioid use. Utilization of adjuvant medications as appropriate. Risks and benefits of the medications and alternative treatments have been discussed with the patient. The patient was advised against drinking alcohol with the opioid pain medicines, advised against driving or handling machinery when starting or adjusting the dose of medicines, feeling groggy or drowsy, or if having any cognitive issues related to the current medications. The patient is fully aware of the risk of overdose and death, if medicines are misused and not taken as prescribed. If the patient develops new symptoms or if the symptoms worsen, the patient was told to call the office. RX Monitoring 6/10/2020   Periodic Controlled Substance Monitoring Possible medication side effects, risk of tolerance/dependence & alternative treatments discussed. ;No signs of potential drug abuse or diversion identified. ;Assessed functional status. ;Obtaining appropriate analgesic effect of treatment. 2. PT:  Encouraged to continue with Home exercise program.    3. Further studies: No further studies. 4. Interventional:  The patient will also following up with Dr. Olivier Connors for further evaluation of the Left Ankle. 5. Follow up:  4-6 weeks      Nicole Stewart was instructed to call the office if her symptoms worsen or if new symptoms appear prior to the next scheduled visit.  She is specifically instructed to contact the office between now & her scheduled appointment if she has concerns related to her condition or if she needs assistance in scheduling the above tests. She is welcome to call for an appointment sooner if she has any additional concerns or questions. WEN Borja, ANAHY  Board Certified by the [employer] on Certification of Physician Assistants  [address] [facility]  Partner of [facility] ([facility])         This dictation was performed with a verbal recognition program [facility]) and it was checked for errors. It is possible that there are still dictated errors within this office note. If so, please bring any errors to my attention for an addendum. All efforts were made to ensure that this office note is accurate.

## 2020-11-09 ENCOUNTER — OFFICE VISIT (OUTPATIENT)
Dept: ORTHOPEDIC SURGERY | Age: 40
End: 2020-11-09
Payer: MEDICARE

## 2020-11-09 ENCOUNTER — TELEPHONE (OUTPATIENT)
Dept: ORTHOPEDIC SURGERY | Age: 40
End: 2020-11-09

## 2020-11-09 PROCEDURE — G8484 FLU IMMUNIZE NO ADMIN: HCPCS | Performed by: ORTHOPAEDIC SURGERY

## 2020-11-09 PROCEDURE — 99213 OFFICE O/P EST LOW 20 MIN: CPT | Performed by: ORTHOPAEDIC SURGERY

## 2020-11-09 PROCEDURE — G8417 CALC BMI ABV UP PARAM F/U: HCPCS | Performed by: ORTHOPAEDIC SURGERY

## 2020-11-09 PROCEDURE — G8428 CUR MEDS NOT DOCUMENT: HCPCS | Performed by: ORTHOPAEDIC SURGERY

## 2020-11-09 PROCEDURE — 4004F PT TOBACCO SCREEN RCVD TLK: CPT | Performed by: ORTHOPAEDIC SURGERY

## 2020-11-09 RX ORDER — HYDROCODONE BITARTRATE AND IBUPROFEN 7.5; 2 MG/1; MG/1
1 TABLET, FILM COATED ORAL 2 TIMES DAILY
Qty: 10 TABLET | Refills: 0 | Status: SHIPPED | OUTPATIENT
Start: 2020-11-09 | End: 2020-11-14

## 2020-11-09 NOTE — PROGRESS NOTES
History of Present Illness:  Candido Miranda is a 45 y.o. femalehere for evaluation chief complaint of follow-up left ankle. We recently got another CT as the patient has had persistent symptoms. She has symptoms kind of all over the ankle joint. She also was describing mechanical symptoms we will get a CT to rule out loose body. Wanted to try Chris brace but it was too hard for her to get on once she took it off the first time. She still having significant pain throughout the ankle joint and is not getting better. Tried significant amounts of physical therapy and operative management. She saw my pain management partner who did not feel that she had an anything to offer. In short we have done everything to get this better without surgery. .        Medical History:  Patient's medications, allergies, past medical, surgical, social and family histories were reviewed and updated as appropriate. Review of Systems:  Pertinent items are noted in HPI    Vital Signs: There were no vitals taken for this visit. General Exam:   Constitutional: Patient is adequately groomed with no evidence of malnutrition  Cardiovascular: Regular Rate   Respiratory: Unlabored on RA   Psych: Appropriate mood and affect  Skin: No appreciated rashes lesions or ulcerations  Circulatory: 2+ distal pulse with warm well perfused extremities    Physical Examination: Left lower extremity    Inspection: Mild swelling about the left ankle, no ecchymosis or bruising    Palpation: Tenderness to palpation about the medial ankle joint plantarflexion about the ankle joint diffusely.   No locking or catching on exam today    Range of Motion: A little limited due to pain    Strength: 5 out of 5 throughout but with pain    Special Tests: None    Gait: Antalgic    Neuro    Sensation: Sensation is intact to light touch throughout the extremity     Reflexes: Deep tendon reflexes intact    Radiology:     X-rays obtained and reviewed in office:  No new radiographs were obtained today but CT scan was reviewed. In comparison with the prior [facility] from [facility] and with the MRI this now shows some fragmentation of the medial edge of this OCD lesion with loose bodies. Assessment : 72-year-old female with medial talar dome OCD with interval development of loose bodies. Impression:  Encounter Diagnoses   Name Primary?  Osteochondral defect of ankle Yes    Chronic pain of left ankle        Office Procedures:  No orders of the defined types were placed in this encounter. Treatment Plan: I had a nice discussion with the patient today. I do think that this new CT showed some meaningful light on exactly what is been going on. These loose bodies certainly explain the general inflammation of her ankle which would explain her ankle joint pain because of synovitis. The intermittent locking and painful symptoms she has had would also be explained by the fragmentation of this OCD lesion. Unfortunately management of this OCD lesion is talar shoulder lesion is quite complicated. The patient absolutely does not want any kind of allograft procedure. I do think that we can help with the locking and synovitis with an ankle arthroscopy for removal of loose bodies and synovectomy. I would consider a microfracture at that time if arthroscopically this lesion appears to warranted as it is about 1 cm in diameter and is right at the border of when it might be held. Nonetheless noncontained medial tibial OCD lesion I would not expect to do substantially better with microfracture, but the results of treating these with any type of modality is somewhat mixed    Plan for surgery will be left ankle arthroscopy with loose body removal and synovectomy as well as possible microfracture for OCD lesion.   I did explain to the patient at length that I am not 100% convinced that this will make her better but I do think there is enough of a chance that I offered her surgery. She really has done everything that she possibly can nonoperatively and we have been trying to manage this nonoperatively without months and her ankle is no better at all. In addition to the risks of surgery below there is always a risk of neuroma with ankle arthroscopy. We will plan for this the next time of mutual convenience. I have evaluated the patient myself and completed the examination of the patient on date of visit. Have discussed the case and reviewed all pertinent data with the patient. Risks of surgery include but are not limited to the possibility of;   Infection  Wound healing problems  Nerve Tendon or Vessel Damage  Incomplete pain relief  Need for further surgery in the future  Reflex Sympathetic Dystrophy  DVT or PE (blood clots)  Amputation  Death  No guarantees were given or implied

## 2020-11-09 NOTE — TELEPHONE ENCOUNTER
Prescription Refill     Medication Name:  Peter Ulrich: [facility] [address] [place] [address] [phone]  Patient Contact Number:  [phone]        PATIENT CALLED REGARDING THE MEDICATION PRESCRIBED BY KATHY. PHARMACY STATES THAT STRENGTH 5-200 IS NO LONGER AVAILABLE. STRENGTH FOR VICOPROFEN NEEDS TO BE CHANGED TO 7.5-200. PATIENT HAS GONE WITHOUT MEDICATION AND IS IN PAIN.

## 2020-11-12 ENCOUNTER — TELEPHONE (OUTPATIENT)
Dept: ORTHOPEDIC SURGERY | Age: 40
End: 2020-11-12

## 2020-11-12 NOTE — PROGRESS NOTES
Garth Lux    Age 45 y.o.    female    1980    MRN [id number]    11/19/2020  Arrival Time_____________  OR Time____________90 [address] Never     Procedure(s):  VIDEO ARTHROSCOPY LEFT ANKLE, SYNOVECTOMY, LOOSE BODY REMOVAL, MICROFRACTURE -BLOCK-                      Monitor Anesthesia Care    Surgeon(s):  Lyssa Downey, MD       Phone [phone] ([place])     [address] [place] [address] [facility]  Cell Work  _________________________________________________________________  _________________________________________________________________  _________________________________________________________________  _________________________________________________________________  _________________________________________________________________      PCP _____________________________ Phone_________________       H&P__________________Bringing    Chart            Epic  DOS     Called_______  EKG__________________Bringing    Chart            Epic  DOS     Called_______  LAB__________________ Bringing    Chart            Epic  DOS     Called_______  Cardiac Clearance_______Bringing    Chart            Epic      DOS       Called_______    Cardiologist________________________ Phone___________________________      ? Zoroastrianism concerns / Waiver on Chart            PAT Communications_____________  ? Pre-op Instructions Given [place]          ______________________________  ? Directions to Surgery Center                          ______________________________  ? Transportation Home_______________      _______________________________  ?  Crutches/Walker__________________        _______________________________      ________Pre-op Orders   _______Transcribed    _______Wt.  ________Pharmacy          _______SCD  ______VTE     ______Beta Blocker  ________Consent             ________TED Rosezetta Sarai

## 2020-11-12 NOTE — PROGRESS NOTES
Obstructive Sleep Apnea (ALVAREZ) Screening     Patient:  Pk Singer    YOB: 1980      Medical Record #:  [id number]                     Date:  11/12/2020     1. Are you a loud and/or regular snorer? []  Yes       [x] No    2. Have you been observed to gasp or stop breathing during sleep? []  Yes       [x] No    3. Do you feel tired or groggy upon awakening or do you awaken with a headache?           []  Yes       [x] No    4. Are you often tired or fatigued during the wake time hours? []  Yes       [x] No    5. Do you fall asleep sitting, reading, watching TV or driving? []  Yes       [x] No    6. Do you often have problems with memory or concentration? []  Yes       [x] No    **If patient's score is ? 3 they are considered high risk for ALVAREZ. An Anesthesia provider will evaluate the patient and develop a plan of care the day of surgery. Note:  If the patient's BMI is more than 35 kg m¯² , has neck circumference > 40 cm, and/or high blood pressure the risk is greater (© American Sleep Apnea Association, 2006).

## 2020-11-13 ENCOUNTER — TELEPHONE (OUTPATIENT)
Dept: ORTHOPEDIC SURGERY | Age: 40
End: 2020-11-13

## 2020-11-13 NOTE — TELEPHONE ENCOUNTER
PATIENT CALLING BACK AGAIN WANTING MEDICATION THAT DOES NOT HAVE IBUPROFEN IN IT. HE SAID HE WON'T TAKE OVER TIL AFTER HER SX. HE STATES SHE CAN HAVE REGULAR VICODIN
PATIENT IS WANTING TO KNOW WHAT OTHER MEDICATION SHE CAN HAVE SINCE DR. Valdemar Atwood CAN'T TAKE MEDICATION WITH IBUPROFEN. PLEASE CALL.
Please see all messages related to this patient. Please see all messages concerning medication.  Please call this patient, she is asking for medication to relieve her pain
Spoke with patient about request for pain medication. Recommended emergency room for short term pain relief prior to her surgery with Dr. Bucky Hernandez on 11/19/2020.
Unable to assess

## 2020-11-14 ENCOUNTER — OFFICE VISIT (OUTPATIENT)
Dept: ORTHOPEDIC SURGERY | Age: 40
End: 2020-11-14
Payer: MEDICARE

## 2020-11-14 VITALS — BODY MASS INDEX: 30.12 KG/M2 | HEIGHT: 63 IN | WEIGHT: 170 LBS

## 2020-11-14 PROCEDURE — 99213 OFFICE O/P EST LOW 20 MIN: CPT | Performed by: NURSE PRACTITIONER

## 2020-11-14 PROCEDURE — G8484 FLU IMMUNIZE NO ADMIN: HCPCS | Performed by: NURSE PRACTITIONER

## 2020-11-14 PROCEDURE — G8428 CUR MEDS NOT DOCUMENT: HCPCS | Performed by: NURSE PRACTITIONER

## 2020-11-14 PROCEDURE — 4004F PT TOBACCO SCREEN RCVD TLK: CPT | Performed by: NURSE PRACTITIONER

## 2020-11-14 PROCEDURE — G8417 CALC BMI ABV UP PARAM F/U: HCPCS | Performed by: NURSE PRACTITIONER

## 2020-11-14 NOTE — PROGRESS NOTES
Patient presents today for pain medication. She is scheduled to have surgery with Dr. Silver Espinoza on 11/19/2020. She had requested pain medication from him during her last visit, he advised her to go to Dr. Anca Jimenez for pain management. At that time she was prescribed tramadol. She reports that the tramadol had not been helping her pain so they gave her a prescription for Vicodin with ibuprofen. She reports that Dr. Silver Espinoza told her she is unable to take ibuprofen a week prior to surgery so she is requesting something else for pain medication. I discussed this with the , Rosa who discussed this with Dr. Silver Espinoza and patient is able to take ibuprofen at this time. The patient reports that she does not have any of his pain medication left. However after an OARRS report was run, it was noted that the patient had 14 days worth of tramadol filled on 11/3/2020 and 5 days worth of Vicodin with ibuprofen filled on 11/9/2020. I advised the patient that I cannot give her any further pain medication at this time and to continue with the pain medication she has at home as well as supplement with Tylenol. She will follow up with Dr. Silver Espinoza as scheduled for her surgery.

## 2020-11-16 ENCOUNTER — TELEPHONE (OUTPATIENT)
Dept: ORTHOPEDIC SURGERY | Age: 40
End: 2020-11-16

## 2020-11-16 NOTE — TELEPHONE ENCOUNTER
CPT: [facility]  AUTH: NPR  DATE RANGE: NONE  INSURANCE: PARAMOUNT  NOTE: ARJUN WERNER CPT ON SX LETTER SAYS CPT 89139 WHICH IS A KNEE SCOPE CPT SHOULD BE 547[address]  DOCUMENT SCANNED

## 2020-11-18 ENCOUNTER — ANESTHESIA EVENT (OUTPATIENT)
Dept: OPERATING ROOM | Age: 40
End: 2020-11-18
Payer: MEDICARE

## 2020-11-18 NOTE — ANESTHESIA PRE PROCEDURE
Department of Anesthesiology  Preprocedure Note       Name:  [place]   Age:  45 y.o.  :  1980                                          MRN:  [id number]         Date:  2020      Surgeon: Viv Slade):  Polo Rasmussen MD    Procedure: Procedure(s):  VIDEO ARTHROSCOPY LEFT ANKLE, SYNOVECTOMY, LOOSE BODY REMOVAL, MICROFRACTURE -BLOCK-    Medications prior to admission:   Prior to Admission medications    Medication Sig Start Date End Date Taking? Authorizing Provider   LAMOTRIGINE PO Take 100 mg by mouth daily For anxiety    Historical Provider, MD   ibuprofen (ADVIL;MOTRIN) 400 MG tablet Take 1 tablet by mouth every 6 hours as needed for Pain 10/24/20   Bobby Silverio MD   ALPRAZolam Trenia Fury) 1 MG tablet Take 1 mg by mouth 4 times daily as needed for Anxiety. Historical Provider, MD       Current medications:    No current facility-administered medications for this encounter. Current Outpatient Medications   Medication Sig Dispense Refill    LAMOTRIGINE PO Take 100 mg by mouth daily For anxiety      ibuprofen (ADVIL;MOTRIN) 400 MG tablet Take 1 tablet by mouth every 6 hours as needed for Pain 40 tablet 0    ALPRAZolam (XANAX) 1 MG tablet Take 1 mg by mouth 4 times daily as needed for Anxiety. Allergies: Allergies   Allergen Reactions    Demerol Hives    Doxycycline Nausea And Vomiting    Lorazepam Other (See Comments)     Makes her anxiety worsen    Vancomycin Hives       Problem List:    Patient Active Problem List   Diagnosis Code    Bursitis of hip M70.70    False labor after 37 weeks of gestation without delivery O47.1    Normal vaginal delivery O80    Displacement of lumbar intervertebral disc without myelopathy M51.26    Displacement of lumbar intervertebral disc without myelopathy M51.26    Sprain of neck S13. 9XXA    Vasovagal syncope R55    Palpitations R00.2    Lymphadenopathy, inguinal R59.0    Headache R51.9    Right hip pain M25.551    Chronic pain of left ankle M25.572, G89.29    Osteochondral defect of ankle M95.8       Past Medical History:        Diagnosis Date    Abnormal Pap smear     WNL since    Anxiety     Back pain     herniated disc    Back pain     had back surgery (fusion of 3 discs) in 2011    Placenta previa     resolved at 28 weeks with this 3rd pregnancy    Tubal pregnancy        Past Surgical History:        Procedure Laterality Date    ANESTHESIA NERVE BLOCK Right 2020    RIGHT HIP INJECTION SITE CONFIRMED BY FLUOROSCOPY performed by Lizett Sullivan MD at [place] [address] Right     CHOLECYSTECTOMY      laparoscopic   [address]  10/2017    LAPAROSCOPY  4/20/10    with salpingostomy  ( ectopic pregnancy)    LUMBAR [address]      LUMBAR FUSION  3-    L3-L5 fusion    TONSILLECTOMY      WISDOM TOOTH EXTRACTION         Social History:    Social History     Tobacco Use    Smoking status: Current Every Day Smoker     Packs/day: 0.50     Years: 12.00     Pack years: 6.00     Types: Cigarettes     Start date: 1999     Last attempt to quit: 2011     Years since quittin.2    Smokeless tobacco: Never Used   Substance Use Topics    Alcohol use: Yes     Comment: every now and then                                Ready to quit: Yes  Counseling given: Yes      Vital Signs (Current):   Vitals:    20 1243   Weight: 170 lb (77.1 kg)   Height: 5' 3\" (1.6 m)                                              BP Readings from Last 3 Encounters:   10/24/20 138/87   20 113/80   20 120/87       NPO Status:                                                                                 BMI:   Wt Readings from Last 3 Encounters:   20 170 lb (77.1 kg)   20 160 lb 0.9 oz (72.6 kg)   20 160 lb (72.6 kg)     Body mass index is 30.11 kg/m².     CBC:   Lab Results   Component Value Date    WBC 4.8 2020    RBC 4.80 2020 HGB 13.9 02/28/2020    HCT 42.5 02/28/2020    MCV 88.5 02/28/2020    RDW 14.5 02/28/2020     02/28/2020       CMP:   Lab Results   Component Value Date     02/29/2020    K 5.6 02/29/2020    K 2.7 02/05/2020     02/29/2020    CO2 15 02/29/2020    BUN 7 02/29/2020    CREATININE 0.7 02/29/2020    GFRAA >60 02/29/2020    GFRAA >60 04/17/2012    AGRATIO 1.5 02/28/2020    LABGLOM >60 02/29/2020    GLUCOSE 80 02/29/2020    PROT 7.3 02/28/2020    PROT 5.8 04/17/2012    CALCIUM 7.9 02/29/2020    BILITOT <0.2 02/28/2020    ALKPHOS 167 02/28/2020    AST 74 02/28/2020    ALT 76 02/28/2020       POC Tests: No results for input(s): POCGLU, POCNA, POCK, POCCL, POCBUN, POCHEMO, POCHCT in the last 72 hours.     Coags:   Lab Results   Component Value Date    PROTIME 12.1 02/08/2020    INR 1.04 02/08/2020       HCG (If Applicable):   Lab Results   Component Value Date    PREGTESTUR Negative 05/21/2020        ABGs: No results found for: PHART, PO2ART, WIT0UXW, FIY8APT, BEART, A7PUSWCO     Type & Screen (If Applicable):  Lab Results   Component Value Date    LABABO A 04/15/2012    [address] Positive 04/15/2012       Drug/Infectious Status (If Applicable):  No results found for: HIV, HEPCAB    COVID-19 Screening (If Applicable):   Lab Results   Component Value Date    COVID19 Not Detected 05/18/2020         Anesthesia Evaluation  Patient summary reviewed no history of anesthetic complications:   Airway: Mallampati: III  TM distance: >3 FB   Neck ROM: full  Mouth opening: > = 3 FB Dental: normal exam         Pulmonary:Negative Pulmonary ROS and normal exam  breath sounds clear to auscultation                             Cardiovascular:Negative CV ROS  Exercise tolerance: good (>4 METS),   (+) dysrhythmias:,         Rhythm: regular  Rate: normal                    Neuro/Psych:   Negative Neuro/Psych ROS              GI/Hepatic/Renal: Neg GI/Hepatic/Renal ROS            Endo/Other: Negative Endo/Other ROS Abdominal:           Vascular: negative vascular ROS. Pre-Operative Diagnosis: Chronic pain of left ankle [M25.572, G89.29]    45 y.o.   BMI:  Body mass index is 30.11 kg/m². Vitals:    20 1243   Weight: 170 lb (77.1 kg)   Height: 5' 3\" (1.6 m)       Allergies   Allergen Reactions    Demerol Hives    Doxycycline Nausea And Vomiting    Lorazepam Other (See Comments)     Makes her anxiety worsen    Vancomycin Hives       Social History     Tobacco Use    Smoking status: Current Every Day Smoker     Packs/day: 0.50     Years: 12.00     Pack years: 6.00     Types: Cigarettes     Start date: 1999     Last attempt to quit: 2011     Years since quittin.2    Smokeless tobacco: Never Used   Substance Use Topics    Alcohol use: Yes     Comment: every now and then       LABS:    CBC  Lab Results   Component Value Date/Time    WBC 4.8 2020 11:35 AM    HGB 13.9 2020 11:35 AM    HCT 42.5 2020 11:35 AM     2020 11:35 AM     RENAL  Lab Results   Component Value Date/Time     2020 01:08 AM    K 5.6 (H) 2020 01:08 AM    K 2.7 (LL) 2020 09:15 AM     (H) 2020 01:08 AM    CO2 15 (L) 2020 01:08 AM    BUN 7 2020 01:08 AM    CREATININE 0.7 2020 01:08 AM    GLUCOSE 80 2020 01:08 AM     COAGS  Lab Results   Component Value Date/Time    PROTIME 12.1 2020 07:42 PM    INR 1.04 2020 07:42 PM     EKG 2020  Normal sinus rhythmNormal ECGWhen compared with ECG of 2020 10:12,No significant change was foundConfirmed by Rossy Shine MD, Robert Menendez ([id number]) on 2020 3:37:20 PM      1/15/2020 stress echo  Summary   Normal stress ECHO     Anesthesia Plan      general     ASA 2     (I discussed with the patient the risks and benefits of regional anesthesia (inlcuding infection, bleeding, damage to nerves and surrounding structures) PIV, General, IV Narcotics, PACU.   All questions were answered the patient agrees with the plan and wishes to proceed.)  Induction: intravenous.                           Luisa Loomis MD   11/18/2020

## 2020-11-19 ENCOUNTER — HOSPITAL ENCOUNTER (OUTPATIENT)
Age: 40
Setting detail: OUTPATIENT SURGERY
Discharge: HOME | End: 2020-11-19
Attending: ORTHOPAEDIC SURGERY | Admitting: ORTHOPAEDIC SURGERY
Payer: MEDICARE

## 2020-11-19 ENCOUNTER — ANESTHESIA (OUTPATIENT)
Dept: OPERATING ROOM | Age: 40
End: 2020-11-19
Payer: MEDICARE

## 2020-11-19 VITALS
SYSTOLIC BLOOD PRESSURE: 105 MMHG | RESPIRATION RATE: 20 BRPM | TEMPERATURE: 97.4 F | HEART RATE: 80 BPM | OXYGEN SATURATION: 99 % | WEIGHT: 180 LBS | DIASTOLIC BLOOD PRESSURE: 85 MMHG | BODY MASS INDEX: 31.89 KG/M2 | HEIGHT: 63 IN

## 2020-11-19 VITALS
RESPIRATION RATE: 10 BRPM | DIASTOLIC BLOOD PRESSURE: 78 MMHG | SYSTOLIC BLOOD PRESSURE: 140 MMHG | OXYGEN SATURATION: 99 %

## 2020-11-19 LAB — PREGNANCY, URINE: NEGATIVE

## 2020-11-19 PROCEDURE — 6360000002 HC RX W HCPCS: Performed by: NURSE ANESTHETIST, CERTIFIED REGISTERED

## 2020-11-19 PROCEDURE — 84703 CHORIONIC GONADOTROPIN ASSAY: CPT

## 2020-11-19 PROCEDURE — 3600000014 HC SURGERY LEVEL 4 ADDTL 15MIN: Performed by: ORTHOPAEDIC SURGERY

## 2020-11-19 PROCEDURE — 6360000002 HC RX W HCPCS: Performed by: ORTHOPAEDIC SURGERY

## 2020-11-19 PROCEDURE — 2580000003 HC RX 258: Performed by: ORTHOPAEDIC SURGERY

## 2020-11-19 PROCEDURE — 3600000004 HC SURGERY LEVEL 4 BASE: Performed by: ORTHOPAEDIC SURGERY

## 2020-11-19 PROCEDURE — 6360000002 HC RX W HCPCS: Performed by: ANESTHESIOLOGY

## 2020-11-19 PROCEDURE — 6370000000 HC RX 637 (ALT 250 FOR IP): Performed by: ANESTHESIOLOGY

## 2020-11-19 PROCEDURE — 3700000001 HC ADD 15 MINUTES (ANESTHESIA): Performed by: ORTHOPAEDIC SURGERY

## 2020-11-19 PROCEDURE — 7100000010 HC PHASE II RECOVERY - FIRST 15 MIN: Performed by: ORTHOPAEDIC SURGERY

## 2020-11-19 PROCEDURE — 76942 ECHO GUIDE FOR BIOPSY: CPT | Performed by: ANESTHESIOLOGY

## 2020-11-19 PROCEDURE — 7100000001 HC PACU RECOVERY - ADDTL 15 MIN: Performed by: ORTHOPAEDIC SURGERY

## 2020-11-19 PROCEDURE — 64447 NJX AA&/STRD FEMORAL NRV IMG: CPT | Performed by: ANESTHESIOLOGY

## 2020-11-19 PROCEDURE — 2500000003 HC RX 250 WO HCPCS: Performed by: NURSE ANESTHETIST, CERTIFIED REGISTERED

## 2020-11-19 PROCEDURE — 2580000003 HC RX 258: Performed by: ANESTHESIOLOGY

## 2020-11-19 PROCEDURE — 7100000000 HC PACU RECOVERY - FIRST 15 MIN: Performed by: ORTHOPAEDIC SURGERY

## 2020-11-19 PROCEDURE — 2709999900 HC NON-CHARGEABLE SUPPLY: Performed by: ORTHOPAEDIC SURGERY

## 2020-11-19 PROCEDURE — 3700000000 HC ANESTHESIA ATTENDED CARE: Performed by: ORTHOPAEDIC SURGERY

## 2020-11-19 PROCEDURE — 7100000011 HC PHASE II RECOVERY - ADDTL 15 MIN: Performed by: ORTHOPAEDIC SURGERY

## 2020-11-19 RX ORDER — LIDOCAINE HYDROCHLORIDE 20 MG/ML
INJECTION, SOLUTION INFILTRATION; PERINEURAL PRN
Status: DISCONTINUED | OUTPATIENT
Start: 2020-11-19 | End: 2020-11-19 | Stop reason: SDUPTHER

## 2020-11-19 RX ORDER — OXYCODONE HYDROCHLORIDE AND ACETAMINOPHEN 5; 325 MG/1; MG/1
1 TABLET ORAL EVERY 6 HOURS PRN
Qty: 28 TABLET | Refills: 0 | Status: SHIPPED | OUTPATIENT
Start: 2020-11-19 | End: 2020-11-26

## 2020-11-19 RX ORDER — SODIUM CHLORIDE, SODIUM LACTATE, POTASSIUM CHLORIDE, CALCIUM CHLORIDE 600; 310; 30; 20 MG/100ML; MG/100ML; MG/100ML; MG/100ML
INJECTION, SOLUTION INTRAVENOUS CONTINUOUS
Status: DISCONTINUED | OUTPATIENT
Start: 2020-11-19 | End: 2020-11-19 | Stop reason: HOSPADM

## 2020-11-19 RX ORDER — FENTANYL CITRATE 50 UG/ML
INJECTION, SOLUTION INTRAMUSCULAR; INTRAVENOUS
Status: COMPLETED
Start: 2020-11-19 | End: 2020-11-19

## 2020-11-19 RX ORDER — FENTANYL CITRATE 50 UG/ML
INJECTION, SOLUTION INTRAMUSCULAR; INTRAVENOUS PRN
Status: DISCONTINUED | OUTPATIENT
Start: 2020-11-19 | End: 2020-11-19 | Stop reason: SDUPTHER

## 2020-11-19 RX ORDER — MIDAZOLAM HYDROCHLORIDE 1 MG/ML
INJECTION INTRAMUSCULAR; INTRAVENOUS PRN
Status: DISCONTINUED | OUTPATIENT
Start: 2020-11-19 | End: 2020-11-19 | Stop reason: SDUPTHER

## 2020-11-19 RX ORDER — DIPHENHYDRAMINE HYDROCHLORIDE 50 MG/ML
12.5 INJECTION INTRAMUSCULAR; INTRAVENOUS
Status: DISCONTINUED | OUTPATIENT
Start: 2020-11-19 | End: 2020-11-19 | Stop reason: HOSPADM

## 2020-11-19 RX ORDER — SODIUM CHLORIDE, SODIUM LACTATE, POTASSIUM CHLORIDE, AND CALCIUM CHLORIDE .6; .31; .03; .02 G/100ML; G/100ML; G/100ML; G/100ML
IRRIGANT IRRIGATION PRN
Status: DISCONTINUED | OUTPATIENT
Start: 2020-11-19 | End: 2020-11-19 | Stop reason: ALTCHOICE

## 2020-11-19 RX ORDER — HYDRALAZINE HYDROCHLORIDE 20 MG/ML
5 INJECTION INTRAMUSCULAR; INTRAVENOUS EVERY 10 MIN PRN
Status: DISCONTINUED | OUTPATIENT
Start: 2020-11-19 | End: 2020-11-19 | Stop reason: HOSPADM

## 2020-11-19 RX ORDER — DEXAMETHASONE SODIUM PHOSPHATE 10 MG/ML
INJECTION INTRAMUSCULAR; INTRAVENOUS PRN
Status: DISCONTINUED | OUTPATIENT
Start: 2020-11-19 | End: 2020-11-19 | Stop reason: SDUPTHER

## 2020-11-19 RX ORDER — KETOROLAC TROMETHAMINE 30 MG/ML
INJECTION, SOLUTION INTRAMUSCULAR; INTRAVENOUS PRN
Status: DISCONTINUED | OUTPATIENT
Start: 2020-11-19 | End: 2020-11-19 | Stop reason: SDUPTHER

## 2020-11-19 RX ORDER — LIDOCAINE HYDROCHLORIDE 10 MG/ML
2 INJECTION, SOLUTION INFILTRATION; PERINEURAL
Status: DISCONTINUED | OUTPATIENT
Start: 2020-11-19 | End: 2020-11-19 | Stop reason: HOSPADM

## 2020-11-19 RX ORDER — MIDAZOLAM HYDROCHLORIDE 1 MG/ML
INJECTION INTRAMUSCULAR; INTRAVENOUS
Status: COMPLETED
Start: 2020-11-19 | End: 2020-11-19

## 2020-11-19 RX ORDER — PROPOFOL 10 MG/ML
INJECTION, EMULSION INTRAVENOUS PRN
Status: DISCONTINUED | OUTPATIENT
Start: 2020-11-19 | End: 2020-11-19 | Stop reason: SDUPTHER

## 2020-11-19 RX ORDER — MORPHINE SULFATE 2 MG/ML
2 INJECTION, SOLUTION INTRAMUSCULAR; INTRAVENOUS EVERY 5 MIN PRN
Status: DISCONTINUED | OUTPATIENT
Start: 2020-11-19 | End: 2020-11-19 | Stop reason: HOSPADM

## 2020-11-19 RX ORDER — ROCURONIUM BROMIDE 10 MG/ML
INJECTION, SOLUTION INTRAVENOUS PRN
Status: DISCONTINUED | OUTPATIENT
Start: 2020-11-19 | End: 2020-11-19 | Stop reason: SDUPTHER

## 2020-11-19 RX ORDER — ONDANSETRON 2 MG/ML
4 INJECTION INTRAMUSCULAR; INTRAVENOUS PRN
Status: DISCONTINUED | OUTPATIENT
Start: 2020-11-19 | End: 2020-11-19 | Stop reason: HOSPADM

## 2020-11-19 RX ORDER — OXYCODONE HYDROCHLORIDE AND ACETAMINOPHEN 5; 325 MG/1; MG/1
2 TABLET ORAL PRN
Status: COMPLETED | OUTPATIENT
Start: 2020-11-19 | End: 2020-11-19

## 2020-11-19 RX ORDER — OXYCODONE HYDROCHLORIDE AND ACETAMINOPHEN 5; 325 MG/1; MG/1
1 TABLET ORAL PRN
Status: COMPLETED | OUTPATIENT
Start: 2020-11-19 | End: 2020-11-19

## 2020-11-19 RX ORDER — ERGOCALCIFEROL 1.25 MG/1
50000 CAPSULE ORAL WEEKLY
Qty: 12 CAPSULE | Refills: 0 | Status: SHIPPED | OUTPATIENT
Start: 2020-11-19 | End: 2021-07-24 | Stop reason: ALTCHOICE

## 2020-11-19 RX ORDER — PROMETHAZINE HYDROCHLORIDE 25 MG/ML
6.25 INJECTION, SOLUTION INTRAMUSCULAR; INTRAVENOUS
Status: DISCONTINUED | OUTPATIENT
Start: 2020-11-19 | End: 2020-11-19 | Stop reason: HOSPADM

## 2020-11-19 RX ORDER — LABETALOL HYDROCHLORIDE 5 MG/ML
5 INJECTION, SOLUTION INTRAVENOUS EVERY 10 MIN PRN
Status: DISCONTINUED | OUTPATIENT
Start: 2020-11-19 | End: 2020-11-19 | Stop reason: HOSPADM

## 2020-11-19 RX ORDER — MORPHINE SULFATE 2 MG/ML
1 INJECTION, SOLUTION INTRAMUSCULAR; INTRAVENOUS EVERY 5 MIN PRN
Status: DISCONTINUED | OUTPATIENT
Start: 2020-11-19 | End: 2020-11-19 | Stop reason: HOSPADM

## 2020-11-19 RX ORDER — ONDANSETRON 2 MG/ML
INJECTION INTRAMUSCULAR; INTRAVENOUS PRN
Status: DISCONTINUED | OUTPATIENT
Start: 2020-11-19 | End: 2020-11-19 | Stop reason: SDUPTHER

## 2020-11-19 RX ADMIN — CEFAZOLIN SODIUM 2 G: 10 INJECTION, POWDER, FOR SOLUTION INTRAVENOUS at 07:21

## 2020-11-19 RX ADMIN — ONDANSETRON 4 MG: 2 INJECTION INTRAMUSCULAR; INTRAVENOUS at 08:23

## 2020-11-19 RX ADMIN — FENTANYL CITRATE 100 MCG: 50 INJECTION INTRAMUSCULAR; INTRAVENOUS at 07:21

## 2020-11-19 RX ADMIN — LIDOCAINE HYDROCHLORIDE 60 MG: 20 INJECTION, SOLUTION INFILTRATION; PERINEURAL at 07:21

## 2020-11-19 RX ADMIN — MIDAZOLAM HYDROCHLORIDE 2 MG: 2 INJECTION, SOLUTION INTRAMUSCULAR; INTRAVENOUS at 07:16

## 2020-11-19 RX ADMIN — HYDROMORPHONE HYDROCHLORIDE 0.5 MG: 1 INJECTION, SOLUTION INTRAMUSCULAR; INTRAVENOUS; SUBCUTANEOUS at 08:40

## 2020-11-19 RX ADMIN — KETOROLAC TROMETHAMINE 30 MG: 30 INJECTION, SOLUTION INTRAMUSCULAR; INTRAVENOUS at 08:23

## 2020-11-19 RX ADMIN — FENTANYL CITRATE 100 MCG: 50 INJECTION INTRAMUSCULAR; INTRAVENOUS at 06:57

## 2020-11-19 RX ADMIN — DEXAMETHASONE SODIUM PHOSPHATE 10 MG: 10 INJECTION INTRAMUSCULAR; INTRAVENOUS at 07:37

## 2020-11-19 RX ADMIN — SUGAMMADEX 200 MG: 100 INJECTION, SOLUTION INTRAVENOUS at 08:23

## 2020-11-19 RX ADMIN — HYDROMORPHONE HYDROCHLORIDE 0.5 MG: 1 INJECTION, SOLUTION INTRAMUSCULAR; INTRAVENOUS; SUBCUTANEOUS at 08:45

## 2020-11-19 RX ADMIN — MIDAZOLAM HYDROCHLORIDE 2 MG: 2 INJECTION, SOLUTION INTRAMUSCULAR; INTRAVENOUS at 06:57

## 2020-11-19 RX ADMIN — OXYCODONE HYDROCHLORIDE AND ACETAMINOPHEN 2 TABLET: 5; 325 TABLET ORAL at 09:01

## 2020-11-19 RX ADMIN — ROCURONIUM BROMIDE 50 MG: 10 SOLUTION INTRAVENOUS at 07:21

## 2020-11-19 RX ADMIN — PROPOFOL 200 MG: 10 INJECTION, EMULSION INTRAVENOUS at 07:21

## 2020-11-19 RX ADMIN — ONDANSETRON 4 MG: 2 INJECTION INTRAMUSCULAR; INTRAVENOUS at 07:37

## 2020-11-19 RX ADMIN — SODIUM CHLORIDE, POTASSIUM CHLORIDE, SODIUM LACTATE AND CALCIUM CHLORIDE: 600; 310; 30; 20 INJECTION, SOLUTION INTRAVENOUS at 06:50

## 2020-11-19 ASSESSMENT — PULMONARY FUNCTION TESTS
PIF_VALUE: 17
PIF_VALUE: 18
PIF_VALUE: 2
PIF_VALUE: 17
PIF_VALUE: 18
PIF_VALUE: 17
PIF_VALUE: 18
PIF_VALUE: 17
PIF_VALUE: 24
PIF_VALUE: 17
PIF_VALUE: 3
PIF_VALUE: 17
PIF_VALUE: 15
PIF_VALUE: 17
PIF_VALUE: 17
PIF_VALUE: 4
PIF_VALUE: 17
PIF_VALUE: 17
PIF_VALUE: 18
PIF_VALUE: 17
PIF_VALUE: 14
PIF_VALUE: 17
PIF_VALUE: 17
PIF_VALUE: 15
PIF_VALUE: 17
PIF_VALUE: 24
PIF_VALUE: 17

## 2020-11-19 ASSESSMENT — PAIN SCALES - GENERAL
PAINLEVEL_OUTOF10: 8
PAINLEVEL_OUTOF10: 4
PAINLEVEL_OUTOF10: 6
PAINLEVEL_OUTOF10: 7
PAINLEVEL_OUTOF10: 8
PAINLEVEL_OUTOF10: 7
PAINLEVEL_OUTOF10: 5
PAINLEVEL_OUTOF10: 4

## 2020-11-19 ASSESSMENT — PAIN - FUNCTIONAL ASSESSMENT
PAIN_FUNCTIONAL_ASSESSMENT: ACTIVITIES ARE NOT PREVENTED
PAIN_FUNCTIONAL_ASSESSMENT: 0-10

## 2020-11-19 ASSESSMENT — PAIN DESCRIPTION - DESCRIPTORS: DESCRIPTORS: THROBBING

## 2020-11-19 NOTE — ANESTHESIA PROCEDURE NOTES
Peripheral Block    Patient location during procedure: pre-op  Staffing  Anesthesiologist: Lu Geronimo MD  Performed: anesthesiologist   Preanesthetic Checklist  Completed: patient identified, site marked, surgical consent, pre-op evaluation, timeout performed, IV checked, risks and benefits discussed, monitors and equipment checked, anesthesia consent given, oxygen available and patient being monitored  Peripheral Block  Prep: ChloraPrep  Patient monitoring: cardiac monitor, continuous pulse ox, frequent blood pressure checks and IV access  Block type: Sciatic  Laterality: left  Injection technique: single-shot  Procedures: ultrasound guided  Local infiltration: lidocaine  Infiltration strength: 1 %  Dose: 3 mL  Popliteal  Provider prep: mask and sterile gloves  Local infiltration: lidocaine  Needle  Needle gauge: 21 G  Needle length: 10 cm  Needle localization: ultrasound guidance  Assessment  Injection assessment: negative aspiration for heme, no paresthesia on injection and local visualized surrounding nerve on ultrasound  Paresthesia pain: none  Slow fractionated injection: yes  Hemodynamics: stable  Additional Notes  Immediately prior to procedure a \"time out\" was called to verify the correct patient, allergies, laterality, procedure and equipment. Time out performed with  RN    Local Anesthetic: 0.25 %  Bupivacaine   Amount: 20 ml  in 5 ml increments after negative aspiration each time. Biceps Femoris muscle (long head), Vastus lateralis muscle, Sciatic nerve (Tibia and Common Peroneal Nerves) and Popliteal artery are identified; the tip of the needle and the spread of the local anesthetic around the Tibial and Common Peroneal Nerve are visualized. The Sciatic Nerve (Tibia and Common Peroneal Nerve) appeared to be anatomically normal and there were no abnormal pathologically findings seen.          Reason for block: post-op pain management and at surgeon's request

## 2020-11-19 NOTE — ANESTHESIA PROCEDURE NOTES
Peripheral Block    Patient location during procedure: pre-op  Staffing  Anesthesiologist: Tamara Mandel MD  Performed: anesthesiologist   Preanesthetic Checklist  Completed: patient identified, site marked, surgical consent, pre-op evaluation, timeout performed, IV checked, risks and benefits discussed, monitors and equipment checked, anesthesia consent given, oxygen available and patient being monitored  Peripheral Block  Patient position: supine  Prep: ChloraPrep  Patient monitoring: cardiac monitor, continuous pulse ox, frequent blood pressure checks and IV access  Block type: Femoral  Laterality: left  Injection technique: single-shot  Procedures: ultrasound guided  Local infiltration: lidocaine  Infiltration strength: 1 %  Dose: 3 mL  Approach to block: Low Femoral.  Provider prep: mask and sterile gloves  Local infiltration: lidocaine  Needle  Needle gauge: 21 G  Needle length: 10 cm  Needle localization: ultrasound guidance  Assessment  Injection assessment: negative aspiration for heme, no paresthesia on injection and local visualized surrounding nerve on ultrasound  Paresthesia pain: none  Slow fractionated injection: yes  Hemodynamics: stable  Additional Notes  Immediately prior to procedure a \"time out\" was called to verify the correct patient, allergies, laterality, procedure and equipment. Time out performed with  RN    Local Anesthetic: 0.25 %  Bupivacaine   Amount: 15 ml  in 5 ml increments after negative aspiration each time. Iliopsoas Muscle and Fascia Iliaca, Femoral artery (Deep artery to the thigh take off), Femoral Vein and Femoral Nerve are identified; the tip of the need and the spread of the local anesthetic around the Femoral nerve are visualized. The Femoral nerve appeared to be anatomically normal and there were no abnormal pathologically findings seen.          Reason for block: post-op pain management and at surgeon's request

## 2020-11-19 NOTE — OP NOTE
Operative Note      Patient: Buck Valle  YOB: 1980  MRN: [id number]    Date of Procedure: 11/19/2020    Pre-Op Diagnosis: Left ankle osteochondral defect and loose bodies     Post-Op Diagnosis: Same       Procedure(s):  VIDEO ARTHROSCOPY LEFT ANKLE, SYNOVECTOMY, LOOSE BODY REMOVAL, MICROFRACTURE -BLOCK-   #1 left ankle arthroscopy with extensive synovectomy and debridement, CPT 60099  #2 microfracture for osteochondral defect, CPT 80220        Surgeon(s):  Adams Carrizales MD     Assistant:  Surgical Assistant: Kourtney Vasquez     Anesthesia: Monitor Anesthesia Care     Estimated Blood Loss (mL): Minimal     Complications: None     Specimens:   * No specimens in log *     Implants:  * No implants in log *      Drains: * No LDAs found *     Findings: 5 loose bodies appreciated and removed, 2 of which brought out in whole and 3 of which were removed via the shaver. Large talar medial shoulder lesion debrided of loose cartilage flaps. Microfracture performed for osteochondral defect. Indications for procedure: This is a 18-year-old female who sustained a severe left ankle sprain a number of months ago. She initially was doing better but then started having worsening mechanical symptoms and pain that seem to migrate about her ankle. Repeat CT imaging was performed which showed interval fracturing of the OCD lesion with multiple loose bodies. A lengthy discussion was had with the patient over her numerous visits over the last couple months. Her OCD lesion is a shoulder lesion which would do poorly with most joint sparing procedures. We did discuss that the best surgery for this at this time would likely be an osteochondral allograft. However the patient was not interested in surgery with that kind of recovery so we elected to undergo a left ankle arthroscopy with debridement of the OCD lesion and microfracture despite her understanding that that microfracture may not be successful. Risk-benefit and alternatives have been discussed with the patient at length and are indicated in my clinic note prior to surgery. Detailed Description of Procedure: On the day of surgery the patient was identified in the preoperative holding area. Informed consent was reviewed and verified. History and physical examination was updated. Regional anesthesia block was performed by the anesthesia team.  The operative site was marked with the assistance of the patient. The patient was then transported back to the operative suite where she was positioned supine with all bony prominences well-padded. Appropriate antibiotics were given at the appropriate time. A left thigh tourniquet was applied. The operative extremity was then prepped and draped in the usual sterile fashion and a formal surgical timeout was performed. The tourniquet was then insufflated to 250 millimeters mercury. Ankle arthroscopy noninvasive distractor was placed and distraction was applied. A standard anterior medial portal was established and a diagnostic arthroscopy of the ankle was performed which immediately revealed extensive synovitis, multiple loose bodies, and a large osteochondral defect of the medial talus with significant loose cartilage flaps. An anterolateral portal was established and a shaver was inserted and a synovectomy was performed. I was able to get 3 smaller loose bodies using an aggressive shaver but the larger ones had osseous portions. Using a spinal needle I then established a posterior lateral portal.  The larger loose bodies removed in this posterior lateral portal.  I then turned my attention to the osteochondral defect. A curette was used to debride the edges of the obviously loose flaps of cartilage in these areas. A shaver was then used as well as a grasper to fully debride this.   Satisfied with the debridement of the loose cartilage flaps of this area a microfracture was then performed after switching the camera to the lateral portal and establishing the all through the anteromedial portal.  Satisfied with the removal of the loose bodies and after 1 final diagnostic scope to make sure that there were not any further loose bodies in the joint the arthroscopic instruments were removed and excess fluid expressed. The portals were closed with horizontal mattress sutures with 3-0 nylon. Tourniquet was let down. Tourniquet time was approximately 40 minutes. Sterile dressing was then applied consisting of Xeroform 4 x 4 ABD cast padding and Ace. Patient was then awoken from anesthesia and transported to the postoperative care unit in stable condition. A boot was placed in the postoperative care unit. Postoperative plan: She will be nonweightbearing for 2 weeks but will come out of her boot a couple times a day for gentle ankle range of motion. Other than that she should be in the boot at all times. She will keep her dressings clean and dry. I will see her back in 2 weeks time for repeat evaluation and suture removal and commencement of weightbearing.     Electronically signed by Arley Martinez MD on 11/19/2020 at 8:28 AM

## 2020-11-19 NOTE — ANESTHESIA POSTPROCEDURE EVALUATION
Department of Anesthesiology  Postprocedure Note    Patient: Rober Haley  MRN: [id number]  YOB: 1980  Date of evaluation: 11/19/2020  Time:  11:38 AM     Procedure Summary     Date:  11/19/20 Room / Location:  [place] [address] / [facility]    Anesthesia Start:  7241 Anesthesia Stop:  4269    Procedure:  VIDEO ARTHROSCOPY LEFT ANKLE, SYNOVECTOMY, LOOSE BODY REMOVAL, MICROFRACTURE -BLOCK- (Left Ankle) Diagnosis:       Chronic pain of left ankle      (Chronic pain of left ankle [M25.572, G89.29])    Surgeon:  Rena Lai MD Responsible Provider:  Markos Pierre MD    Anesthesia Type:  general ASA Status:  2          Anesthesia Type: general    Hannah Phase I: Hannah Score: 8    Hannah Phase II: Hannah Score: 9    Last vitals: Reviewed and per EMR flowsheets.        Anesthesia Post Evaluation    Comments: Postoperative Anesthesia Note    Name:    Rober Haley  MRN:      [id number]    Patient Vitals in the past 12 hrs:  11/19/20 0955, BP:105/85, Temp:97.4 °F (36.3 °C), Temp src:Tympanic, Pulse:80, Resp:20, SpO2:99 %  11/19/20 0910, BP:126/80, Pulse:88, Resp:20  11/19/20 0905, BP:139/85, Pulse:91, Resp:19, SpO2:99 %  11/19/20 0901, SpO2:100 %  11/19/20 0900, BP:(!) 140/88, Pulse:91, Resp:19, SpO2:99 %  11/19/20 0855, BP:139/85, Temp:97.2 °F (36.2 °C), Temp src:Tympanic, Pulse:90, Resp:14, SpO2:100 %  11/19/20 0845, BP:134/81, Pulse:95, Resp:14, SpO2:100 %  11/19/20 0840, BP:133/77, Pulse:87, Resp:14, SpO2:100 %  11/19/20 0835, BP:(!) 121/97, Pulse:98, Resp:14, SpO2:100 %  11/19/20 0830, BP:122/62, Temp:97.4 °F (36.3 °C), Temp src:Tympanic, Pulse:94, Resp:12, SpO2:99 %  11/19/20 0647, BP:117/75, Temp:97 °F (36.1 °C), Temp src:Tympanic, Pulse:84, Resp:16, SpO2:98 %, Height:5' 3\" (1.6 m), Weight:180 lb (81.6 kg)     LABS:    CBC  Lab Results       Component                Value               Date/Time                  WBC                      4.8                 02/28/2020 11:35

## 2020-12-01 ENCOUNTER — TELEPHONE (OUTPATIENT)
Dept: ORTHOPEDIC SURGERY | Age: 40
End: 2020-12-01

## 2020-12-02 RX ORDER — OXYCODONE HYDROCHLORIDE AND ACETAMINOPHEN 5; 325 MG/1; MG/1
1 TABLET ORAL EVERY 8 HOURS PRN
Qty: 21 TABLET | Refills: 0 | Status: SHIPPED | OUTPATIENT
Start: 2020-12-02 | End: 2020-12-16

## 2020-12-04 PROBLEM — M93.20 OSTEOCHONDRITIS DISSECANS: Status: ACTIVE | Noted: 2020-12-04

## 2020-12-07 ENCOUNTER — OFFICE VISIT (OUTPATIENT)
Dept: ORTHOPEDIC SURGERY | Age: 40
End: 2020-12-07

## 2020-12-07 VITALS — WEIGHT: 180 LBS | BODY MASS INDEX: 31.89 KG/M2 | HEIGHT: 63 IN

## 2020-12-07 PROCEDURE — 99024 POSTOP FOLLOW-UP VISIT: CPT | Performed by: ORTHOPAEDIC SURGERY

## 2020-12-07 NOTE — PROGRESS NOTES
Patient is here for 2-week postop after left ankle arthroscopy with treatment of osteochondral defect and loose body removal.  She is doing well. She has had some posterior ankle pain most likely from the distractor but this is gone away. Overall she is doing well and has no complaints. She denies any numbness or tingling including in the superficial peroneal nerve distributions. She has been compliant nonweightbearing. We got her sutures out today. She is okay to shower but not soak the incisions for another couple of weeks. We will get her therapy for working on range of motion. She is okay to be weightbearing as tolerated and can wean out of the boot but I would like her to refrain from any kind of high-impact activity for at least 3 months after surgery. I will see her back in another 4 weeks for postop check.

## 2020-12-09 ENCOUNTER — HOSPITAL ENCOUNTER (OUTPATIENT)
Dept: PHYSICAL THERAPY | Age: 40
Setting detail: THERAPIES SERIES
Discharge: HOME | End: 2020-12-09
Payer: MEDICARE

## 2020-12-09 NOTE — FLOWSHEET NOTE
[place] [facility] [facility] [facility], [address], [address] [place] [address]        Physical Therapy  Cancellation/No-show Note  Patient Name:  Mitzi Aranda  :  1980   Date:  2020  Cancelled visits to date: 0  No-shows to date: 1    For today's appointment patient:  ?  Cancelled  ? Rescheduled appointment  ? No-show     Reason given by patient:  ?  Patient ill  ? Conflicting appointment  ? No transportation    ? Conflict with work  ? No reason given  ? Other:     Comments:   will be calling pt to reschedule her initial evaluation. If not rescheduled MD will be made aware.     Electronically signed by:  Toro Clark

## 2020-12-22 ENCOUNTER — TELEPHONE (OUTPATIENT)
Dept: ORTHOPEDIC SURGERY | Age: 40
End: 2020-12-22

## 2020-12-22 RX ORDER — LIDOCAINE 50 MG/G
1 PATCH TOPICAL DAILY
Qty: 30 PATCH | Refills: 1 | Status: SHIPPED | OUTPATIENT
Start: 2020-12-22 | End: 2021-08-09 | Stop reason: SDUPTHER

## 2020-12-22 NOTE — TELEPHONE ENCOUNTER
Patient left a message requesting we call in lidocaine patches for her ankle pain.   She has some left over from a back injury and states they help her lateral ankle pain

## 2021-07-24 ENCOUNTER — HOSPITAL ENCOUNTER (EMERGENCY)
Age: 41
Discharge: HOME | End: 2021-07-25
Payer: COMMERCIAL

## 2021-07-24 ENCOUNTER — APPOINTMENT (OUTPATIENT)
Dept: CT IMAGING | Age: 41
End: 2021-07-24
Payer: COMMERCIAL

## 2021-07-24 DIAGNOSIS — K21.9 GASTROESOPHAGEAL REFLUX DISEASE, UNSPECIFIED WHETHER ESOPHAGITIS PRESENT: ICD-10-CM

## 2021-07-24 DIAGNOSIS — R10.13 ABDOMINAL PAIN, EPIGASTRIC: Primary | ICD-10-CM

## 2021-07-24 LAB
A/G RATIO: 2.1 (ref 1.1–2.2)
ALBUMIN SERPL-MCNC: 5.2 G/DL (ref 3.4–5)
ALP BLD-CCNC: 65 U/L (ref 40–129)
ALT SERPL-CCNC: 11 U/L (ref 10–40)
ANION GAP SERPL CALCULATED.3IONS-SCNC: 18 MMOL/L (ref 3–16)
AST SERPL-CCNC: 15 U/L (ref 15–37)
BASOPHILS ABSOLUTE: 0.1 K/UL (ref 0–0.2)
BASOPHILS RELATIVE PERCENT: 1.1 %
BILIRUB SERPL-MCNC: 0.4 MG/DL (ref 0–1)
BUN BLDV-MCNC: 7 MG/DL (ref 7–20)
CALCIUM SERPL-MCNC: 10.9 MG/DL (ref 8.3–10.6)
CHLORIDE BLD-SCNC: 99 MMOL/L (ref 99–110)
CO2: 21 MMOL/L (ref 21–32)
CREAT SERPL-MCNC: 0.9 MG/DL (ref 0.6–1.1)
EOSINOPHILS ABSOLUTE: 0 K/UL (ref 0–0.6)
EOSINOPHILS RELATIVE PERCENT: 0.6 %
GFR AFRICAN AMERICAN: >60
GFR NON-AFRICAN AMERICAN: >60
GLOBULIN: 2.5 G/DL
GLUCOSE BLD-MCNC: 113 MG/DL (ref 70–99)
HCT VFR BLD CALC: 42.6 % (ref 36–48)
HEMOGLOBIN: 14.6 G/DL (ref 12–16)
LIPASE: 39 U/L (ref 13–60)
LYMPHOCYTES ABSOLUTE: 2.5 K/UL (ref 1–5.1)
LYMPHOCYTES RELATIVE PERCENT: 31.1 %
MCH RBC QN AUTO: 29.7 PG (ref 26–34)
MCHC RBC AUTO-ENTMCNC: 34.3 G/DL (ref 31–36)
MCV RBC AUTO: 86.3 FL (ref 80–100)
MONOCYTES ABSOLUTE: 0.7 K/UL (ref 0–1.3)
MONOCYTES RELATIVE PERCENT: 8.9 %
NEUTROPHILS ABSOLUTE: 4.6 K/UL (ref 1.7–7.7)
NEUTROPHILS RELATIVE PERCENT: 58.3 %
PDW BLD-RTO: 13.9 % (ref 12.4–15.4)
PLATELET # BLD: 283 K/UL (ref 135–450)
PMV BLD AUTO: 8.6 FL (ref 5–10.5)
POTASSIUM SERPL-SCNC: 4 MMOL/L (ref 3.5–5.1)
RBC # BLD: 4.93 M/UL (ref 4–5.2)
SODIUM BLD-SCNC: 138 MMOL/L (ref 136–145)
TOTAL PROTEIN: 7.7 G/DL (ref 6.4–8.2)
WBC # BLD: 7.9 K/UL (ref 4–11)

## 2021-07-24 PROCEDURE — 80053 COMPREHEN METABOLIC PANEL: CPT

## 2021-07-24 PROCEDURE — 74177 CT ABD & PELVIS W/CONTRAST: CPT

## 2021-07-24 PROCEDURE — 84484 ASSAY OF TROPONIN QUANT: CPT

## 2021-07-24 PROCEDURE — 81001 URINALYSIS AUTO W/SCOPE: CPT

## 2021-07-24 PROCEDURE — 85025 COMPLETE CBC W/AUTO DIFF WBC: CPT

## 2021-07-24 PROCEDURE — 6360000002 HC RX W HCPCS: Performed by: PHYSICIAN ASSISTANT

## 2021-07-24 PROCEDURE — 99284 EMERGENCY DEPT VISIT MOD MDM: CPT

## 2021-07-24 PROCEDURE — 96374 THER/PROPH/DIAG INJ IV PUSH: CPT

## 2021-07-24 PROCEDURE — 6370000000 HC RX 637 (ALT 250 FOR IP): Performed by: PHYSICIAN ASSISTANT

## 2021-07-24 PROCEDURE — 83690 ASSAY OF LIPASE: CPT

## 2021-07-24 PROCEDURE — 96375 TX/PRO/DX INJ NEW DRUG ADDON: CPT

## 2021-07-24 RX ORDER — SUCRALFATE 1 G/1
1 TABLET ORAL EVERY 6 HOURS SCHEDULED
Status: DISCONTINUED | OUTPATIENT
Start: 2021-07-25 | End: 2021-07-25 | Stop reason: HOSPADM

## 2021-07-24 RX ORDER — SODIUM CHLORIDE, SODIUM LACTATE, POTASSIUM CHLORIDE, CALCIUM CHLORIDE 600; 310; 30; 20 MG/100ML; MG/100ML; MG/100ML; MG/100ML
1000 INJECTION, SOLUTION INTRAVENOUS ONCE
Status: COMPLETED | OUTPATIENT
Start: 2021-07-24 | End: 2021-07-25

## 2021-07-24 RX ORDER — ONDANSETRON 2 MG/ML
4 INJECTION INTRAMUSCULAR; INTRAVENOUS ONCE
Status: COMPLETED | OUTPATIENT
Start: 2021-07-24 | End: 2021-07-24

## 2021-07-24 RX ADMIN — ONDANSETRON 4 MG: 2 INJECTION INTRAMUSCULAR; INTRAVENOUS at 23:44

## 2021-07-24 RX ADMIN — MAGNESIUM HYDROXIDE/ALUMINUM HYDROXICE/SIMETHICONE: 120; 1200; 1200 SUSPENSION ORAL at 23:45

## 2021-07-24 RX ADMIN — SUCRALFATE 1 G: 1 TABLET ORAL at 23:44

## 2021-07-24 ASSESSMENT — PAIN DESCRIPTION - LOCATION: LOCATION: ABDOMEN

## 2021-07-24 ASSESSMENT — PAIN DESCRIPTION - PAIN TYPE: TYPE: ACUTE PAIN

## 2021-07-24 ASSESSMENT — PAIN DESCRIPTION - PROGRESSION: CLINICAL_PROGRESSION: NOT CHANGED

## 2021-07-24 ASSESSMENT — PAIN SCALES - GENERAL: PAINLEVEL_OUTOF10: 5

## 2021-07-24 ASSESSMENT — PAIN DESCRIPTION - ORIENTATION: ORIENTATION: MID;UPPER

## 2021-07-24 ASSESSMENT — PAIN DESCRIPTION - ONSET: ONSET: ON-GOING

## 2021-07-24 ASSESSMENT — PAIN DESCRIPTION - DESCRIPTORS: DESCRIPTORS: OTHER (COMMENT)

## 2021-07-24 ASSESSMENT — PAIN DESCRIPTION - FREQUENCY: FREQUENCY: CONTINUOUS

## 2021-07-25 VITALS
BODY MASS INDEX: 31.89 KG/M2 | SYSTOLIC BLOOD PRESSURE: 119 MMHG | HEIGHT: 63 IN | WEIGHT: 180 LBS | TEMPERATURE: 98.4 F | DIASTOLIC BLOOD PRESSURE: 86 MMHG | OXYGEN SATURATION: 99 % | RESPIRATION RATE: 19 BRPM | HEART RATE: 79 BPM

## 2021-07-25 LAB
BACTERIA: ABNORMAL /HPF
BILIRUBIN URINE: ABNORMAL
BLOOD, URINE: ABNORMAL
CLARITY: CLEAR
COLOR: YELLOW
CRYSTALS, UA: ABNORMAL /HPF
EKG ATRIAL RATE: 68 BPM
EKG DIAGNOSIS: NORMAL
EKG P AXIS: 55 DEGREES
EKG P-R INTERVAL: 144 MS
EKG Q-T INTERVAL: 406 MS
EKG QRS DURATION: 88 MS
EKG QTC CALCULATION (BAZETT): 431 MS
EKG R AXIS: 38 DEGREES
EKG T AXIS: 34 DEGREES
EKG VENTRICULAR RATE: 68 BPM
EPITHELIAL CELLS, UA: ABNORMAL /HPF (ref 0–5)
GLUCOSE URINE: NEGATIVE MG/DL
KETONES, URINE: 15 MG/DL
LEUKOCYTE ESTERASE, URINE: NEGATIVE
MICROSCOPIC EXAMINATION: YES
MUCUS: ABNORMAL /LPF
NITRITE, URINE: NEGATIVE
PH UA: 5 (ref 5–8)
PROTEIN UA: 30 MG/DL
RBC UA: ABNORMAL /HPF (ref 0–4)
SPECIFIC GRAVITY UA: >=1.03 (ref 1–1.03)
TROPONIN: <0.01 NG/ML
URINE REFLEX TO CULTURE: ABNORMAL
URINE TYPE: ABNORMAL
UROBILINOGEN, URINE: 0.2 E.U./DL
WBC UA: ABNORMAL /HPF (ref 0–5)

## 2021-07-25 PROCEDURE — 2500000003 HC RX 250 WO HCPCS: Performed by: PHYSICIAN ASSISTANT

## 2021-07-25 PROCEDURE — 2580000003 HC RX 258: Performed by: PHYSICIAN ASSISTANT

## 2021-07-25 PROCEDURE — 6360000004 HC RX CONTRAST MEDICATION: Performed by: PHYSICIAN ASSISTANT

## 2021-07-25 PROCEDURE — 93010 ELECTROCARDIOGRAM REPORT: CPT | Performed by: INTERNAL MEDICINE

## 2021-07-25 PROCEDURE — C9113 INJ PANTOPRAZOLE SODIUM, VIA: HCPCS | Performed by: PHYSICIAN ASSISTANT

## 2021-07-25 PROCEDURE — 93005 ELECTROCARDIOGRAM TRACING: CPT | Performed by: PHYSICIAN ASSISTANT

## 2021-07-25 PROCEDURE — 6360000002 HC RX W HCPCS: Performed by: PHYSICIAN ASSISTANT

## 2021-07-25 RX ORDER — DIPHENHYDRAMINE HYDROCHLORIDE 50 MG/ML
25 INJECTION INTRAMUSCULAR; INTRAVENOUS ONCE
Status: COMPLETED | OUTPATIENT
Start: 2021-07-25 | End: 2021-07-25

## 2021-07-25 RX ORDER — METOCLOPRAMIDE HYDROCHLORIDE 5 MG/ML
10 INJECTION INTRAMUSCULAR; INTRAVENOUS ONCE
Status: COMPLETED | OUTPATIENT
Start: 2021-07-25 | End: 2021-07-25

## 2021-07-25 RX ORDER — METOCLOPRAMIDE HYDROCHLORIDE 5 MG/ML
INJECTION INTRAMUSCULAR; INTRAVENOUS
Status: DISCONTINUED
Start: 2021-07-25 | End: 2021-07-25 | Stop reason: HOSPADM

## 2021-07-25 RX ORDER — OMEPRAZOLE 20 MG/1
20 CAPSULE, DELAYED RELEASE ORAL
Qty: 30 CAPSULE | Refills: 0 | Status: ON HOLD | OUTPATIENT
Start: 2021-07-25 | End: 2021-08-02 | Stop reason: HOSPADM

## 2021-07-25 RX ORDER — DIPHENHYDRAMINE HYDROCHLORIDE 50 MG/ML
INJECTION INTRAMUSCULAR; INTRAVENOUS
Status: DISCONTINUED
Start: 2021-07-25 | End: 2021-07-25 | Stop reason: HOSPADM

## 2021-07-25 RX ORDER — PANTOPRAZOLE SODIUM 40 MG/10ML
INJECTION, POWDER, LYOPHILIZED, FOR SOLUTION INTRAVENOUS
Status: DISCONTINUED
Start: 2021-07-25 | End: 2021-07-25 | Stop reason: HOSPADM

## 2021-07-25 RX ORDER — PANTOPRAZOLE SODIUM 40 MG/10ML
40 INJECTION, POWDER, LYOPHILIZED, FOR SOLUTION INTRAVENOUS ONCE
Status: COMPLETED | OUTPATIENT
Start: 2021-07-25 | End: 2021-07-25

## 2021-07-25 RX ADMIN — FAMOTIDINE 20 MG: 10 INJECTION, SOLUTION INTRAVENOUS at 01:27

## 2021-07-25 RX ADMIN — DIPHENHYDRAMINE HYDROCHLORIDE 25 MG: 50 INJECTION, SOLUTION INTRAMUSCULAR; INTRAVENOUS at 01:34

## 2021-07-25 RX ADMIN — METOCLOPRAMIDE HYDROCHLORIDE 10 MG: 5 INJECTION INTRAMUSCULAR; INTRAVENOUS at 01:28

## 2021-07-25 RX ADMIN — PANTOPRAZOLE SODIUM 40 MG: 40 INJECTION, POWDER, FOR SOLUTION INTRAVENOUS at 01:31

## 2021-07-25 RX ADMIN — IOPAMIDOL 75 ML: 755 INJECTION, SOLUTION INTRAVENOUS at 00:00

## 2021-07-25 RX ADMIN — SODIUM CHLORIDE, POTASSIUM CHLORIDE, SODIUM LACTATE AND CALCIUM CHLORIDE 1000 ML: 600; 310; 30; 20 INJECTION, SOLUTION INTRAVENOUS at 00:14

## 2021-07-25 NOTE — ED NOTES
Returned from CT scan, reporting some improvement in abdominal pain and discomfort, awaiting disposition      Yazmin Saunders RN  07/25/21 5635

## 2021-07-25 NOTE — ED PROVIDER NOTES
EKG Interpretation    Interpreted by emergency department physician    Rhythm: normal sinus   Rate: normal  Axis: normal  Ectopy: none  Conduction: normal  ST Segments: normal  T Waves: normal  Q Waves: none    Clinical Impression: no acute changes    MD Ashlyn Alcocer MD  07/25/21 9041

## 2021-07-25 NOTE — ED PROVIDER NOTES
[address] Emergency Department    CHIEF COMPLAINT  Abdominal Pain (Mid upper abd pain since Wednesday.) and Nausea & Vomiting      SHARED SERVICE VISIT  Evaluated by KARMA. My supervising physician was available for consultation. HISTORY OF PRESENT ILLNESS  Talib Samuel is a 45 y.o. female history of tobacco use, status post cholecystectomy; presents emergency department for evaluation of remittent epigastric pain has been ongoing since Wednesday. She states the pain first originated after she ate a meal for lunch. Since then she has been unable to tolerate much oral intake. She states she has intermittent waves of burning and pressure in her epigastric region. States these waves usually result in vomiting and acid reflux-like symptoms. She has been taking Pepto-Bismol around-the-clock with minimal relief. Denies any known history of gastric ulcers. No change in bowel movements. Able to pass gas. Denies any fevers. Denies any prior endoscopy evaluation. No other complaints, modifying factors or associated symptoms. Nursing notes reviewed.    Past Medical History:   Diagnosis Date    Abnormal Pap smear     WNL since    Anxiety     Back pain     herniated disc    Back pain     had back surgery (fusion of 3 discs) in march 2011    Placenta previa     resolved at 28 weeks with this 3rd pregnancy    Tubal pregnancy     Pt states bilateral fallopian tubes removed     Past Surgical History:   Procedure Laterality Date    ANESTHESIA NERVE BLOCK Right 5/21/2020    RIGHT HIP INJECTION SITE CONFIRMED BY FLUOROSCOPY performed by Madeleine Kendrick MD at [address] ARTHROSCOPY Left 11/19/2020    VIDEO ARTHROSCOPY LEFT ANKLE, SYNOVECTOMY, LOOSE BODY REMOVAL, MICROFRACTURE -BLOCK- performed by Elvin Zavala MD at [facility] Right     CHOLECYSTECTOMY      laparoscopic    ECTOPIC PREGNANCY SURGERY  10/2017    LAPAROSCOPY Facility-Administered Medications   Medication Dose Route Frequency Provider Last Rate Last Admin    famotidine (PEPCID) injection 20 mg  20 mg Intravenous Once Lady IzquierdoANAHY        pantoprazole (PROTONIX) injection 40 mg  40 mg Intravenous Once Lady RomeroANAHY so        metoclopramide (REGLAN) injection 10 mg  10 mg Intravenous Once Lady RomeroANAHY so        diphenhydrAMINE (BENADRYL) injection 25 mg  25 mg Intravenous Once Lady IzquierdoANAHY        sucralfate (CARAFATE) tablet 1 g  1 g Oral 4 times per day Lady RomeroANAHY so   1 g at 07/24/21 2344    lactated ringers infusion 1,000 mL  1,000 mL Intravenous Once Donisgabbie ANAHY Izquierdo 1,000 mL/hr at 07/25/21 0014 1,000 mL at 07/25/21 0014     Current Outpatient Medications   Medication Sig Dispense Refill    omeprazole (PRILOSEC) 20 MG delayed release capsule Take 1 capsule by mouth every morning (before breakfast) 30 capsule 0    lidocaine (LIDODERM) 5 % Place 1 patch onto the skin daily 12 hours on, 12 hours off. 30 patch 1    LAMOTRIGINE PO Take 100 mg by mouth daily For anxiety      ibuprofen (ADVIL;MOTRIN) 400 MG tablet Take 1 tablet by mouth every 6 hours as needed for Pain 40 tablet 0    ALPRAZolam (XANAX) 1 MG tablet Take 1 mg by mouth nightly. Allergies   Allergen Reactions    Demerol Hives    Doxycycline Nausea And Vomiting    Lorazepam Other (See Comments)     Makes her anxiety worsen    Vancomycin Hives       REVIEW OF SYSTEMS  10 systems reviewed, pertinent positives per HPI otherwise noted to be negative    PHYSICAL EXAM  /77   Pulse 79   Temp 98.4 °F (36.9 °C) (Oral)   Resp 18   Ht 5' 3\" (1.6 m)   Wt 180 lb (81.6 kg)   SpO2 97%   BMI 31.89 kg/m²   GENERAL APPEARANCE: Awake and alert. Cooperative. HEAD: Normocephalic. Atraumatic. EYES: PERRL. EOM's grossly intact. ENT: Mucous membranes are moist.   NECK: Supple. HEART: RRR. No murmurs. LUNGS: Respirations unlabored. CTAB.  Good air exchange. Speaking comfortably in full sentences. ABDOMEN: Soft. Nondistended significant tenderness to palpation in the epigastric regions. No rigidity. No guarding or rebound. No masses. No organomegaly. EXTREMITIES: No peripheral edema. Moves all extremities equally. All extremities neurovascularly intact. SKIN: Warm and dry. No acute rashes. NEUROLOGICAL: Alert and oriented. CN's 2-12 intact. No gross facial drooping. Strength 5/5, sensation intact. PSYCHIATRIC: Normal mood and affect. RADIOLOGY  CT ABDOMEN PELVIS W IV CONTRAST Additional Contrast? None   Final Result   Mild circumferential mucosal thickening along the left colon which could be   due to poor distention or possible early mild colitis with no bowel   obstruction. Recommend clinical follow-up      Previous cholecystectomy and mild chronic liver changes which is unchanged. Unremarkable uterus with no pelvic mass. Postop changes lower lumbar spine which is unchanged.                LABS  Labs Reviewed   COMPREHENSIVE METABOLIC PANEL - Abnormal; Notable for the following components:       Result Value    Anion Gap 18 (*)     Glucose 113 (*)     Calcium 10.9 (*)     Albumin 5.2 (*)     All other components within normal limits    Narrative:     Performed at:  [address], [facility] [employer]   Phone [phone]   URINE RT REFLEX TO CULTURE - Abnormal; Notable for the following components:    Bilirubin Urine MODERATE (*)     Ketones, Urine 15 (*)     Blood, Urine MODERATE (*)     Protein, UA 30 (*)     All other components within normal limits    Narrative:     Performed at:  [facility] EMERGENCY [address], [facility] [employer]   Phone [phone]   MICROSCOPIC URINALYSIS - Abnormal; Notable for the following components:    Mucus, UA Rare (*)     RBC, UA 5-10 (*)     Epithelial Cells, UA 11-20 (*)     Bacteria, UA 1+ (*)     Crystals, UA 3+ Ca. Oxalate (*)     All other components within normal limits    Narrative:     Performed at:  [facility]) [address],  [place], [facility] [employer]   Phone [phone]   CBC WITH AUTO DIFFERENTIAL    Narrative:     Performed at:  [facility]) [address],  [place], [address] [employer] [place]   Phone [phone]   LIPASE    Narrative:     Performed at:  [facility]) [address], [facility] [employer]   Phone [phone]   TROPONIN       PROCEDURES  Unless otherwise noted below, none  Procedures         CRITICAL CARE TIME  0 Minutes of critical care time spent not including separately billable procedures. MDM  MDM  Is a 36-year-old female who presented emergency department for evaluation of epigastric pain nausea and vomiting has been ongoing since Wednesday. Pain is intermittent. Arrived emergency department vitals within normal limits. On exam patient did experience significant amount of discomfort. Primarily tenderness to the epigastric region. I do not appreciate any peritoneal signs however on my exam.  Blood was obtained which demonstrated no leukocytosis hemoglobin 14, lipase within normal limits no kidney injury electrolyte malady or renal dysfunction. However given patient's significant level of discomfort will obtain a CT of the abdomen pelvis with contrast to rule out any perforated gastric ulcer. Given patient's symptomology and location differential includes but not limited to gastritis versus gastric ulcers versus perforation. Patient was symptomatically treated with Carafate, GI cocktail and Zofran. ET of them pelvis was returned demonstrating some possible early mild colitis with no bowel obstruction. Given patient's physical exam and history I state there is a low likelihood of this. Patient does not have no diarrhea. Patient is primarily pain is epigastric.   On reevaluation patient she did have very well relief with the medication administered however upon laying flat the symptoms returned and now patient is uncomfortable again. At this time I believe the most likely diagnosis for this patient is gastritis however will obtain a troponin to evaluate risk of ACS. Will give patient PPI as well as Pepcid. Is refer to attending note for EKG interpretation. Opponent was within normal limits. Patient received relief of her symptoms with the Protonix, Pepcid, Reglan and Benadryl combination. Patient is discharged home to follow-up with GI for evaluation as soon as possible. DISPOSITION  Patient was discharged to home in good condition. CLINICAL IMPRESSION  1.  Abdominal pain, epigastric            Isra Dumont PA-C  07/26/21 7927

## 2021-07-25 NOTE — ED NOTES
Provided d/c instructions, medication education, and follow up plan of care.  Verbalizes understandingAmbulated out with a steady gait      Adrianna [facility]  07/25/21 2944

## 2021-07-27 ENCOUNTER — HOSPITAL ENCOUNTER (INPATIENT)
Age: 41
LOS: 4 days | Discharge: HOME | DRG: 384 | End: 2021-08-02
Attending: EMERGENCY MEDICINE | Admitting: INTERNAL MEDICINE
Payer: COMMERCIAL

## 2021-07-27 ENCOUNTER — ANESTHESIA (OUTPATIENT)
Dept: ENDOSCOPY | Age: 41
DRG: 384 | End: 2021-07-27
Payer: COMMERCIAL

## 2021-07-27 ENCOUNTER — ANESTHESIA EVENT (OUTPATIENT)
Dept: ENDOSCOPY | Age: 41
DRG: 384 | End: 2021-07-27
Payer: COMMERCIAL

## 2021-07-27 VITALS — DIASTOLIC BLOOD PRESSURE: 78 MMHG | SYSTOLIC BLOOD PRESSURE: 122 MMHG | OXYGEN SATURATION: 95 %

## 2021-07-27 DIAGNOSIS — R10.13 ABDOMINAL PAIN, EPIGASTRIC: Primary | ICD-10-CM

## 2021-07-27 DIAGNOSIS — K25.9 GASTRIC ULCER WITHOUT HEMORRHAGE, PERFORATION, OR OBSTRUCTION, UNSPECIFIED ULCER CHRONICITY: ICD-10-CM

## 2021-07-27 DIAGNOSIS — E83.42 HYPOMAGNESEMIA: ICD-10-CM

## 2021-07-27 DIAGNOSIS — R11.0 NAUSEA: ICD-10-CM

## 2021-07-27 PROBLEM — R10.9 ABDOMINAL PAIN: Status: ACTIVE | Noted: 2021-07-27

## 2021-07-27 LAB
A/G RATIO: 1.5 (ref 1.1–2.2)
ALBUMIN SERPL-MCNC: 4.7 G/DL (ref 3.4–5)
ALP BLD-CCNC: 58 U/L (ref 40–129)
ALT SERPL-CCNC: 15 U/L (ref 10–40)
ANION GAP SERPL CALCULATED.3IONS-SCNC: 17 MMOL/L (ref 3–16)
AST SERPL-CCNC: 21 U/L (ref 15–37)
BACTERIA: ABNORMAL /HPF
BASOPHILS ABSOLUTE: 0.1 K/UL (ref 0–0.2)
BASOPHILS RELATIVE PERCENT: 1 %
BILIRUB SERPL-MCNC: <0.2 MG/DL (ref 0–1)
BILIRUBIN URINE: ABNORMAL
BLOOD, URINE: ABNORMAL
BUN BLDV-MCNC: 8 MG/DL (ref 7–20)
CALCIUM SERPL-MCNC: 11 MG/DL (ref 8.3–10.6)
CHLORIDE BLD-SCNC: 101 MMOL/L (ref 99–110)
CLARITY: CLEAR
CO2: 22 MMOL/L (ref 21–32)
COLOR: YELLOW
CREAT SERPL-MCNC: 0.9 MG/DL (ref 0.6–1.1)
EOSINOPHILS ABSOLUTE: 0.1 K/UL (ref 0–0.6)
EOSINOPHILS RELATIVE PERCENT: 0.8 %
EPITHELIAL CELLS, UA: ABNORMAL /HPF (ref 0–5)
GFR AFRICAN AMERICAN: >60
GFR NON-AFRICAN AMERICAN: >60
GLOBULIN: 3.1 G/DL
GLUCOSE BLD-MCNC: 108 MG/DL (ref 70–99)
GLUCOSE URINE: NEGATIVE MG/DL
HCG QUALITATIVE: NEGATIVE
HCT VFR BLD CALC: 41.9 % (ref 36–48)
HEMOGLOBIN: 14.5 G/DL (ref 12–16)
KETONES, URINE: 40 MG/DL
LACTIC ACID: 1.3 MMOL/L (ref 0.4–2)
LEUKOCYTE ESTERASE, URINE: NEGATIVE
LIPASE: 36 U/L (ref 13–60)
LYMPHOCYTES ABSOLUTE: 2.1 K/UL (ref 1–5.1)
LYMPHOCYTES RELATIVE PERCENT: 27.7 %
MAGNESIUM: 1.6 MG/DL (ref 1.8–2.4)
MCH RBC QN AUTO: 30.3 PG (ref 26–34)
MCHC RBC AUTO-ENTMCNC: 34.6 G/DL (ref 31–36)
MCV RBC AUTO: 87.6 FL (ref 80–100)
MICROSCOPIC EXAMINATION: YES
MONOCYTES ABSOLUTE: 0.5 K/UL (ref 0–1.3)
MONOCYTES RELATIVE PERCENT: 7 %
NEUTROPHILS ABSOLUTE: 4.9 K/UL (ref 1.7–7.7)
NEUTROPHILS RELATIVE PERCENT: 63.5 %
NITRITE, URINE: NEGATIVE
PDW BLD-RTO: 13.9 % (ref 12.4–15.4)
PH UA: 7 (ref 5–8)
PLATELET # BLD: 303 K/UL (ref 135–450)
PMV BLD AUTO: 8.3 FL (ref 5–10.5)
POTASSIUM REFLEX MAGNESIUM: 3.5 MMOL/L (ref 3.5–5.1)
PROTEIN UA: NEGATIVE MG/DL
RBC # BLD: 4.79 M/UL (ref 4–5.2)
RBC UA: ABNORMAL /HPF (ref 0–4)
SODIUM BLD-SCNC: 140 MMOL/L (ref 136–145)
SPECIFIC GRAVITY UA: 1.02 (ref 1–1.03)
TOTAL PROTEIN: 7.8 G/DL (ref 6.4–8.2)
TROPONIN: <0.01 NG/ML
URINE REFLEX TO CULTURE: ABNORMAL
URINE TYPE: ABNORMAL
UROBILINOGEN, URINE: 0.2 E.U./DL
WBC # BLD: 7.7 K/UL (ref 4–11)
WBC UA: ABNORMAL /HPF (ref 0–5)

## 2021-07-27 PROCEDURE — 2580000003 HC RX 258: Performed by: INTERNAL MEDICINE

## 2021-07-27 PROCEDURE — G0378 HOSPITAL OBSERVATION PER HR: HCPCS

## 2021-07-27 PROCEDURE — 7100000010 HC PHASE II RECOVERY - FIRST 15 MIN: Performed by: INTERNAL MEDICINE

## 2021-07-27 PROCEDURE — 83735 ASSAY OF MAGNESIUM: CPT

## 2021-07-27 PROCEDURE — 83605 ASSAY OF LACTIC ACID: CPT

## 2021-07-27 PROCEDURE — 80053 COMPREHEN METABOLIC PANEL: CPT

## 2021-07-27 PROCEDURE — 85025 COMPLETE CBC W/AUTO DIFF WBC: CPT

## 2021-07-27 PROCEDURE — 96365 THER/PROPH/DIAG IV INF INIT: CPT

## 2021-07-27 PROCEDURE — 6370000000 HC RX 637 (ALT 250 FOR IP): Performed by: PHYSICIAN ASSISTANT

## 2021-07-27 PROCEDURE — 6370000000 HC RX 637 (ALT 250 FOR IP): Performed by: REGISTERED NURSE

## 2021-07-27 PROCEDURE — 83690 ASSAY OF LIPASE: CPT

## 2021-07-27 PROCEDURE — 99284 EMERGENCY DEPT VISIT MOD MDM: CPT

## 2021-07-27 PROCEDURE — 81001 URINALYSIS AUTO W/SCOPE: CPT

## 2021-07-27 PROCEDURE — 96366 THER/PROPH/DIAG IV INF ADDON: CPT

## 2021-07-27 PROCEDURE — 96375 TX/PRO/DX INJ NEW DRUG ADDON: CPT

## 2021-07-27 PROCEDURE — 2709999900 HC NON-CHARGEABLE SUPPLY: Performed by: INTERNAL MEDICINE

## 2021-07-27 PROCEDURE — 6360000002 HC RX W HCPCS: Performed by: PHYSICIAN ASSISTANT

## 2021-07-27 PROCEDURE — 0DB48ZX EXCISION OF ESOPHAGOGASTRIC JUNCTION, VIA NATURAL OR ARTIFICIAL OPENING ENDOSCOPIC, DIAGNOSTIC: ICD-10-PCS | Performed by: INTERNAL MEDICINE

## 2021-07-27 PROCEDURE — 84484 ASSAY OF TROPONIN QUANT: CPT

## 2021-07-27 PROCEDURE — 6360000002 HC RX W HCPCS: Performed by: INTERNAL MEDICINE

## 2021-07-27 PROCEDURE — 6360000002 HC RX W HCPCS: Performed by: NURSE PRACTITIONER

## 2021-07-27 PROCEDURE — 84703 CHORIONIC GONADOTROPIN ASSAY: CPT

## 2021-07-27 PROCEDURE — 3700000000 HC ANESTHESIA ATTENDED CARE: Performed by: INTERNAL MEDICINE

## 2021-07-27 PROCEDURE — 6360000002 HC RX W HCPCS: Performed by: NURSE ANESTHETIST, CERTIFIED REGISTERED

## 2021-07-27 PROCEDURE — 2500000003 HC RX 250 WO HCPCS: Performed by: NURSE ANESTHETIST, CERTIFIED REGISTERED

## 2021-07-27 PROCEDURE — 6360000002 HC RX W HCPCS: Performed by: REGISTERED NURSE

## 2021-07-27 PROCEDURE — 3609012400 HC EGD TRANSORAL BIOPSY SINGLE/MULTIPLE: Performed by: INTERNAL MEDICINE

## 2021-07-27 PROCEDURE — 43239 EGD BIOPSY SINGLE/MULTIPLE: CPT | Performed by: INTERNAL MEDICINE

## 2021-07-27 PROCEDURE — 7100000011 HC PHASE II RECOVERY - ADDTL 15 MIN: Performed by: INTERNAL MEDICINE

## 2021-07-27 PROCEDURE — C9113 INJ PANTOPRAZOLE SODIUM, VIA: HCPCS | Performed by: REGISTERED NURSE

## 2021-07-27 PROCEDURE — 2500000003 HC RX 250 WO HCPCS: Performed by: PHYSICIAN ASSISTANT

## 2021-07-27 PROCEDURE — 2580000003 HC RX 258: Performed by: PHYSICIAN ASSISTANT

## 2021-07-27 PROCEDURE — 88305 TISSUE EXAM BY PATHOLOGIST: CPT

## 2021-07-27 RX ORDER — ONDANSETRON 2 MG/ML
4 INJECTION INTRAMUSCULAR; INTRAVENOUS EVERY 6 HOURS PRN
Status: DISCONTINUED | OUTPATIENT
Start: 2021-07-27 | End: 2021-07-27 | Stop reason: CLARIF

## 2021-07-27 RX ORDER — PROPOFOL 10 MG/ML
INJECTION, EMULSION INTRAVENOUS PRN
Status: DISCONTINUED | OUTPATIENT
Start: 2021-07-27 | End: 2021-07-27 | Stop reason: SDUPTHER

## 2021-07-27 RX ORDER — DIPHENHYDRAMINE HYDROCHLORIDE 50 MG/ML
25 INJECTION INTRAMUSCULAR; INTRAVENOUS ONCE
Status: DISCONTINUED | OUTPATIENT
Start: 2021-07-27 | End: 2021-07-27

## 2021-07-27 RX ORDER — DIPHENHYDRAMINE HYDROCHLORIDE 50 MG/ML
12.5 INJECTION INTRAMUSCULAR; INTRAVENOUS ONCE
Status: COMPLETED | OUTPATIENT
Start: 2021-07-27 | End: 2021-07-27

## 2021-07-27 RX ORDER — LORAZEPAM 2 MG/ML
0.5 INJECTION INTRAMUSCULAR ONCE
Status: DISCONTINUED | OUTPATIENT
Start: 2021-07-27 | End: 2021-07-27

## 2021-07-27 RX ORDER — SODIUM CHLORIDE 0.9 % (FLUSH) 0.9 %
5-40 SYRINGE (ML) INJECTION EVERY 12 HOURS SCHEDULED
Status: DISCONTINUED | OUTPATIENT
Start: 2021-07-27 | End: 2021-08-02 | Stop reason: HOSPADM

## 2021-07-27 RX ORDER — MORPHINE SULFATE 4 MG/ML
4 INJECTION, SOLUTION INTRAMUSCULAR; INTRAVENOUS
Status: DISCONTINUED | OUTPATIENT
Start: 2021-07-27 | End: 2021-07-29

## 2021-07-27 RX ORDER — PANTOPRAZOLE SODIUM 40 MG/10ML
40 INJECTION, POWDER, LYOPHILIZED, FOR SOLUTION INTRAVENOUS DAILY
Status: DISCONTINUED | OUTPATIENT
Start: 2021-07-27 | End: 2021-07-28

## 2021-07-27 RX ORDER — SODIUM CHLORIDE, SODIUM LACTATE, POTASSIUM CHLORIDE, CALCIUM CHLORIDE 600; 310; 30; 20 MG/100ML; MG/100ML; MG/100ML; MG/100ML
INJECTION, SOLUTION INTRAVENOUS CONTINUOUS
Status: DISCONTINUED | OUTPATIENT
Start: 2021-07-27 | End: 2021-07-31

## 2021-07-27 RX ORDER — SODIUM CHLORIDE 0.9 % (FLUSH) 0.9 %
5-40 SYRINGE (ML) INJECTION PRN
Status: DISCONTINUED | OUTPATIENT
Start: 2021-07-27 | End: 2021-08-02 | Stop reason: HOSPADM

## 2021-07-27 RX ORDER — MAGNESIUM SULFATE 1 G/100ML
1000 INJECTION INTRAVENOUS ONCE
Status: COMPLETED | OUTPATIENT
Start: 2021-07-27 | End: 2021-07-27

## 2021-07-27 RX ORDER — ONDANSETRON 8 MG/1
4 TABLET, ORALLY DISINTEGRATING ORAL EVERY 8 HOURS PRN
Status: DISCONTINUED | OUTPATIENT
Start: 2021-07-27 | End: 2021-07-27 | Stop reason: CLARIF

## 2021-07-27 RX ORDER — LIDOCAINE HYDROCHLORIDE 20 MG/ML
INJECTION, SOLUTION INFILTRATION; PERINEURAL PRN
Status: DISCONTINUED | OUTPATIENT
Start: 2021-07-27 | End: 2021-07-27 | Stop reason: SDUPTHER

## 2021-07-27 RX ORDER — ONDANSETRON 4 MG/1
4 TABLET, ORALLY DISINTEGRATING ORAL EVERY 8 HOURS PRN
Status: DISCONTINUED | OUTPATIENT
Start: 2021-07-27 | End: 2021-08-02 | Stop reason: HOSPADM

## 2021-07-27 RX ORDER — POLYETHYLENE GLYCOL 3350 17 G/17G
17 POWDER, FOR SOLUTION ORAL DAILY PRN
Status: DISCONTINUED | OUTPATIENT
Start: 2021-07-27 | End: 2021-08-02 | Stop reason: HOSPADM

## 2021-07-27 RX ORDER — MORPHINE SULFATE 2 MG/ML
2 INJECTION, SOLUTION INTRAMUSCULAR; INTRAVENOUS
Status: DISCONTINUED | OUTPATIENT
Start: 2021-07-27 | End: 2021-07-27

## 2021-07-27 RX ORDER — SUCRALFATE 1 G/1
1 TABLET ORAL EVERY 6 HOURS SCHEDULED
Status: DISCONTINUED | OUTPATIENT
Start: 2021-07-27 | End: 2021-08-02 | Stop reason: HOSPADM

## 2021-07-27 RX ORDER — ACETAMINOPHEN 325 MG/1
650 TABLET ORAL EVERY 6 HOURS PRN
Status: DISCONTINUED | OUTPATIENT
Start: 2021-07-27 | End: 2021-08-02 | Stop reason: HOSPADM

## 2021-07-27 RX ORDER — MORPHINE SULFATE 4 MG/ML
4 INJECTION, SOLUTION INTRAMUSCULAR; INTRAVENOUS ONCE
Status: COMPLETED | OUTPATIENT
Start: 2021-07-27 | End: 2021-07-27

## 2021-07-27 RX ORDER — ACETAMINOPHEN 650 MG/1
650 SUPPOSITORY RECTAL EVERY 6 HOURS PRN
Status: DISCONTINUED | OUTPATIENT
Start: 2021-07-27 | End: 2021-08-02 | Stop reason: HOSPADM

## 2021-07-27 RX ORDER — ALPRAZOLAM 1 MG/1
1 TABLET ORAL NIGHTLY
Status: DISCONTINUED | OUTPATIENT
Start: 2021-07-27 | End: 2021-08-02 | Stop reason: HOSPADM

## 2021-07-27 RX ORDER — METOCLOPRAMIDE HYDROCHLORIDE 5 MG/ML
10 INJECTION INTRAMUSCULAR; INTRAVENOUS ONCE
Status: COMPLETED | OUTPATIENT
Start: 2021-07-27 | End: 2021-07-27

## 2021-07-27 RX ORDER — MORPHINE SULFATE 2 MG/ML
2 INJECTION, SOLUTION INTRAMUSCULAR; INTRAVENOUS ONCE
Status: COMPLETED | OUTPATIENT
Start: 2021-07-27 | End: 2021-07-27

## 2021-07-27 RX ORDER — FENTANYL CITRATE 50 UG/ML
50 INJECTION, SOLUTION INTRAMUSCULAR; INTRAVENOUS ONCE
Status: DISCONTINUED | OUTPATIENT
Start: 2021-07-27 | End: 2021-07-27

## 2021-07-27 RX ORDER — SODIUM CHLORIDE 9 MG/ML
25 INJECTION, SOLUTION INTRAVENOUS PRN
Status: DISCONTINUED | OUTPATIENT
Start: 2021-07-27 | End: 2021-08-02 | Stop reason: HOSPADM

## 2021-07-27 RX ORDER — ONDANSETRON 2 MG/ML
4 INJECTION INTRAMUSCULAR; INTRAVENOUS ONCE
Status: COMPLETED | OUTPATIENT
Start: 2021-07-27 | End: 2021-07-27

## 2021-07-27 RX ORDER — SUCRALFATE 1 G/1
1 TABLET ORAL ONCE
Status: COMPLETED | OUTPATIENT
Start: 2021-07-27 | End: 2021-07-27

## 2021-07-27 RX ORDER — 0.9 % SODIUM CHLORIDE 0.9 %
1000 INTRAVENOUS SOLUTION INTRAVENOUS ONCE
Status: COMPLETED | OUTPATIENT
Start: 2021-07-27 | End: 2021-07-27

## 2021-07-27 RX ORDER — ONDANSETRON 2 MG/ML
4 INJECTION INTRAMUSCULAR; INTRAVENOUS EVERY 6 HOURS PRN
Status: DISCONTINUED | OUTPATIENT
Start: 2021-07-27 | End: 2021-08-02 | Stop reason: HOSPADM

## 2021-07-27 RX ADMIN — SODIUM CHLORIDE 1000 ML: 9 INJECTION, SOLUTION INTRAVENOUS at 09:18

## 2021-07-27 RX ADMIN — SODIUM CHLORIDE, POTASSIUM CHLORIDE, SODIUM LACTATE AND CALCIUM CHLORIDE: 600; 310; 30; 20 INJECTION, SOLUTION INTRAVENOUS at 12:59

## 2021-07-27 RX ADMIN — SUCRALFATE 1 G: 1 TABLET ORAL at 17:42

## 2021-07-27 RX ADMIN — FAMOTIDINE 20 MG: 10 INJECTION, SOLUTION INTRAVENOUS at 09:19

## 2021-07-27 RX ADMIN — ONDANSETRON 4 MG: 2 INJECTION INTRAMUSCULAR; INTRAVENOUS at 09:19

## 2021-07-27 RX ADMIN — LIDOCAINE HYDROCHLORIDE 60 MG: 20 INJECTION, SOLUTION INFILTRATION; PERINEURAL at 13:28

## 2021-07-27 RX ADMIN — METOCLOPRAMIDE HYDROCHLORIDE 10 MG: 5 INJECTION INTRAMUSCULAR; INTRAVENOUS at 10:12

## 2021-07-27 RX ADMIN — SODIUM CHLORIDE, POTASSIUM CHLORIDE, SODIUM LACTATE AND CALCIUM CHLORIDE: 600; 310; 30; 20 INJECTION, SOLUTION INTRAVENOUS at 19:21

## 2021-07-27 RX ADMIN — MORPHINE SULFATE 2 MG: 2 INJECTION, SOLUTION INTRAMUSCULAR; INTRAVENOUS at 21:35

## 2021-07-27 RX ADMIN — MAGNESIUM SULFATE HEPTAHYDRATE 1000 MG: 1 INJECTION, SOLUTION INTRAVENOUS at 10:14

## 2021-07-27 RX ADMIN — MORPHINE SULFATE 4 MG: 4 INJECTION, SOLUTION INTRAMUSCULAR; INTRAVENOUS at 10:13

## 2021-07-27 RX ADMIN — MORPHINE SULFATE 2 MG: 2 INJECTION, SOLUTION INTRAMUSCULAR; INTRAVENOUS at 20:34

## 2021-07-27 RX ADMIN — PROPOFOL 50 MG: 10 INJECTION, EMULSION INTRAVENOUS at 13:32

## 2021-07-27 RX ADMIN — MORPHINE SULFATE 2 MG: 2 INJECTION, SOLUTION INTRAMUSCULAR; INTRAVENOUS at 15:59

## 2021-07-27 RX ADMIN — MAGNESIUM HYDROXIDE/ALUMINUM HYDROXICE/SIMETHICONE: 120; 1200; 1200 SUSPENSION ORAL at 09:20

## 2021-07-27 RX ADMIN — ONDANSETRON 4 MG: 2 INJECTION INTRAMUSCULAR; INTRAVENOUS at 15:58

## 2021-07-27 RX ADMIN — SUCRALFATE 1 G: 1 TABLET ORAL at 09:20

## 2021-07-27 RX ADMIN — PANTOPRAZOLE SODIUM 40 MG: 40 INJECTION, POWDER, FOR SOLUTION INTRAVENOUS at 15:49

## 2021-07-27 RX ADMIN — DIPHENHYDRAMINE HYDROCHLORIDE 12.5 MG: 50 INJECTION, SOLUTION INTRAMUSCULAR; INTRAVENOUS at 10:13

## 2021-07-27 RX ADMIN — ALPRAZOLAM 1 MG: 1 TABLET ORAL at 20:34

## 2021-07-27 RX ADMIN — PROPOFOL 200 MG: 10 INJECTION, EMULSION INTRAVENOUS at 13:28

## 2021-07-27 ASSESSMENT — ENCOUNTER SYMPTOMS
CONSTIPATION: 0
SHORTNESS OF BREATH: 0
BACK PAIN: 0
ABDOMINAL PAIN: 1
VOMITING: 1
NAUSEA: 1
EYES NEGATIVE: 1
DIARRHEA: 0

## 2021-07-27 ASSESSMENT — PAIN SCALES - GENERAL
PAINLEVEL_OUTOF10: 7
PAINLEVEL_OUTOF10: 6
PAINLEVEL_OUTOF10: 5
PAINLEVEL_OUTOF10: 7
PAINLEVEL_OUTOF10: 3
PAINLEVEL_OUTOF10: 5
PAINLEVEL_OUTOF10: 6
PAINLEVEL_OUTOF10: 0

## 2021-07-27 ASSESSMENT — PAIN DESCRIPTION - LOCATION
LOCATION: ABDOMEN
LOCATION: ABDOMEN

## 2021-07-27 ASSESSMENT — PAIN DESCRIPTION - ORIENTATION
ORIENTATION: MID
ORIENTATION: MID;UPPER

## 2021-07-27 ASSESSMENT — PAIN DESCRIPTION - PAIN TYPE
TYPE: ACUTE PAIN
TYPE: ACUTE PAIN

## 2021-07-27 ASSESSMENT — PAIN DESCRIPTION - ONSET: ONSET: ON-GOING

## 2021-07-27 ASSESSMENT — PAIN DESCRIPTION - FREQUENCY
FREQUENCY: CONTINUOUS
FREQUENCY: INTERMITTENT

## 2021-07-27 ASSESSMENT — PAIN DESCRIPTION - PROGRESSION: CLINICAL_PROGRESSION: NOT CHANGED

## 2021-07-27 NOTE — H&P
Hospital Medicine History & Physical      PCP: No primary care provider on file. Date of Admission: 7/27/2021    Date of Service: Pt seen/examined on 7/27/2021 and Placed in Observation. Chief Complaint:  Epigastric abdominal pain     History Of Present Illness:      45 y.o. female with PMH of tobacco abuse and chronic back pain who presented to [facility] with complaints of epigastric abdominal pain. Pain started last Wednesday. She went to the ED on Saturday. Workup was unremarkable. It was suggested she be admitted however pt wanted to be dc'd as she was feeling better. Pain then came back and progressively worsened on Monday which prompted her to go back to the ED. She had associated N&V. No hematemesis, melena or hematochezia. No fever/chills. No dyspnea or chest pain. Pt takes a fair amount of ibuprofen for her chronic back pain. ED course: Afebrile. VSS. GI consulted. Pt was admitted for further evaluation and management.      Past Medical History:          Diagnosis Date    Abnormal Pap smear     WNL since    Anxiety     Back pain     herniated disc    Back pain     had back surgery (fusion of 3 discs) in march 2011    Placenta previa     resolved at 28 weeks with this 3rd pregnancy    Tubal pregnancy     Pt states bilateral fallopian tubes removed       Past Surgical History:          Procedure Laterality Date    ANESTHESIA NERVE BLOCK Right 5/21/2020    RIGHT HIP INJECTION SITE CONFIRMED BY FLUOROSCOPY performed by North Echeverria MD at [address] ARTHROSCOPY Left 11/19/2020    VIDEO ARTHROSCOPY LEFT ANKLE, SYNOVECTOMY, LOOSE BODY REMOVAL, MICROFRACTURE -BLOCK- performed by Claudio Mayo MD at [facility] Right     CHOLECYSTECTOMY      laparoscopic    ECTOPIC PREGNANCY SURGERY  10/2017    LAPAROSCOPY  4/20/10    with salpingostomy  ( ectopic pregnancy)    LUMBAR DISC SURGERY      LUMBAR FUSION  3-2011    L3-L5 fusion    TONSILLECTOMY  2000    WISDOM TOOTH EXTRACTION  1998       Medications Prior to Admission:      Prior to Admission medications    Medication Sig Start Date End Date Taking? Authorizing Provider   omeprazole (PRILOSEC) 20 MG delayed release capsule Take 1 capsule by mouth every morning (before breakfast) 7/25/21  Yes Pao Mckeon PA-C   lidocaine (LIDODERM) 5 % Place 1 patch onto the skin daily 12 hours on, 12 hours off. 12/22/20  Yes Rena Lai MD   LAMOTRIGINE PO Take 100 mg by mouth daily For anxiety   Yes Historical Provider, MD   ibuprofen (ADVIL;MOTRIN) 400 MG tablet Take 1 tablet by mouth every 6 hours as needed for Pain 10/24/20  Yes Mela Rizzo MD   ALPRAZolam [place]) 1 MG tablet Take 1 mg by mouth nightly. Yes Historical Provider, MD       Allergies:  Demerol, Doxycycline, Lorazepam, and Vancomycin    Social History:      The patient currently lives at home. TOBACCO:   reports that she has been smoking cigarettes. She started smoking about 22 years ago. She has a 6.00 pack-year smoking history. She has never used smokeless tobacco.  ETOH:   reports current alcohol use. E-Cigarettes/Vaping Use     Questions Responses    E-Cigarette/Vaping Use Never User    Start Date     Passive Exposure     Quit Date     Counseling Given     Comments             Family History:      Reviewed in detail. Positive as follows:        Problem Relation Age of Onset    High Blood Pressure Father     Diabetes Maternal Aunt        REVIEW OF SYSTEMS COMPLETED:   Pertinent positives as noted in the HPI. All other systems reviewed and negative. PHYSICAL EXAM PERFORMED:    /77   Pulse 84   Temp 98.5 °F (36.9 °C) (Oral)   Resp 16   Ht 5' 3\" (1.6 m)   Wt 180 lb (81.6 kg)   LMP 07/27/2021   SpO2 99%   BMI 31.89 kg/m²     General appearance:  No apparent distress, appears stated age and cooperative. HEENT:  Normal cephalic, atraumatic without obvious deformity.  Pupils equal, round, and reactive to light. Extra ocular muscles intact. Conjunctivae/corneas clear. Neck: Supple, with full range of motion. No jugular venous distention. Trachea midline. Respiratory:  Normal respiratory effort. Clear to auscultation, bilaterally without Rales/Wheezes/Rhonchi. Cardiovascular:  Regular rate and rhythm with normal S1/S2 without murmurs, rubs or gallops. Abdomen: Soft, non-tender, non-distended with normal bowel sounds. Musculoskeletal:  No clubbing, cyanosis or edema bilaterally. Full range of motion without deformity. Skin: Skin color, texture, turgor normal.  No rashes or lesions. Neurologic:  Neurovascularly intact without any focal sensory/motor deficits. Cranial nerves: II-XII intact, grossly non-focal.  Psychiatric:  Alert and oriented, thought content appropriate, normal insight  Capillary Refill: Brisk,3 seconds, normal  Peripheral Pulses: +2 palpable, equal bilaterally       Labs:     Recent Labs     07/24/21 2229 07/27/21  0835   WBC 7.9 7.7   HGB 14.6 14.5   HCT 42.6 41.9    303     Recent Labs     07/24/21 2229 07/27/21  0835    140   K 4.0 3.5   CL 99 101   CO2 21 22   BUN 7 8   CREATININE 0.9 0.9   CALCIUM 10.9* 11.0*     Recent Labs     07/24/21 2229 07/27/21  0835   AST 15 21   ALT 11 15   BILITOT 0.4 <0.2   ALKPHOS 65 58     Recent Labs     07/24/21 2229 07/27/21  0835   TROPONINI <0.01 <0.01       Urinalysis:      Lab Results   Component Value Date    NITRU Negative 07/27/2021    WBCUA None seen 07/27/2021    BACTERIA Rare 07/27/2021    RBCUA 5-10 07/27/2021    BLOODU MODERATE 07/27/2021    SPECGRAV 1.025 07/27/2021    GLUCOSEU Negative 07/27/2021    GLUCOSEU NEGATIVE 12/22/2011       ASSESSMENT/PLAN:    Active Hospital Problems    Diagnosis Date Noted    Abdominal pain [R10.9] 07/27/2021       Epigastric abdominal pain:  - CT A/P on 7/24 was unremarkable. - GI consulted in the ED.  Pt is s/p EGD on 7/27 and found to have deep penetrating ulcer of 12mm located in the pyloric channel but extended into the duodenal bulb and patches of erosive antral gastritis. Plan for repeat EGD in 8-12 weeks. - Pt started on IV PPI and carafate. Continue prn analgesia. - Defer diet to GI.   - Pt advised to avoid NSAIDs. Tobacco abuse:  - Smoking cessation discussed/advised. DVT Prophylaxis: Lovenox   Diet: ADULT DIET; Regular; GI [place] (GERD/Peptic Ulcer)  Code Status: Full Code    PT/OT Eval Status: not indicated     Dispo - < 2 days        DAQUAN Rodriguez - CNP    Thank you No primary care provider on file. for the opportunity to be involved in this patient's care. If you have any questions or concerns please feel free to contact me at [phone].

## 2021-07-27 NOTE — ED NOTES
[address] [facility] @ 1639  Re: consult  Dr Gopi Johnson responded @ [address]  07/27/21 3327

## 2021-07-27 NOTE — CONSULTS
[address] [facility],  [facility]  [place], [place] [place]  Phone: [phone] [facility] She is a  [4] White (non-) [1] 45 y.o. [place] female    (Received [employer] message at 10:44am and not at 10:39am)    Main Problems/Chief Complaint for which GI service is seeing pt     Upper abdominal pain - persistent    Clinical Summary      Pt is S/P Xenia. She has had CT scan. She has been treated medically with PPI, but the burning pain is persisting - intensity varies from 3 to 8 on a scale to 10. No dysphagia, overt GI bleeding. The abdominal pain is localized to a limited area in the midline epigastrium. The pain seems to be quite different than the pain associated with gallstones to her.     PAST MEDICAL HISTORY     Past Medical History:   Diagnosis Date    Abnormal Pap smear     WNL since    Anxiety     Back pain     herniated disc    Back pain     had back surgery (fusion of 3 discs) in march 2011    Placenta previa     resolved at 28 weeks with this 3rd pregnancy    Tubal pregnancy     Pt states bilateral fallopian tubes removed     FAMILY HISTORY     Family History   Problem Relation Age of Onset    High Blood Pressure Father     Diabetes Maternal Aunt        SURGICAL HISTORY     Past Surgical History:   Procedure Laterality Date    ANESTHESIA NERVE BLOCK Right 5/21/2020    RIGHT HIP INJECTION SITE CONFIRMED BY FLUOROSCOPY performed by Rosemary Joya MD at [address] ARTHROSCOPY Left 11/19/2020    VIDEO ARTHROSCOPY LEFT ANKLE, SYNOVECTOMY, LOOSE BODY REMOVAL, MICROFRACTURE -BLOCK- performed by Ellie Ybarra MD at [facility] Right     CHOLECYSTECTOMY      laparoscopic    Benoit [address] 3.  10/2017    LAPAROSCOPY  4/20/10    with salpingostomy  ( ectopic pregnancy)    LUMBAR DISC SURGERY      LUMBAR FUSION  3-2011    L3-L5 fusion    [address] [facility] CURRENT MEDICATIONS   (This list may include medications prescribed during this encounter as epic can not insert only the list prior to this encounter.)  Current Outpatient Rx   Medication Sig Dispense Refill    omeprazole (PRILOSEC) 20 MG delayed release capsule Take 1 capsule by mouth every morning (before breakfast) 30 capsule 0    lidocaine (LIDODERM) 5 % Place 1 patch onto the skin daily 12 hours on, 12 hours off. 30 patch 1    LAMOTRIGINE PO Take 100 mg by mouth daily For anxiety      ibuprofen (ADVIL;MOTRIN) 400 MG tablet Take 1 tablet by mouth every 6 hours as needed for Pain 40 tablet 0    ALPRAZolam (XANAX) 1 MG tablet Take 1 mg by mouth nightly. ALLERGIES     Allergies   Allergen Reactions    Demerol Hives    Doxycycline Nausea And Vomiting    Lorazepam Other (See Comments)     Makes her anxiety worsen    Vancomycin Hives       REVIEW OF SYSTEMS   No chest pain suspicious for cardiac origin  No SOB    PHYSICAL EXAM   Vitals as per nursing record. Heart: WNL  Lungs: Clear to A&P  Abd: soft, localized epigastric tenderness, no rebound or rigidity, no masses are felt. Neurologic: Alert & oriented x 3. Psych: Good mood and memory. Pt is able to make appropriate decisions. FINAL IMPRESSION AND RECOMMENDATIONS     ? PUD w penetration. R/O post-kassidy syn or CBD stone not seen on CT. The patient is advised to have  EGD. MRCP is ordered. EGD with Bx and possible polypectomy, dilation, cauterization and other interventions during the procedure as needed is recommended. The patient is explained the above procedure(s) in simple words along with its need, risks, benefits and alternatives. The risks explained to the patient included, but are not limited to, bleeding, perforation, infection, suppression of breathing, heart attack, stroke, need for hospitalization and/or surgery and remotely even death.  The fact that the intended procedure may not be completed under certain circumstances and that in spite of our intention to keep the patient as comfortable as possible, there is some possibility of the patient experiencing discomfort or pain during and after the procedure remains. All the risks put together account for  0.03% of cases. The prognosis is explained if the procedure is not done. The patient has voiced the understanding of the above and has been given opportunity to ask questions. No question was left unanswered. The informed consent for the above procedure(s) is obtained. The total time spent face-to-face, review of the record and on the gore during this visit was 30 minutes with more than 50% of the time spent in review of the electronic record showing treatment with PPI and coordination of care for an EGD. Yolanda Petit MD 7/27/21 11:35 AM EDT    CC:  No primary care provider on file. IMPORTANT: Please note that some portions of this note may have been created using Dragon voice recognition software. Some \"sound-alike\" and totally wrong word substitutions may have taken place due to known inherent limitations of any such software, including this voice recognition software. In spite of efforts to eliminate such errors, some may not have been corrected. So please read the note with this in mind and recognize such mistakes and understand the correct version using the  context. Thanks.

## 2021-07-27 NOTE — OP NOTE
Operative Note      Patient: Daniela Garcia  YOB: 1980  MRN: [id number]    Date of Procedure: 2021    Pre-Op Diagnosis: Epigastric pain. S/P Xenia. Pt takes NSAIDs. Post-Op Diagnosis: Deep penetrating ulcer in the pyloric channel. Antral gastritis. Procedure(s):  EGD BIOPSY    Surgeon(s):  Kailee Cisneros MD    Assistant:   * No surgical staff found *    Anesthesia: General    Estimated Blood Loss (mL): Minimal    Complications: None    Specimens:   ID Type Source Tests Collected by Time Destination   A :  Tissue Biopsy SURGICAL PATHOLOGY Kailee Cisneros MD 2021 1333        Implants:  * No implants in log *      Drains: * No LDAs found *    [address],  [address] [facility]  Phone: [phone]  [facility],   [address], [address] [facility]  Phone: [phone]    EGD Procedure Note    Patient: Daniela Garcia  : 1980    Procedure: Esophagogastroduodenoscopy with Bx    Date:  2021     Endoscopist:  Kailee Cisneros MD, MD    Referring Physician:  No primary care provider on file. Anesthesia: Anesthesia: MAC    Procedure Details  The patient was placed in the left lateral decubitus position. A bite block was placed. The patient was monitored with ECG tracing, pulse oximetry, blood pressure monitoring, and direct observation. An upper endoscope was inserted through the bite into the mouth and advanced under direct vision to into the esophagus and gradually to the duodenum. A careful inspection was made during the procedure including a retroflexed view of the proximal stomach including views of the incisura and cardia. The findings and interventions are described below. Findings:   Normal esophagus. The GE junction was located at 37cm and at 38cm diaphragmatic impression was seen. There was irregular Z-line.      Stomach:   A deep penetrating ulcer of about 12mm was primarily located in the pyloric channel, but extended into the duodenal bulb. Patches of erosive antral gastritis - Bxed. On retroverted view no hiatal hernia was noted. Duodenum: Normal mucosa    Complications/ adverse events:  None. Estimated Blood loss:  <10 ml    Disposition:   PACU - hemodynamically stable. Discharge from PACU  after appropriate period of observation and when criteria for discharge are met. Recommendations:  Continue acid suppressive herapy. Avoid irritants to the stomach such as NSAIDs and aspirin -the most important therapeutic intervention in this case. Await pathology report. Once it is available, interpretation and further recommendations will be sent. A repeat EGD is recommended in 8-12 weeks. IMPORTANT: Please note that some portions of this note may have been created using Dragon voice recognition software. Some \"sound-alike\" and totally wrong word substitutions may have taken place due to known inherent limitations of any such software, including this voice recognition software. In spite of efforts to eliminate such errors, some may not have been corrected. So please read the note with this in mind and recognize such mistakes and understand the correct version using the  context. If there are still uncertainties in the mind of the medical provider reading this note about any aspect of the note, the provider can feel free to contact me. Thanks.       Electronically signed by Jackie Beard MD on 7/27/2021 at 1:40 PM

## 2021-07-27 NOTE — ED PROVIDER NOTES
[address] [facility]  ED  EMERGENCY DEPARTMENT ENCOUNTER        Pt Name: Gaston Espinoza  MRN: [id number]  [place] 1980  Date of evaluation: 7/27/2021  Provider: Brittney Villalobos PA-C  PCP: No primary care provider on file. ED Attending: Mariajose Valle DO      This patient was seen by the attending provider    History provided by the patient    CHIEF COMPLAINT:     Chief Complaint   Patient presents with    Abdominal Pain     pt with upper gastric pain. pt state pain started on wed she was here on sat had blood and CT which was negative. pt with apt to follow up with GI. pt state that apt with GI is wed       HISTORY OF PRESENT ILLNESS:      Gaston Espinoza is a 45 y.o. female who arrives to the ED by private vehicle. Patient is here complaining of persistent epigastric pain that started on Wednesday, 7/21. She recalls taking her \"staff\" out for lunch at [facility]. She states by 4-5 PM she was experiencing epigastric pain. She presented to the ED on 7/24 as a result of this pain and had a work-up that included EKG, labs and CT imaging of the abdomen and pelvis. She received medication in the ED that helped with symptoms but was discharged on only omeprazole. She has been taking it but has continued with pain. She reports nausea as well and difficulty with oral intake as result of the nausea and pain. She does have a scheduled appointment with [facility] next Wednesday, 8/4. She however is looking for some help with pain relief to get her to that appointment. She is never had any issues with upper GI infection, GERD or ulcer disease. She has remotely undergone cholecystectomy, laparoscopy for ectopic pregnancy. She has stopped using NSAIDs since being seen here on 7/24 and states she does not drink much alcohol to begin with. She seems to have exacerbated symptoms when she eats. However, she reports constant pain regardless of eating or drinking.   No identifiable alleviating factors. Nursing Notes were reviewed     REVIEW OF SYSTEMS:     Review of Systems   Constitutional: Positive for appetite change. Negative for activity change, chills and fever. HENT: Negative. Eyes: Negative. Respiratory: Negative for shortness of breath. Cardiovascular: Negative for chest pain. Gastrointestinal: Positive for abdominal pain, nausea and vomiting (rarely). Negative for constipation and diarrhea. Genitourinary: Negative for dysuria. Musculoskeletal: Negative for back pain and neck pain. Skin: Negative. Neurological: Negative for headaches. All other systems reviewed and are negative. Except as noted above in the ROS, all other systems were reviewed and negative. PAST MEDICAL HISTORY:     Past Medical History:   Diagnosis Date    Abnormal Pap smear     WNL since    Anxiety     Back pain     herniated disc    Back pain     had back surgery (fusion of 3 discs) in march 2011    Placenta previa     resolved at 28 weeks with this 3rd pregnancy    Tubal pregnancy     Pt states bilateral fallopian tubes removed         SURGICAL HISTORY:      Past Surgical History:   Procedure Laterality Date    ANESTHESIA NERVE BLOCK Right 5/21/2020    RIGHT HIP INJECTION SITE CONFIRMED BY FLUOROSCOPY performed by Rosina Ybarra MD at [address] ARTHROSCOPY Left 11/19/2020    VIDEO ARTHROSCOPY LEFT ANKLE, SYNOVECTOMY, LOOSE BODY REMOVAL, MICROFRACTURE -BLOCK- performed by Alida Rai MD at [facility] Right     CHOLECYSTECTOMY      laparoscopic    ECTOPIC PREGNANCY SURGERY  10/2017    LAPAROSCOPY  4/20/10    with salpingostomy  ( ectopic pregnancy)    LUMBAR DISC SURGERY      LUMBAR FUSION  3-2011    L3-L5 fusion    TONSILLECTOMY  2000    WISDOM TOOTH EXTRACTION  1998         CURRENT MEDICATIONS:       Previous Medications    ALPRAZOLAM (XANAX) 1 MG TABLET    Take 1 mg by mouth nightly.      IBUPROFEN (ADVIL;MOTRIN) 400 MG TABLET    Take 1 tablet by mouth every 6 hours as needed for Pain    LAMOTRIGINE PO    Take 100 mg by mouth daily For anxiety    LIDOCAINE (LIDODERM) 5 %    Place 1 patch onto the skin daily 12 hours on, 12 hours off. OMEPRAZOLE (PRILOSEC) 20 MG DELAYED RELEASE CAPSULE    Take 1 capsule by mouth every morning (before breakfast)         ALLERGIES:    Demerol, Doxycycline, Lorazepam, and Vancomycin    FAMILY HISTORY:       Family History   Problem Relation Age of Onset    High Blood Pressure Father     Diabetes Maternal Aunt           SOCIAL HISTORY:       Social History     Socioeconomic History    Marital status:      Spouse name: None    Number of children: None    Years of education: None    Highest education level: None   Occupational History    None   Tobacco Use    Smoking status: Current Every Day Smoker     Packs/day: 0.50     Years: 12.00     Pack years: 6.00     Types: Cigarettes     Start date: 1999     Last attempt to quit: 2011     Years since quittin.9    Smokeless tobacco: Never Used   Vaping Use    Vaping Use: Never used   Substance and Sexual Activity    Alcohol use: Yes     Comment: occ    Drug use: No    Sexual activity: Yes     Partners: Male   Other Topics Concern    None   Social History Narrative    None     Social Determinants of Health     Financial Resource Strain:     Difficulty of Paying Living Expenses:    Food Insecurity:     Worried About Running Out of Food in the Last Year:     Ran Out of Food in the Last Year:    Transportation Needs:     Lack of Transportation (Medical):      Lack of Transportation (Non-Medical):    Physical Activity:     Days of Exercise per Week:     Minutes of Exercise per Session:    Stress:     Feeling of Stress :    Social Connections:     Frequency of Communication with Friends and Family:     Frequency of Social Gatherings with Friends and Family:     Attends Gnosticist Services:     Active Member of Clubs or Organizations:     Attends Club or Organization Meetings:     Marital Status:    Intimate Partner Violence:     Fear of Current or Ex-Partner:     Emotionally Abused:     Physically Abused:     Sexually Abused:        SCREENINGS:             PHYSICAL EXAM:       ED Triage Vitals [07/27/21 0830]   BP Temp Temp Source Pulse Resp SpO2 Height Weight   (!) 140/87 98.4 °F (36.9 °C) Oral 79 16 98 % 5' 3\" (1.6 m) 180 lb (81.6 kg)       Physical Exam    CONSTITUTIONAL: Awake and alert. Cooperative. Well-developed. Well-nourished. Non-toxic. No acute distress. HENT: Normocephalic. Atraumatic. External ears normal, without discharge. No nasal discharge. Oropharynx clear. Mucous membranes moist.  EYES: Conjunctiva non-injected. No scleral icterus. PERRL. EOM's grossly intact. NECK: Supple. Normal ROM. CARDIOVASCULAR: RRR. No Murmer. Intact distal pulses. PULMONARY/CHEST WALL: Effort normal. No tachypnea. Lungs clear to ausculation. ABDOMEN: Normal BS. Soft. Nondistended. The gastric tenderness to palpate. No palpable mass. No guarding. No rebound or rigidity. /ANORECTAL: Not assessed  MUSKULOSKELETAL: Normal ROM. No acute deformities. No edema. No tenderness to palpate. SKIN: Warm and dry. No rash. NEUROLOGICAL: Alert and oriented x 3. GCS 15. CN II-XII grossly intact. Strength is 5/5 in all extremities and sensation is intact. Normal gait.    PSYCHIATRIC: Normal affect        DIAGNOSTICRESULTS:     LABS:    Results for orders placed or performed during the hospital encounter of 07/27/21   CBC auto differential   Result Value Ref Range    WBC 7.7 4.0 - 11.0 K/uL    RBC 4.79 4.00 - 5.20 M/uL    Hemoglobin 14.5 12.0 - 16.0 g/dL    Hematocrit 41.9 36.0 - 48.0 %    MCV 87.6 80.0 - 100.0 fL    MCH 30.3 26.0 - 34.0 pg    MCHC 34.6 31.0 - 36.0 g/dL    RDW 13.9 12.4 - 15.4 %    Platelets 422 108 - 219 K/uL    MPV 8.3 5.0 - 10.5 fL    Neutrophils % 63.5 %    Lymphocytes % 27.7 %    Monocytes % 7.0 %    Eosinophils % 0.8 %    Basophils % 1.0 %    Neutrophils Absolute 4.9 1.7 - 7.7 K/uL    Lymphocytes Absolute 2.1 1.0 - 5.1 K/uL    Monocytes Absolute 0.5 0.0 - 1.3 K/uL    Eosinophils Absolute 0.1 0.0 - 0.6 K/uL    Basophils Absolute 0.1 0.0 - 0.2 K/uL   Comprehensive Metabolic Panel w/ Reflex to MG   Result Value Ref Range    Sodium 140 136 - 145 mmol/L    Potassium reflex Magnesium 3.5 3.5 - 5.1 mmol/L    Chloride 101 99 - 110 mmol/L    CO2 22 21 - 32 mmol/L    Anion Gap 17 (H) 3 - 16    Glucose 108 (H) 70 - 99 mg/dL    BUN 8 7 - 20 mg/dL    CREATININE 0.9 0.6 - 1.1 mg/dL    GFR Non-African American >60 >60    GFR African American >60 >60    Calcium 11.0 (H) 8.3 - 10.6 mg/dL    Total Protein 7.8 6.4 - 8.2 g/dL    Albumin 4.7 3.4 - 5.0 g/dL    Albumin/Globulin Ratio 1.5 1.1 - 2.2    Total Bilirubin <0.2 0.0 - 1.0 mg/dL    Alkaline Phosphatase 58 40 - 129 U/L    ALT 15 10 - 40 U/L    AST 21 15 - 37 U/L    Globulin 3.1 g/dL   Urine, reflex to culture    Specimen: Urine, clean catch   Result Value Ref Range    Color, UA Yellow Straw/Yellow    Clarity, UA Clear Clear    Glucose, Ur Negative Negative mg/dL    Bilirubin Urine SMALL (A) Negative    Ketones, Urine 40 (A) Negative mg/dL    Specific Gravity, UA 1.025 1.005 - 1.030    Blood, Urine MODERATE (A) Negative    pH, UA 7.0 5.0 - 8.0    Protein, UA Negative Negative mg/dL    Urobilinogen, Urine 0.2 <2.0 E.U./dL    Nitrite, Urine Negative Negative    Leukocyte Esterase, Urine Negative Negative    Microscopic Examination YES     Urine Type NotGiven     Urine Reflex to Culture Not Indicated    Lipase   Result Value Ref Range    Lipase 36.0 13.0 - 60.0 U/L   Lactic Acid, Plasma   Result Value Ref Range    Lactic Acid 1.3 0.4 - 2.0 mmol/L   HCG Qualitative, Serum   Result Value Ref Range    hCG Qual Negative Detects HCG level >10 MIU/mL   Troponin   Result Value Ref Range    Troponin <0.01 <0.01 ng/mL   Magnesium   Result Value Ref Range    Magnesium 1.60 (L) 1.80 - 2.40 mg/dL Microscopic Urinalysis   Result Value Ref Range    WBC, UA None seen 0 - 5 /HPF    RBC, UA 5-10 (A) 0 - 4 /HPF    Epithelial Cells, UA 2-5 0 - 5 /HPF    Bacteria, UA Rare (A) None Seen /HPF         RADIOLOGY:  All x-ray studies areviewed/reviewed by me. Formal interpretations per the radiologist are as follows:      CT ABDOMEN PELVIS W IV CONTRAST Additional Contrast? None    Result Date: 7/25/2021  EXAMINATION: CT OF THE ABDOMEN AND PELVIS WITH CONTRAST 7/24/2021 11:45 pm TECHNIQUE: CT of the abdomen and pelvis was performed with the administration of intravenous contrast. Multiplanar reformatted images are provided for review. Dose modulation, iterative reconstruction, and/or weight based adjustment of the mA/kV was utilized to reduce the radiation dose to as low as reasonably achievable. COMPARISON: None. HISTORY: ORDERING SYSTEM PROVIDED HISTORY: Epigastric pain; TECHNOLOGIST PROVIDED HISTORY: Reason for exam:->Epigastric pain; Additional Contrast?->None Decision Support Exception - unselect if not a suspected or confirmed emergency medical condition->Emergency Medical Condition (MA) Reason for Exam: upper abdominal pain with nausea and vomiting x 3 days Acuity: Acute Type of Exam: Initial Relevant Medical/Surgical History: cholecsystectomy FINDINGS: Lower Chest: The lung bases are clear. There is minimal dependent atelectasis along the lung bases posteriorly. Organs: The liver is borderline enlarged with fatty replacement throughout. No focal lesion is seen. The gallbladder has been removed with clips in the gallbladder fossa which is unchanged. The common duct is slightly prominent proximally and tapers distally which is less prominent. The pancreas and adrenals are normal.  The spleen is unremarkable. The kidneys are normal size and function normally with no hydronephrosis, renal stone, or mass. GI/Bowel: The ascending colon and appendix are unremarkable.   The left colon is not well distended and there is questionable mild circumferential mucosal thickening along the left colon extending distally into the proximal sigmoid region with no significant pericolonic inflammation and no bowel obstruction. Pelvis: The uterus is grossly unremarkable there is no adnexal mass or free fluid bladder is unremarkable no adenopathy or ascites is seen Peritoneum/Retroperitoneum: The aorta is grossly unremarkable with no aneurysm or dissection and no periaortic mass or adenopathy. Bones/Soft Tissues: There are postop changes along the lower lumbar spine which is unchanged. No aggressive osseous lesion is seen. Mild circumferential mucosal thickening along the left colon which could be due to poor distention or possible early mild colitis with no bowel obstruction. Recommend clinical follow-up Previous cholecystectomy and mild chronic liver changes which is unchanged. Unremarkable uterus with no pelvic mass. Postop changes lower lumbar spine which is unchanged.            PROCEDURES:   N/A    CRITICAL CARE TIME:     None      CONSULTS:  IP CONSULT TO GI  IP CONSULT TO HOSPITALIST      EMERGENCY DEPARTMENT COURSE and DIFFERENTIAL DIAGNOSIS/MDM:   Vitals:    Vitals:    07/27/21 0830   BP: (!) 140/87   Pulse: 79   Resp: 16   Temp: 98.4 °F (36.9 °C)   TempSrc: Oral   SpO2: 98%   Weight: 180 lb (81.6 kg)   Height: 5' 3\" (1.6 m)       Patient was given the following medications:  Medications   0.9 % sodium chloride bolus (0 mLs Intravenous Stopped 7/27/21 1006)   ondansetron (ZOFRAN) injection 4 mg (4 mg Intravenous Given 7/27/21 0919)   famotidine (PEPCID) injection 20 mg (20 mg Intravenous Given 7/27/21 0919)   aluminum & magnesium hydroxide-simethicone (MAALOX) 30 mL, lidocaine viscous hcl (XYLOCAINE) 5 mL (GI COCKTAIL) ( Oral Given 7/27/21 0920)   sucralfate (CARAFATE) tablet 1 g (1 g Oral Given 7/27/21 0920)   magnesium sulfate 1000 mg in dextrose 5% 100 mL IVPB (1,000 mg Intravenous New Bag 7/27/21 1014) metoclopramide (REGLAN) injection 10 mg (10 mg Intravenous Given 7/27/21 1012)   morphine (PF) injection 4 mg (4 mg Intravenous Given 7/27/21 1013)   diphenhydrAMINE (BENADRYL) injection 12.5 mg (12.5 mg Intravenous Given 7/27/21 1013)         I have evaluated this patient in the ED. Old records were reviewed. Patient returns back to the ED with ongoing epigastric pain. She had a work-up done in the ED 3 days ago. She is on omeprazole. She has follow-up with GI in 8 days. Pain is very localized to the epigastrium with associated nausea and rare vomiting. She is already had her gallbladder taken out. She reports rare alcohol use. Based on CT imaging of the abdomen and pelvis done less than 72 hours ago I am starting with labs and urinalysis on the patient. After speaking with her I ordered 1 L normal saline IV with Zofran 4 mg IV, Pepcid 20 mg IV, Carafate 1 g orally and GI cocktail. CBC reveals normal white count of 7.7 with H&H 14.5 and 41.9  Lactate normal at 1.3  CMP normal with exception of mild hypomagnesemia at 1.6. Patient ordered 1 g of magnesium sulfate. Lipase normal at 36  Troponin negative at <0.01  Qualitative hCG negative  Urinalysis shows small urine bilirubin, 40 ketones, moderate blood. No WBCs, 5-10 RBCs and rare bacteria. (Patient on menstrual period). I reassessed the patient and despite the multiple medications given on arrival, she is not feeling any better. I spoke with her at length and at this time I am ordering a dose of morphine for milligrams IV with Reglan 10 mg IV and Benadryl 12.5 mg IV. Upon reassessing the patient she has some improvement but is still in pain. I consulted GI. I spoke with Dr. Kathleen Hoyos. Based on the repeat ED visits and her ongoing pain he requested admission in order to evaluate and do EGD. Patient comfortable with this plan. I am consulting the hospitalist to request admission. I spoke with Dr. Kathleen Hoyos and Dr. Ana Rosa Delgado.  We thoroughly discussed the history, physical exam, laboratory and imaging studies, as well as, emergency department course. Based upon that discussion, we've decided to admit Celso Nettles for further observation and evaluation of Lory Mcgovern's intractable abdominal pain. As I have deemed necessary from their history, physical and studies, I have considered and evaluated Celso Nettles for the following diagnoses:  ACUTE APPENDICITIS, CHOLECYSTITIS, DIVERTICULITIS, PANCREATITIS, PYELONEPHRITIS, BOWEL OBSTRUCTION, INCARCERATED HERNIA, ISCHEMIC GUT, GI BLEED, PERFORATED BOWEL or ULCER. FINAL IMPRESSION:      1. Abdominal pain, epigastric    2. Nausea    3.  Hypomagnesemia          DISPOSITION/PLAN:   DISPOSITION     ADMIT             (Please note thatportions of this note were completed with a voice recognition program.  Efforts were made to edit the dictations, but occasionally words are mis-transcribed.)    Flakita Hanson PA-C (electronicallysigned)              ZULEMA Estrada  07/27/21 1118

## 2021-07-27 NOTE — PROGRESS NOTES
Dr. Liz Deluca updated on patient takes beta blocker, did not take for 2 days. /88, HR 70's. No orders received.

## 2021-07-27 NOTE — ANESTHESIA PRE PROCEDURE
Height:                                                  BP Readings from Last 3 Encounters:   07/27/21 120/88   07/27/21 122/78   07/25/21 119/86       NPO Status: Time of last liquid consumption: 2300                        Time of last solid consumption: 2300                        Date of last liquid consumption: 07/26/21                        Date of last solid food consumption: 07/26/21    BMI:   Wt Readings from Last 3 Encounters:   07/27/21 180 lb (81.6 kg)   07/24/21 180 lb (81.6 kg)   12/07/20 180 lb (81.6 kg)     Body mass index is 31.89 kg/m². CBC:   Lab Results   Component Value Date    WBC 7.7 07/27/2021    RBC 4.79 07/27/2021    HGB 14.5 07/27/2021    HCT 41.9 07/27/2021    MCV 87.6 07/27/2021    RDW 13.9 07/27/2021     07/27/2021       CMP:   Lab Results   Component Value Date     07/27/2021    K 3.5 07/27/2021     07/27/2021    CO2 22 07/27/2021    BUN 8 07/27/2021    CREATININE 0.9 07/27/2021    GFRAA >60 07/27/2021    GFRAA >60 04/17/2012    AGRATIO 1.5 07/27/2021    LABGLOM >60 07/27/2021    GLUCOSE 108 07/27/2021    PROT 7.8 07/27/2021    PROT 5.8 04/17/2012    CALCIUM 11.0 07/27/2021    BILITOT <0.2 07/27/2021    ALKPHOS 58 07/27/2021    AST 21 07/27/2021    ALT 15 07/27/2021       POC Tests: No results for input(s): POCGLU, POCNA, POCK, POCCL, POCBUN, POCHEMO, POCHCT in the last 72 hours.     Coags:   Lab Results   Component Value Date    PROTIME 12.1 02/08/2020    INR 1.04 02/08/2020       HCG (If Applicable):   Lab Results   Component Value Date    PREGTESTUR Negative 11/19/2020        ABGs: No results found for: PHART, PO2ART, GUB6KWC, VVS2HXW, BEART, H3CQFMUX     Type & Screen (If Applicable):  Lab Results   Component Value Date    LABABO A 04/15/2012    [address] Positive 04/15/2012       Drug/Infectious Status (If Applicable):  No results found for: HIV, HEPCAB    COVID-19 Screening (If Applicable):   Lab Results   Component Value Date    COVID19 Not Detected 05/18/2020           Anesthesia Evaluation  Patient summary reviewed and Nursing notes reviewed  Airway: Mallampati: II  TM distance: >3 FB   Neck ROM: full  Mouth opening: > = 3 FB Dental: normal exam         Pulmonary:Negative Pulmonary ROS and normal exam  breath sounds clear to auscultation                             Cardiovascular:Negative CV ROS            Rhythm: regular  Rate: normal                    Neuro/Psych:   (+) headaches:,             GI/Hepatic/Renal: Neg GI/Hepatic/Renal ROS            Endo/Other: Negative Endo/Other ROS                    Abdominal:   (+) obese,           Vascular: negative vascular ROS. Other Findings:             Anesthesia Plan      MAC     ASA 2       Induction: intravenous. Anesthetic plan and risks discussed with patient. Plan discussed with CRNA.                   Saul Lam MD   7/27/2021

## 2021-07-27 NOTE — PROGRESS NOTES
Admits to room 537. VSS. Alert and oriented x 4. Oriented to room and call light. Ambulates to bathroom without difficulty. In bed at this time. Call light in reach. Able to make needs known.

## 2021-07-27 NOTE — ED NOTES
Pt was here on Saturday for upper gastric pain. Pt was sent home on Prise. Pt state that medication not working and pt state that GI can't get her in until next week. Pt reports nausea and difficulty with po. Pt state that she had jello yesterday and a couple of sips of water with her medication today however she cont to have pain and unable to eat anything solid. Pt state that she previously had cholecystectomy. Pt is off unit at this time to endo for procedure.       Rj Edwards RN  07/27/21 [id number]

## 2021-07-28 LAB
ANION GAP SERPL CALCULATED.3IONS-SCNC: 12 MMOL/L (ref 3–16)
BUN BLDV-MCNC: 6 MG/DL (ref 7–20)
CALCIUM SERPL-MCNC: 8.7 MG/DL (ref 8.3–10.6)
CHLORIDE BLD-SCNC: 105 MMOL/L (ref 99–110)
CO2: 26 MMOL/L (ref 21–32)
CREAT SERPL-MCNC: 0.8 MG/DL (ref 0.6–1.1)
GFR AFRICAN AMERICAN: >60
GFR NON-AFRICAN AMERICAN: >60
GLUCOSE BLD-MCNC: 92 MG/DL (ref 70–99)
HCT VFR BLD CALC: 38 % (ref 36–48)
HEMOGLOBIN: 12.9 G/DL (ref 12–16)
MCH RBC QN AUTO: 30.3 PG (ref 26–34)
MCHC RBC AUTO-ENTMCNC: 34 G/DL (ref 31–36)
MCV RBC AUTO: 89.1 FL (ref 80–100)
PDW BLD-RTO: 14.2 % (ref 12.4–15.4)
PLATELET # BLD: 248 K/UL (ref 135–450)
PMV BLD AUTO: 8.1 FL (ref 5–10.5)
POTASSIUM REFLEX MAGNESIUM: 4.1 MMOL/L (ref 3.5–5.1)
RBC # BLD: 4.26 M/UL (ref 4–5.2)
SODIUM BLD-SCNC: 143 MMOL/L (ref 136–145)
WBC # BLD: 6.3 K/UL (ref 4–11)

## 2021-07-28 PROCEDURE — C9113 INJ PANTOPRAZOLE SODIUM, VIA: HCPCS | Performed by: REGISTERED NURSE

## 2021-07-28 PROCEDURE — 96376 TX/PRO/DX INJ SAME DRUG ADON: CPT

## 2021-07-28 PROCEDURE — 6360000002 HC RX W HCPCS: Performed by: NURSE PRACTITIONER

## 2021-07-28 PROCEDURE — 2580000003 HC RX 258: Performed by: INTERNAL MEDICINE

## 2021-07-28 PROCEDURE — 36415 COLL VENOUS BLD VENIPUNCTURE: CPT

## 2021-07-28 PROCEDURE — 6360000002 HC RX W HCPCS: Performed by: REGISTERED NURSE

## 2021-07-28 PROCEDURE — 6370000000 HC RX 637 (ALT 250 FOR IP): Performed by: REGISTERED NURSE

## 2021-07-28 PROCEDURE — 85027 COMPLETE CBC AUTOMATED: CPT

## 2021-07-28 PROCEDURE — 96372 THER/PROPH/DIAG INJ SC/IM: CPT

## 2021-07-28 PROCEDURE — 6360000002 HC RX W HCPCS: Performed by: INTERNAL MEDICINE

## 2021-07-28 PROCEDURE — 2500000003 HC RX 250 WO HCPCS: Performed by: REGISTERED NURSE

## 2021-07-28 PROCEDURE — 96375 TX/PRO/DX INJ NEW DRUG ADDON: CPT

## 2021-07-28 PROCEDURE — 80048 BASIC METABOLIC PNL TOTAL CA: CPT

## 2021-07-28 PROCEDURE — 2580000003 HC RX 258: Performed by: REGISTERED NURSE

## 2021-07-28 PROCEDURE — G0378 HOSPITAL OBSERVATION PER HR: HCPCS

## 2021-07-28 RX ORDER — SODIUM CHLORIDE 9 MG/ML
INJECTION, SOLUTION INTRAVENOUS CONTINUOUS
Status: DISCONTINUED | OUTPATIENT
Start: 2021-07-28 | End: 2021-07-28

## 2021-07-28 RX ORDER — PANTOPRAZOLE SODIUM 40 MG/10ML
40 INJECTION, POWDER, LYOPHILIZED, FOR SOLUTION INTRAVENOUS 2 TIMES DAILY
Status: DISCONTINUED | OUTPATIENT
Start: 2021-07-28 | End: 2021-08-02

## 2021-07-28 RX ADMIN — MORPHINE SULFATE 4 MG: 4 INJECTION, SOLUTION INTRAMUSCULAR; INTRAVENOUS at 00:03

## 2021-07-28 RX ADMIN — SUCRALFATE 1 G: 1 TABLET ORAL at 06:03

## 2021-07-28 RX ADMIN — ONDANSETRON 4 MG: 2 INJECTION INTRAMUSCULAR; INTRAVENOUS at 00:14

## 2021-07-28 RX ADMIN — PANTOPRAZOLE SODIUM 40 MG: 40 INJECTION, POWDER, FOR SOLUTION INTRAVENOUS at 09:18

## 2021-07-28 RX ADMIN — SODIUM CHLORIDE, POTASSIUM CHLORIDE, SODIUM LACTATE AND CALCIUM CHLORIDE: 600; 310; 30; 20 INJECTION, SOLUTION INTRAVENOUS at 16:51

## 2021-07-28 RX ADMIN — ENOXAPARIN SODIUM 40 MG: 40 INJECTION SUBCUTANEOUS at 09:19

## 2021-07-28 RX ADMIN — MORPHINE SULFATE 4 MG: 4 INJECTION, SOLUTION INTRAMUSCULAR; INTRAVENOUS at 22:43

## 2021-07-28 RX ADMIN — SUCRALFATE 1 G: 1 TABLET ORAL at 18:34

## 2021-07-28 RX ADMIN — SUCRALFATE 1 G: 1 TABLET ORAL at 00:03

## 2021-07-28 RX ADMIN — ALPRAZOLAM 1 MG: 1 TABLET ORAL at 21:40

## 2021-07-28 RX ADMIN — MORPHINE SULFATE 4 MG: 4 INJECTION, SOLUTION INTRAMUSCULAR; INTRAVENOUS at 19:58

## 2021-07-28 RX ADMIN — SUCRALFATE 1 G: 1 TABLET ORAL at 22:42

## 2021-07-28 RX ADMIN — SUCRALFATE 1 G: 1 TABLET ORAL at 11:46

## 2021-07-28 RX ADMIN — MORPHINE SULFATE 4 MG: 4 INJECTION, SOLUTION INTRAMUSCULAR; INTRAVENOUS at 16:51

## 2021-07-28 RX ADMIN — ONDANSETRON 4 MG: 2 INJECTION INTRAMUSCULAR; INTRAVENOUS at 06:03

## 2021-07-28 RX ADMIN — PANTOPRAZOLE SODIUM 40 MG: 40 INJECTION, POWDER, FOR SOLUTION INTRAVENOUS at 20:02

## 2021-07-28 RX ADMIN — SODIUM CHLORIDE, POTASSIUM CHLORIDE, SODIUM LACTATE AND CALCIUM CHLORIDE: 600; 310; 30; 20 INJECTION, SOLUTION INTRAVENOUS at 06:04

## 2021-07-28 RX ADMIN — MORPHINE SULFATE 4 MG: 4 INJECTION, SOLUTION INTRAMUSCULAR; INTRAVENOUS at 13:26

## 2021-07-28 RX ADMIN — SODIUM CHLORIDE, PRESERVATIVE FREE 10 ML: 5 INJECTION INTRAVENOUS at 20:02

## 2021-07-28 RX ADMIN — MORPHINE SULFATE 4 MG: 4 INJECTION, SOLUTION INTRAMUSCULAR; INTRAVENOUS at 06:03

## 2021-07-28 RX ADMIN — FAMOTIDINE 20 MG: 10 INJECTION, SOLUTION INTRAVENOUS at 19:57

## 2021-07-28 RX ADMIN — MORPHINE SULFATE 4 MG: 4 INJECTION, SOLUTION INTRAMUSCULAR; INTRAVENOUS at 09:17

## 2021-07-28 RX ADMIN — ACETAMINOPHEN 650 MG: 325 TABLET ORAL at 11:46

## 2021-07-28 RX ADMIN — ACETAMINOPHEN 650 MG: 325 TABLET ORAL at 18:34

## 2021-07-28 RX ADMIN — ONDANSETRON 4 MG: 2 INJECTION INTRAMUSCULAR; INTRAVENOUS at 19:57

## 2021-07-28 RX ADMIN — MORPHINE SULFATE 4 MG: 4 INJECTION, SOLUTION INTRAMUSCULAR; INTRAVENOUS at 03:03

## 2021-07-28 ASSESSMENT — PAIN SCALES - GENERAL
PAINLEVEL_OUTOF10: 7
PAINLEVEL_OUTOF10: 7
PAINLEVEL_OUTOF10: 5
PAINLEVEL_OUTOF10: 7
PAINLEVEL_OUTOF10: 6
PAINLEVEL_OUTOF10: 7
PAINLEVEL_OUTOF10: 7
PAINLEVEL_OUTOF10: 3
PAINLEVEL_OUTOF10: 5
PAINLEVEL_OUTOF10: 5

## 2021-07-28 ASSESSMENT — PAIN DESCRIPTION - ORIENTATION: ORIENTATION: UPPER;MID

## 2021-07-28 ASSESSMENT — PAIN DESCRIPTION - PAIN TYPE: TYPE: ACUTE PAIN

## 2021-07-28 ASSESSMENT — PAIN DESCRIPTION - LOCATION: LOCATION: ABDOMEN

## 2021-07-28 NOTE — PROGRESS NOTES
Perfect serve to Min Willams at 2127:    \"Pt here for an abdominal ulcer. Pt has 2mg morphine q3, which was given at 2034. Pt states it helped earlier but is not helping now. Is there any other pain med that can be given?  Thank you\"    Waiting for response

## 2021-07-28 NOTE — PROGRESS NOTES
PS sent to Dr. Diogo Dillon \"pt is still having extreme pain and is unable to eat. are we able to up her dose of protonix?  she is still getting it through the IV as well\" waiting for response

## 2021-07-28 NOTE — PROGRESS NOTES
Comprehensive Nutrition Assessment    Type and Reason for Visit:  Initial, Positive Nutrition Screen (nausea and vomiting)    Nutrition Recommendations/Plan:   1. Diet as tolerated  2. Offer Ensure clear with meals if able to take in fluids in the near future  3. Will monitor nutritional adequacy, nutrition-related labs, weights, BMs, and clinical progress     Nutrition Assessment:  Patient admitted with abdominal pain. GI consulted, EGD found deep penetrating ulcer in the pyloric channel. Antral gastritis on 7/27/21. Diet had progressed to GI bland diet. Patient still having significant abd pain at this time, having difficulty eating. Patient sleeping at this time. Malnutrition Assessment:  Malnutrition Status: At risk for malnutrition (Comment)      Estimated Daily Nutrient Needs:  Energy (kcal):  1466-0875; Weight Used for Energy Requirements:  Ideal     Protein (g):  78-94; Weight Used for Protein Requirements:  Ideal (1.5-1.8)        Fluid (ml/day):   ; Method Used for Fluid Requirements:  1 ml/kcal      Nutrition Related Findings:  lytes stable on 7/28/21; Wounds:  None       Current Nutrition Therapies:    ADULT DIET; Regular; GI Hardy (GERD/Peptic Ulcer)  Adult Oral Nutrition Supplement; Clear Liquid Oral Supplement    Anthropometric Measures:  · Height: 5' 3\" (160 cm)  · Current Body Weight: 180 lb (81.6 kg)   · Ideal Body Weight: 115 lbs; % Ideal Body Weight     · BMI: 31.9  · BMI Categories: Obese Class 1 (BMI 30.0-34. 9)       Nutrition Diagnosis:   · Inadequate energy intake related to pain, altered GI function as evidenced by nausea, vomiting (significant pain, previous nausea and vomiting prior to admission)    Nutrition Interventions:   Food and/or Nutrient Delivery:  Continue Current Diet, Start Oral Nutrition Supplement  Nutrition Education/Counseling:  No recommendation at this time   Coordination of Nutrition Care:  Continue to monitor while inpatient    Goals:  Patient will eat 50% or greater of meals and supplements. Nutrition Monitoring and Evaluation:   Behavioral-Environmental Outcomes:      Food/Nutrient Intake Outcomes:  Supplement Intake, Food and Nutrient Intake  Physical Signs/Symptoms Outcomes:  Biochemical Data, Nutrition Focused Physical Findings     Discharge Planning:     Too soon to determine     Electronically signed by Corrie Baron, [address] [place] [address],  on 7/28/21 at 3:02 PM EDT    Contact: Office: [phone]; [address] [place] [address] Road: [id number]

## 2021-07-28 NOTE — PROGRESS NOTES
Hospitalist Progress Note      PCP: No primary care provider on file. Date of Admission: 7/27/2021    Chief Complaint: Epigastric abdominal pain     Hospital Course:   45 y.o. female with PMH of tobacco abuse and chronic back pain who presented to [facility] with complaints of epigastric abdominal pain. Pain started last Wednesday. She went to the ED on Saturday. Workup was unremarkable. It was suggested she be admitted however pt wanted to be dc'd as she was feeling better. Pain then came back and progressively worsened on Monday which prompted her to go back to the ED. She had associated N&V. No hematemesis, melena or hematochezia. No fever/chills. No dyspnea or chest pain. Pt takes a fair amount of ibuprofen for her chronic back pain. ED course: Afebrile. VSS. GI consulted. Pt was admitted for further evaluation and management. Subjective:   Pt is on RA. Afebrile. VSS. Having abdominal pain, worse with eating.        Medications:  Reviewed    Infusion Medications    sodium chloride      lactated ringers 100 mL/hr at 07/28/21 0604     Scheduled Medications    pantoprazole  40 mg Intravenous BID    famotidine (PEPCID) injection  20 mg Intravenous BID    ALPRAZolam  1 mg Oral Nightly    sodium chloride flush  5-40 mL Intravenous 2 times per day    enoxaparin  40 mg Subcutaneous Daily    sucralfate  1 g Oral 4 times per day     PRN Meds: sodium chloride flush, sodium chloride, polyethylene glycol, acetaminophen **OR** acetaminophen, ondansetron **OR** ondansetron, morphine      Intake/Output Summary (Last 24 hours) at 7/28/2021 1537  Last data filed at 7/28/2021 1454  Gross per 24 hour   Intake 1600.17 ml   Output --   Net 1600.17 ml       Physical Exam Performed:    /85   Pulse 69   Temp 98.5 °F (36.9 °C) (Oral)   Resp 16   Ht 5' 3\" (1.6 m)   Wt 180 lb (81.6 kg)   LMP 07/27/2021   SpO2 99%   BMI 31.89 kg/m²     General appearance: No apparent distress, appears stated age and cooperative. HEENT: Pupils equal, round, and reactive to light. Conjunctivae/corneas clear. Neck: Supple, with full range of motion. No jugular venous distention. Trachea midline. Respiratory:  Normal respiratory effort. Clear to auscultation, bilaterally without Rales/Wheezes/Rhonchi. Cardiovascular: Regular rate and rhythm with normal S1/S2 without murmurs, rubs or gallops. Abdomen: Soft, non-tender, non-distended with normal bowel sounds. Musculoskeletal: No clubbing, cyanosis or edema bilaterally. Full range of motion without deformity. Skin: Skin color, texture, turgor normal.  No rashes or lesions. Neurologic:  Neurovascularly intact without any focal sensory/motor deficits. Cranial nerves: II-XII intact, grossly non-focal.  Psychiatric: Alert and oriented, thought content appropriate, normal insight  Capillary Refill: Brisk,3 seconds, normal   Peripheral Pulses: +2 palpable, equal bilaterally       Labs:   Recent Labs     07/27/21  0835 07/28/21  1018   WBC 7.7 6.3   HGB 14.5 12.9   HCT 41.9 38.0    248     Recent Labs     07/27/21  0835 07/28/21  1018    143   K 3.5 4.1    105   CO2 22 26   BUN 8 6*   CREATININE 0.9 0.8   CALCIUM 11.0* 8.7     Recent Labs     07/27/21  0835   AST 21   ALT 15   BILITOT <0.2   ALKPHOS 58     Recent Labs     07/27/21  0835   TROPONINI <0.01       Urinalysis:      Lab Results   Component Value Date    NITRU Negative 07/27/2021    WBCUA None seen 07/27/2021    BACTERIA Rare 07/27/2021    RBCUA 5-10 07/27/2021    BLOODU MODERATE 07/27/2021    SPECGRAV 1.025 07/27/2021    GLUCOSEU Negative 07/27/2021    GLUCOSEU NEGATIVE 12/22/2011       Assessment/Plan:    Active Hospital Problems    Diagnosis     Abdominal pain [R10.9]        Epigastric abdominal pain:  - CT A/P on 7/24 was unremarkable. - GI consulted in the ED.  Pt is s/p EGD on 7/27 and found to have deep 12 mm penetrating ulcer located in the pyloric channel but extended into the duodenal bulb and patches of erosive antral gastritis. Plan for repeat EGD in 8-12 weeks. - Pt started on IV PPI, H2RB and carafate. Continue prn analgesia. - Pt advised to avoid NSAIDs for the foreseeable future. - Continue IV fluids until able to tolerate PO intake.      Tobacco abuse:  - Smoking cessation discussed/advised. DVT Prophylaxis: Lovenox  Diet: ADULT DIET;  Regular; GI [place] (GERD/Peptic Ulcer)  Adult Oral Nutrition Supplement; Clear Liquid Oral Supplement  Code Status: Full Code    PT/OT Eval Status: not indicated     Dispo - 1-2 days     [address], APRN - CNP

## 2021-07-28 NOTE — PLAN OF CARE
Problem: Pain:  Goal: Pain level will decrease  Description: Pain level will decrease  7/28/2021 1022 by Doreen Hennessy RN  Outcome: Ongoing

## 2021-07-28 NOTE — CARE COORDINATION
CASE MANAGEMENT INITIAL ASSESSMENT      Reviewed chart and completed assessment  With: pt  Explained Case Management role/services. Primary contact information: Ben MAST. Health Care Decision Maker :   Primary Decision Maker: Carie Cornejo - Domestic Partner - [phone]          Can this person be reached and be able to respond quickly, such as within a few minutes or hours? Yes    Admit date/status: IP, 7/28/21  Diagnosis: Abdominal Pain  Is this a Readmission?:  No      Insurance: [phone] [place] [address] required for SNF: Yes       3 night stay required: No    Living arrangements, Adls, care needs, prior to admission: [employer], works and is active . Transportation: None    PT/OT recs: Pt independent in room. Plan/comments: CM met with pt at bedside for initial assessment. Pt plans to return back home with current support system in place and denies DCP needs. Provided pt with PCP list and information to [facility]. No other needs identified. If any needs or concerns should arise please advise.  Roberto Rizzo RN      [facility] on chart for MD signature

## 2021-07-28 NOTE — ANESTHESIA POSTPROCEDURE EVALUATION
Department of Anesthesiology  Postprocedure Note    Patient: Sheri Westbrook  MRN: [id number]  YOB: 1980  Date of evaluation: 7/28/2021  Time:  12:16 PM     Procedure Summary     Date: 07/27/21 Room / Location: [address] [place] [address]    Anesthesia Start: 7718 Anesthesia Stop: 1914    Procedure: EGD BIOPSY (N/A ) Diagnosis: (Epigastric pain. S/P Xenia)    Surgeons: Nicho Robbins MD Responsible Provider: Leeann Garcia MD    Anesthesia Type: MAC ASA Status: 2          Anesthesia Type: MAC    Hannah Phase I: Hannah Score: 10    Hannah Phase II: Hannah Score: 9    Last vitals: Reviewed and per EMR flowsheets.        Anesthesia Post Evaluation    Patient location during evaluation: PACU  Patient participation: complete - patient participated  Level of consciousness: awake and alert  Pain score: 0  Airway patency: patent  Nausea & Vomiting: no nausea and no vomiting  Complications: no  Cardiovascular status: blood pressure returned to baseline  Respiratory status: acceptable  Hydration status: stable

## 2021-07-29 LAB
ALBUMIN SERPL-MCNC: 3.8 G/DL (ref 3.4–5)
ALP BLD-CCNC: 55 U/L (ref 40–129)
ALT SERPL-CCNC: 103 U/L (ref 10–40)
ANION GAP SERPL CALCULATED.3IONS-SCNC: 10 MMOL/L (ref 3–16)
AST SERPL-CCNC: 95 U/L (ref 15–37)
BILIRUB SERPL-MCNC: <0.2 MG/DL (ref 0–1)
BILIRUBIN DIRECT: <0.2 MG/DL (ref 0–0.3)
BILIRUBIN, INDIRECT: ABNORMAL MG/DL (ref 0–1)
BUN BLDV-MCNC: 6 MG/DL (ref 7–20)
CALCIUM SERPL-MCNC: 8.9 MG/DL (ref 8.3–10.6)
CHLORIDE BLD-SCNC: 107 MMOL/L (ref 99–110)
CO2: 24 MMOL/L (ref 21–32)
CREAT SERPL-MCNC: 0.7 MG/DL (ref 0.6–1.1)
GFR AFRICAN AMERICAN: >60
GFR NON-AFRICAN AMERICAN: >60
GLUCOSE BLD-MCNC: 85 MG/DL (ref 70–99)
HCT VFR BLD CALC: 38.7 % (ref 36–48)
HEMOGLOBIN: 13.1 G/DL (ref 12–16)
MCH RBC QN AUTO: 30 PG (ref 26–34)
MCHC RBC AUTO-ENTMCNC: 33.8 G/DL (ref 31–36)
MCV RBC AUTO: 88.6 FL (ref 80–100)
PDW BLD-RTO: 13.9 % (ref 12.4–15.4)
PLATELET # BLD: 232 K/UL (ref 135–450)
PMV BLD AUTO: 8.6 FL (ref 5–10.5)
POTASSIUM REFLEX MAGNESIUM: 4.4 MMOL/L (ref 3.5–5.1)
RBC # BLD: 4.36 M/UL (ref 4–5.2)
SODIUM BLD-SCNC: 141 MMOL/L (ref 136–145)
TOTAL PROTEIN: 6.4 G/DL (ref 6.4–8.2)
WBC # BLD: 5.3 K/UL (ref 4–11)

## 2021-07-29 PROCEDURE — 6370000000 HC RX 637 (ALT 250 FOR IP): Performed by: REGISTERED NURSE

## 2021-07-29 PROCEDURE — 2500000003 HC RX 250 WO HCPCS: Performed by: REGISTERED NURSE

## 2021-07-29 PROCEDURE — 36415 COLL VENOUS BLD VENIPUNCTURE: CPT

## 2021-07-29 PROCEDURE — 85027 COMPLETE CBC AUTOMATED: CPT

## 2021-07-29 PROCEDURE — 6360000002 HC RX W HCPCS: Performed by: REGISTERED NURSE

## 2021-07-29 PROCEDURE — 80048 BASIC METABOLIC PNL TOTAL CA: CPT

## 2021-07-29 PROCEDURE — G0378 HOSPITAL OBSERVATION PER HR: HCPCS

## 2021-07-29 PROCEDURE — 96376 TX/PRO/DX INJ SAME DRUG ADON: CPT

## 2021-07-29 PROCEDURE — 99254 IP/OBS CNSLTJ NEW/EST MOD 60: CPT | Performed by: SURGERY

## 2021-07-29 PROCEDURE — 2580000003 HC RX 258: Performed by: INTERNAL MEDICINE

## 2021-07-29 PROCEDURE — C9113 INJ PANTOPRAZOLE SODIUM, VIA: HCPCS | Performed by: REGISTERED NURSE

## 2021-07-29 PROCEDURE — 1200000000 HC SEMI PRIVATE

## 2021-07-29 PROCEDURE — 96375 TX/PRO/DX INJ NEW DRUG ADDON: CPT

## 2021-07-29 PROCEDURE — 6360000002 HC RX W HCPCS: Performed by: NURSE PRACTITIONER

## 2021-07-29 PROCEDURE — 2580000003 HC RX 258: Performed by: REGISTERED NURSE

## 2021-07-29 PROCEDURE — 96372 THER/PROPH/DIAG INJ SC/IM: CPT

## 2021-07-29 PROCEDURE — 99232 SBSQ HOSP IP/OBS MODERATE 35: CPT | Performed by: INTERNAL MEDICINE

## 2021-07-29 PROCEDURE — 6360000002 HC RX W HCPCS: Performed by: INTERNAL MEDICINE

## 2021-07-29 PROCEDURE — 80076 HEPATIC FUNCTION PANEL: CPT

## 2021-07-29 RX ORDER — MORPHINE SULFATE 2 MG/ML
2 INJECTION, SOLUTION INTRAMUSCULAR; INTRAVENOUS
Status: DISCONTINUED | OUTPATIENT
Start: 2021-07-29 | End: 2021-08-02

## 2021-07-29 RX ORDER — SENNA AND DOCUSATE SODIUM 50; 8.6 MG/1; MG/1
2 TABLET, FILM COATED ORAL 2 TIMES DAILY
Status: DISCONTINUED | OUTPATIENT
Start: 2021-07-29 | End: 2021-08-02 | Stop reason: HOSPADM

## 2021-07-29 RX ORDER — OXYCODONE HYDROCHLORIDE AND ACETAMINOPHEN 5; 325 MG/1; MG/1
1 TABLET ORAL EVERY 4 HOURS PRN
Status: DISCONTINUED | OUTPATIENT
Start: 2021-07-29 | End: 2021-08-02

## 2021-07-29 RX ORDER — MORPHINE SULFATE 4 MG/ML
4 INJECTION, SOLUTION INTRAMUSCULAR; INTRAVENOUS
Status: DISCONTINUED | OUTPATIENT
Start: 2021-07-29 | End: 2021-08-02

## 2021-07-29 RX ADMIN — HYDROMORPHONE HYDROCHLORIDE 1 MG: 1 INJECTION, SOLUTION INTRAMUSCULAR; INTRAVENOUS; SUBCUTANEOUS at 16:03

## 2021-07-29 RX ADMIN — FAMOTIDINE 20 MG: 10 INJECTION, SOLUTION INTRAVENOUS at 09:30

## 2021-07-29 RX ADMIN — FAMOTIDINE 20 MG: 10 INJECTION, SOLUTION INTRAVENOUS at 20:56

## 2021-07-29 RX ADMIN — ONDANSETRON 4 MG: 2 INJECTION INTRAMUSCULAR; INTRAVENOUS at 02:40

## 2021-07-29 RX ADMIN — SUCRALFATE 1 G: 1 TABLET ORAL at 05:23

## 2021-07-29 RX ADMIN — PANTOPRAZOLE SODIUM 40 MG: 40 INJECTION, POWDER, FOR SOLUTION INTRAVENOUS at 09:30

## 2021-07-29 RX ADMIN — MORPHINE SULFATE 4 MG: 4 INJECTION, SOLUTION INTRAMUSCULAR; INTRAVENOUS at 17:03

## 2021-07-29 RX ADMIN — SODIUM CHLORIDE, POTASSIUM CHLORIDE, SODIUM LACTATE AND CALCIUM CHLORIDE: 600; 310; 30; 20 INJECTION, SOLUTION INTRAVENOUS at 13:34

## 2021-07-29 RX ADMIN — HYDROMORPHONE HYDROCHLORIDE 0.5 MG: 1 INJECTION, SOLUTION INTRAMUSCULAR; INTRAVENOUS; SUBCUTANEOUS at 09:30

## 2021-07-29 RX ADMIN — ACETAMINOPHEN 650 MG: 325 TABLET ORAL at 03:26

## 2021-07-29 RX ADMIN — Medication 10 ML: at 20:56

## 2021-07-29 RX ADMIN — ACETAMINOPHEN 650 MG: 325 TABLET ORAL at 15:12

## 2021-07-29 RX ADMIN — SUCRALFATE 1 G: 1 TABLET ORAL at 12:29

## 2021-07-29 RX ADMIN — MORPHINE SULFATE 4 MG: 4 INJECTION, SOLUTION INTRAMUSCULAR; INTRAVENOUS at 02:40

## 2021-07-29 RX ADMIN — SODIUM CHLORIDE, POTASSIUM CHLORIDE, SODIUM LACTATE AND CALCIUM CHLORIDE: 600; 310; 30; 20 INJECTION, SOLUTION INTRAVENOUS at 02:37

## 2021-07-29 RX ADMIN — OXYCODONE AND ACETAMINOPHEN 1 TABLET: 5; 325 TABLET ORAL at 10:40

## 2021-07-29 RX ADMIN — MORPHINE SULFATE 4 MG: 4 INJECTION, SOLUTION INTRAMUSCULAR; INTRAVENOUS at 05:23

## 2021-07-29 RX ADMIN — ONDANSETRON 4 MG: 2 INJECTION INTRAMUSCULAR; INTRAVENOUS at 17:57

## 2021-07-29 RX ADMIN — SUCRALFATE 1 G: 1 TABLET ORAL at 17:53

## 2021-07-29 RX ADMIN — ENOXAPARIN SODIUM 40 MG: 40 INJECTION SUBCUTANEOUS at 09:30

## 2021-07-29 RX ADMIN — MORPHINE SULFATE 4 MG: 4 INJECTION, SOLUTION INTRAMUSCULAR; INTRAVENOUS at 20:54

## 2021-07-29 RX ADMIN — DOCUSATE SODIUM 50 MG AND SENNOSIDES 8.6 MG 2 TABLET: 8.6; 5 TABLET, FILM COATED ORAL at 20:55

## 2021-07-29 RX ADMIN — OXYCODONE AND ACETAMINOPHEN 1 TABLET: 5; 325 TABLET ORAL at 14:24

## 2021-07-29 RX ADMIN — ALPRAZOLAM 1 MG: 1 TABLET ORAL at 20:55

## 2021-07-29 RX ADMIN — HYDROMORPHONE HYDROCHLORIDE 1 MG: 1 INJECTION, SOLUTION INTRAMUSCULAR; INTRAVENOUS; SUBCUTANEOUS at 13:35

## 2021-07-29 RX ADMIN — PANTOPRAZOLE SODIUM 40 MG: 40 INJECTION, POWDER, FOR SOLUTION INTRAVENOUS at 20:56

## 2021-07-29 RX ADMIN — DOCUSATE SODIUM 50 MG AND SENNOSIDES 8.6 MG 2 TABLET: 8.6; 5 TABLET, FILM COATED ORAL at 14:24

## 2021-07-29 ASSESSMENT — PAIN SCALES - GENERAL
PAINLEVEL_OUTOF10: 4
PAINLEVEL_OUTOF10: 6
PAINLEVEL_OUTOF10: 5
PAINLEVEL_OUTOF10: 7
PAINLEVEL_OUTOF10: 7
PAINLEVEL_OUTOF10: 6
PAINLEVEL_OUTOF10: 7
PAINLEVEL_OUTOF10: 4
PAINLEVEL_OUTOF10: 6
PAINLEVEL_OUTOF10: 8

## 2021-07-29 ASSESSMENT — PAIN DESCRIPTION - ORIENTATION: ORIENTATION: MID

## 2021-07-29 ASSESSMENT — PAIN DESCRIPTION - DESCRIPTORS: DESCRIPTORS: ACHING;BURNING

## 2021-07-29 ASSESSMENT — PAIN DESCRIPTION - LOCATION: LOCATION: ABDOMEN

## 2021-07-29 ASSESSMENT — PAIN DESCRIPTION - PAIN TYPE: TYPE: ACUTE PAIN

## 2021-07-29 NOTE — PLAN OF CARE
Problem: Pain:  Description: Pain management should include both nonpharmacologic and pharmacologic interventions. Goal: Pain level will decrease  Description: Pain level will decrease  Outcome: Ongoing     Problem: Pain:  Description: Pain management should include both nonpharmacologic and pharmacologic interventions.   Goal: Control of acute pain  Description: Control of acute pain  Outcome: Ongoing     Problem: Nutrition  Goal: Optimal nutrition therapy  7/28/2021 1508 by Torrie Soto RD, LD  Outcome: Ongoing

## 2021-07-29 NOTE — PROGRESS NOTES
[address] [facility],  [address] [place] [address] Ave  Ann[place], Lisa1 [facility]  Phone: [phone] [facility] She is a  [4] White (non-) [1] 45 y.o. Katherin Sharper female      Main Problems/Chief Complaint for which GI service is seeing pt     Persistent epigastric pain    Clinical Summary      On recent endoscopy, the patient has been diagnosed with pyloric channel ulcer which was deep and possibly penetrating. She is persistent pain which is not going away with IV pantoprazole and p.o. Carafate. CT scan of the abdomen had shown mild dilation of the bile duct. She is requiring narcotics for pain control.     PAST MEDICAL HISTORY     Past Medical History:   Diagnosis Date    Abnormal Pap smear     WNL since    Anxiety     Back pain     herniated disc    Back pain     had back surgery (fusion of 3 discs) in march 2011    Placenta previa     resolved at 28 weeks with this 3rd pregnancy    Tubal pregnancy     Pt states bilateral fallopian tubes removed     FAMILY HISTORY     Family History   Problem Relation Age of Onset    High Blood Pressure Father     Diabetes Maternal Aunt        SURGICAL HISTORY     Past Surgical History:   Procedure Laterality Date    ANESTHESIA NERVE BLOCK Right 5/21/2020    RIGHT HIP INJECTION SITE CONFIRMED BY FLUOROSCOPY performed by Ines Vazquez MD at [address] ARTHROSCOPY Left 11/19/2020    VIDEO ARTHROSCOPY LEFT ANKLE, SYNOVECTOMY, LOOSE BODY REMOVAL, MICROFRACTURE -BLOCK- performed by Giselle Harris MD at [facility] Right     CHOLECYSTECTOMY      laparoscopic    ECTOPIC PREGNANCY SURGERY  10/2017    LAPAROSCOPY  4/20/10    with salpingostomy  ( ectopic pregnancy)    LUMBAR DISC SURGERY      LUMBAR FUSION  3-2011    L3-L5 fusion    TONSILLECTOMY  2000    UPPER GASTROINTESTINAL ENDOSCOPY N/A 7/27/2021    EGD BIOPSY performed by Brooklyn Wells MD at [address] EXTRACTION  1998     CURRENT MEDICATIONS   (This list may include medications prescribed during this encounter as epic can not insert only the list prior to this encounter.)      ALLERGIES     Allergies   Allergen Reactions    Demerol Hives    Doxycycline Nausea And Vomiting    Lorazepam Other (See Comments)     Makes her anxiety worsen    Vancomycin Hives       FINAL IMPRESSION AND RECOMMENDATIONS     I have discussed with the patient the options for treatment for peptic ulcer disease. She has expressed desire to see a surgeon. We will also check fasting serum gastrin to see if she has hypersecretion syndrome. In view of the reported mild dilation of the biliary tree, a separate pathology is also a consideration. MRCP is ordered. The total time spent face-to-face, review of the record and on the gore during this visit was 25 minutes with more than 50% of the time spent in review of the electronic record which mentions prominence of bile duct on the initial CT scan and of coordination of care and counseling the patient about options for management of bad ulcer and a little bit about what type of surgical procedure might be considered. Dian Singh MD 7/29/21 10:54 AM EDT    CC:  No primary care provider on file. IMPORTANT: Please note that some portions of this note may have been created using Dragon voice recognition software. Some \"sound-alike\" and totally wrong word substitutions may have taken place due to known inherent limitations of any such software, including this voice recognition software. In spite of efforts to eliminate such errors, some may not have been corrected. So please read the note with this in mind and recognize such mistakes and understand the correct version using the  context. Thanks.

## 2021-07-29 NOTE — PROGRESS NOTES
stated age and cooperative. HEENT: Pupils equal, round, and reactive to light. Conjunctivae/corneas clear. Neck: Supple, with full range of motion. No jugular venous distention. Trachea midline. Respiratory:  Normal respiratory effort. Clear to auscultation, bilaterally without Rales/Wheezes/Rhonchi. Cardiovascular: Regular rate and rhythm with normal S1/S2 without murmurs, rubs or gallops. Abdomen: Soft, non-tender, non-distended with normal bowel sounds. Musculoskeletal: No clubbing, cyanosis or edema bilaterally. Full range of motion without deformity. Skin: Skin color, texture, turgor normal.  No rashes or lesions. Neurologic:  Neurovascularly intact without any focal sensory/motor deficits. Cranial nerves: II-XII intact, grossly non-focal.  Psychiatric: Alert and oriented, thought content appropriate, normal insight  Capillary Refill: Brisk,3 seconds, normal   Peripheral Pulses: +2 palpable, equal bilaterally       Labs:   Recent Labs     07/27/21  0835 07/28/21  1018 07/29/21  0716   WBC 7.7 6.3 5.3   HGB 14.5 12.9 13.1   HCT 41.9 38.0 38.7    248 232     Recent Labs     07/27/21  0835 07/28/21  1018 07/29/21  0716    143 141   K 3.5 4.1 4.4    105 107   CO2 22 26 24   BUN 8 6* 6*   CREATININE 0.9 0.8 0.7   CALCIUM 11.0* 8.7 8.9     Recent Labs     07/27/21  0835   AST 21   ALT 15   BILITOT <0.2   ALKPHOS 58     Recent Labs     07/27/21  0835   TROPONINI <0.01       Urinalysis:      Lab Results   Component Value Date    NITRU Negative 07/27/2021    WBCUA None seen 07/27/2021    BACTERIA Rare 07/27/2021    RBCUA 5-10 07/27/2021    BLOODU MODERATE 07/27/2021    SPECGRAV 1.025 07/27/2021    GLUCOSEU Negative 07/27/2021    GLUCOSEU NEGATIVE 12/22/2011       Assessment/Plan:    Active Hospital Problems    Diagnosis     Abdominal pain [R10.9]        Epigastric abdominal pain:  - CT A/P on 7/24 was unremarkable. - GI consulted in the ED.  Pt is s/p EGD on 7/27, found to have deep 12 mm penetrating ulcer located in the pyloric channel with extension into the duodenal bulb and patches of erosive antral gastritis. Plan for repeat EGD in 8-12 weeks. Path was negative. - Continue IV PPI, H2RB and carafate. Continue prn analgesia. - Pt advised to avoid NSAIDs for the foreseeable future. - Continue IV fluids. Keep NPO for now. - GI recommending general surgery consult -- appreciate. Plan for MRCP. Elevated LFTs:  - Were normal on admission. Plan for MRCP. Will trend.      Tobacco abuse:  - Smoking cessation discussed/advised. DVT Prophylaxis: Lovenox  Diet: ADULT DIET;  Regular; GI [place] (GERD/Peptic Ulcer)  Adult Oral Nutrition Supplement; Clear Liquid Oral Supplement  Code Status: Full Code    PT/OT Eval Status: not indicated     Dispo - Uncertain     DAQUAN Reyna - CNP

## 2021-07-29 NOTE — CONSULTS
Department of General Surgery Consult    PATIENT NAME: Aaron Castellano OF BIRTH: 1980    ADMISSION DATE: 7/27/2021  8:24 AM      TODAY'S DATE: 7/29/2021    Reason for Consult:  Gastric ulcer    Chief Complaint: Abdominal pain    Requesting Physician:  Radha Shah    HISTORY OF PRESENT ILLNESS:              The patient is a 45 y.o. female who presented 2 days ago w/ epigastric pain, nausea and emesis. Admitted, underwent EGD which noted 12mm gastric ulcer at the pyloric channel. Has been managed since then with PPI/antacid therapy. She is feeling better. The GI service suggested she consider elective acid-reduction surgery rather than the usual medical acid suppressive therapy. .    Past Medical History:        Diagnosis Date    Abnormal Pap smear     WNL since    Anxiety     Back pain     herniated disc    Back pain     had back surgery (fusion of 3 discs) in march 2011    Placenta previa     resolved at 28 weeks with this 3rd pregnancy    Tubal pregnancy     Pt states bilateral fallopian tubes removed       Past Surgical History:        Procedure Laterality Date    ANESTHESIA NERVE BLOCK Right 5/21/2020    RIGHT HIP INJECTION SITE CONFIRMED BY FLUOROSCOPY performed by Rosina Ybarra MD at [address] ARTHROSCOPY Left 11/19/2020    VIDEO ARTHROSCOPY LEFT ANKLE, SYNOVECTOMY, LOOSE BODY REMOVAL, MICROFRACTURE -BLOCK- performed by Alida Rai MD at [facility] Right     CHOLECYSTECTOMY      laparoscopic    ECTOPIC PREGNANCY SURGERY  10/2017    LAPAROSCOPY  4/20/10    with salpingostomy  ( ectopic pregnancy)    LUMBAR DISC SURGERY      LUMBAR FUSION  3-2011    L3-L5 fusion    TONSILLECTOMY  2000    UPPER GASTROINTESTINAL ENDOSCOPY N/A 7/27/2021    EGD BIOPSY performed by Eliceo Lozoya MD at [address] [place] [address]       Current Medications:   Current Facility-Administered Medications: oxyCODONE-acetaminophen been smoking cigarettes. She started smoking about 22 years ago. She has a 6.00 pack-year smoking history. She has never used smokeless tobacco.  ETOH:   reports current alcohol use. DRUGS:   reports no history of drug use. OCCUPATION:    Patient currently lives with family      Family History:        Problem Relation Age of Onset    High Blood Pressure Father     Diabetes Maternal Aunt        REVIEW OF SYSTEMS:  CONSTITUTIONAL:  negative  HEENT:  negative  RESPIRATORY:  negative  CARDIOVASCULAR:  negative  GASTROINTESTINAL:  negative except for   GENITOURINARY:  negative  HEMATOLOGIC/LYMPHATIC:  negative  NEUROLOGICAL:  Negative  * All other ROS reviewed and negative. PHYSICAL EXAM:  VITALS:  /85   Pulse 61   Temp 98.4 °F (36.9 °C) (Oral)   Resp 19   Ht 5' 3\" (1.6 m)   Wt 180 lb (81.6 kg)   LMP 07/27/2021   SpO2 100%   BMI 31.89 kg/m²   24HR INTAKE/OUTPUT:    I/O last 3 completed shifts: In: 1602.2 [P.O.:450; I.V.:1152.2]  Out: -   No intake/output data recorded.       CONSTITUTIONAL:  alert, no apparent distress and mildly obese  EYES:  PERRL, sclera clear  ENT:  Normocephalic,atraumatic, without obvious abnormality  NECK:  supple, symmetrical, trachea midline  LUNGS: Resp effort easy and unlabored  CARDIOVASCULAR:  NO JVD, regular rate and rhythm   ABDOMEN:  soft, non-distended, MUSCULOSKELETAL: No clubbing or cyanosis, 0+ pitting edema lower extremities  NEUROLOGIC:  Mental Status Exam:  Level of Alertness:   awake  PSYCHIATRIC:   person, place, time  SKIN:  normal skin color, texture, turgor    DATA:    CBC:   Recent Labs     07/27/21  0835 07/28/21  1018 07/29/21  0716   WBC 7.7 6.3 5.3   HGB 14.5 12.9 13.1   HCT 41.9 38.0 38.7    248 232     BMP:    Recent Labs     07/27/21  0835 07/28/21  1018 07/29/21  0716    143 141   K 3.5 4.1 4.4    105 107   CO2 22 26 24   BUN 8 6* 6*   CREATININE 0.9 0.8 0.7   GLUCOSE 108* 92 85     Hepatic:   Recent Labs     07/27/21  0835 07/29/21  0716   AST 21 95*   ALT 15 103*   BILITOT <0.2 <0.2   ALKPHOS 58 55     Mag:      Recent Labs     07/27/21  0835   MG 1.60*      Phos:   No results for input(s): PHOS in the last 72 hours. INR: No results for input(s): INR in the last 72 hours. Radiology Review: Images personally reviewed by me. IMPRESSION/RECOMMENDATIONS:    Acute peptic ulcer, managed medically and currently improving clinically. I do not think surgical acid-reducing maneuvers should be considered first-line therapy. Based on her history in the chart, she is both smoking and using alcohol. Certainly both of these should be stopped. Medical therapy to promote ulcer healing should be expected to succeed in this situation. If she were to fail to heal or have frequent relapses, then possible surgical intervention could be appropriate. Will review this issue with Dr Radha Shah. Otherwise we will not follow further at this time.     Electronically signed by Kimberly Reina MD     [address] [facility]  [id number]

## 2021-07-30 ENCOUNTER — APPOINTMENT (OUTPATIENT)
Dept: MRI IMAGING | Age: 41
DRG: 384 | End: 2021-07-30
Payer: COMMERCIAL

## 2021-07-30 LAB
ALBUMIN SERPL-MCNC: 3.8 G/DL (ref 3.4–5)
ALP BLD-CCNC: 92 U/L (ref 40–129)
ALT SERPL-CCNC: 181 U/L (ref 10–40)
ANION GAP SERPL CALCULATED.3IONS-SCNC: 10 MMOL/L (ref 3–16)
AST SERPL-CCNC: 154 U/L (ref 15–37)
BILIRUB SERPL-MCNC: <0.2 MG/DL (ref 0–1)
BILIRUBIN DIRECT: <0.2 MG/DL (ref 0–0.3)
BILIRUBIN, INDIRECT: ABNORMAL MG/DL (ref 0–1)
BUN BLDV-MCNC: 5 MG/DL (ref 7–20)
CALCIUM SERPL-MCNC: 9.1 MG/DL (ref 8.3–10.6)
CHLORIDE BLD-SCNC: 106 MMOL/L (ref 99–110)
CO2: 26 MMOL/L (ref 21–32)
CREAT SERPL-MCNC: 0.8 MG/DL (ref 0.6–1.1)
GFR AFRICAN AMERICAN: >60
GFR NON-AFRICAN AMERICAN: >60
GLUCOSE BLD-MCNC: 82 MG/DL (ref 70–99)
HAV IGM SER IA-ACNC: NORMAL
HCT VFR BLD CALC: 37.5 % (ref 36–48)
HEMOGLOBIN: 12.4 G/DL (ref 12–16)
HEPATITIS B CORE IGM ANTIBODY: NORMAL
HEPATITIS B SURFACE ANTIGEN INTERPRETATION: NORMAL
HEPATITIS C ANTIBODY INTERPRETATION: NORMAL
LIPASE: 27 U/L (ref 13–60)
MCH RBC QN AUTO: 29.2 PG (ref 26–34)
MCHC RBC AUTO-ENTMCNC: 32.9 G/DL (ref 31–36)
MCV RBC AUTO: 88.7 FL (ref 80–100)
PDW BLD-RTO: 14.1 % (ref 12.4–15.4)
PLATELET # BLD: 245 K/UL (ref 135–450)
PMV BLD AUTO: 8.8 FL (ref 5–10.5)
POTASSIUM REFLEX MAGNESIUM: 3.8 MMOL/L (ref 3.5–5.1)
RBC # BLD: 4.23 M/UL (ref 4–5.2)
SODIUM BLD-SCNC: 142 MMOL/L (ref 136–145)
TOTAL PROTEIN: 6.1 G/DL (ref 6.4–8.2)
WBC # BLD: 4.9 K/UL (ref 4–11)

## 2021-07-30 PROCEDURE — 2580000003 HC RX 258: Performed by: INTERNAL MEDICINE

## 2021-07-30 PROCEDURE — 6370000000 HC RX 637 (ALT 250 FOR IP): Performed by: REGISTERED NURSE

## 2021-07-30 PROCEDURE — 83690 ASSAY OF LIPASE: CPT

## 2021-07-30 PROCEDURE — 6360000002 HC RX W HCPCS: Performed by: REGISTERED NURSE

## 2021-07-30 PROCEDURE — 82941 ASSAY OF GASTRIN: CPT

## 2021-07-30 PROCEDURE — 36415 COLL VENOUS BLD VENIPUNCTURE: CPT

## 2021-07-30 PROCEDURE — 2500000003 HC RX 250 WO HCPCS: Performed by: REGISTERED NURSE

## 2021-07-30 PROCEDURE — 80076 HEPATIC FUNCTION PANEL: CPT

## 2021-07-30 PROCEDURE — 6360000002 HC RX W HCPCS: Performed by: INTERNAL MEDICINE

## 2021-07-30 PROCEDURE — C9113 INJ PANTOPRAZOLE SODIUM, VIA: HCPCS | Performed by: REGISTERED NURSE

## 2021-07-30 PROCEDURE — 1200000000 HC SEMI PRIVATE

## 2021-07-30 PROCEDURE — 99232 SBSQ HOSP IP/OBS MODERATE 35: CPT | Performed by: SURGERY

## 2021-07-30 PROCEDURE — 2580000003 HC RX 258: Performed by: REGISTERED NURSE

## 2021-07-30 PROCEDURE — 80048 BASIC METABOLIC PNL TOTAL CA: CPT

## 2021-07-30 PROCEDURE — 74181 MRI ABDOMEN W/O CONTRAST: CPT

## 2021-07-30 PROCEDURE — 80074 ACUTE HEPATITIS PANEL: CPT

## 2021-07-30 PROCEDURE — 85027 COMPLETE CBC AUTOMATED: CPT

## 2021-07-30 RX ADMIN — Medication 10 ML: at 20:54

## 2021-07-30 RX ADMIN — DOCUSATE SODIUM 50 MG AND SENNOSIDES 8.6 MG 2 TABLET: 8.6; 5 TABLET, FILM COATED ORAL at 20:53

## 2021-07-30 RX ADMIN — MORPHINE SULFATE 4 MG: 4 INJECTION, SOLUTION INTRAMUSCULAR; INTRAVENOUS at 20:57

## 2021-07-30 RX ADMIN — DOCUSATE SODIUM 50 MG AND SENNOSIDES 8.6 MG 2 TABLET: 8.6; 5 TABLET, FILM COATED ORAL at 08:38

## 2021-07-30 RX ADMIN — SODIUM CHLORIDE, POTASSIUM CHLORIDE, SODIUM LACTATE AND CALCIUM CHLORIDE: 600; 310; 30; 20 INJECTION, SOLUTION INTRAVENOUS at 12:50

## 2021-07-30 RX ADMIN — ONDANSETRON 4 MG: 2 INJECTION INTRAMUSCULAR; INTRAVENOUS at 00:33

## 2021-07-30 RX ADMIN — MORPHINE SULFATE 4 MG: 4 INJECTION, SOLUTION INTRAMUSCULAR; INTRAVENOUS at 08:38

## 2021-07-30 RX ADMIN — OXYCODONE AND ACETAMINOPHEN 1 TABLET: 5; 325 TABLET ORAL at 20:10

## 2021-07-30 RX ADMIN — SODIUM CHLORIDE, POTASSIUM CHLORIDE, SODIUM LACTATE AND CALCIUM CHLORIDE 100 ML/HR: 600; 310; 30; 20 INJECTION, SOLUTION INTRAVENOUS at 00:40

## 2021-07-30 RX ADMIN — MORPHINE SULFATE 4 MG: 4 INJECTION, SOLUTION INTRAMUSCULAR; INTRAVENOUS at 16:48

## 2021-07-30 RX ADMIN — ACETAMINOPHEN 650 MG: 325 TABLET ORAL at 18:54

## 2021-07-30 RX ADMIN — PANTOPRAZOLE SODIUM 40 MG: 40 INJECTION, POWDER, FOR SOLUTION INTRAVENOUS at 08:38

## 2021-07-30 RX ADMIN — SUCRALFATE 1 G: 1 TABLET ORAL at 05:55

## 2021-07-30 RX ADMIN — ONDANSETRON 4 MG: 2 INJECTION INTRAMUSCULAR; INTRAVENOUS at 18:54

## 2021-07-30 RX ADMIN — MORPHINE SULFATE 4 MG: 4 INJECTION, SOLUTION INTRAMUSCULAR; INTRAVENOUS at 03:40

## 2021-07-30 RX ADMIN — SUCRALFATE 1 G: 1 TABLET ORAL at 00:33

## 2021-07-30 RX ADMIN — SUCRALFATE 1 G: 1 TABLET ORAL at 18:54

## 2021-07-30 RX ADMIN — SUCRALFATE 1 G: 1 TABLET ORAL at 11:32

## 2021-07-30 RX ADMIN — ACETAMINOPHEN 650 MG: 325 TABLET ORAL at 03:41

## 2021-07-30 RX ADMIN — FAMOTIDINE 20 MG: 10 INJECTION, SOLUTION INTRAVENOUS at 08:38

## 2021-07-30 RX ADMIN — MORPHINE SULFATE 4 MG: 4 INJECTION, SOLUTION INTRAMUSCULAR; INTRAVENOUS at 12:50

## 2021-07-30 RX ADMIN — ALPRAZOLAM 1 MG: 1 TABLET ORAL at 20:59

## 2021-07-30 RX ADMIN — SODIUM CHLORIDE, POTASSIUM CHLORIDE, SODIUM LACTATE AND CALCIUM CHLORIDE 100 ML/HR: 600; 310; 30; 20 INJECTION, SOLUTION INTRAVENOUS at 23:01

## 2021-07-30 RX ADMIN — PANTOPRAZOLE SODIUM 40 MG: 40 INJECTION, POWDER, FOR SOLUTION INTRAVENOUS at 20:54

## 2021-07-30 RX ADMIN — OXYCODONE AND ACETAMINOPHEN 1 TABLET: 5; 325 TABLET ORAL at 00:33

## 2021-07-30 RX ADMIN — ONDANSETRON 4 MG: 2 INJECTION INTRAMUSCULAR; INTRAVENOUS at 06:25

## 2021-07-30 RX ADMIN — ONDANSETRON 4 MG: 2 INJECTION INTRAMUSCULAR; INTRAVENOUS at 12:52

## 2021-07-30 RX ADMIN — OXYCODONE AND ACETAMINOPHEN 1 TABLET: 5; 325 TABLET ORAL at 11:31

## 2021-07-30 RX ADMIN — OXYCODONE AND ACETAMINOPHEN 1 TABLET: 5; 325 TABLET ORAL at 15:45

## 2021-07-30 ASSESSMENT — PAIN DESCRIPTION - PAIN TYPE
TYPE: ACUTE PAIN

## 2021-07-30 ASSESSMENT — PAIN SCALES - GENERAL
PAINLEVEL_OUTOF10: 4
PAINLEVEL_OUTOF10: 5
PAINLEVEL_OUTOF10: 6
PAINLEVEL_OUTOF10: 7
PAINLEVEL_OUTOF10: 7
PAINLEVEL_OUTOF10: 3
PAINLEVEL_OUTOF10: 7
PAINLEVEL_OUTOF10: 7
PAINLEVEL_OUTOF10: 5
PAINLEVEL_OUTOF10: 5
PAINLEVEL_OUTOF10: 6
PAINLEVEL_OUTOF10: 6
PAINLEVEL_OUTOF10: 4

## 2021-07-30 ASSESSMENT — PAIN DESCRIPTION - ORIENTATION
ORIENTATION: MID

## 2021-07-30 ASSESSMENT — PAIN DESCRIPTION - LOCATION
LOCATION: ABDOMEN

## 2021-07-30 NOTE — PLAN OF CARE
Problem: Pain:  Goal: Pain level will decrease  Description: Pain level will decrease  Outcome: Ongoing  Goal: Control of acute pain  Description: Control of acute pain  Outcome: Ongoing     Problem: Nutrition  Goal: Optimal nutrition therapy  Outcome: Ongoing     Problem: Falls - Risk of:  Goal: Will remain free from falls  Description: Will remain free from falls  Outcome: Ongoing  Goal: Absence of physical injury  Description: Absence of physical injury  Outcome: Ongoing

## 2021-07-30 NOTE — PROGRESS NOTES
Hospitalist Progress Note      PCP: No primary care provider on file. Date of Admission: 7/27/2021    Chief Complaint: Epigastric abdominal pain     Hospital Course:   45 y.o. female with PMH of tobacco abuse and chronic back pain who presented to [facility] with complaints of epigastric abdominal pain. Pain started last Wednesday. She went to the ED on Saturday. Workup was unremarkable. It was suggested she be admitted however pt wanted to be dc'd as she was feeling better. Pain then came back and progressively worsened on Monday which prompted her to go back to the ED. She had associated N&V. No hematemesis, melena or hematochezia. No fever/chills. No dyspnea or chest pain. Pt takes a fair amount of ibuprofen for her chronic back pain. ED course: Afebrile. VSS. GI consulted. Pt was admitted for further evaluation and management. Subjective:   Pt is on RA. Afebrile. VSS. Feels like her pain is improving, still requiring IV pain meds. No N/V. No BM yet.      Medications:  Reviewed    Infusion Medications    sodium chloride      lactated ringers 100 mL/hr at 07/30/21 0631     Scheduled Medications    sennosides-docusate sodium  2 tablet Oral BID    pantoprazole  40 mg Intravenous BID    ALPRAZolam  1 mg Oral Nightly    sodium chloride flush  5-40 mL Intravenous 2 times per day    enoxaparin  40 mg Subcutaneous Daily    sucralfate  1 g Oral 4 times per day     PRN Meds: oxyCODONE-acetaminophen, morphine **OR** morphine, sodium chloride flush, sodium chloride, polyethylene glycol, acetaminophen **OR** acetaminophen, ondansetron **OR** ondansetron      Intake/Output Summary (Last 24 hours) at 7/30/2021 1211  Last data filed at 7/30/2021 0631  Gross per 24 hour   Intake 2336.91 ml   Output --   Net 2336.91 ml       Physical Exam Performed:    /84   Pulse 64   Temp 98.2 °F (36.8 °C) (Oral)   Resp 18   Ht 5' 3\" (1.6 m)   Wt 180 lb (81.6 kg)   LMP 07/27/2021   SpO2 98%   BMI 31.89 kg/m² General appearance: No apparent distress, appears stated age and cooperative. HEENT: Pupils equal, round, and reactive to light. Conjunctivae/corneas clear. Neck: Supple, with full range of motion. No jugular venous distention. Trachea midline. Respiratory:  Normal respiratory effort. Clear to auscultation, bilaterally without Rales/Wheezes/Rhonchi. Cardiovascular: Regular rate and rhythm with normal S1/S2 without murmurs, rubs or gallops. Abdomen: Soft, non-tender, non-distended with normal bowel sounds. Musculoskeletal: No clubbing, cyanosis or edema bilaterally. Full range of motion without deformity. Skin: Skin color, texture, turgor normal.  No rashes or lesions. Neurologic:  Neurovascularly intact without any focal sensory/motor deficits. Cranial nerves: II-XII intact, grossly non-focal.  Psychiatric: Alert and oriented, thought content appropriate, normal insight  Capillary Refill: Brisk,3 seconds, normal   Peripheral Pulses: +2 palpable, equal bilaterally       Labs:   Recent Labs     07/28/21  1018 07/29/21  0716 07/30/21  0400   WBC 6.3 5.3 4.9   HGB 12.9 13.1 12.4   HCT 38.0 38.7 37.5    232 245     Recent Labs     07/28/21  1018 07/29/21  0716 07/30/21  0400    141 142   K 4.1 4.4 3.8    107 106   CO2 26 24 26   BUN 6* 6* 5*   CREATININE 0.8 0.7 0.8   CALCIUM 8.7 8.9 9.1     Recent Labs     07/29/21  0716 07/30/21  0400   AST 95* 154*   * 181*   BILIDIR <0.2 <0.2   BILITOT <0.2 <0.2   ALKPHOS 55 92     No results for input(s): Aminta Fleming in the last 72 hours.     Urinalysis:      Lab Results   Component Value Date    NITRU Negative 07/27/2021    WBCUA None seen 07/27/2021    BACTERIA Rare 07/27/2021    RBCUA 5-10 07/27/2021    BLOODU MODERATE 07/27/2021    SPECGRAV 1.025 07/27/2021    GLUCOSEU Negative 07/27/2021    GLUCOSEU NEGATIVE 12/22/2011       Assessment/Plan:    Active Hospital Problems    Diagnosis     Abdominal pain, epigastric [R10.13]    

## 2021-07-31 LAB
ALBUMIN SERPL-MCNC: 3.6 G/DL (ref 3.4–5)
ALP BLD-CCNC: 82 U/L (ref 40–129)
ALT SERPL-CCNC: 121 U/L (ref 10–40)
ANION GAP SERPL CALCULATED.3IONS-SCNC: 10 MMOL/L (ref 3–16)
AST SERPL-CCNC: 53 U/L (ref 15–37)
BILIRUB SERPL-MCNC: <0.2 MG/DL (ref 0–1)
BILIRUBIN DIRECT: <0.2 MG/DL (ref 0–0.3)
BILIRUBIN, INDIRECT: ABNORMAL MG/DL (ref 0–1)
BUN BLDV-MCNC: 5 MG/DL (ref 7–20)
CALCIUM SERPL-MCNC: 9 MG/DL (ref 8.3–10.6)
CHLORIDE BLD-SCNC: 107 MMOL/L (ref 99–110)
CO2: 25 MMOL/L (ref 21–32)
CREAT SERPL-MCNC: 0.8 MG/DL (ref 0.6–1.1)
GFR AFRICAN AMERICAN: >60
GFR NON-AFRICAN AMERICAN: >60
GLUCOSE BLD-MCNC: 83 MG/DL (ref 70–99)
HCT VFR BLD CALC: 37 % (ref 36–48)
HEMOGLOBIN: 12.4 G/DL (ref 12–16)
MCH RBC QN AUTO: 29.8 PG (ref 26–34)
MCHC RBC AUTO-ENTMCNC: 33.6 G/DL (ref 31–36)
MCV RBC AUTO: 88.8 FL (ref 80–100)
PDW BLD-RTO: 13.9 % (ref 12.4–15.4)
PLATELET # BLD: 247 K/UL (ref 135–450)
PMV BLD AUTO: 8.6 FL (ref 5–10.5)
POTASSIUM REFLEX MAGNESIUM: 3.9 MMOL/L (ref 3.5–5.1)
RBC # BLD: 4.16 M/UL (ref 4–5.2)
SODIUM BLD-SCNC: 142 MMOL/L (ref 136–145)
TOTAL PROTEIN: 5.8 G/DL (ref 6.4–8.2)
WBC # BLD: 4.9 K/UL (ref 4–11)

## 2021-07-31 PROCEDURE — 6360000002 HC RX W HCPCS: Performed by: REGISTERED NURSE

## 2021-07-31 PROCEDURE — 80076 HEPATIC FUNCTION PANEL: CPT

## 2021-07-31 PROCEDURE — 1200000000 HC SEMI PRIVATE

## 2021-07-31 PROCEDURE — 85027 COMPLETE CBC AUTOMATED: CPT

## 2021-07-31 PROCEDURE — 6360000002 HC RX W HCPCS: Performed by: INTERNAL MEDICINE

## 2021-07-31 PROCEDURE — 6370000000 HC RX 637 (ALT 250 FOR IP): Performed by: REGISTERED NURSE

## 2021-07-31 PROCEDURE — 6370000000 HC RX 637 (ALT 250 FOR IP): Performed by: HOSPITALIST

## 2021-07-31 PROCEDURE — C9113 INJ PANTOPRAZOLE SODIUM, VIA: HCPCS | Performed by: REGISTERED NURSE

## 2021-07-31 PROCEDURE — 6370000000 HC RX 637 (ALT 250 FOR IP): Performed by: INTERNAL MEDICINE

## 2021-07-31 PROCEDURE — 2580000003 HC RX 258: Performed by: REGISTERED NURSE

## 2021-07-31 PROCEDURE — 36415 COLL VENOUS BLD VENIPUNCTURE: CPT

## 2021-07-31 PROCEDURE — 80048 BASIC METABOLIC PNL TOTAL CA: CPT

## 2021-07-31 RX ORDER — PROMETHAZINE HYDROCHLORIDE 25 MG/1
12.5 TABLET ORAL EVERY 6 HOURS PRN
Status: DISCONTINUED | OUTPATIENT
Start: 2021-07-31 | End: 2021-08-02 | Stop reason: HOSPADM

## 2021-07-31 RX ORDER — MISOPROSTOL 100 UG/1
200 TABLET ORAL
Status: DISCONTINUED | OUTPATIENT
Start: 2021-07-31 | End: 2021-08-02 | Stop reason: HOSPADM

## 2021-07-31 RX ORDER — PROMETHAZINE HYDROCHLORIDE 25 MG/ML
12.5 INJECTION, SOLUTION INTRAMUSCULAR; INTRAVENOUS ONCE
Status: DISCONTINUED | OUTPATIENT
Start: 2021-07-31 | End: 2021-08-01

## 2021-07-31 RX ADMIN — SODIUM CHLORIDE, PRESERVATIVE FREE 10 ML: 5 INJECTION INTRAVENOUS at 20:15

## 2021-07-31 RX ADMIN — MORPHINE SULFATE 4 MG: 4 INJECTION, SOLUTION INTRAMUSCULAR; INTRAVENOUS at 10:14

## 2021-07-31 RX ADMIN — ONDANSETRON 4 MG: 2 INJECTION INTRAMUSCULAR; INTRAVENOUS at 05:15

## 2021-07-31 RX ADMIN — OXYCODONE AND ACETAMINOPHEN 1 TABLET: 5; 325 TABLET ORAL at 05:14

## 2021-07-31 RX ADMIN — SUCRALFATE 1 G: 1 TABLET ORAL at 05:15

## 2021-07-31 RX ADMIN — PANTOPRAZOLE SODIUM 40 MG: 40 INJECTION, POWDER, FOR SOLUTION INTRAVENOUS at 08:52

## 2021-07-31 RX ADMIN — PROMETHAZINE HYDROCHLORIDE 12.5 MG: 25 TABLET ORAL at 20:15

## 2021-07-31 RX ADMIN — ONDANSETRON 4 MG: 2 INJECTION INTRAMUSCULAR; INTRAVENOUS at 22:38

## 2021-07-31 RX ADMIN — ENOXAPARIN SODIUM 40 MG: 40 INJECTION SUBCUTANEOUS at 08:52

## 2021-07-31 RX ADMIN — OXYCODONE AND ACETAMINOPHEN 1 TABLET: 5; 325 TABLET ORAL at 15:08

## 2021-07-31 RX ADMIN — MORPHINE SULFATE 4 MG: 4 INJECTION, SOLUTION INTRAMUSCULAR; INTRAVENOUS at 15:51

## 2021-07-31 RX ADMIN — MORPHINE SULFATE 2 MG: 2 INJECTION, SOLUTION INTRAMUSCULAR; INTRAVENOUS at 20:15

## 2021-07-31 RX ADMIN — PANTOPRAZOLE SODIUM 40 MG: 40 INJECTION, POWDER, FOR SOLUTION INTRAVENOUS at 20:15

## 2021-07-31 RX ADMIN — SUCRALFATE 1 G: 1 TABLET ORAL at 11:16

## 2021-07-31 RX ADMIN — PROMETHAZINE HYDROCHLORIDE 12.5 MG: 25 TABLET ORAL at 10:14

## 2021-07-31 RX ADMIN — ONDANSETRON 4 MG: 2 INJECTION INTRAMUSCULAR; INTRAVENOUS at 15:08

## 2021-07-31 RX ADMIN — SUCRALFATE 1 G: 1 TABLET ORAL at 17:20

## 2021-07-31 RX ADMIN — MORPHINE SULFATE 2 MG: 2 INJECTION, SOLUTION INTRAMUSCULAR; INTRAVENOUS at 06:42

## 2021-07-31 RX ADMIN — SUCRALFATE 1 G: 1 TABLET ORAL at 00:13

## 2021-07-31 RX ADMIN — MORPHINE SULFATE 4 MG: 4 INJECTION, SOLUTION INTRAMUSCULAR; INTRAVENOUS at 12:51

## 2021-07-31 RX ADMIN — MORPHINE SULFATE 2 MG: 2 INJECTION, SOLUTION INTRAMUSCULAR; INTRAVENOUS at 22:42

## 2021-07-31 RX ADMIN — ALPRAZOLAM 1 MG: 1 TABLET ORAL at 20:15

## 2021-07-31 RX ADMIN — SODIUM CHLORIDE, PRESERVATIVE FREE 10 ML: 5 INJECTION INTRAVENOUS at 08:53

## 2021-07-31 RX ADMIN — MORPHINE SULFATE 4 MG: 4 INJECTION, SOLUTION INTRAMUSCULAR; INTRAVENOUS at 00:13

## 2021-07-31 RX ADMIN — OXYCODONE AND ACETAMINOPHEN 1 TABLET: 5; 325 TABLET ORAL at 09:43

## 2021-07-31 RX ADMIN — MISOPROSTOL 200 MCG: 100 TABLET ORAL at 22:38

## 2021-07-31 ASSESSMENT — PAIN DESCRIPTION - LOCATION
LOCATION: ABDOMEN
LOCATION: ABDOMEN

## 2021-07-31 ASSESSMENT — PAIN SCALES - GENERAL
PAINLEVEL_OUTOF10: 6
PAINLEVEL_OUTOF10: 6
PAINLEVEL_OUTOF10: 7
PAINLEVEL_OUTOF10: 4
PAINLEVEL_OUTOF10: 0
PAINLEVEL_OUTOF10: 6
PAINLEVEL_OUTOF10: 7
PAINLEVEL_OUTOF10: 3
PAINLEVEL_OUTOF10: 8
PAINLEVEL_OUTOF10: 8
PAINLEVEL_OUTOF10: 5
PAINLEVEL_OUTOF10: 0
PAINLEVEL_OUTOF10: 4
PAINLEVEL_OUTOF10: 8
PAINLEVEL_OUTOF10: 6

## 2021-07-31 ASSESSMENT — PAIN DESCRIPTION - PAIN TYPE
TYPE: ACUTE PAIN
TYPE: ACUTE PAIN

## 2021-07-31 NOTE — PROGRESS NOTES
Hospitalist Progress Note      PCP: No primary care provider on file. Date of Admission: 7/27/2021    Chief Complaint: Epigastric abdominal pain     Hospital Course:   45 y.o. female with PMH of tobacco abuse and chronic back pain who presented to [facility] with complaints of epigastric abdominal pain. Pain started last Wednesday. She went to the ED on Saturday. Workup was unremarkable. It was suggested she be admitted however pt wanted to be dc'd as she was feeling better. Pain then came back and progressively worsened on Monday which prompted her to go back to the ED. She had associated N&V. No hematemesis, melena or hematochezia. No fever/chills. No dyspnea or chest pain. Pt takes a fair amount of ibuprofen for her chronic back pain. ED course: Afebrile. VSS. GI consulted. Pt was admitted for further evaluation and management. Subjective:   Pt is on RA. Afebrile. VSS. Feels like her pain is improving, still requiring IV pain meds. No N/V. No BM yet.      Medications:  Reviewed    Infusion Medications    sodium chloride      lactated ringers 100 mL/hr at 07/31/21 8141     Scheduled Medications    promethazine  12.5 mg Intramuscular Once    sennosides-docusate sodium  2 tablet Oral BID    pantoprazole  40 mg Intravenous BID    ALPRAZolam  1 mg Oral Nightly    sodium chloride flush  5-40 mL Intravenous 2 times per day    enoxaparin  40 mg Subcutaneous Daily    sucralfate  1 g Oral 4 times per day     PRN Meds: promethazine, oxyCODONE-acetaminophen, morphine **OR** morphine, sodium chloride flush, sodium chloride, polyethylene glycol, acetaminophen **OR** acetaminophen, ondansetron **OR** ondansetron      Intake/Output Summary (Last 24 hours) at 7/31/2021 1428  Last data filed at 7/31/2021 0576  Gross per 24 hour   Intake 3105.29 ml   Output --   Net 3105.29 ml       Physical Exam Performed:    /76   Pulse 69   Temp 98.4 °F (36.9 °C) (Oral)   Resp 18   Ht 5' 3\" (1.6 m)   Wt 180 lb (81.6 kg)   LMP 07/27/2021   SpO2 95%   BMI 31.89 kg/m²     General appearance: No apparent distress, appears stated age and cooperative. HEENT: Pupils equal, round, and reactive to light. Conjunctivae/corneas clear. Neck: Supple, with full range of motion. No jugular venous distention. Trachea midline. Respiratory:  Normal respiratory effort. Clear to auscultation, bilaterally without Rales/Wheezes/Rhonchi. Cardiovascular: Regular rate and rhythm with normal S1/S2 without murmurs, rubs or gallops. Abdomen: Soft, non-tender, non-distended with normal bowel sounds. Musculoskeletal: No clubbing, cyanosis or edema bilaterally. Full range of motion without deformity. Skin: Skin color, texture, turgor normal.  No rashes or lesions. Neurologic:  Neurovascularly intact without any focal sensory/motor deficits. Cranial nerves: II-XII intact, grossly non-focal.  Psychiatric: Alert and oriented, thought content appropriate, normal insight  Capillary Refill: Brisk,3 seconds, normal   Peripheral Pulses: +2 palpable, equal bilaterally       Labs:   Recent Labs     07/29/21  0716 07/30/21  0400 07/31/21  0617   WBC 5.3 4.9 4.9   HGB 13.1 12.4 12.4   HCT 38.7 37.5 37.0    245 247     Recent Labs     07/29/21  0716 07/30/21  0400 07/31/21  0617    142 142   K 4.4 3.8 3.9    106 107   CO2 24 26 25   BUN 6* 5* 5*   CREATININE 0.7 0.8 0.8   CALCIUM 8.9 9.1 9.0     Recent Labs     07/29/21  0716 07/30/21  0400 07/31/21  0617   AST 95* 154* 53*   * 181* 121*   BILIDIR <0.2 <0.2 <0.2   BILITOT <0.2 <0.2 <0.2   ALKPHOS 55 92 82     No results for input(s): Faye Latah in the last 72 hours.     Urinalysis:      Lab Results   Component Value Date    NITRU Negative 07/27/2021    WBCUA None seen 07/27/2021    BACTERIA Rare 07/27/2021    RBCUA 5-10 07/27/2021    BLOODU MODERATE 07/27/2021    SPECGRAV 1.025 07/27/2021    GLUCOSEU Negative 07/27/2021    GLUCOSEU NEGATIVE 12/22/2011 Assessment/Plan:    Active Hospital Problems    Diagnosis     Abdominal pain, epigastric [R10.13]     Abdominal pain [R10.9]        Epigastric abdominal pain:  - CT A/P on 7/24 was unremarkable. - GI consulted in the ED. Pt is s/p EGD on 7/27, found to have deep 12 mm penetrating ulcer located in the pyloric channel with extension into the duodenal bulb and patches of erosive antral gastritis. Plan for repeat EGD in 8-12 weeks. Path was negative. - Continue IV PPI and carafate. Continue prn analgesia. - Pt advised to avoid NSAIDs for the foreseeable future. - Stop IV fluids.  - Started on clear liquids and ADAT. - General surgery consulted per GI recs, recommend to continue with medical management. Elevated LFTs (improved):  - Etiology unclear. Were normal on admission. MRCP was unremarkable. Lipase is normal. Acute hepatitis panel was negative. Continue to trend. Constipation:  - Continue bowel regimen.      Tobacco abuse:  - Smoking cessation discussed/advised. DVT Prophylaxis: Lovenox  Diet: ADULT DIET;  Regular; Low Fiber  Code Status: Full Code    PT/OT Eval Status: not indicated     Dispo - Possibly tomorrow     [address], APRN - CNP

## 2021-08-01 ENCOUNTER — APPOINTMENT (OUTPATIENT)
Dept: CT IMAGING | Age: 41
DRG: 384 | End: 2021-08-01
Payer: COMMERCIAL

## 2021-08-01 ENCOUNTER — APPOINTMENT (OUTPATIENT)
Dept: GENERAL RADIOLOGY | Age: 41
DRG: 384 | End: 2021-08-01
Payer: COMMERCIAL

## 2021-08-01 LAB
A/G RATIO: 1.7 (ref 1.1–2.2)
ALBUMIN SERPL-MCNC: 3.7 G/DL (ref 3.4–5)
ALP BLD-CCNC: 72 U/L (ref 40–129)
ALT SERPL-CCNC: 84 U/L (ref 10–40)
ANION GAP SERPL CALCULATED.3IONS-SCNC: 9 MMOL/L (ref 3–16)
AST SERPL-CCNC: 30 U/L (ref 15–37)
BACTERIA: ABNORMAL /HPF
BILIRUB SERPL-MCNC: <0.2 MG/DL (ref 0–1)
BILIRUBIN URINE: NEGATIVE
BLOOD, URINE: ABNORMAL
BUN BLDV-MCNC: 11 MG/DL (ref 7–20)
CALCIUM SERPL-MCNC: 8.9 MG/DL (ref 8.3–10.6)
CHLORIDE BLD-SCNC: 105 MMOL/L (ref 99–110)
CLARITY: CLEAR
CO2: 24 MMOL/L (ref 21–32)
COLOR: ABNORMAL
CREAT SERPL-MCNC: 0.9 MG/DL (ref 0.6–1.1)
EPITHELIAL CELLS, UA: ABNORMAL /HPF (ref 0–5)
GASTRIN: 68 PG/ML (ref 0–100)
GFR AFRICAN AMERICAN: >60
GFR NON-AFRICAN AMERICAN: >60
GLOBULIN: 2.2 G/DL
GLUCOSE BLD-MCNC: 90 MG/DL (ref 70–99)
GLUCOSE URINE: NEGATIVE MG/DL
HCT VFR BLD CALC: 37.1 % (ref 36–48)
HEMOGLOBIN: 12.5 G/DL (ref 12–16)
KETONES, URINE: NEGATIVE MG/DL
LEUKOCYTE ESTERASE, URINE: NEGATIVE
MCH RBC QN AUTO: 29.5 PG (ref 26–34)
MCHC RBC AUTO-ENTMCNC: 33.8 G/DL (ref 31–36)
MCV RBC AUTO: 87.5 FL (ref 80–100)
MICROSCOPIC EXAMINATION: YES
NITRITE, URINE: NEGATIVE
PDW BLD-RTO: 14 % (ref 12.4–15.4)
PH UA: 7 (ref 5–8)
PLATELET # BLD: 248 K/UL (ref 135–450)
PMV BLD AUTO: 8.7 FL (ref 5–10.5)
POTASSIUM REFLEX MAGNESIUM: 3.9 MMOL/L (ref 3.5–5.1)
PROCALCITONIN: 0.03 NG/ML (ref 0–0.15)
PROTEIN UA: NEGATIVE MG/DL
RBC # BLD: 4.24 M/UL (ref 4–5.2)
RBC UA: ABNORMAL /HPF (ref 0–4)
SODIUM BLD-SCNC: 138 MMOL/L (ref 136–145)
SPECIFIC GRAVITY UA: 1.01 (ref 1–1.03)
TOTAL PROTEIN: 5.9 G/DL (ref 6.4–8.2)
URINE REFLEX TO CULTURE: ABNORMAL
URINE TYPE: ABNORMAL
UROBILINOGEN, URINE: 0.2 E.U./DL
WBC # BLD: 5.5 K/UL (ref 4–11)
WBC UA: ABNORMAL /HPF (ref 0–5)

## 2021-08-01 PROCEDURE — 74176 CT ABD & PELVIS W/O CONTRAST: CPT

## 2021-08-01 PROCEDURE — 6370000000 HC RX 637 (ALT 250 FOR IP): Performed by: INTERNAL MEDICINE

## 2021-08-01 PROCEDURE — 36415 COLL VENOUS BLD VENIPUNCTURE: CPT

## 2021-08-01 PROCEDURE — 6370000000 HC RX 637 (ALT 250 FOR IP): Performed by: REGISTERED NURSE

## 2021-08-01 PROCEDURE — 6360000002 HC RX W HCPCS: Performed by: INTERNAL MEDICINE

## 2021-08-01 PROCEDURE — 6370000000 HC RX 637 (ALT 250 FOR IP): Performed by: HOSPITALIST

## 2021-08-01 PROCEDURE — C9113 INJ PANTOPRAZOLE SODIUM, VIA: HCPCS | Performed by: REGISTERED NURSE

## 2021-08-01 PROCEDURE — 71045 X-RAY EXAM CHEST 1 VIEW: CPT

## 2021-08-01 PROCEDURE — 6360000002 HC RX W HCPCS: Performed by: REGISTERED NURSE

## 2021-08-01 PROCEDURE — 80053 COMPREHEN METABOLIC PANEL: CPT

## 2021-08-01 PROCEDURE — 2580000003 HC RX 258: Performed by: REGISTERED NURSE

## 2021-08-01 PROCEDURE — 1200000000 HC SEMI PRIVATE

## 2021-08-01 PROCEDURE — 85027 COMPLETE CBC AUTOMATED: CPT

## 2021-08-01 PROCEDURE — 81001 URINALYSIS AUTO W/SCOPE: CPT

## 2021-08-01 PROCEDURE — 84145 PROCALCITONIN (PCT): CPT

## 2021-08-01 RX ADMIN — SUCRALFATE 1 G: 1 TABLET ORAL at 06:05

## 2021-08-01 RX ADMIN — ALPRAZOLAM 1 MG: 1 TABLET ORAL at 20:20

## 2021-08-01 RX ADMIN — ONDANSETRON 4 MG: 2 INJECTION INTRAMUSCULAR; INTRAVENOUS at 20:21

## 2021-08-01 RX ADMIN — PROMETHAZINE HYDROCHLORIDE 12.5 MG: 25 TABLET ORAL at 10:19

## 2021-08-01 RX ADMIN — MORPHINE SULFATE 2 MG: 2 INJECTION, SOLUTION INTRAMUSCULAR; INTRAVENOUS at 06:05

## 2021-08-01 RX ADMIN — MISOPROSTOL 200 MCG: 100 TABLET ORAL at 06:05

## 2021-08-01 RX ADMIN — SUCRALFATE 1 G: 1 TABLET ORAL at 10:17

## 2021-08-01 RX ADMIN — ENOXAPARIN SODIUM 40 MG: 40 INJECTION SUBCUTANEOUS at 09:08

## 2021-08-01 RX ADMIN — MORPHINE SULFATE 4 MG: 4 INJECTION, SOLUTION INTRAMUSCULAR; INTRAVENOUS at 17:28

## 2021-08-01 RX ADMIN — ONDANSETRON 4 MG: 2 INJECTION INTRAMUSCULAR; INTRAVENOUS at 12:24

## 2021-08-01 RX ADMIN — MISOPROSTOL 200 MCG: 100 TABLET ORAL at 10:12

## 2021-08-01 RX ADMIN — MORPHINE SULFATE 4 MG: 4 INJECTION, SOLUTION INTRAMUSCULAR; INTRAVENOUS at 21:19

## 2021-08-01 RX ADMIN — SODIUM CHLORIDE, PRESERVATIVE FREE 10 ML: 5 INJECTION INTRAVENOUS at 09:09

## 2021-08-01 RX ADMIN — MISOPROSTOL 200 MCG: 100 TABLET ORAL at 16:03

## 2021-08-01 RX ADMIN — PANTOPRAZOLE SODIUM 40 MG: 40 INJECTION, POWDER, FOR SOLUTION INTRAVENOUS at 20:20

## 2021-08-01 RX ADMIN — PROMETHAZINE HYDROCHLORIDE 12.5 MG: 25 TABLET ORAL at 23:57

## 2021-08-01 RX ADMIN — PANTOPRAZOLE SODIUM 40 MG: 40 INJECTION, POWDER, FOR SOLUTION INTRAVENOUS at 09:08

## 2021-08-01 RX ADMIN — OXYCODONE AND ACETAMINOPHEN 1 TABLET: 5; 325 TABLET ORAL at 16:03

## 2021-08-01 RX ADMIN — SODIUM CHLORIDE, PRESERVATIVE FREE 10 ML: 5 INJECTION INTRAVENOUS at 20:20

## 2021-08-01 RX ADMIN — SUCRALFATE 1 G: 1 TABLET ORAL at 00:03

## 2021-08-01 RX ADMIN — MISOPROSTOL 200 MCG: 100 TABLET ORAL at 20:26

## 2021-08-01 RX ADMIN — SUCRALFATE 1 G: 1 TABLET ORAL at 23:58

## 2021-08-01 RX ADMIN — SUCRALFATE 1 G: 1 TABLET ORAL at 16:03

## 2021-08-01 RX ADMIN — MORPHINE SULFATE 2 MG: 2 INJECTION, SOLUTION INTRAMUSCULAR; INTRAVENOUS at 10:12

## 2021-08-01 RX ADMIN — MORPHINE SULFATE 4 MG: 4 INJECTION, SOLUTION INTRAMUSCULAR; INTRAVENOUS at 12:25

## 2021-08-01 RX ADMIN — ONDANSETRON 4 MG: 2 INJECTION INTRAMUSCULAR; INTRAVENOUS at 06:05

## 2021-08-01 RX ADMIN — DOCUSATE SODIUM 50 MG AND SENNOSIDES 8.6 MG 2 TABLET: 8.6; 5 TABLET, FILM COATED ORAL at 09:09

## 2021-08-01 RX ADMIN — MORPHINE SULFATE 4 MG: 4 INJECTION, SOLUTION INTRAMUSCULAR; INTRAVENOUS at 14:30

## 2021-08-01 RX ADMIN — OXYCODONE AND ACETAMINOPHEN 1 TABLET: 5; 325 TABLET ORAL at 09:08

## 2021-08-01 RX ADMIN — OXYCODONE AND ACETAMINOPHEN 1 TABLET: 5; 325 TABLET ORAL at 20:20

## 2021-08-01 RX ADMIN — MORPHINE SULFATE 4 MG: 4 INJECTION, SOLUTION INTRAMUSCULAR; INTRAVENOUS at 23:58

## 2021-08-01 ASSESSMENT — PAIN SCALES - GENERAL
PAINLEVEL_OUTOF10: 7
PAINLEVEL_OUTOF10: 6
PAINLEVEL_OUTOF10: 7
PAINLEVEL_OUTOF10: 6
PAINLEVEL_OUTOF10: 7
PAINLEVEL_OUTOF10: 6

## 2021-08-01 ASSESSMENT — PAIN DESCRIPTION - PAIN TYPE
TYPE: ACUTE PAIN

## 2021-08-01 ASSESSMENT — PAIN DESCRIPTION - LOCATION
LOCATION: ABDOMEN

## 2021-08-01 NOTE — PLAN OF CARE
Pt rates pain level using 0-10 pain scale. Pain meds administered as ordered per MAR. Pt instructed to use call light for increasing pain or ineffective pain management. Pt verbalizes understanding. Call light within reach. Will continue to monitor.     Problem: Pain:  Goal: Control of acute pain  Description: Control of acute pain  Outcome: Ongoing     Problem: Pain:  Goal: Pain level will decrease  Description: Pain level will decrease  Outcome: Ongoing

## 2021-08-01 NOTE — PROGRESS NOTES
Hospitalist Progress Note      PCP: No primary care provider on file. Date of Admission: 7/27/2021    Chief Complaint: Epigastric abdominal pain     Hospital Course:   45 y.o. female with PMH of tobacco abuse and chronic back pain who presented to [facility] with complaints of epigastric abdominal pain. Pain started last Wednesday. She went to the ED on Saturday. Workup was unremarkable. It was suggested she be admitted however pt wanted to be dc'd as she was feeling better. Pain then came back and progressively worsened on Monday which prompted her to go back to the ED. She had associated N&V. No hematemesis, melena or hematochezia. No fever/chills. No dyspnea or chest pain. Pt takes a fair amount of ibuprofen for her chronic back pain. ED course: Afebrile. VSS. GI consulted. Pt was admitted for further evaluation and management. Subjective:   Pt is on RA. Afebrile. VSS. Abdominal pain feels worse/different today. +nausea but no vomiting. +BM. Medications:  Reviewed    Infusion Medications    sodium chloride       Scheduled Medications    miSOPROStol  200 mcg Oral 4x Daily AC & HS    sennosides-docusate sodium  2 tablet Oral BID    pantoprazole  40 mg Intravenous BID    ALPRAZolam  1 mg Oral Nightly    sodium chloride flush  5-40 mL Intravenous 2 times per day    enoxaparin  40 mg Subcutaneous Daily    sucralfate  1 g Oral 4 times per day     PRN Meds: promethazine, oxyCODONE-acetaminophen, morphine **OR** morphine, sodium chloride flush, sodium chloride, polyethylene glycol, acetaminophen **OR** acetaminophen, ondansetron **OR** ondansetron    No intake or output data in the 24 hours ending 08/01/21 1504    Physical Exam Performed:    /86   Pulse 64   Temp 99.8 °F (37.7 °C) (Oral)   Resp 18   Ht 5' 3\" (1.6 m)   Wt 180 lb (81.6 kg)   LMP 07/27/2021   SpO2 98%   BMI 31.89 kg/m²     General appearance: No apparent distress, appears stated age and cooperative.   HEENT: Pupils equal, round, and reactive to light. Conjunctivae/corneas clear. Neck: Supple, with full range of motion. No jugular venous distention. Trachea midline. Respiratory:  Normal respiratory effort. Clear to auscultation, bilaterally without Rales/Wheezes/Rhonchi. Cardiovascular: Regular rate and rhythm with normal S1/S2 without murmurs, rubs or gallops. Abdomen: Soft, non-tender, non-distended with normal bowel sounds. Musculoskeletal: No clubbing, cyanosis or edema bilaterally. Full range of motion without deformity. Skin: Skin color, texture, turgor normal.  No rashes or lesions. Neurologic:  Neurovascularly intact without any focal sensory/motor deficits. Cranial nerves: II-XII intact, grossly non-focal.  Psychiatric: Alert and oriented, thought content appropriate, normal insight  Capillary Refill: Brisk,3 seconds, normal   Peripheral Pulses: +2 palpable, equal bilaterally       Labs:   Recent Labs     07/30/21  0400 07/31/21  0617 08/01/21  0600   WBC 4.9 4.9 5.5   HGB 12.4 12.4 12.5   HCT 37.5 37.0 37.1    247 248     Recent Labs     07/30/21  0400 07/31/21  0617 08/01/21  0559    142 138   K 3.8 3.9 3.9    107 105   CO2 26 25 24   BUN 5* 5* 11   CREATININE 0.8 0.8 0.9   CALCIUM 9.1 9.0 8.9     Recent Labs     07/30/21  0400 07/31/21  0617 08/01/21  0559   * 53* 30   * 121* 84*   BILIDIR <0.2 <0.2  --    BILITOT <0.2 <0.2 <0.2   ALKPHOS 92 82 72     Urinalysis:      Lab Results   Component Value Date    NITRU Negative 07/27/2021    WBCUA None seen 07/27/2021    BACTERIA Rare 07/27/2021    RBCUA 5-10 07/27/2021    BLOODU MODERATE 07/27/2021    SPECGRAV 1.025 07/27/2021    GLUCOSEU Negative 07/27/2021    GLUCOSEU NEGATIVE 12/22/2011       Assessment/Plan:    Active Hospital Problems    Diagnosis     Abdominal pain, epigastric [R10.13]     Abdominal pain [R10.9]        Epigastric abdominal pain:  - CT A/P on 7/24 was unremarkable. - GI consulted in the ED.  Pt is s/p EGD on 7/27, found to have deep 12 mm penetrating ulcer located in the pyloric channel with extension into the duodenal bulb and patches of erosive antral gastritis. Plan for repeat EGD in 8-12 weeks. Path was negative. - Continue IV PPI and carafate. Continue prn analgesia. - Pt advised to avoid NSAIDs for the foreseeable future. - Stop IV fluids.  - Started on clear liquids and ADAT. - General surgery consulted per GI recs, recommend to continue with medical management.   - Repeat CT today given pt's ongoing pain and low-grade fever this morning. Elevated LFTs (improved):  - Etiology unclear. Were normal on admission. MRCP was unremarkable. Lipase is normal. Acute hepatitis panel was negative. Continue to trend. Constipation:  - Continue bowel regimen.      Tobacco abuse:  - Smoking cessation discussed/advised. DVT Prophylaxis: Lovenox  Diet: ADULT DIET;  Regular; Low Fiber  Code Status: Full Code    PT/OT Eval Status: not indicated     Dispo - 1-2 days     [address] [facility], APRN - CNP

## 2021-08-01 NOTE — PROGRESS NOTES
PROGRESS NOTE  S:40 yrs Patient  admitted on 7/27/2021 with Abdominal pain [R10.9] . Still with significant pain. Has history of tubal ligation  Current Hospital Schedued Meds   promethazine  12.5 mg Intramuscular Once    sennosides-docusate sodium  2 tablet Oral BID    pantoprazole  40 mg Intravenous BID    ALPRAZolam  1 mg Oral Nightly    sodium chloride flush  5-40 mL Intravenous 2 times per day    enoxaparin  40 mg Subcutaneous Daily    sucralfate  1 g Oral 4 times per day     Current Hospital IV Meds   sodium chloride       Current Hospital PRN Meds  promethazine, oxyCODONE-acetaminophen, morphine **OR** morphine, sodium chloride flush, sodium chloride, polyethylene glycol, acetaminophen **OR** acetaminophen, ondansetron **OR** ondansetron    Exam:   Vitals:    07/31/21 2000   BP: 119/81   Pulse: 59   Resp: 16   Temp: 98 °F (36.7 °C)   SpO2: 98%     I/O last 3 completed shifts:   In: 3105.3 [P.O.:960; I.V.:2145.3]  Out: -    General appearance: alert, appears stated age and cooperative  HEENT: PERRLA  Neck: no adenopathy, no carotid bruit, no JVD, supple, symmetrical, trachea midline and thyroid not enlarged, symmetric, no tenderness/mass/nodules  Lungs: clear to auscultation bilaterally  Heart: regular rate and rhythm, S1, S2 normal, no murmur, click, rub or gallop  Abdomen: soft, non-tender; bowel sounds normal; no masses,  no organomegaly  Extremities: extremities normal, atraumatic, no cyanosis or edema     Labs:  CBC:   Recent Labs     07/29/21  0716 07/30/21  0400 07/31/21  0617   WBC 5.3 4.9 4.9   HGB 13.1 12.4 12.4   HCT 38.7 37.5 37.0   MCV 88.6 88.7 88.8    245 247     BMP:   Recent Labs     07/29/21  0716 07/30/21  0400 07/31/21  0617    142 142   K 4.4 3.8 3.9    106 107   CO2 24 26 25   BUN 6* 5* 5*   CREATININE 0.7 0.8 0.8     LIVER PROFILE:   Recent Labs     07/29/21  0716 07/30/21  0400 07/31/21  0617   AST 95* 154* 53*   ALT 103* 181* 121*   LIPASE  --  27.0  --    PROT 6.4 6.1* 5.8*   BILIDIR <0.2 <0.2 <0.2   BILITOT <0.2 <0.2 <0.2   ALKPHOS 55 92 82     PT/INR: No results for input(s): INR in the last 72 hours. Invalid input(s): PT    IMAGING:  MRI ABDOMEN WO CONTRAST MRCP    Result Date: 7/30/2021  EXAMINATION: MRI OF THE ABDOMEN WITHOUT CONTRAST AND MRCP 7/30/2021 7:10 am TECHNIQUE: Multiplanar multisequence MRI of the abdomen was performed without the administration of intravenous contrast.  After initial T2 axial and coronal images, thick slab, thin slab and 3D coronal MRCP sequences were obtained without the administration of intravenous contrast.  MIP images are provided for review. COMPARISON: CT scan 07/24/2021 HISTORY: ORDERING SYSTEM PROVIDED HISTORY: Persistent epigastric pain - R/O biliary tract dis. besides PUD. TECHNOLOGIST PROVIDED HISTORY: Reason for exam:->Persistent epigastric pain - R/O biliary tract dis. besides PUD. Is the patient pregnant?->No Reason for Exam: elevated liver enzymes, bleeding ulcer Acuity: Acute Type of Exam: Unknown FINDINGS: Gallbladder: Gallbladder is surgically absent. Bile Ducts: There is mild intra and extrahepatic biliary ductal dilatation. Extrahepatic common duct measures 10 mm. No focal filling defects seen in the extrahepatic common duct. No significant loss of signal on out of phase images, to suggest fatty infiltration Pancreatic Duct: No pancreatic ductal dilatation. No peripancreatic fluid Other:  Adrenal glands appear normal.  No hydronephrosis on the right or left No significant small bowel distention noted. Moderate stool seen in the colon Postfusion changes are seen in the spine     Mild biliary ductal dilatation, likely secondary to the post cholecystectomy state. No retained common duct stone noted.  Moderate stool load        Hospital Problems         Last Modified POA    Abdominal pain 7/27/2021 Yes    Abdominal pain, epigastric 7/29/2021 Yes         Impression:  40 yo female with pyloric channel ulcer    Recommendation:  1. Continue ppi  2. Continue carafate  3. Avoid NSAIDs  4.  Add prostaglandins given persistent pain      Ulysses Sorto DO  10:11 PM 7/31/2021            [facility]    [address]      [address] [place] [address]     Phone: [phone]     Fax: [phone]

## 2021-08-02 VITALS
WEIGHT: 180 LBS | TEMPERATURE: 98.4 F | SYSTOLIC BLOOD PRESSURE: 129 MMHG | HEART RATE: 98 BPM | BODY MASS INDEX: 31.89 KG/M2 | OXYGEN SATURATION: 95 % | DIASTOLIC BLOOD PRESSURE: 80 MMHG | RESPIRATION RATE: 16 BRPM | HEIGHT: 63 IN

## 2021-08-02 PROCEDURE — 2580000003 HC RX 258: Performed by: REGISTERED NURSE

## 2021-08-02 PROCEDURE — 6370000000 HC RX 637 (ALT 250 FOR IP): Performed by: INTERNAL MEDICINE

## 2021-08-02 PROCEDURE — 6360000002 HC RX W HCPCS: Performed by: INTERNAL MEDICINE

## 2021-08-02 PROCEDURE — 6370000000 HC RX 637 (ALT 250 FOR IP): Performed by: NURSE PRACTITIONER

## 2021-08-02 PROCEDURE — 6370000000 HC RX 637 (ALT 250 FOR IP): Performed by: REGISTERED NURSE

## 2021-08-02 PROCEDURE — C9113 INJ PANTOPRAZOLE SODIUM, VIA: HCPCS | Performed by: REGISTERED NURSE

## 2021-08-02 PROCEDURE — 6360000002 HC RX W HCPCS: Performed by: REGISTERED NURSE

## 2021-08-02 PROCEDURE — 6370000000 HC RX 637 (ALT 250 FOR IP): Performed by: HOSPITALIST

## 2021-08-02 RX ORDER — OXYCODONE HYDROCHLORIDE AND ACETAMINOPHEN 5; 325 MG/1; MG/1
2 TABLET ORAL EVERY 4 HOURS PRN
Status: DISCONTINUED | OUTPATIENT
Start: 2021-08-02 | End: 2021-08-02 | Stop reason: HOSPADM

## 2021-08-02 RX ORDER — OXYCODONE HYDROCHLORIDE AND ACETAMINOPHEN 5; 325 MG/1; MG/1
2 TABLET ORAL EVERY 4 HOURS PRN
Qty: 30 TABLET | Refills: 0 | Status: SHIPPED | OUTPATIENT
Start: 2021-08-02 | End: 2021-08-07

## 2021-08-02 RX ORDER — PANTOPRAZOLE SODIUM 40 MG/1
40 TABLET, DELAYED RELEASE ORAL
Qty: 180 TABLET | Refills: 1 | Status: SHIPPED | OUTPATIENT
Start: 2021-08-02 | End: 2021-12-07

## 2021-08-02 RX ORDER — ONDANSETRON 4 MG/1
4 TABLET, ORALLY DISINTEGRATING ORAL EVERY 8 HOURS PRN
Qty: 30 TABLET | Refills: 0 | Status: ON HOLD | OUTPATIENT
Start: 2021-08-02 | End: 2021-08-20 | Stop reason: SDUPTHER

## 2021-08-02 RX ORDER — SUCRALFATE ORAL 1 G/10ML
1 SUSPENSION ORAL 4 TIMES DAILY
Qty: 1200 ML | Refills: 3 | Status: SHIPPED | OUTPATIENT
Start: 2021-08-02 | End: 2021-12-07

## 2021-08-02 RX ORDER — MISOPROSTOL 200 UG/1
200 TABLET ORAL
Qty: 120 TABLET | Refills: 3 | Status: SHIPPED | OUTPATIENT
Start: 2021-08-02 | End: 2022-01-31

## 2021-08-02 RX ADMIN — PROMETHAZINE HYDROCHLORIDE 12.5 MG: 25 TABLET ORAL at 06:39

## 2021-08-02 RX ADMIN — SODIUM CHLORIDE, PRESERVATIVE FREE 10 ML: 5 INJECTION INTRAVENOUS at 09:05

## 2021-08-02 RX ADMIN — PANTOPRAZOLE SODIUM 40 MG: 40 INJECTION, POWDER, FOR SOLUTION INTRAVENOUS at 09:01

## 2021-08-02 RX ADMIN — ENOXAPARIN SODIUM 40 MG: 40 INJECTION SUBCUTANEOUS at 09:01

## 2021-08-02 RX ADMIN — MORPHINE SULFATE 2 MG: 2 INJECTION, SOLUTION INTRAMUSCULAR; INTRAVENOUS at 09:01

## 2021-08-02 RX ADMIN — MORPHINE SULFATE 4 MG: 4 INJECTION, SOLUTION INTRAMUSCULAR; INTRAVENOUS at 03:07

## 2021-08-02 RX ADMIN — OXYCODONE AND ACETAMINOPHEN 1 TABLET: 5; 325 TABLET ORAL at 01:45

## 2021-08-02 RX ADMIN — MISOPROSTOL 200 MCG: 100 TABLET ORAL at 12:35

## 2021-08-02 RX ADMIN — SUCRALFATE 1 G: 1 TABLET ORAL at 11:34

## 2021-08-02 RX ADMIN — ONDANSETRON 4 MG: 2 INJECTION INTRAMUSCULAR; INTRAVENOUS at 01:45

## 2021-08-02 RX ADMIN — OXYCODONE HYDROCHLORIDE AND ACETAMINOPHEN 2 TABLET: 5; 325 TABLET ORAL at 12:35

## 2021-08-02 RX ADMIN — ONDANSETRON 4 MG: 4 TABLET, ORALLY DISINTEGRATING ORAL at 12:35

## 2021-08-02 RX ADMIN — SUCRALFATE 1 G: 1 TABLET ORAL at 06:40

## 2021-08-02 RX ADMIN — OXYCODONE AND ACETAMINOPHEN 1 TABLET: 5; 325 TABLET ORAL at 06:39

## 2021-08-02 RX ADMIN — MISOPROSTOL 200 MCG: 100 TABLET ORAL at 06:39

## 2021-08-02 ASSESSMENT — PAIN DESCRIPTION - PAIN TYPE
TYPE: ACUTE PAIN

## 2021-08-02 ASSESSMENT — PAIN SCALES - GENERAL
PAINLEVEL_OUTOF10: 4
PAINLEVEL_OUTOF10: 7
PAINLEVEL_OUTOF10: 4
PAINLEVEL_OUTOF10: 7
PAINLEVEL_OUTOF10: 6
PAINLEVEL_OUTOF10: 4
PAINLEVEL_OUTOF10: 6
PAINLEVEL_OUTOF10: 8
PAINLEVEL_OUTOF10: 3

## 2021-08-02 ASSESSMENT — PAIN DESCRIPTION - LOCATION
LOCATION: ABDOMEN

## 2021-08-02 NOTE — PROGRESS NOTES
<0.2 <0.2  --    BILITOT <0.2 <0.2 <0.2   ALKPHOS 92 82 72     PT/INR: No results for input(s): INR in the last 72 hours. Invalid input(s): PT    IMAGING:  CT ABDOMEN PELVIS WO CONTRAST Additional Contrast? None    Result Date: 8/1/2021  EXAMINATION: CT OF THE ABDOMEN AND PELVIS WITHOUT CONTRAST 8/1/2021 1:04 pm TECHNIQUE: CT of the abdomen and pelvis was performed without the administration of intravenous contrast. Multiplanar reformatted images are provided for review. Dose modulation, iterative reconstruction, and/or weight based adjustment of the mA/kV was utilized to reduce the radiation dose to as low as reasonably achievable. COMPARISON: MRI abdomen, 07/30/2021. CT abdomen pelvis, 07/25/2021. HISTORY: ORDERING SYSTEM PROVIDED HISTORY: gastric ulcer, worsening abdominal pain, fever TECHNOLOGIST PROVIDED HISTORY: Reason for exam:->gastric ulcer, worsening abdominal pain, fever Additional Contrast?->None Is the patient pregnant?->No Reason for Exam: known stomach ulcer, new onset pain radiating to left side of abd; nausea, fever Acuity: Acute Type of Exam: Initial Relevant Medical/Surgical History: cholecystectomy FINDINGS: Lower Chest: There is mild right basilar atelectasis. Organs: The liver is homogeneous and normal in attenuation. Cholecystectomy clips are present. No fluid collection is identified in the gallbladder fossa. There is mild post cholecystectomy prominence of the common duct. The pancreas, spleen and adrenal glands are unremarkable. The kidneys are symmetrical.  There is no hydronephrosis or obstructing calculus. GI/Bowel: There is a mild amount of food material in the stomach. No perigastric edema or mural irregularity is identified. Duodenum is unremarkable as well. There are no dilated loops of small bowel. There is no focal mural thickening of the colon. Appendix is normal in caliber. Pelvis: Urinary bladder and uterus are unremarkable.  Peritoneum/Retroperitoneum: Thoracic aorta

## 2021-08-02 NOTE — PLAN OF CARE
Problem: Nutrition  Goal: Optimal nutrition therapy  Outcome: Ongoing  Note: Nutrition Problem #1: Inadequate energy intake  Intervention: Food and/or Nutrient Delivery: Continue Current Diet  Nutritional Goals: Patient will eat 50% or greater of meals and supplements.

## 2021-08-02 NOTE — PROGRESS NOTES
Patient discharged. IV removed. Patient took belongings with her and refused a wheelchair. Ambulated to main entrance. Independent in room.  waiting at front for . AVS reviewed .

## 2021-08-02 NOTE — DISCHARGE SUMMARY
Hospital Medicine Discharge Summary    Patient ID: Román Guajardo      Patient's PCP: No primary care provider on file. Admit Date: 7/27/2021     Discharge Date:   8/2/21    Admitting Physician: Miguelina Pierre MD     Discharge Physician: DAQUAN Valle - CNP     Discharge Diagnoses: Active Hospital Problems    Diagnosis     Abdominal pain, epigastric [R10.13]     Abdominal pain [R10.9]        The patient was seen and examined on day of discharge and this discharge summary is in conjunction with any daily progress note from day of discharge. Hospital Course:   45 y.o. female with PMH of tobacco abuse and chronic back pain who presented to [facility] with complaints of epigastric abdominal pain. Pain started last Wednesday. She went to the ED on Saturday. Workup was unremarkable. It was suggested she be admitted however pt wanted to be dc'd as she was feeling better. Pain then came back and progressively worsened on Monday which prompted her to go back to the ED. She had associated N&V. No hematemesis, melena or hematochezia. No fever/chills. No dyspnea or chest pain. Pt takes a fair amount of ibuprofen for her chronic back pain. ED course: Afebrile. VSS. GI consulted. Pt was admitted for further evaluation and management.     Epigastric abdominal pain:  - CT A/P on 7/24 was unremarkable. - GI consulted in the ED. Pt is s/p EGD on 7/27, found to have deep 12 mm penetrating ulcer located in the pyloric channel with extension into the duodenal bulb and patches of erosive antral gastritis. Plan for repeat EGD in 8-12 weeks. Path was negative. - Continue PPI and carafate. Continue prn analgesia. - Pt advised to avoid NSAIDs for the foreseeable future. - Stop IV fluids.  - Started on clear liquids and ADAT.    - General surgery consulted per GI recs, recommend to continue with medical management.   - Repeat CT today given pt's ongoing pain and low-grade fever: No acute abdominopelvic abnormality. - GS was also consulted- no surgical needs   - 8/2 tolerating PO if does [facility] with only oral meds for pain can be DC to home with follow up      Elevated LFTs (improved):  - Etiology unclear. Were normal on admission. MRCP was unremarkable. Lipase is normal. Acute hepatitis panel was negative. Continue to trend.      Constipation:  - Continue bowel regimen.      Tobacco abuse:  - Smoking cessation discussed/advised.     Physical Exam Performed:     /78   Pulse 62   Temp 98.2 °F (36.8 °C) (Oral)   Resp 16   Ht 5' 3\" (1.6 m)   Wt 180 lb (81.6 kg)   LMP 07/27/2021   SpO2 99%   BMI 31.89 kg/m²       General appearance:  No apparent distress, appears stated age and cooperative. HEENT:  Normal cephalic, atraumatic without obvious deformity. Pupils equal, round, and reactive to light. Extra ocular muscles intact. Conjunctivae/corneas clear. Neck: Supple, with full range of motion. No jugular venous distention. Trachea midline. Respiratory:  Normal respiratory effort. Clear to auscultation, bilaterally without Rales/Wheezes/Rhonchi. Cardiovascular:  Regular rate and rhythm with normal S1/S2 without murmurs, rubs or gallops. Abdomen: Soft, non-tender, non-distended with normal bowel sounds. Musculoskeletal:  No clubbing, cyanosis or edema bilaterally. Full range of motion without deformity. Skin: Skin color, texture, turgor normal.  No rashes or lesions. Neurologic:  Neurovascularly intact without any focal sensory/motor deficits. Cranial nerves: II-XII intact, grossly non-focal.  Psychiatric:  Alert and oriented, thought content appropriate, normal insight  Capillary Refill: Brisk,< 3 seconds   Peripheral Pulses: +2 palpable, equal bilaterally       Labs:  For convenience and continuity at follow-up the following most recent labs are provided:      CBC:    Lab Results   Component Value Date    WBC 5.5 08/01/2021    HGB 12.5 08/01/2021    HCT 37.1 08/01/2021     08/01/2021       Renal:    Lab Results   Component Value Date     08/01/2021    K 3.9 08/01/2021     08/01/2021    CO2 24 08/01/2021    BUN 11 08/01/2021    CREATININE 0.9 08/01/2021    CALCIUM 8.9 08/01/2021         Significant Diagnostic Studies    Radiology:   CT ABDOMEN PELVIS WO CONTRAST Additional Contrast? None   Final Result   No findings of gastritis are identified. No acute abdominopelvic abnormality. Status post cholecystectomy, with mild post cholecystectomy prominence of the   common duct, stable since yesterday. XR CHEST PORTABLE   Final Result   No acute cardiopulmonary disease. MRI ABDOMEN WO CONTRAST MRCP   Final Result   Mild biliary ductal dilatation, likely secondary to the post cholecystectomy   state. No retained common duct stone noted. Moderate stool load                Consults:     IP CONSULT TO GI  IP CONSULT TO HOSPITALIST  IP CONSULT TO GENERAL SURGERY    Disposition:  Home     Condition at Discharge: Stable    Discharge Instructions/Follow-up:  GI     Code Status:  Full Code     Activity: activity as tolerated    Diet: regular diet      Discharge Medications:     Current Discharge Medication List           Details   omeprazole (PRILOSEC) 20 MG delayed release capsule Take 1 capsule by mouth every morning (before breakfast)  Qty: 30 capsule, Refills: 0      lidocaine (LIDODERM) 5 % Place 1 patch onto the skin daily 12 hours on, 12 hours off. Qty: 30 patch, Refills: 1    Associated Diagnoses: Chronic pain of left ankle; Osteochondral defect of ankle; Left ankle pain, unspecified chronicity; Acute left ankle pain      LAMOTRIGINE PO Take 100 mg by mouth daily For anxiety      ibuprofen (ADVIL;MOTRIN) 400 MG tablet Take 1 tablet by mouth every 6 hours as needed for Pain  Qty: 40 tablet, Refills: 0      ALPRAZolam (XANAX) 1 MG tablet Take 1 mg by mouth nightly.               Time Spent on discharge is more than 30 minutes in the examination, evaluation, counseling and review of medications and discharge plan. Signed:    DAQUAN Phan - CNP   8/2/2021      Thank you No primary care provider on file. for the opportunity to be involved in this patient's care. If you have any questions or concerns please feel free to contact me at [phone].

## 2021-08-02 NOTE — CARE COORDINATION
CASE MANAGEMENT DISCHARGE SUMMARY      Discharge to: Home with family and NN, provided [facility] information and PCP list prior.       New Durable Medical Equipment ordered/agency: None       Patient: yes       RN, name: Navid Alba RN

## 2021-08-02 NOTE — PROGRESS NOTES
Comprehensive Nutrition Assessment    Type and Reason for Visit:  Reassess    Nutrition Recommendations/Plan:   Continue low fiber diet  Encourage nutrition - pt declined ONS  Monitor po intakes, nutrition adequacy, weights, pertinent labs, BMs    Nutrition Assessment:  Follow up: Pt improving somewhat from a nutritional standpoint AEB pt report of pain/nausea improving some. Pt reports that her appetite remains small and she has only been eating ~25% of her meals. Currently on a low fiber diet. Pt declined ONS and reports that she tried Ensure and does not like it. Encouraged po intakes. Pt had no questions. Malnutrition Assessment:  Malnutrition Status: At risk for malnutrition (Comment)      Estimated Daily Nutrient Needs:  Energy (kcal):  3854-8864; Weight Used for Energy Requirements:  Ideal     Protein (g):  78-94; Weight Used for Protein Requirements:  Ideal (1.5-1.8)        Fluid (ml/day):  1 ml/kcal      Nutrition Related Findings:  Last BM 7/31. Continued pain and nausea - improving somewhat per pt      Wounds:  None       Current Nutrition Therapies:    ADULT DIET; Regular; Low Fiber    Anthropometric Measures:  · Height: 5' 3\" (160 cm)  · Current Body Weight: 180 lb (81.6 kg)   · Ideal Body Weight: 115 lbs  · BMI: 31.9  · BMI Categories: Obese Class 1 (BMI 30.0-34. 9)       Nutrition Diagnosis:   · Inadequate energy intake related to pain, altered GI function as evidenced by nausea, vomiting (significant pain, previous nausea and vomiting prior to admission)      Nutrition Interventions:   Food and/or Nutrient Delivery:  Continue Current Diet  Nutrition Education/Counseling:  No recommendation at this time   Coordination of Nutrition Care:  Continue to monitor while inpatient    Goals:  Patient will eat 50% or greater of meals and supplements.        Nutrition Monitoring and Evaluation:   Behavioral-Environmental Outcomes:  None Identified   Food/Nutrient Intake Outcomes:  Food and Nutrient Intake  Physical Signs/Symptoms Outcomes:  Biochemical Data, Nutrition Focused Physical Findings     Discharge Planning:    Continue current diet     Electronically signed by Butch Carlin RD, LD on 8/2/21 at 3:03 PM EDT    Contact: [phone]

## 2021-08-04 ENCOUNTER — TELEPHONE (OUTPATIENT)
Dept: GASTROENTEROLOGY | Age: 41
End: 2021-08-04

## 2021-08-04 NOTE — TELEPHONE ENCOUNTER
Patient called wanting to know how long she needs to remain on the low fiber diet. Patient aware that we are awaiting results of surgical pathology. Patient complains she is still in pain, get intense at times.

## 2021-08-04 NOTE — TELEPHONE ENCOUNTER
She needs to take low fiber diet for at least 1 more week. The pain will persist for some time even though the healing is taking place. When there is significant healing done, the pain will start improving.

## 2021-08-05 ENCOUNTER — TELEPHONE (OUTPATIENT)
Dept: PRIMARY CARE CLINIC | Age: 41
End: 2021-08-05

## 2021-08-05 ENCOUNTER — OFFICE VISIT (OUTPATIENT)
Dept: PRIMARY CARE CLINIC | Age: 41
End: 2021-08-05
Payer: COMMERCIAL

## 2021-08-05 VITALS
BODY MASS INDEX: 33.49 KG/M2 | HEIGHT: 63 IN | HEART RATE: 63 BPM | DIASTOLIC BLOOD PRESSURE: 66 MMHG | RESPIRATION RATE: 16 BRPM | SYSTOLIC BLOOD PRESSURE: 118 MMHG | WEIGHT: 189 LBS | OXYGEN SATURATION: 97 % | TEMPERATURE: 98.3 F

## 2021-08-05 DIAGNOSIS — E66.09 CLASS 1 OBESITY DUE TO EXCESS CALORIES WITHOUT SERIOUS COMORBIDITY WITH BODY MASS INDEX (BMI) OF 33.0 TO 33.9 IN ADULT: ICD-10-CM

## 2021-08-05 DIAGNOSIS — R10.13 ABDOMINAL PAIN, EPIGASTRIC: ICD-10-CM

## 2021-08-05 DIAGNOSIS — K25.9 GASTRIC ULCER WITHOUT HEMORRHAGE, PERFORATION, OR OBSTRUCTION, UNSPECIFIED ULCER CHRONICITY: ICD-10-CM

## 2021-08-05 DIAGNOSIS — R10.13 ABDOMINAL PAIN, EPIGASTRIC: Primary | ICD-10-CM

## 2021-08-05 PROCEDURE — 99203 OFFICE O/P NEW LOW 30 MIN: CPT | Performed by: NURSE PRACTITIONER

## 2021-08-05 PROCEDURE — 1111F DSCHRG MED/CURRENT MED MERGE: CPT | Performed by: NURSE PRACTITIONER

## 2021-08-05 RX ORDER — PROPRANOLOL HYDROCHLORIDE 10 MG/1
TABLET ORAL
COMMUNITY
Start: 2021-07-27

## 2021-08-05 RX ORDER — OXYCODONE HYDROCHLORIDE AND ACETAMINOPHEN 5; 325 MG/1; MG/1
2 TABLET ORAL EVERY 4 HOURS PRN
Qty: 30 TABLET | Refills: 0 | Status: CANCELLED | OUTPATIENT
Start: 2021-08-05 | End: 2021-08-10

## 2021-08-05 RX ORDER — LAMOTRIGINE 150 MG/1
TABLET ORAL
COMMUNITY
Start: 2021-07-13

## 2021-08-05 RX ORDER — ALPRAZOLAM 0.5 MG/1
TABLET ORAL
COMMUNITY
Start: 2021-07-02 | End: 2021-08-05

## 2021-08-05 RX ORDER — CHLORAL HYDRATE 500 MG
1000 CAPSULE ORAL EVERY MORNING
COMMUNITY

## 2021-08-05 RX ORDER — CHLORHEXIDINE GLUCONATE 0.12 MG/ML
RINSE ORAL
COMMUNITY
Start: 2021-06-18

## 2021-08-05 ASSESSMENT — ENCOUNTER SYMPTOMS
VOMITING: 1
DIARRHEA: 0
CONSTIPATION: 0
NAUSEA: 1
SHORTNESS OF BREATH: 0
COUGH: 0
ABDOMINAL PAIN: 1

## 2021-08-05 ASSESSMENT — ANXIETY QUESTIONNAIRES
3. WORRYING TOO MUCH ABOUT DIFFERENT THINGS: 3
5. BEING SO RESTLESS THAT IT IS HARD TO SIT STILL: 1
IF YOU CHECKED OFF ANY PROBLEMS ON THIS QUESTIONNAIRE, HOW DIFFICULT HAVE THESE PROBLEMS MADE IT FOR YOU TO DO YOUR WORK, TAKE CARE OF THINGS AT HOME, OR GET ALONG WITH OTHER PEOPLE: SOMEWHAT DIFFICULT
7. FEELING AFRAID AS IF SOMETHING AWFUL MIGHT HAPPEN: 1
1. FEELING NERVOUS, ANXIOUS, OR ON EDGE: 0
GAD7 TOTAL SCORE: 12
2. NOT BEING ABLE TO STOP OR CONTROL WORRYING: 3
4. TROUBLE RELAXING: 3
6. BECOMING EASILY ANNOYED OR IRRITABLE: 1

## 2021-08-05 ASSESSMENT — PATIENT HEALTH QUESTIONNAIRE - PHQ9
SUM OF ALL RESPONSES TO PHQ QUESTIONS 1-9: 0
SUM OF ALL RESPONSES TO PHQ QUESTIONS 1-9: 0
1. LITTLE INTEREST OR PLEASURE IN DOING THINGS: 0
SUM OF ALL RESPONSES TO PHQ QUESTIONS 1-9: 0
SUM OF ALL RESPONSES TO PHQ9 QUESTIONS 1 & 2: 0
2. FEELING DOWN, DEPRESSED OR HOPELESS: 0

## 2021-08-05 NOTE — TELEPHONE ENCOUNTER
----- Message from Mikala Arguelles 12 sent at 8/5/2021  4:11 PM EDT -----  Subject: Refill Request    QUESTIONS  Name of Medication? oxyCODONE-acetaminophen (PERCOCET) 5-325 MG per tablet  Patient-reported dosage and instructions? Take 1 tablet by mouth 2 times   daily for 30 days. How many days do you have left? 0  [id number]  Pharmacy phone number (if available)? [phone]  Additional Information for Provider? Patient had an appointment today and   was going to stop by [facility] to go  her prescription and would   like to have Saima Cleveland to try to refill today before she leaves the office  ---------------------------------------------------------------------------  --------------  [address]  What is the best way for the office to contact you? OK to leave message on   voicemail  Preferred Call Back Phone Number?  [phone]

## 2021-08-05 NOTE — PATIENT INSTRUCTIONS
[facility]     Patient Education        Low-Fiber Diet: Care Instructions  Your Care Instructions     When your bowels are irritated, you may need to limit fiber in your diet until the problem clears up. Your doctor and dietitian can help you design a low-fiber diet based on your health and what you prefer to eat. Ask your doctor how long you should stay on a low-fiber diet. Your doctor probably will have you start adding more fiber to your diet as you get better. Always talk with your doctor or dietitian before you make changes in your diet. Follow-up care is a key part of your treatment and safety. Be sure to make and go to all appointments, and call your doctor if you are having problems. It's also a good idea to know your test results and keep a list of the medicines you take. How can you care for yourself at home? · Choose white or refined grains, and avoid whole grains. That means eating white or refined cereals, breads, crackers, rice, or pasta. · Peel the skin from fruits and vegetables before you eat or cook them. Avoid eating skins, seeds, and hulls. ? Eat frozen or canned fruit. Low-fiber fruits include applesauce, ripe bananas, and fruit juice without pulp. ? Eat low-fiber vegetables, which include well-cooked vegetables and vegetable juice. · Drink or eat milk, yogurt, or other milk products, if you can digest dairy without too many problems. Your doctor may limit milk and milk products for a while. If so, he or she may recommend a calcium and vitamin D supplement. · Eat well-cooked meat, such as chicken, turkey, fish, or lean meat. You also can eat eggs and tofu. · Avoid these foods:  ? Bran, brown or wild rice, oatmeal, granola, corn, christie crackers, barley, and whole wheat and other whole-grain breads, such as rye bread  ? Cereals with more than 3 grams of fiber a serving  ? Berries, rhubarb, prunes, prune juice, and all dried fruits  ? Raw vegetables  ?  Cabbage, broccoli, brussels sprouts, and cauliflower  ? Cooked dried beans, lentils, and split peas  ? Crunchy peanut butter  ? Ice cream with fruit pieces in it  ? Coconut, nuts, popcorn, raisins, and seeds, or any ice cream, yogurt, or cheese with these in them  Where can you learn more? Go to [email or web address] and sign in to your [employer] account. Enter H565 in the [facility] box to learn more about \"Low-Fiber Diet: Care Instructions. \"     If you do not have an account, please click on the \"Sign Up Now\" link. Current as of: December 17, 2020               Content Version: 12.9  © 2006-2021 Healthwise, [employer]. Care instructions adapted under license by [facility] ([facility]). If you have questions about a medical condition or this instruction, always ask your healthcare professional. [place] [address] any warranty or liability for your use of this information. Low-fiber diet do's and don'ts  By [facility] Staff  Definition  Fiber is the part of fruits, vegetables and grains not digested by your body. A low-fiber diet restricts these foods. As a result, the amount of undigested material passing through your large intestine is limited and stool bulk is lessened. A low-fiber diet may be recommended for a number of conditions or situations. It is sometimes called a restricted-fiber diet. Purpose  Your doctor may prescribe a low-fiber diet if:    You have narrowing of the bowel due to a tumor or an inflammatory disease  You have had bowel surgery  You are having treatment, such as radiation, that damages or irritates your digestive tract  As your digestive system returns to normal, you usually can slowly add more fiber back into your diet. Diet details  A low-fiber diet limits the types of vegetables, fruits and grains that you can eat. Occasionally, your doctor also may want you to limit the amount of milk and milk products in your diet.  Milk doesn't contain fiber, but it may contribute to discomfort or diarrhea, especially if you're lactose intolerant. The ability to digest food varies from person to person. Depending on your condition and tolerance, your doctor may recommend a diet that is more or less restricted. If you're eating a low-fiber diet, be sure to read food labels. Foods you might not expect  such as yogurt, ice cream, cereal and even beverages  can have added fiber. Look for foods that have no more than 1-2 grams of fiber in one serving.     Avoid these foods and products made with them:    Nuts, seeds, dried fruit and coconut  Whole grains, popcorn, wheat germ and bran  Brown rice, wild rice, oatmeal, granola, shredded wheat, quinoa, bulgur and barley  Dried beans, baked beans, lima beans, peas and lentils  [place] peanut butter  Fruits and vegetables except those noted below  Choose these foods:    Tender meat, fish and poultry, ham, guy, shellfish, and lunch meat  Eggs, tofu and creamy peanut butter  Dairy products if tolerated  White rice and pasta  Baked goods made with refined wheat or rye flour, such as bread, biscuits, pancakes, waffles, bagels, saltines and christie crackers  Hot and cold cereals that have less than 2 grams of dietary fiber in a single serving, such as those made from rice  Canned or well-cooked potatoes, carrots and green beans  Plain tomato sauce  Vegetable and fruit juices  Bananas, melons, applesauce and canned peaches (no skin)  Butter, margarine, oils and salad dressings without seeds  A typical menu might look like this:    Breakfast  Cornflakes with milk  White toast, creamy peanut butter, jelly  Fruit juice  Coffee  Mid-morning snack  Yogurt without seeds  Water or other beverage  Noon meal  Malagasy  [place] sandwich on white bread with mayonnaise  Tomato soup  Canned peaches  Milk or other beverage  Afternoon snack  Cheese slices  Saltine crackers  Water or other beverage  Evening meal  Meat loaf  Mashed potatoes with butter  Cooked carrots  Applesauce  Milk or other beverage  Prepare all foods so that they're tender. Good cooking methods include simmering, poaching, stewing, steaming and braising. Baking or microwaving in a covered dish is another option. Keep in mind that you may have fewer bowel movements and smaller stools while you're following a low-fiber diet. To avoid constipation, you may need to drink extra fluids. Drink plenty of water unless your doctor tells you otherwise. Results  Eating a low-fiber diet will limit your bowel movements and help ease diarrhea or other symptoms of abdominal conditions, such as abdominal pain. Once your digestive system has returned to normal, you can slowly reintroduce fiber into your diet.

## 2021-08-05 NOTE — PROGRESS NOTES
PROGRESS NOTE  Date of Service:  8/5/2021  Address: [place] [address] [facility]  [address] [place] [address] [address] [facility]  Dept: [phone]  Loc: [phone]    Subjective:      Patient ID: [id number]  Corey Hatfield is a 45 y.o. female    HPI: patient is here to establish care, patient said that she is still having nausea and vomiting. Patient said that she is taking oxycodone for her stomach. Patient is sticking to the low fiber diet. Patient said she does get some relief for 2-3 hours. Patient said it does give relief for that time frame. Patient said the zofran does help with nausea. Patient was told that gi will not prescribe medications. Patient said she wants her to stay on the low fiber diet. Patient goes to gym 3-4 times a week, patient has tried several diets, patient said she is having a hard time loosing weight. Patient said she has tried keto and other things, patient said she did not struggle with weight until she was old. 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 Review of Systems   Constitutional: Negative for chills, fatigue and fever. Respiratory: Negative for cough and shortness of breath. Gastrointestinal: Positive for abdominal pain, nausea and vomiting. Negative for constipation and diarrhea. All other systems reviewed and are negative. Objective:   Physical Exam  Vitals reviewed. Constitutional:       Appearance: Normal appearance. Cardiovascular:      Rate and Rhythm: Normal rate and regular rhythm. Pulses: Normal pulses. Heart sounds: Normal heart sounds. Pulmonary:      Effort: Pulmonary effort is normal.      Breath sounds: Normal breath sounds. Skin:     Capillary Refill: Capillary refill takes less than 2 seconds. Neurological:      General: No focal deficit present. Mental Status: She is alert and oriented to person, place, and time.    Psychiatric:         Mood and Affect: Mood normal. Behavior: Behavior normal.         Thought Content: Thought content normal.         Judgment: Judgment normal.           Plan:   1. Abdominal pain, epigastric  -Patient was recently hospitalized for gastric ulcer. Patient is having pain still unable to eat without taking medications. Patient did call GI doctor's office and was told that he cannot prescribe medications. Will consult with GI about pain medication.  - CA DISCHARGE MEDS RECONCILED W/ CURRENT OUTPATIENT MED LIST    2. Class 1 obesity due to excess calories without serious comorbidity with body mass index (BMI) of 33.0 to 33.9 in adult  Long discussion with today with patient for calorie counting. Patient is tried several diets in the past such as keto and other diets related to this. Patient said she actually gained weight. Patient's has not seen weight gain until she was over the age of 28. Patient does exercise regularly. Patient will start calorie counting and come back in 1 month after calorie counting to reevaluate her food intake. Patient agrees with plan. Electronically signed by Enzo Bence, APRN - CNP on 8/5/21 at 2:57 PM EDT     This dictation was generated by voice recognition computer software. Although all attempts are made to edit the dictation for accuracy, there may be errors in the transcription that were not intended.

## 2021-08-05 NOTE — TELEPHONE ENCOUNTER
Medication:   Requested Prescriptions     Pending Prescriptions Disp Refills    oxyCODONE-acetaminophen (PERCOCET) 5-325 MG per tablet 30 tablet 0     Sig: Take 2 tablets by mouth every 4 hours as needed for Pain for up to 5 days. Last Filled:      Patient Phone Number: [phone] (home)     Last appt: 8/5/2021   Next appt: Visit date not found    Last OARRS:   RX Monitoring 8/2/2021   Acute Pain Prescriptions Severe pain not adequately treated with lower dose.    Periodic Controlled Substance Monitoring -

## 2021-08-06 DIAGNOSIS — K25.9 GASTRIC ULCER WITHOUT HEMORRHAGE, PERFORATION, OR OBSTRUCTION, UNSPECIFIED ULCER CHRONICITY: ICD-10-CM

## 2021-08-06 DIAGNOSIS — R10.13 ABDOMINAL PAIN, EPIGASTRIC: ICD-10-CM

## 2021-08-06 RX ORDER — OXYCODONE HYDROCHLORIDE AND ACETAMINOPHEN 5; 325 MG/1; MG/1
2 TABLET ORAL EVERY 4 HOURS PRN
Qty: 30 TABLET | Refills: 0 | OUTPATIENT
Start: 2021-08-06 | End: 2021-08-11

## 2021-08-06 NOTE — TELEPHONE ENCOUNTER
Pt called to miguel. On refill status for her :  Pt said she was in the hospital and received a short supply and thought that Yulia was sending the rest in yesterday:    Please advise. Medication  oxyCODONE-acetaminophen (PERCOCET) 5-325 MG per tablet [5940]  oxyCODONE-acetaminophen (PERCOCET) 5-325 MG per tablet [[id number]]     Order Details  Dose: 2 tablet Route: Oral Frequency: EVERY 4 HOURS PRN for Pain   Dispense Quantity: 30 tablet Refills: 0    Note to Pharmacy: Reduce doses taken as pain becomes manageable         Sig: Take 2 tablets by mouth every 4 hours as needed for Pain for up to 5 days.

## 2021-08-06 NOTE — TELEPHONE ENCOUNTER
Sent patient my chart message about medication, dr Praveen Koch does not want patient to have more oxycodone, this could be more harmful.

## 2021-08-06 NOTE — TELEPHONE ENCOUNTER
Medication:   Requested Prescriptions     Pending Prescriptions Disp Refills    oxyCODONE-acetaminophen (PERCOCET) 5-325 MG per tablet 30 tablet 0     Sig: Take 2 tablets by mouth every 4 hours as needed for Pain for up to 5 days. Last Filled:  08/02/2021 #30    Patient Phone Number: [phone] (home)     Last appt: 8/5/2021   Next appt: Visit date not found    Last OARRS:   RX Monitoring 8/2/2021   Acute Pain Prescriptions Severe pain not adequately treated with lower dose.    Periodic Controlled Substance Monitoring -

## 2021-08-09 DIAGNOSIS — M25.572 LEFT ANKLE PAIN, UNSPECIFIED CHRONICITY: ICD-10-CM

## 2021-08-09 DIAGNOSIS — G89.29 CHRONIC PAIN OF LEFT ANKLE: ICD-10-CM

## 2021-08-09 DIAGNOSIS — M95.8 OSTEOCHONDRAL DEFECT OF ANKLE: ICD-10-CM

## 2021-08-09 DIAGNOSIS — M25.572 ACUTE LEFT ANKLE PAIN: ICD-10-CM

## 2021-08-09 DIAGNOSIS — M25.572 CHRONIC PAIN OF LEFT ANKLE: ICD-10-CM

## 2021-08-09 RX ORDER — LIDOCAINE 50 MG/G
1 PATCH TOPICAL DAILY
Qty: 30 PATCH | Refills: 1 | Status: SHIPPED | OUTPATIENT
Start: 2021-08-09 | End: 2022-02-09 | Stop reason: SDUPTHER

## 2021-08-09 NOTE — TELEPHONE ENCOUNTER
Pt. Called asking if Catee would refill :    lidocaine (LIDODERM) 5 % [[id number]]     Order Details  Dose: 1 patch       Pharm.     [facility] [address] [place], [place] [id number]   Phone:  [phone]  Fax:  [phone]

## 2021-08-09 NOTE — TELEPHONE ENCOUNTER
Medication:   Requested Prescriptions     Pending Prescriptions Disp Refills    lidocaine (LIDODERM) 5 % 30 patch 1     Sig: Place 1 patch onto the skin daily 12 hours on, 12 hours off. Last Filled:  12/22/2020 #30 patches    Patient Phone Number: [phone] (home)     Last appt: 8/5/2021   Next appt: Visit date not found    Last OARRS:   RX Monitoring 8/2/2021   Acute Pain Prescriptions Severe pain not adequately treated with lower dose.    Periodic Controlled Substance Monitoring -

## 2021-08-11 NOTE — PROGRESS NOTES
Physician Progress Note      PATIENT:               Clayton Ho  CSN #:                  [id number]  :                       1980  ADMIT DATE:       2021 8:24 AM  [address] [place] DATE:        2021 5:30 PM  RESPONDING  PROVIDER #:        Nury Harkins CNP          QUERY TEXT:    Pt admitted with abdominal pain. Pt noted to have EGD on , found to have   deep 12 mm penetrating ulcer located in the pyloric channel with extension   into the duodenal bulb and patches of erosive antral gastritis. The surgical   consult note on  states:  Acute peptic ulcer. If possible, please   document in progress notes and discharge summary the etiology of abdominal   pain. The medical record reflects the following:  Risk Factors: NSAIDS  Clinical Indicators: EGD: deep 12 mm penetrating ulcer located in the pyloric   channel with extension into the duodenal bulb and patches of erosive antral   gastritis. The surgical consult note on  states:  Acute peptic ulcer. Treatment: egd, ppi, carafate, avoid nsaids, gi consult, surgical consult, CT    Thank you for your assistance,  Betys Jacques RN,BSN,CCDS,CRCR  Options provided:  -- Abdominal pain due to pyloric channel ulcer  -- Abdominal pain due to erosive antral gastritis  -- Abdominal pain due to acute peptic ulcer  -- Other - I will add my own diagnosis  -- Disagree - Not applicable / Not valid  -- Disagree - Clinically unable to determine / Unknown  -- Refer to Clinical Documentation Reviewer    PROVIDER RESPONSE TEXT:    The abdominal pain due to acute peptic ulcer.     Query created by: Arcelia Kenny on 2021 2:04 PM      Electronically signed by:  Nury Harkins CNP 8/10/2021 11:31 PM

## 2021-08-12 ENCOUNTER — TELEPHONE (OUTPATIENT)
Dept: PRIMARY CARE CLINIC | Age: 41
End: 2021-08-12

## 2021-08-12 ENCOUNTER — HOSPITAL ENCOUNTER (EMERGENCY)
Age: 41
Discharge: HOME | DRG: 384 | End: 2021-08-12
Attending: EMERGENCY MEDICINE
Payer: COMMERCIAL

## 2021-08-12 VITALS
BODY MASS INDEX: 31.89 KG/M2 | WEIGHT: 180 LBS | TEMPERATURE: 98 F | SYSTOLIC BLOOD PRESSURE: 127 MMHG | DIASTOLIC BLOOD PRESSURE: 84 MMHG | HEART RATE: 81 BPM | RESPIRATION RATE: 16 BRPM | OXYGEN SATURATION: 100 % | HEIGHT: 63 IN

## 2021-08-12 DIAGNOSIS — R10.13 ABDOMINAL PAIN, EPIGASTRIC: Primary | ICD-10-CM

## 2021-08-12 LAB
A/G RATIO: 1.6 (ref 1.1–2.2)
ALBUMIN SERPL-MCNC: 4.4 G/DL (ref 3.4–5)
ALP BLD-CCNC: 104 U/L (ref 40–129)
ALT SERPL-CCNC: 46 U/L (ref 10–40)
ANION GAP SERPL CALCULATED.3IONS-SCNC: 11 MMOL/L (ref 3–16)
AST SERPL-CCNC: 20 U/L (ref 15–37)
BASOPHILS ABSOLUTE: 0 K/UL (ref 0–0.2)
BASOPHILS RELATIVE PERCENT: 0.3 %
BILIRUB SERPL-MCNC: <0.2 MG/DL (ref 0–1)
BILIRUBIN URINE: NEGATIVE
BLOOD, URINE: ABNORMAL
BUN BLDV-MCNC: 12 MG/DL (ref 7–20)
CALCIUM SERPL-MCNC: 9.2 MG/DL (ref 8.3–10.6)
CHLORIDE BLD-SCNC: 100 MMOL/L (ref 99–110)
CLARITY: CLEAR
CO2: 23 MMOL/L (ref 21–32)
COLOR: YELLOW
CREAT SERPL-MCNC: 0.7 MG/DL (ref 0.6–1.1)
EOSINOPHILS ABSOLUTE: 0.3 K/UL (ref 0–0.6)
EOSINOPHILS RELATIVE PERCENT: 3.3 %
EPITHELIAL CELLS, UA: NORMAL /HPF (ref 0–5)
GFR AFRICAN AMERICAN: >60
GFR NON-AFRICAN AMERICAN: >60
GLOBULIN: 2.7 G/DL
GLUCOSE BLD-MCNC: 84 MG/DL (ref 70–99)
GLUCOSE URINE: NEGATIVE MG/DL
HCT VFR BLD CALC: 38.2 % (ref 36–48)
HEMOGLOBIN: 13 G/DL (ref 12–16)
KETONES, URINE: NEGATIVE MG/DL
LEUKOCYTE ESTERASE, URINE: NEGATIVE
LIPASE: 52 U/L (ref 13–60)
LYMPHOCYTES ABSOLUTE: 3.6 K/UL (ref 1–5.1)
LYMPHOCYTES RELATIVE PERCENT: 41.9 %
MCH RBC QN AUTO: 30 PG (ref 26–34)
MCHC RBC AUTO-ENTMCNC: 33.9 G/DL (ref 31–36)
MCV RBC AUTO: 88.4 FL (ref 80–100)
MICROSCOPIC EXAMINATION: YES
MONOCYTES ABSOLUTE: 0.5 K/UL (ref 0–1.3)
MONOCYTES RELATIVE PERCENT: 5.7 %
NEUTROPHILS ABSOLUTE: 4.2 K/UL (ref 1.7–7.7)
NEUTROPHILS RELATIVE PERCENT: 48.8 %
NITRITE, URINE: NEGATIVE
PDW BLD-RTO: 14.3 % (ref 12.4–15.4)
PH UA: 6 (ref 5–8)
PLATELET # BLD: 365 K/UL (ref 135–450)
PMV BLD AUTO: 8 FL (ref 5–10.5)
POTASSIUM REFLEX MAGNESIUM: 3.8 MMOL/L (ref 3.5–5.1)
PROTEIN UA: NEGATIVE MG/DL
RBC # BLD: 4.32 M/UL (ref 4–5.2)
RBC UA: NORMAL /HPF (ref 0–4)
SODIUM BLD-SCNC: 134 MMOL/L (ref 136–145)
SPECIFIC GRAVITY UA: <=1.005 (ref 1–1.03)
SPECIMEN STATUS: NORMAL
TOTAL PROTEIN: 7.1 G/DL (ref 6.4–8.2)
URINE TYPE: ABNORMAL
UROBILINOGEN, URINE: 0.2 E.U./DL
WBC # BLD: 8.6 K/UL (ref 4–11)
WBC UA: NORMAL /HPF (ref 0–5)

## 2021-08-12 PROCEDURE — 2580000003 HC RX 258: Performed by: EMERGENCY MEDICINE

## 2021-08-12 PROCEDURE — 80053 COMPREHEN METABOLIC PANEL: CPT

## 2021-08-12 PROCEDURE — 96375 TX/PRO/DX INJ NEW DRUG ADDON: CPT

## 2021-08-12 PROCEDURE — 99283 EMERGENCY DEPT VISIT LOW MDM: CPT

## 2021-08-12 PROCEDURE — 2500000003 HC RX 250 WO HCPCS: Performed by: EMERGENCY MEDICINE

## 2021-08-12 PROCEDURE — 6370000000 HC RX 637 (ALT 250 FOR IP): Performed by: EMERGENCY MEDICINE

## 2021-08-12 PROCEDURE — 6360000002 HC RX W HCPCS: Performed by: EMERGENCY MEDICINE

## 2021-08-12 PROCEDURE — 85025 COMPLETE CBC W/AUTO DIFF WBC: CPT

## 2021-08-12 PROCEDURE — 81001 URINALYSIS AUTO W/SCOPE: CPT

## 2021-08-12 PROCEDURE — 96374 THER/PROPH/DIAG INJ IV PUSH: CPT

## 2021-08-12 PROCEDURE — 83690 ASSAY OF LIPASE: CPT

## 2021-08-12 RX ORDER — 0.9 % SODIUM CHLORIDE 0.9 %
1000 INTRAVENOUS SOLUTION INTRAVENOUS ONCE
Status: COMPLETED | OUTPATIENT
Start: 2021-08-12 | End: 2021-08-12

## 2021-08-12 RX ORDER — ONDANSETRON 2 MG/ML
4 INJECTION INTRAMUSCULAR; INTRAVENOUS ONCE
Status: COMPLETED | OUTPATIENT
Start: 2021-08-12 | End: 2021-08-12

## 2021-08-12 RX ADMIN — SODIUM CHLORIDE 1000 ML: 9 INJECTION, SOLUTION INTRAVENOUS at 03:51

## 2021-08-12 RX ADMIN — ONDANSETRON 4 MG: 2 INJECTION INTRAMUSCULAR; INTRAVENOUS at 03:46

## 2021-08-12 RX ADMIN — FAMOTIDINE 20 MG: 10 INJECTION, SOLUTION INTRAVENOUS at 03:46

## 2021-08-12 RX ADMIN — MAGNESIUM HYDROXIDE/ALUMINUM HYDROXICE/SIMETHICONE: 120; 1200; 1200 SUSPENSION ORAL at 03:46

## 2021-08-12 ASSESSMENT — ENCOUNTER SYMPTOMS
ABDOMINAL PAIN: 1
VOMITING: 0
SHORTNESS OF BREATH: 0

## 2021-08-12 ASSESSMENT — PAIN SCALES - GENERAL: PAINLEVEL_OUTOF10: 6

## 2021-08-12 NOTE — ED PROVIDER NOTES
[address] [facility]  ED  EMERGENCY DEPARTMENT ENCOUNTER      Pt Name: Candis Lebron  MRN: [id number]  Armstrongf[birth year]ior 1980  Date of evaluation: 8/12/2021  Provider: Devora Silver MD    CHIEF COMPLAINT       Chief Complaint   Patient presents with    Abdominal Pain     Dx 3 weeks ago with \"bleeding ulcer\". HISTORY OF PRESENT ILLNESS   (Location/Symptom, Timing/Onset,Context/Setting, Quality, Duration, Modifying Factors, Severity)  Note limiting factors. Candis Lebron is a 45 y.o. female who presents to the emergency department for epigastric abdominal pain radiating to the chest.  The patient was recently diagnosed with a large esophageal ulcer. He states that the medications are not working. She is not having any GI bleed. No vomiting although she has nausea. She contacted her GI on-call number and they recommended that she come to the emergency room. Nursing notes were reviewed. REVIEW OF SYSTEMS    (2-9 systems for level 4, 10 or more for level 5)     Review of Systems   Constitutional: Negative for fever. Respiratory: Negative for shortness of breath. Cardiovascular: Positive for chest pain. Gastrointestinal: Positive for abdominal pain. Negative for vomiting.          PAST MEDICAL HISTORY     Past Medical History:   Diagnosis Date    Abnormal Pap smear     WNL since    Anxiety     Back pain     herniated disc    Back pain     had back surgery (fusion of 3 discs) in march 2011    Placenta previa     resolved at 28 weeks with this 3rd pregnancy    Tubal pregnancy     Pt states bilateral fallopian tubes removed         SURGICALHISTORY       Past Surgical History:   Procedure Laterality Date    ANESTHESIA NERVE BLOCK Right 5/21/2020    RIGHT HIP INJECTION SITE CONFIRMED BY FLUOROSCOPY performed by Keli Durbin MD at [address] ARTHROSCOPY Left 11/19/2020    VIDEO ARTHROSCOPY LEFT ANKLE, SYNOVECTOMY, LOOSE BODY REMOVAL, MICROFRACTURE -BLOCK- performed by Heather Rodríguez MD at [facility] Right     CHOLECYSTECTOMY      laparoscopic    ECTOPIC PREGNANCY SURGERY  10/2017    LAPAROSCOPY  4/20/10    with salpingostomy  ( ectopic pregnancy)    LUMBAR DISC SURGERY      LUMBAR FUSION  3-2011    L3-L5 fusion    TONSILLECTOMY  2000    UPPER GASTROINTESTINAL ENDOSCOPY N/A 7/27/2021    EGD BIOPSY performed by Staci Morillo MD at [address] [place] [address]       Discharge Medication List as of 8/12/2021  5:16 AM      CONTINUE these medications which have NOT CHANGED    Details   lidocaine (LIDODERM) 5 % Place 1 patch onto the skin daily 12 hours on, 12 hours off., Disp-30 patch, R-1Normal      chlorhexidine (PERIDEX) 0.12 % solution Historical Med      lamoTRIgine (LAMICTAL) 150 MG tablet TAKE ONE TABLET BY MOUTH TWICE DAILYHistorical Med      Omega-3 Fatty Acids (FISH OIL) 1000 MG CAPS Take 1,000 mg by mouth every morningHistorical Med      propranolol (INDERAL) 10 MG tablet TAKE ONE TABLET BY MOUTH TWICE DAILY AS NEEDEDHistorical Med      pantoprazole (PROTONIX) 40 MG tablet Take 1 tablet by mouth 2 times daily (before meals), Disp-180 tablet, R-1Normal      ondansetron (ZOFRAN-ODT) 4 MG disintegrating tablet Take 1 tablet by mouth every 8 hours as needed for Nausea or Vomiting, Disp-30 tablet, R-0Normal      miSOPROStol (CYTOTEC) 200 MCG tablet Take 1 tablet by mouth 4 times daily (before meals and nightly), Disp-120 tablet, R-3Normal      sucralfate (CARAFATE) 1 GM/10ML suspension Take 10 mLs by mouth 4 times daily, Disp-1200 mL, R-3Normal      ALPRAZolam (XANAX) 1 MG tablet Take 1 mg by mouth nightly.  Historical Med             ALLERGIES     Demerol, Doxycycline, Lorazepam, and Vancomycin    FAMILY HISTORY       Family History   Problem Relation Age of Onset    High Blood Pressure Father     Diabetes Maternal Aunt           SOCIAL HISTORY       Social History Socioeconomic History    Marital status:      Spouse name: Not on file    Number of children: Not on file    Years of education: Not on file    Highest education level: Not on file   Occupational History    Not on file   Tobacco Use    Smoking status: Former Smoker     Packs/day: 0.50     Years: 21.00     Pack years: 10.50     Types: Cigarettes     Start date: 1999     Quit date: 2021     Years since quittin.0    Smokeless tobacco: Never Used   Vaping Use    Vaping Use: Never used   Substance and Sexual Activity    Alcohol use: Yes     Alcohol/week: 1.0 standard drinks     Types: 1 Cans of beer per week     Comment: social drinker    Drug use: No    Sexual activity: Yes     Partners: Male   Other Topics Concern    Not on file   Social History Narrative    Not on file     Social Determinants of Health     Financial Resource Strain:     Difficulty of Paying Living Expenses:    Food Insecurity:     Worried About Running Out of Food in the Last Year:     [address] in the Last Year:    Transportation Needs:     Lack of Transportation (Medical):      Lack of Transportation (Non-Medical):    Physical Activity:     Days of Exercise per Week:     Minutes of Exercise per Session:    Stress:     Feeling of Stress :    Social Connections:     Frequency of Communication with Friends and Family:     Frequency of Social Gatherings with Friends and Family:     Attends Gnosticist Services:     Active Member of Clubs or Organizations:     Attends Club or Organization Meetings:     Marital Status:    Intimate Partner Violence:     Fear of Current or Ex-Partner:     Emotionally Abused:     Physically Abused:     Sexually Abused:        SCREENINGS             PHYSICAL EXAM    (up to 7 for level 4, 8 or more for level 5)     ED Triage Vitals   BP Temp Temp Source Pulse Resp SpO2 Height Weight   21 0039 21 0037 21 0037 21 0039 21 0037 21 0039 08/12/21 0037 08/12/21 0037   (!) 155/102 98 °F (36.7 °C) Temporal 105 16 100 % 5' 3\" (1.6 m) 180 lb (81.6 kg)       Physical Exam  Vitals and nursing note reviewed. Constitutional:       Appearance: Normal appearance. She is well-developed. She is not ill-appearing. HENT:      Head: Normocephalic and atraumatic. Right Ear: External ear normal.      Left Ear: External ear normal.      Nose: Nose normal.   Eyes:      General: No scleral icterus. Right eye: No discharge. Left eye: No discharge. Conjunctiva/sclera: Conjunctivae normal.   Cardiovascular:      Rate and Rhythm: Normal rate and regular rhythm. Heart sounds: Normal heart sounds. Pulmonary:      Effort: Pulmonary effort is normal. No respiratory distress. Breath sounds: Normal breath sounds. No wheezing or rales. Abdominal:      General: Bowel sounds are increased. There is no distension. Palpations: Abdomen is soft. Tenderness: There is no abdominal tenderness. There is no guarding or rebound. Musculoskeletal:      Cervical back: Neck supple. Skin:     Coloration: Skin is not pale. Neurological:      Mental Status: She is alert.    Psychiatric:         Mood and Affect: Mood normal.         Behavior: Behavior normal.           DIAGNOSTIC RESULTS     EKG: All EKG's are interpreted by the Emergency Department Physician who either signs or Co-signs this chart in the absence of a cardiologist.    12 lead EKG shows   RADIOLOGY:   Non-plain film images such as CT, Ultrasound and MRI are read by the radiologist. Plain radiographic images are visualized and preliminarily interpreted by the emergency physician with the below findings:        Interpretation per the Radiologist below, if available at the time of this note:    No orders to display         ED BEDSIDE ULTRASOUND:   Performed by ED Physician - none    LABS:  Labs Reviewed   URINALYSIS - Abnormal; Notable for the following components:       Result Value    Blood, Urine SMALL (*)     All other components within normal limits    Narrative:     Performed at:  [address], [facility]   Phone [phone]   COMPREHENSIVE METABOLIC PANEL W/ REFLEX TO MG FOR LOW K - Abnormal; Notable for the following components:    Sodium 134 (*)     ALT 46 (*)     All other components within normal limits    Narrative:     Performed at:  [address], [facility] [employer]   Phone [phone]   MICROSCOPIC URINALYSIS    Narrative:     Performed at:  [address], [facility] [employer]   Phone [phone]   CBC WITH AUTO DIFFERENTIAL    Narrative:     Performed at:  [address], [facility]   Phone [phone]   LIPASE    Narrative:     Performed at:  [address], [facility] [employer]   Phone [phone]       All other labs were within normal range or not returned as of this dictation. EMERGENCY DEPARTMENT COURSE and DIFFERENTIAL DIAGNOSIS/MDM:   Vitals:    Vitals:    08/12/21 0037 08/12/21 0039 08/12/21 0513   BP:  (!) 155/102 127/84   Pulse:  105 81   Resp: 16 16 16   Temp: 98 °F (36.7 °C)     TempSrc: Temporal     SpO2:  100% 100%   Weight: 180 lb (81.6 kg)     Height: 5' 3\" (1.6 m)         Adult female who comes in for abdominal pain. Basic laboratory studies ordered and showed no acute process per the patient is given Pepcid and a GI cocktail. Upon reassessment her pain is trending down patient is resting comfortably. Patient's chart is reviewed and she does have images of her study. Based on history physical exam and diagnostic work-up I do not believe that further work-up is required here in the emergency room.   I recommend that the patient call her gastroenterologist in a few hours to schedule a follow-up. She does have an appointment next Monday but they may want to see her sooner. CRITICAL CARE TIME   None       CONSULTS:  None    PROCEDURES:       Procedures    FINAL IMPRESSION      1. Abdominal pain, epigastric          DISPOSITION/PLAN   DISPOSITION Decision To Discharge 08/12/2021 05:20:20 AM      PATIENT REFERREDTO:    Please contact your gastroenterologist later this morning so that you can be reassessed.   Schedule an appointment as soon as possible for a visit         DISCHARGEMEDICATIONS:  Discharge Medication List as of 8/12/2021  5:16 AM             (Please note that portions of this note were completed with a voice recognition program.  Efforts were made to edit the dictations but occasionally words are mis-transcribed.)    Soheila Rick MD (electronically signed)  Attending Emergency Physician        Soheila Rick MD  08/12/21 6139

## 2021-08-13 ENCOUNTER — TELEPHONE (OUTPATIENT)
Dept: GASTROENTEROLOGY | Age: 41
End: 2021-08-13

## 2021-08-13 ENCOUNTER — APPOINTMENT (OUTPATIENT)
Dept: GENERAL RADIOLOGY | Age: 41
DRG: 384 | End: 2021-08-13
Payer: COMMERCIAL

## 2021-08-13 ENCOUNTER — TELEPHONE (OUTPATIENT)
Dept: PRIMARY CARE CLINIC | Age: 41
End: 2021-08-13

## 2021-08-13 ENCOUNTER — HOSPITAL ENCOUNTER (INPATIENT)
Age: 41
LOS: 7 days | Discharge: HOME | DRG: 384 | End: 2021-08-20
Attending: STUDENT IN AN ORGANIZED HEALTH CARE EDUCATION/TRAINING PROGRAM | Admitting: INTERNAL MEDICINE
Payer: COMMERCIAL

## 2021-08-13 DIAGNOSIS — K27.9 PUD (PEPTIC ULCER DISEASE): Primary | ICD-10-CM

## 2021-08-13 DIAGNOSIS — K27.9 PUD (PEPTIC ULCER DISEASE): ICD-10-CM

## 2021-08-13 DIAGNOSIS — K25.3 ACUTE GASTRIC ULCER, UNSPECIFIED WHETHER GASTRIC ULCER HEMORRHAGE OR PERFORATION PRESENT: ICD-10-CM

## 2021-08-13 DIAGNOSIS — R10.13 ABDOMINAL PAIN, EPIGASTRIC: Primary | ICD-10-CM

## 2021-08-13 LAB
A/G RATIO: 1.5 (ref 1.1–2.2)
ALBUMIN SERPL-MCNC: 4.4 G/DL (ref 3.4–5)
ALP BLD-CCNC: 93 U/L (ref 40–129)
ALT SERPL-CCNC: 33 U/L (ref 10–40)
ANION GAP SERPL CALCULATED.3IONS-SCNC: 10 MMOL/L (ref 3–16)
AST SERPL-CCNC: 18 U/L (ref 15–37)
BACTERIA: ABNORMAL /HPF
BASOPHILS ABSOLUTE: 0.1 K/UL (ref 0–0.2)
BASOPHILS RELATIVE PERCENT: 0.8 %
BILIRUB SERPL-MCNC: <0.2 MG/DL (ref 0–1)
BILIRUBIN URINE: NEGATIVE
BLOOD, URINE: ABNORMAL
BUN BLDV-MCNC: 10 MG/DL (ref 7–20)
CALCIUM SERPL-MCNC: 9.7 MG/DL (ref 8.3–10.6)
CHLORIDE BLD-SCNC: 104 MMOL/L (ref 99–110)
CLARITY: CLEAR
CO2: 23 MMOL/L (ref 21–32)
COLOR: YELLOW
CREAT SERPL-MCNC: 0.8 MG/DL (ref 0.6–1.1)
EKG ATRIAL RATE: 74 BPM
EKG DIAGNOSIS: NORMAL
EKG P AXIS: 45 DEGREES
EKG P-R INTERVAL: 154 MS
EKG Q-T INTERVAL: 386 MS
EKG QRS DURATION: 78 MS
EKG QTC CALCULATION (BAZETT): 428 MS
EKG R AXIS: 23 DEGREES
EKG T AXIS: 34 DEGREES
EKG VENTRICULAR RATE: 74 BPM
EOSINOPHILS ABSOLUTE: 0.1 K/UL (ref 0–0.6)
EOSINOPHILS RELATIVE PERCENT: 1.8 %
EPITHELIAL CELLS, UA: ABNORMAL /HPF (ref 0–5)
GFR AFRICAN AMERICAN: >60
GFR NON-AFRICAN AMERICAN: >60
GLOBULIN: 2.9 G/DL
GLUCOSE BLD-MCNC: 98 MG/DL (ref 70–99)
GLUCOSE URINE: NEGATIVE MG/DL
HCG QUALITATIVE: NEGATIVE
HCT VFR BLD CALC: 40 % (ref 36–48)
HEMOGLOBIN: 13.1 G/DL (ref 12–16)
KETONES, URINE: NEGATIVE MG/DL
LEUKOCYTE ESTERASE, URINE: NEGATIVE
LIPASE: 43 U/L (ref 13–60)
LYMPHOCYTES ABSOLUTE: 1.6 K/UL (ref 1–5.1)
LYMPHOCYTES RELATIVE PERCENT: 23.7 %
MCH RBC QN AUTO: 29 PG (ref 26–34)
MCHC RBC AUTO-ENTMCNC: 32.8 G/DL (ref 31–36)
MCV RBC AUTO: 88.6 FL (ref 80–100)
MICROSCOPIC EXAMINATION: YES
MONOCYTES ABSOLUTE: 0.3 K/UL (ref 0–1.3)
MONOCYTES RELATIVE PERCENT: 4.4 %
NEUTROPHILS ABSOLUTE: 4.7 K/UL (ref 1.7–7.7)
NEUTROPHILS RELATIVE PERCENT: 69.3 %
NITRITE, URINE: NEGATIVE
PDW BLD-RTO: 14.3 % (ref 12.4–15.4)
PH UA: 7.5 (ref 5–8)
PLATELET # BLD: 361 K/UL (ref 135–450)
PMV BLD AUTO: 7.8 FL (ref 5–10.5)
POTASSIUM REFLEX MAGNESIUM: 4.4 MMOL/L (ref 3.5–5.1)
PROTEIN UA: NEGATIVE MG/DL
RBC # BLD: 4.51 M/UL (ref 4–5.2)
RBC UA: ABNORMAL /HPF (ref 0–4)
SODIUM BLD-SCNC: 137 MMOL/L (ref 136–145)
SPECIFIC GRAVITY UA: 1.01 (ref 1–1.03)
TOTAL PROTEIN: 7.3 G/DL (ref 6.4–8.2)
URINE TYPE: ABNORMAL
UROBILINOGEN, URINE: 0.2 E.U./DL
WBC # BLD: 6.8 K/UL (ref 4–11)
WBC UA: ABNORMAL /HPF (ref 0–5)

## 2021-08-13 PROCEDURE — 80053 COMPREHEN METABOLIC PANEL: CPT

## 2021-08-13 PROCEDURE — 71045 X-RAY EXAM CHEST 1 VIEW: CPT

## 2021-08-13 PROCEDURE — 99285 EMERGENCY DEPT VISIT HI MDM: CPT

## 2021-08-13 PROCEDURE — 83690 ASSAY OF LIPASE: CPT

## 2021-08-13 PROCEDURE — 6360000002 HC RX W HCPCS: Performed by: STUDENT IN AN ORGANIZED HEALTH CARE EDUCATION/TRAINING PROGRAM

## 2021-08-13 PROCEDURE — 6360000002 HC RX W HCPCS: Performed by: NURSE PRACTITIONER

## 2021-08-13 PROCEDURE — C9113 INJ PANTOPRAZOLE SODIUM, VIA: HCPCS | Performed by: STUDENT IN AN ORGANIZED HEALTH CARE EDUCATION/TRAINING PROGRAM

## 2021-08-13 PROCEDURE — 6360000002 HC RX W HCPCS: Performed by: REGISTERED NURSE

## 2021-08-13 PROCEDURE — C9113 INJ PANTOPRAZOLE SODIUM, VIA: HCPCS | Performed by: REGISTERED NURSE

## 2021-08-13 PROCEDURE — 2500000003 HC RX 250 WO HCPCS: Performed by: STUDENT IN AN ORGANIZED HEALTH CARE EDUCATION/TRAINING PROGRAM

## 2021-08-13 PROCEDURE — 84703 CHORIONIC GONADOTROPIN ASSAY: CPT

## 2021-08-13 PROCEDURE — 96375 TX/PRO/DX INJ NEW DRUG ADDON: CPT

## 2021-08-13 PROCEDURE — 93005 ELECTROCARDIOGRAM TRACING: CPT | Performed by: STUDENT IN AN ORGANIZED HEALTH CARE EDUCATION/TRAINING PROGRAM

## 2021-08-13 PROCEDURE — 93010 ELECTROCARDIOGRAM REPORT: CPT | Performed by: INTERNAL MEDICINE

## 2021-08-13 PROCEDURE — 6370000000 HC RX 637 (ALT 250 FOR IP)

## 2021-08-13 PROCEDURE — 85025 COMPLETE CBC W/AUTO DIFF WBC: CPT

## 2021-08-13 PROCEDURE — 81001 URINALYSIS AUTO W/SCOPE: CPT

## 2021-08-13 PROCEDURE — 96374 THER/PROPH/DIAG INJ IV PUSH: CPT

## 2021-08-13 PROCEDURE — 36415 COLL VENOUS BLD VENIPUNCTURE: CPT

## 2021-08-13 PROCEDURE — 6370000000 HC RX 637 (ALT 250 FOR IP): Performed by: STUDENT IN AN ORGANIZED HEALTH CARE EDUCATION/TRAINING PROGRAM

## 2021-08-13 PROCEDURE — 6370000000 HC RX 637 (ALT 250 FOR IP): Performed by: REGISTERED NURSE

## 2021-08-13 PROCEDURE — 96376 TX/PRO/DX INJ SAME DRUG ADON: CPT

## 2021-08-13 PROCEDURE — 1200000000 HC SEMI PRIVATE

## 2021-08-13 PROCEDURE — 2580000003 HC RX 258: Performed by: REGISTERED NURSE

## 2021-08-13 RX ORDER — MORPHINE SULFATE 4 MG/ML
4 INJECTION, SOLUTION INTRAMUSCULAR; INTRAVENOUS ONCE
Status: COMPLETED | OUTPATIENT
Start: 2021-08-13 | End: 2021-08-13

## 2021-08-13 RX ORDER — PANTOPRAZOLE SODIUM 40 MG/10ML
40 INJECTION, POWDER, LYOPHILIZED, FOR SOLUTION INTRAVENOUS 2 TIMES DAILY
Status: DISCONTINUED | OUTPATIENT
Start: 2021-08-13 | End: 2021-08-20 | Stop reason: HOSPADM

## 2021-08-13 RX ORDER — SODIUM CHLORIDE 0.9 % (FLUSH) 0.9 %
5-40 SYRINGE (ML) INJECTION EVERY 12 HOURS SCHEDULED
Status: DISCONTINUED | OUTPATIENT
Start: 2021-08-13 | End: 2021-08-20 | Stop reason: HOSPADM

## 2021-08-13 RX ORDER — LAMOTRIGINE 100 MG/1
150 TABLET ORAL 2 TIMES DAILY
Status: DISCONTINUED | OUTPATIENT
Start: 2021-08-13 | End: 2021-08-20 | Stop reason: HOSPADM

## 2021-08-13 RX ORDER — SODIUM CHLORIDE 9 MG/ML
25 INJECTION, SOLUTION INTRAVENOUS PRN
Status: DISCONTINUED | OUTPATIENT
Start: 2021-08-13 | End: 2021-08-20 | Stop reason: HOSPADM

## 2021-08-13 RX ORDER — SUCRALFATE 1 G/1
1 TABLET ORAL EVERY 6 HOURS SCHEDULED
Status: DISCONTINUED | OUTPATIENT
Start: 2021-08-13 | End: 2021-08-20 | Stop reason: HOSPADM

## 2021-08-13 RX ORDER — ONDANSETRON 4 MG/1
4 TABLET, ORALLY DISINTEGRATING ORAL EVERY 8 HOURS PRN
Status: DISCONTINUED | OUTPATIENT
Start: 2021-08-13 | End: 2021-08-20 | Stop reason: HOSPADM

## 2021-08-13 RX ORDER — POLYETHYLENE GLYCOL 3350 17 G/17G
17 POWDER, FOR SOLUTION ORAL DAILY PRN
Status: DISCONTINUED | OUTPATIENT
Start: 2021-08-13 | End: 2021-08-17

## 2021-08-13 RX ORDER — ACETAMINOPHEN 650 MG/1
650 SUPPOSITORY RECTAL EVERY 6 HOURS PRN
Status: DISCONTINUED | OUTPATIENT
Start: 2021-08-13 | End: 2021-08-20 | Stop reason: HOSPADM

## 2021-08-13 RX ORDER — ONDANSETRON 4 MG/1
TABLET, ORALLY DISINTEGRATING ORAL
Status: COMPLETED
Start: 2021-08-13 | End: 2021-08-13

## 2021-08-13 RX ORDER — PANTOPRAZOLE SODIUM 40 MG/10ML
40 INJECTION, POWDER, LYOPHILIZED, FOR SOLUTION INTRAVENOUS ONCE
Status: COMPLETED | OUTPATIENT
Start: 2021-08-13 | End: 2021-08-13

## 2021-08-13 RX ORDER — ACETAMINOPHEN 325 MG/1
650 TABLET ORAL EVERY 6 HOURS PRN
Status: DISCONTINUED | OUTPATIENT
Start: 2021-08-13 | End: 2021-08-20 | Stop reason: HOSPADM

## 2021-08-13 RX ORDER — ALPRAZOLAM 1 MG/1
1 TABLET ORAL NIGHTLY
Status: DISCONTINUED | OUTPATIENT
Start: 2021-08-13 | End: 2021-08-20 | Stop reason: HOSPADM

## 2021-08-13 RX ORDER — MORPHINE SULFATE 2 MG/ML
2 INJECTION, SOLUTION INTRAMUSCULAR; INTRAVENOUS
Status: DISCONTINUED | OUTPATIENT
Start: 2021-08-13 | End: 2021-08-18

## 2021-08-13 RX ORDER — SODIUM CHLORIDE 0.9 % (FLUSH) 0.9 %
5-40 SYRINGE (ML) INJECTION PRN
Status: DISCONTINUED | OUTPATIENT
Start: 2021-08-13 | End: 2021-08-20 | Stop reason: HOSPADM

## 2021-08-13 RX ORDER — MISOPROSTOL 100 UG/1
200 TABLET ORAL
Status: DISCONTINUED | OUTPATIENT
Start: 2021-08-13 | End: 2021-08-20 | Stop reason: HOSPADM

## 2021-08-13 RX ORDER — SODIUM CHLORIDE 9 MG/ML
INJECTION, SOLUTION INTRAVENOUS CONTINUOUS
Status: DISCONTINUED | OUTPATIENT
Start: 2021-08-13 | End: 2021-08-14

## 2021-08-13 RX ORDER — ONDANSETRON 2 MG/ML
4 INJECTION INTRAMUSCULAR; INTRAVENOUS EVERY 6 HOURS PRN
Status: DISCONTINUED | OUTPATIENT
Start: 2021-08-13 | End: 2021-08-20 | Stop reason: HOSPADM

## 2021-08-13 RX ORDER — MORPHINE SULFATE 4 MG/ML
4 INJECTION, SOLUTION INTRAMUSCULAR; INTRAVENOUS
Status: DISCONTINUED | OUTPATIENT
Start: 2021-08-13 | End: 2021-08-18

## 2021-08-13 RX ORDER — ONDANSETRON 2 MG/ML
4 INJECTION INTRAMUSCULAR; INTRAVENOUS ONCE
Status: COMPLETED | OUTPATIENT
Start: 2021-08-13 | End: 2021-08-13

## 2021-08-13 RX ADMIN — ONDANSETRON 4 MG: 4 TABLET, ORALLY DISINTEGRATING ORAL at 19:15

## 2021-08-13 RX ADMIN — PANTOPRAZOLE SODIUM 40 MG: 40 INJECTION, POWDER, FOR SOLUTION INTRAVENOUS at 13:59

## 2021-08-13 RX ADMIN — FAMOTIDINE 20 MG: 10 INJECTION, SOLUTION INTRAVENOUS at 11:15

## 2021-08-13 RX ADMIN — MORPHINE SULFATE 4 MG: 4 INJECTION, SOLUTION INTRAMUSCULAR; INTRAVENOUS at 11:15

## 2021-08-13 RX ADMIN — SUCRALFATE 1 G: 1 TABLET ORAL at 13:59

## 2021-08-13 RX ADMIN — LAMOTRIGINE 150 MG: 100 TABLET ORAL at 21:27

## 2021-08-13 RX ADMIN — SUCRALFATE 1 G: 1 TABLET ORAL at 19:02

## 2021-08-13 RX ADMIN — MAGNESIUM HYDROXIDE/ALUMINUM HYDROXICE/SIMETHICONE: 120; 1200; 1200 SUSPENSION ORAL at 11:14

## 2021-08-13 RX ADMIN — MORPHINE SULFATE 4 MG: 4 INJECTION, SOLUTION INTRAMUSCULAR; INTRAVENOUS at 19:01

## 2021-08-13 RX ADMIN — PANTOPRAZOLE SODIUM 40 MG: 40 INJECTION, POWDER, FOR SOLUTION INTRAVENOUS at 21:25

## 2021-08-13 RX ADMIN — MORPHINE SULFATE 4 MG: 4 INJECTION INTRAVENOUS at 21:21

## 2021-08-13 RX ADMIN — SODIUM CHLORIDE, PRESERVATIVE FREE 10 ML: 5 INJECTION INTRAVENOUS at 21:30

## 2021-08-13 RX ADMIN — ALPRAZOLAM 1 MG: 1 TABLET ORAL at 21:27

## 2021-08-13 RX ADMIN — SODIUM CHLORIDE: 9 INJECTION, SOLUTION INTRAVENOUS at 21:25

## 2021-08-13 RX ADMIN — MORPHINE SULFATE 4 MG: 4 INJECTION, SOLUTION INTRAMUSCULAR; INTRAVENOUS at 14:25

## 2021-08-13 RX ADMIN — ONDANSETRON 4 MG: 2 INJECTION INTRAMUSCULAR; INTRAVENOUS at 11:15

## 2021-08-13 ASSESSMENT — PAIN DESCRIPTION - ORIENTATION
ORIENTATION: UPPER
ORIENTATION: MID;UPPER

## 2021-08-13 ASSESSMENT — PAIN DESCRIPTION - LOCATION
LOCATION: ABDOMEN
LOCATION: ABDOMEN

## 2021-08-13 ASSESSMENT — PAIN SCALES - GENERAL
PAINLEVEL_OUTOF10: 7
PAINLEVEL_OUTOF10: 6
PAINLEVEL_OUTOF10: 10
PAINLEVEL_OUTOF10: 3
PAINLEVEL_OUTOF10: 7
PAINLEVEL_OUTOF10: 10
PAINLEVEL_OUTOF10: 8

## 2021-08-13 ASSESSMENT — PAIN DESCRIPTION - DESCRIPTORS: DESCRIPTORS: DULL;ACHING;BURNING

## 2021-08-13 ASSESSMENT — PAIN DESCRIPTION - ONSET: ONSET: ON-GOING

## 2021-08-13 NOTE — ED PROVIDER NOTES
[facility]  ED      CHIEF COMPLAINT  Epigastric pain     HISTORY OF PRESENT ILLNESS  Eusebio Hollingsworth is a 45 y.o. female with past medical history of peptic ulcer and anxiety who presents to the ED complaining of upper abdominal pain. Reports recent admission with large peptic ulcer    Onset of pain: severe for 2-3d  Location: epigastric  Radiation: denies  Quality: dull ache  Severity: 7/10  Timing: constant  Palliative Factors: denies, does report brief improvement after medications- is on carafate, zofran, protonic  Provocative Factors: worse with food    Nausea/Vomiting: nasuea, NBNB emesis 2x/d after eating  Diarrhea/Constipation: soft stool, nonbloody; Last BM: yesterday  Vaginal Discharge/Bleeding: denies, Last menses: 7/27/21  Dysuria/urinary frequency: denies      Old records reviewed:   Admission 7/27/21  deep 12 mm penetrating ulcer located in the pyloric channel with extension into the duodenal bulb and patches of erosive antral gastritis. Seen by surgery- did not feel requiried surgery at this time    No other complaints, modifying factors or associated symptoms. I have reviewed the following from the nursing documentation.     Past Medical History:   Diagnosis Date    Abnormal Pap smear     WNL since    Anxiety     Back pain     herniated disc    Back pain     had back surgery (fusion of 3 discs) in march 2011    Placenta previa     resolved at 28 weeks with this 3rd pregnancy    Tubal pregnancy     Pt states bilateral fallopian tubes removed     Past Surgical History:   Procedure Laterality Date    ANESTHESIA NERVE BLOCK Right 5/21/2020    RIGHT HIP INJECTION SITE CONFIRMED BY FLUOROSCOPY performed by Neo Chao MD at [address] ARTHROSCOPY Left 11/19/2020    VIDEO ARTHROSCOPY LEFT ANKLE, SYNOVECTOMY, LOOSE BODY REMOVAL, MICROFRACTURE -BLOCK- performed by Arley [facility] MD at [facility] Right     CHOLECYSTECTOMY laparoscopic    ECTOPIC PREGNANCY SURGERY  10/2017    LAPAROSCOPY  4/20/10    with salpingostomy  ( ectopic pregnancy)   [place] FUSION  3-    L3-L5 fusion    TONSILLECTOMY      UPPER GASTROINTESTINAL ENDOSCOPY N/A 2021    EGD BIOPSY performed by Von Almendarez MD at [address] [place] [address]     Family History   Problem Relation Age of Onset    High Blood Pressure Father     Diabetes Maternal Aunt      Social History     Socioeconomic History    Marital status:      Spouse name: Not on file    Number of children: Not on file    Years of education: Not on file    Highest education level: Not on file   Occupational History    Not on file   Tobacco Use    Smoking status: Former Smoker     Packs/day: 0.50     Years: 21.00     Pack years: 10.50     Types: Cigarettes     Start date: 1999     Quit date: 2021     Years since quittin.0    Smokeless tobacco: Never Used   Vaping Use    Vaping Use: Never used   Substance and Sexual Activity    Alcohol use: Yes     Alcohol/week: 1.0 standard drinks     Types: 1 Cans of beer per week     Comment: social drinker    Drug use: No    Sexual activity: Yes     Partners: Male   Other Topics Concern    Not on file   Social History Narrative    Not on file     Social Determinants of Health     Financial Resource Strain:     Difficulty of Paying Living Expenses:    Food Insecurity:     Worried About Running Out of Food in the Last Year:     [address] in the Last Year:    Transportation Needs:     Lack of Transportation (Medical):      Lack of Transportation (Non-Medical):    Physical Activity:     Days of Exercise per Week:     Minutes of Exercise per Session:    Stress:     Feeling of Stress :    Social Connections:     Frequency of Communication with Friends and Family:     Frequency of Social Gatherings with Friends and Family:     Attends Protestant Services:  Active Member of Clubs or Organizations:     Attends Club or Organization Meetings:     Marital Status:    Intimate Partner Violence:     Fear of Current or Ex-Partner:     Emotionally Abused:     Physically Abused:     Sexually Abused:      Current Facility-Administered Medications   Medication Dose Route Frequency Provider Last Rate Last Admin    sucralfate (CARAFATE) tablet 1 g  1 g Oral 4 times per day Yvonne Ye MD   1 g at 08/13/21 2595     Current Outpatient Medications   Medication Sig Dispense Refill    lidocaine (LIDODERM) 5 % Place 1 patch onto the skin daily 12 hours on, 12 hours off. 30 patch 1    chlorhexidine (PERIDEX) 0.12 % solution       lamoTRIgine (LAMICTAL) 150 MG tablet TAKE ONE TABLET BY MOUTH TWICE DAILY      Omega-3 Fatty Acids (FISH OIL) 1000 MG CAPS Take 1,000 mg by mouth every morning      propranolol (INDERAL) 10 MG tablet TAKE ONE TABLET BY MOUTH TWICE DAILY AS NEEDED      pantoprazole (PROTONIX) 40 MG tablet Take 1 tablet by mouth 2 times daily (before meals) 180 tablet 1    ondansetron (ZOFRAN-ODT) 4 MG disintegrating tablet Take 1 tablet by mouth every 8 hours as needed for Nausea or Vomiting 30 tablet 0    miSOPROStol (CYTOTEC) 200 MCG tablet Take 1 tablet by mouth 4 times daily (before meals and nightly) 120 tablet 3    sucralfate (CARAFATE) 1 GM/10ML suspension Take 10 mLs by mouth 4 times daily 1200 mL 3    ALPRAZolam (XANAX) 1 MG tablet Take 1 mg by mouth nightly. Allergies   Allergen Reactions    Demerol Hives    Doxycycline Nausea And Vomiting    Lorazepam Other (See Comments)     Makes her anxiety worsen    Vancomycin Hives       REVIEW OF SYSTEMS  All systems reviewed, pertinent positives per HPI otherwise noted to be negative.     PHYSICAL EXAM  BP (!) 145/95   Pulse 78   Temp 98.2 °F (36.8 °C) (Oral)   Resp 18   Ht 5' 3\" (1.6 m)   Wt 180 lb (81.6 kg)   LMP 07/27/2021   SpO2 100%   BMI 31.89 kg/m²    GENERAL APPEARANCE: Awake and alert. Cooperative. Uncomfortable but no distress. HENT: Normocephalic. Atraumatic. Mucous membranes are moist  NECK: Supple. Full range of motion without pain or stiffness  EYES: PERRL. EOM's grossly intact. HEART/CHEST: RRR. No murmurs. LUNGS: Respirations unlabored. CTAB. Good air exchange. Speaking comfortably in full sentences. ABDOMEN: Tender in the epigastric region. Soft. Non-distended. No masses. No organomegaly. No guarding or rebound. MUSCULOSKELETAL: No extremity edema. Compartments soft. No deformity. No tenderness in the extremities. All extremities neurovascularly intact. SKIN: Warm and dry. No acute rashes. NEUROLOGICAL: Alert and oriented. No gross facial drooping. Strength 5/5, sensation intact. PSYCHIATRIC: Normal mood and affect. LABS  I have reviewed all labs for this visit.    Results for orders placed or performed during the hospital encounter of 08/13/21   CBC Auto Differential   Result Value Ref Range    WBC 6.8 4.0 - 11.0 K/uL    RBC 4.51 4.00 - 5.20 M/uL    Hemoglobin 13.1 12.0 - 16.0 g/dL    Hematocrit 40.0 36.0 - 48.0 %    MCV 88.6 80.0 - 100.0 fL    MCH 29.0 26.0 - 34.0 pg    MCHC 32.8 31.0 - 36.0 g/dL    RDW 14.3 12.4 - 15.4 %    Platelets 830 017 - 834 K/uL    MPV 7.8 5.0 - 10.5 fL    Neutrophils % 69.3 %    Lymphocytes % 23.7 %    Monocytes % 4.4 %    Eosinophils % 1.8 %    Basophils % 0.8 %    Neutrophils Absolute 4.7 1.7 - 7.7 K/uL    Lymphocytes Absolute 1.6 1.0 - 5.1 K/uL    Monocytes Absolute 0.3 0.0 - 1.3 K/uL    Eosinophils Absolute 0.1 0.0 - 0.6 K/uL    Basophils Absolute 0.1 0.0 - 0.2 K/uL   Comprehensive Metabolic Panel w/ Reflex to MG   Result Value Ref Range    Sodium 137 136 - 145 mmol/L    Potassium reflex Magnesium 4.4 3.5 - 5.1 mmol/L    Chloride 104 99 - 110 mmol/L    CO2 23 21 - 32 mmol/L    Anion Gap 10 3 - 16    Glucose 98 70 - 99 mg/dL    BUN 10 7 - 20 mg/dL    CREATININE 0.8 0.6 - 1.1 mg/dL    GFR Non-African American >60 >60    GFR  American >60 >60    Calcium 9.7 8.3 - 10.6 mg/dL    Total Protein 7.3 6.4 - 8.2 g/dL    Albumin 4.4 3.4 - 5.0 g/dL    Albumin/Globulin Ratio 1.5 1.1 - 2.2    Total Bilirubin <0.2 0.0 - 1.0 mg/dL    Alkaline Phosphatase 93 40 - 129 U/L    ALT 33 10 - 40 U/L    AST 18 15 - 37 U/L    Globulin 2.9 g/dL   Lipase   Result Value Ref Range    Lipase 43.0 13.0 - 60.0 U/L   Urinalysis, reflex to microscopic   Result Value Ref Range    Color, UA Yellow Straw/Yellow    Clarity, UA Clear Clear    Glucose, Ur Negative Negative mg/dL    Bilirubin Urine Negative Negative    Ketones, Urine Negative Negative mg/dL    Specific Gravity, UA 1.010 1.005 - 1.030    Blood, Urine SMALL (A) Negative    pH, UA 7.5 5.0 - 8.0    Protein, UA Negative Negative mg/dL    Urobilinogen, Urine 0.2 <2.0 E.U./dL    Nitrite, Urine Negative Negative    Leukocyte Esterase, Urine Negative Negative    Microscopic Examination YES     Urine Type NotGiven    Microscopic Urinalysis   Result Value Ref Range    WBC, UA 0-2 0 - 5 /HPF    RBC, UA 3-4 0 - 4 /HPF    Epithelial Cells, UA 6-10 (A) 0 - 5 /HPF    Bacteria, UA 1+ (A) None Seen /HPF   hCG, serum, qualitative   Result Value Ref Range    hCG Qual Negative Detects HCG level >10 MIU/mL   EKG 12 Lead   Result Value Ref Range    Ventricular Rate 74 BPM    Atrial Rate 74 BPM    P-R Interval 154 ms    QRS Duration 78 ms    Q-T Interval 386 ms    QTc Calculation (Bazett) 428 ms    P Axis 45 degrees    R Axis 23 degrees    T Axis 34 degrees    Diagnosis       Normal sinus rhythm with sinus arrhythmiaNormal ECGWhen compared with ECG of 25-JUL-2021 01:52,No significant change was found       RADIOLOGY  XR CHEST 1 VIEW   Final Result   Rotated exam.  No acute airspace disease. ED COURSE / MDM  Patient seen and evaluated. Old records reviewed and pertinent information included in HPI. Labs and imaging reviewed and results discussed with patient.       Overall uncomfortable appearing patient, in no acute distress, presenting for epigastric pain. Patient has known severe gastric ulcer. Physical exam remarkable for epigastric tenderness to palpation, no peritoneal signs. Differential diagnosis includes but is not limited to: gastroenteritis, peptic ulcer disease, cholecystitis, pancreatitis, hepatitis or other liver disease, low suspicion for Appendicitis, bowel obstruction,  diverticulitis, hernia,  UTI, AAA    Laboratory studies obtained. ED Course as of Aug 13 1513   Fri Aug 13, 2021   1131 No electrolyte abnormalities or evidence of kidney dysfunction.    [ER]   1131 Liver function testing unremarkable, low suspicion for hepatitis or other acute liver pathology. [ER]   1131 No leukocytosis, anemia, thrombocytopenia. [ER]   1137 Lipase within normal limits, low suspicion for pancreatitis. [ER]   1137 Urinalysis shows small blood, patient does not have any flank pain. Low suspicion for clinically significant kidney stone. No evidence of urinary tract infection.    [ER]   1138 CXR: FINDINGS:  Single view of the chest was obtained. Cardiac leads project over the chest.  No confluent airspace disease. No pneumothorax. Cardiac and mediastinal  silhouettes are reflective of patient rotation. No definite  pneumoperitoneum. Linear opacities projecting in the right base, likely  artifact, similar to that seen at the right axilla.     IMPRESSION:  Rotated exam.  No acute airspace disease.    [ER]   1140 The Ekg interpreted by me shows  sinus arrhythmia with a rate of 74  Axis is   Normal  QTc is  within an acceptable range  Intervals and Durations are unremarkable. ST Segments: nonspecific changes  No previous for comparison    [ER]   1226 Spoke to Shaquille Corral, they agreed that patient was on full outpatient treatment and they stated that if patient was having severe pain on this, she should be admitted for further evaluation and likely rescope.     [ER]      ED Course User Index  [ER] Lynne Pride MD          During the patient's ED course, the patient was given:  Medications   sucralfate (CARAFATE) tablet 1 g (1 g Oral Given 8/13/21 1359)   ondansetron (ZOFRAN) injection 4 mg (4 mg Intravenous Given 8/13/21 1115)   famotidine (PEPCID) injection 20 mg (20 mg Intravenous Given 8/13/21 1115)   aluminum & magnesium hydroxide-simethicone (MAALOX) 30 mL, lidocaine viscous hcl (XYLOCAINE) 5 mL (GI COCKTAIL) ( Oral Given 8/13/21 1114)   morphine (PF) injection 4 mg (4 mg Intravenous Given 8/13/21 1115)   pantoprazole (PROTONIX) injection 40 mg (40 mg Intravenous Given 8/13/21 1359)   morphine (PF) injection 4 mg (4 mg Intravenous Given 8/13/21 1425)        Work-up overall reassuring, however I have concern for intractable pain related to known peptic ulcer. GI consulted and stated that patient was already maximized on outpatient therapy, thus they recommended admission for repeat endoscopy and management in the hospital.  Would also recommend reconsultation with surgery given that they felt that medical management was sufficient previously, but patient seems to be failing outpatient medical management. At this time, do feel the patient requires admission for further work-up and management. Discussed the patient with hospital team, Dr Mio Dumont. CLINICAL IMPRESSION  1. Abdominal pain, epigastric    2. Acute gastric ulcer, unspecified whether gastric ulcer hemorrhage or perforation present        Blood pressure (!) 141/105, pulse 79, temperature 98.2 °F (36.8 °C), temperature source Oral, resp. rate 13, height 5' 3\" (1.6 m), weight 180 lb (81.6 kg), last menstrual period 07/27/2021, SpO2 98 %, not currently breastfeeding. DISPOSITION  Ignacio Jeffrey was admitted in stable condition. DISCLAIMER: This chart was created using Dragon dictation software.   Efforts were made by me to ensure accuracy, however some errors may be present due to limitations of this technology and occasionally words are not transcribed correctly.         Merritt Steele MD  08/13/21 2112

## 2021-08-13 NOTE — ED NOTES
Ps Gen Surg @ 2528  Re: s/p EGD on 7/27, found to have deep 12 mm penetrating ulcer located in the pyloric channel with extension into the duodenal bulb and patches of erosive antral gastritis, pt readmitted for ongoing abdominal pain despite meds, GI recommending consult  No call back required     Yulia Alberts  08/13/21 [id number]

## 2021-08-13 NOTE — ED NOTES
[facility] GI @4281  Re; known severe gastric ulcer w/ recurrent severe pain  Austyn responded @1223       Matthew Lane  08/13/21 1223

## 2021-08-13 NOTE — TELEPHONE ENCOUNTER
I am aware she is asking for pain medication. I have talked to dr Lopez De Paz and he said that with her  ulcer it should be painful but pain medication can cause a possible blunt in her pain then she will not know if she has perforation.

## 2021-08-13 NOTE — H&P
Hospital Medicine History & Physical      PCP: DAQUAN Holley CNP    Date of Admission: 8/13/2021    Date of Service: Pt seen/examined on 8/13/21 and Admitted to Inpatient with expected LOS greater than two midnights due to medical therapy. Chief Complaint:  Abdominal pain    History Of Present Illness:      45 y.o. female who presented to [facility] with complaints of abdominal pain. Pt was hospitalized at [facility] from 7/27 to 8/2 for peptic ulcer. She underwent EGD on 7/27 and found to have a deep 12 mm penetrating ulcer located in the pyloric channel with extension into the duodenal bulb and patches of erosive antral gastritis. She was treated with protonix, carafate and cytotec and was discharged on this regimen. General surgery was also consulted during previous stay and found no role for surgery and recommended medical management. She had a CT A/P and MRCP during previous hospital stay which was unremarkable. The pt was dc'd on 8/2 with the aforementioned regimen as well as zofran and percocet. Pt presented to her PCP on Wednesday due to ongoing pain and possible pain med refill. Per pt, her PCP reached out to Dr. Shweta Moore office and stated that meds should not be refilled. Pt has had no pain meds since last Thursday and has resorted to taking ibuprofen again even though she was advised not to do so however pt felt like she had no other choice to control her pain. She presented to the ED last Wednesday and was ultimately dc'd. She presents today due to ongoing epigastric abdominal pain with associated N&V. No fever/chills. No dyspnea or chest pain. No hematemesis. She is having normal / regular bowel movements. ED course: Afebrile. On RA. VSS. CXR showed no pneumoperitoneum. Lab work was unremarkable. GI was consulted in the ED. Hospitalist was consulted for admission.     Past Medical History:          Diagnosis Date    Abnormal Pap smear     WNL since    Anxiety     Back pain herniated disc    Back pain     had back surgery (fusion of 3 discs) in march 2011    Placenta previa     resolved at 28 weeks with this 3rd pregnancy    Tubal pregnancy     Pt states bilateral fallopian tubes removed       Past Surgical History:          Procedure Laterality Date    ANESTHESIA NERVE BLOCK Right 5/21/2020    RIGHT HIP INJECTION SITE CONFIRMED BY FLUOROSCOPY performed by Cynthia Dee MD at [address] ARTHROSCOPY Left 11/19/2020    VIDEO ARTHROSCOPY LEFT ANKLE, SYNOVECTOMY, LOOSE BODY REMOVAL, MICROFRACTURE -BLOCK- performed by Chase Chavez MD at [facility] Right     CHOLECYSTECTOMY      laparoscopic    ECTOPIC PREGNANCY SURGERY  10/2017    LAPAROSCOPY  4/20/10    with salpingostomy  ( ectopic pregnancy)    LUMBAR DISC SURGERY      LUMBAR FUSION  3-2011    L3-L5 fusion    TONSILLECTOMY  2000    UPPER GASTROINTESTINAL ENDOSCOPY N/A 7/27/2021    EGD BIOPSY performed by Alissa Valiente MD at [address] [place] [address]       Medications Prior to Admission:      Prior to Admission medications    Medication Sig Start Date End Date Taking?  Authorizing Provider   lidocaine (LIDODERM) 5 % Place 1 patch onto the skin daily 12 hours on, 12 hours off. 8/9/21   DAQUAN Holley - CNP   chlorhexidine (PERIDEX) 0.12 % solution  6/18/21   Historical Provider, MD   lamoTRIgine (LAMICTAL) 150 MG tablet TAKE ONE TABLET BY MOUTH TWICE DAILY 7/13/21   Historical Provider, MD   Omega-3 Fatty Acids (FISH OIL) 1000 MG CAPS Take 1,000 mg by mouth every morning    Historical Provider, MD   propranolol (INDERAL) 10 MG tablet TAKE ONE TABLET BY MOUTH TWICE DAILY AS NEEDED 7/27/21   Historical Provider, MD   pantoprazole (PROTONIX) 40 MG tablet Take 1 tablet by mouth 2 times daily (before meals) 8/2/21   DAQUAN Roach - CNP   ondansetron (ZOFRAN-ODT) 4 MG disintegrating tablet Take 1 tablet by mouth every 8 hours as needed for Nausea or Vomiting 8/2/21   Weston Or, APRN - CNP   miSOPROStol (CYTOTEC) 200 MCG tablet Take 1 tablet by mouth 4 times daily (before meals and nightly) 8/2/21   Weston Or, APRN - CNP   sucralfate (CARAFATE) 1 GM/10ML suspension Take 10 mLs by mouth 4 times daily 8/2/21   Weston Or, APRN - CNP   ALPRAZolam Belvin Blare) 1 MG tablet Take 1 mg by mouth nightly. Historical Provider, MD       Allergies:  Demerol, Doxycycline, Lorazepam, and Vancomycin    Social History:      The patient currently lives at home. TOBACCO:   reports that she quit smoking about 4 weeks ago. Her smoking use included cigarettes. She started smoking about 22 years ago. She has a 10.50 pack-year smoking history. She has never used smokeless tobacco.  ETOH:   reports current alcohol use of about 1.0 standard drinks of alcohol per week. E-Cigarettes/Vaping Use     Questions Responses    E-Cigarette/Vaping Use Never User    Start Date     Passive Exposure     Quit Date     Counseling Given     Comments           Family History:      Reviewed in detail. Positive as follows:        Problem Relation Age of Onset    High Blood Pressure Father     Diabetes Maternal Aunt        REVIEW OF SYSTEMS COMPLETED:   Pertinent positives as noted in the HPI. All other systems reviewed and negative. PHYSICAL EXAM PERFORMED:    BP (!) 135/95   Pulse 70   Temp 98.2 °F (36.8 °C) (Oral)   Resp 18   Ht 5' 3\" (1.6 m)   Wt 180 lb (81.6 kg)   LMP 07/27/2021   SpO2 100%   BMI 31.89 kg/m²     General appearance:  No apparent distress, appears stated age and cooperative. HEENT:  Normal cephalic, atraumatic without obvious deformity. Pupils equal, round, and reactive to light. Extra ocular muscles intact. Conjunctivae/corneas clear. Neck: Supple, with full range of motion. No jugular venous distention. Trachea midline. Respiratory:  Normal respiratory effort.  Clear to auscultation, bilaterally without Rales/Wheezes/Rhonchi. Cardiovascular:  Regular rate and rhythm with normal S1/S2 without murmurs, rubs or gallops. Abdomen: Soft, epigastric tenderness, non-distended with normal bowel sounds. Musculoskeletal:  No clubbing, cyanosis or edema bilaterally. Full range of motion without deformity. Skin: Skin color, texture, turgor normal.  No rashes or lesions. Neurologic:  Neurovascularly intact without any focal sensory/motor deficits. Cranial nerves: II-XII intact, grossly non-focal.  Psychiatric:  Alert and oriented, thought content appropriate, normal insight  Capillary Refill: Brisk,3 seconds, normal  Peripheral Pulses: +2 palpable, equal bilaterally       Labs:     Recent Labs     08/12/21  0331 08/13/21  1045   WBC 8.6 6.8   HGB 13.0 13.1   HCT 38.2 40.0    361     Recent Labs     08/12/21  0331 08/13/21  1045   * 137   K 3.8 4.4    104   CO2 23 23   BUN 12 10   CREATININE 0.7 0.8   CALCIUM 9.2 9.7     Recent Labs     08/12/21  0331 08/13/21  1045   AST 20 18   ALT 46* 33   BILITOT <0.2 <0.2   ALKPHOS 104 93     Urinalysis:      Lab Results   Component Value Date    NITRU Negative 08/13/2021    WBCUA 0-2 08/13/2021    BACTERIA 1+ 08/13/2021    RBCUA 3-4 08/13/2021    BLOODU SMALL 08/13/2021    SPECGRAV 1.010 08/13/2021    GLUCOSEU Negative 08/13/2021    GLUCOSEU NEGATIVE 12/22/2011       Radiology:     XR CHEST 1 VIEW   Final Result   Rotated exam.  No acute airspace disease. ASSESSMENT:    Active Hospital Problems    Diagnosis Date Noted    PUD (peptic ulcer disease) [K27.9] 08/13/2021       Epigastric abdominal pain due to PUD:  - CT A/P and MRCP during previous hospitalization were unremarkable. - Pt is s/p EGD on 7/27, found to have deep 12 mm penetrating ulcer located in the pyloric channel with extension into the duodenal bulb and patches of erosive antral gastritis. Path was negative. - Continue PPI, cytotec and carafate. Continue prn analgesia.    - Pt advised to avoid NSAIDs for the foreseeable future. - Keep NPO for now. IV fluids ordered. - GI and general surgery have been consulted -- appreciate. Tobacco abuse:  - Smoking cessation discussed/advised.       DVT Prophylaxis: Lovenox   Diet: Diet NPO  Code Status: Full code     PT/OT Eval Status: not indicated     Dispo - > 2 days       [facility], APRN - CNP    Thank you DAQUAN Negron CNP for the opportunity to be involved in this patient's care. If you have any questions or concerns please feel free to contact me at [phone].

## 2021-08-13 NOTE — TELEPHONE ENCOUNTER
Patient is in the ER- states she cannot deal with the pain and is having GI called in - she states she cannot wait till 8/16.  FYI

## 2021-08-13 NOTE — TELEPHONE ENCOUNTER
----- Message from July Jacob sent at 8/12/2021  5:14 PM EDT -----  Subject: Message to Provider    QUESTIONS  Information for Provider? PT called checking on status of request for pain   medication refill; PT was seen in the ER this morning, medication has worn   off and pain is unbearable.   ---------------------------------------------------------------------------  --------------  CALL BACK INFO  What is the best way for the office to contact you? OK to leave message on   voicemail  Preferred Call Back Phone Number? [phone]  ---------------------------------------------------------------------------  --------------  SCRIPT ANSWERS  Relationship to Patient?  Self

## 2021-08-14 LAB
A/G RATIO: 1.5 (ref 1.1–2.2)
ALBUMIN SERPL-MCNC: 3.9 G/DL (ref 3.4–5)
ALP BLD-CCNC: 85 U/L (ref 40–129)
ALT SERPL-CCNC: 27 U/L (ref 10–40)
ANION GAP SERPL CALCULATED.3IONS-SCNC: 10 MMOL/L (ref 3–16)
AST SERPL-CCNC: 17 U/L (ref 15–37)
BILIRUB SERPL-MCNC: 0.3 MG/DL (ref 0–1)
BUN BLDV-MCNC: 9 MG/DL (ref 7–20)
CALCIUM SERPL-MCNC: 8.9 MG/DL (ref 8.3–10.6)
CHLORIDE BLD-SCNC: 107 MMOL/L (ref 99–110)
CO2: 23 MMOL/L (ref 21–32)
CREAT SERPL-MCNC: 0.9 MG/DL (ref 0.6–1.1)
GFR AFRICAN AMERICAN: >60
GFR NON-AFRICAN AMERICAN: >60
GLOBULIN: 2.6 G/DL
GLUCOSE BLD-MCNC: 86 MG/DL (ref 70–99)
HCT VFR BLD CALC: 37.9 % (ref 36–48)
HEMOGLOBIN: 12.5 G/DL (ref 12–16)
MAGNESIUM: 1.9 MG/DL (ref 1.8–2.4)
MCH RBC QN AUTO: 29.3 PG (ref 26–34)
MCHC RBC AUTO-ENTMCNC: 32.9 G/DL (ref 31–36)
MCV RBC AUTO: 88.9 FL (ref 80–100)
PDW BLD-RTO: 14.2 % (ref 12.4–15.4)
PLATELET # BLD: 322 K/UL (ref 135–450)
PMV BLD AUTO: 7.8 FL (ref 5–10.5)
POTASSIUM REFLEX MAGNESIUM: 3.5 MMOL/L (ref 3.5–5.1)
RBC # BLD: 4.26 M/UL (ref 4–5.2)
SODIUM BLD-SCNC: 140 MMOL/L (ref 136–145)
TOTAL PROTEIN: 6.5 G/DL (ref 6.4–8.2)
WBC # BLD: 6.1 K/UL (ref 4–11)

## 2021-08-14 PROCEDURE — 6370000000 HC RX 637 (ALT 250 FOR IP): Performed by: REGISTERED NURSE

## 2021-08-14 PROCEDURE — 6360000002 HC RX W HCPCS: Performed by: NURSE PRACTITIONER

## 2021-08-14 PROCEDURE — 1200000000 HC SEMI PRIVATE

## 2021-08-14 PROCEDURE — 99254 IP/OBS CNSLTJ NEW/EST MOD 60: CPT | Performed by: SURGERY

## 2021-08-14 PROCEDURE — 2580000003 HC RX 258: Performed by: REGISTERED NURSE

## 2021-08-14 PROCEDURE — C9113 INJ PANTOPRAZOLE SODIUM, VIA: HCPCS | Performed by: REGISTERED NURSE

## 2021-08-14 PROCEDURE — 85027 COMPLETE CBC AUTOMATED: CPT

## 2021-08-14 PROCEDURE — 83735 ASSAY OF MAGNESIUM: CPT

## 2021-08-14 PROCEDURE — 6360000002 HC RX W HCPCS: Performed by: REGISTERED NURSE

## 2021-08-14 PROCEDURE — 80053 COMPREHEN METABOLIC PANEL: CPT

## 2021-08-14 RX ORDER — PROPRANOLOL HYDROCHLORIDE 10 MG/1
10 TABLET ORAL 2 TIMES DAILY PRN
Status: DISCONTINUED | OUTPATIENT
Start: 2021-08-14 | End: 2021-08-20 | Stop reason: HOSPADM

## 2021-08-14 RX ORDER — OXYCODONE HYDROCHLORIDE AND ACETAMINOPHEN 5; 325 MG/1; MG/1
1 TABLET ORAL EVERY 4 HOURS PRN
Status: DISCONTINUED | OUTPATIENT
Start: 2021-08-14 | End: 2021-08-18

## 2021-08-14 RX ADMIN — ONDANSETRON 4 MG: 2 INJECTION INTRAMUSCULAR; INTRAVENOUS at 02:06

## 2021-08-14 RX ADMIN — SUCRALFATE 1 G: 1 TABLET ORAL at 23:22

## 2021-08-14 RX ADMIN — OXYCODONE HYDROCHLORIDE AND ACETAMINOPHEN 1 TABLET: 5; 325 TABLET ORAL at 21:33

## 2021-08-14 RX ADMIN — MORPHINE SULFATE 4 MG: 4 INJECTION INTRAVENOUS at 04:11

## 2021-08-14 RX ADMIN — MORPHINE SULFATE 4 MG: 4 INJECTION INTRAVENOUS at 00:01

## 2021-08-14 RX ADMIN — SODIUM CHLORIDE, PRESERVATIVE FREE 10 ML: 5 INJECTION INTRAVENOUS at 08:24

## 2021-08-14 RX ADMIN — MORPHINE SULFATE 4 MG: 4 INJECTION INTRAVENOUS at 20:25

## 2021-08-14 RX ADMIN — SUCRALFATE 1 G: 1 TABLET ORAL at 11:14

## 2021-08-14 RX ADMIN — SODIUM CHLORIDE, PRESERVATIVE FREE 10 ML: 5 INJECTION INTRAVENOUS at 20:26

## 2021-08-14 RX ADMIN — MISOPROSTOL 200 MCG: 100 TABLET ORAL at 00:03

## 2021-08-14 RX ADMIN — OXYCODONE HYDROCHLORIDE AND ACETAMINOPHEN 1 TABLET: 5; 325 TABLET ORAL at 16:39

## 2021-08-14 RX ADMIN — ENOXAPARIN SODIUM 40 MG: 40 INJECTION SUBCUTANEOUS at 08:24

## 2021-08-14 RX ADMIN — OXYCODONE HYDROCHLORIDE AND ACETAMINOPHEN 1 TABLET: 5; 325 TABLET ORAL at 12:23

## 2021-08-14 RX ADMIN — ONDANSETRON 4 MG: 2 INJECTION INTRAMUSCULAR; INTRAVENOUS at 22:31

## 2021-08-14 RX ADMIN — SODIUM CHLORIDE: 9 INJECTION, SOLUTION INTRAVENOUS at 04:13

## 2021-08-14 RX ADMIN — LAMOTRIGINE 150 MG: 100 TABLET ORAL at 08:24

## 2021-08-14 RX ADMIN — PANTOPRAZOLE SODIUM 40 MG: 40 INJECTION, POWDER, FOR SOLUTION INTRAVENOUS at 08:24

## 2021-08-14 RX ADMIN — MISOPROSTOL 200 MCG: 100 TABLET ORAL at 20:30

## 2021-08-14 RX ADMIN — MORPHINE SULFATE 4 MG: 4 INJECTION INTRAVENOUS at 22:31

## 2021-08-14 RX ADMIN — SUCRALFATE 1 G: 1 TABLET ORAL at 00:03

## 2021-08-14 RX ADMIN — MORPHINE SULFATE 4 MG: 4 INJECTION INTRAVENOUS at 09:07

## 2021-08-14 RX ADMIN — MISOPROSTOL 200 MCG: 100 TABLET ORAL at 11:18

## 2021-08-14 RX ADMIN — MISOPROSTOL 200 MCG: 100 TABLET ORAL at 06:22

## 2021-08-14 RX ADMIN — MORPHINE SULFATE 4 MG: 4 INJECTION INTRAVENOUS at 18:05

## 2021-08-14 RX ADMIN — MORPHINE SULFATE 4 MG: 4 INJECTION INTRAVENOUS at 02:04

## 2021-08-14 RX ADMIN — ONDANSETRON 4 MG: 2 INJECTION INTRAMUSCULAR; INTRAVENOUS at 09:07

## 2021-08-14 RX ADMIN — MISOPROSTOL 200 MCG: 100 TABLET ORAL at 18:05

## 2021-08-14 RX ADMIN — PROPRANOLOL HYDROCHLORIDE 10 MG: 10 TABLET ORAL at 11:18

## 2021-08-14 RX ADMIN — MORPHINE SULFATE 4 MG: 4 INJECTION INTRAVENOUS at 15:41

## 2021-08-14 RX ADMIN — PANTOPRAZOLE SODIUM 40 MG: 40 INJECTION, POWDER, FOR SOLUTION INTRAVENOUS at 20:25

## 2021-08-14 RX ADMIN — MORPHINE SULFATE 4 MG: 4 INJECTION INTRAVENOUS at 06:20

## 2021-08-14 RX ADMIN — MORPHINE SULFATE 4 MG: 4 INJECTION INTRAVENOUS at 13:23

## 2021-08-14 RX ADMIN — ALPRAZOLAM 1 MG: 1 TABLET ORAL at 20:26

## 2021-08-14 RX ADMIN — MORPHINE SULFATE 2 MG: 2 INJECTION, SOLUTION INTRAMUSCULAR; INTRAVENOUS at 11:25

## 2021-08-14 RX ADMIN — ONDANSETRON 4 MG: 2 INJECTION INTRAMUSCULAR; INTRAVENOUS at 15:41

## 2021-08-14 RX ADMIN — SUCRALFATE 1 G: 1 TABLET ORAL at 18:04

## 2021-08-14 RX ADMIN — LAMOTRIGINE 150 MG: 100 TABLET ORAL at 20:26

## 2021-08-14 RX ADMIN — SUCRALFATE 1 G: 1 TABLET ORAL at 06:22

## 2021-08-14 ASSESSMENT — PAIN SCALES - GENERAL
PAINLEVEL_OUTOF10: 6
PAINLEVEL_OUTOF10: 7
PAINLEVEL_OUTOF10: 6
PAINLEVEL_OUTOF10: 7
PAINLEVEL_OUTOF10: 6
PAINLEVEL_OUTOF10: 7
PAINLEVEL_OUTOF10: 9
PAINLEVEL_OUTOF10: 8
PAINLEVEL_OUTOF10: 7
PAINLEVEL_OUTOF10: 6
PAINLEVEL_OUTOF10: 8
PAINLEVEL_OUTOF10: 10
PAINLEVEL_OUTOF10: 8

## 2021-08-14 NOTE — PROGRESS NOTES
Hospitalist Progress Note      PCP: DAQUAN Díaz CNP    Date of Admission: 8/13/2021    Chief Complaint: Abdominal pain     Hospital Course:   45 y.o. female who presented to [facility] with complaints of abdominal pain. Pt was hospitalized at [facility] from 7/27 to 8/2 for peptic ulcer. She underwent EGD on 7/27 and found to have a deep 12 mm penetrating ulcer located in the pyloric channel with extension into the duodenal bulb and patches of erosive antral gastritis. She was treated with protonix, carafate and cytotec and was discharged on this regimen. General surgery was also consulted during previous stay and found no role for surgery and recommended medical management. She had a CT A/P and MRCP during previous hospital stay which was unremarkable. The pt was dc'd on 8/2 with the aforementioned regimen as well as zofran and percocet. Pt presented to her PCP on Wednesday due to ongoing pain and possible pain med refill. Per pt, her PCP reached out to Dr. Jovanni Tavarez office and stated that meds should not be refilled. Pt has had no pain meds since last Thursday and has resorted to taking ibuprofen again even though she was advised not to do so however pt felt like she had no other choice to control her pain. She presented to the ED last Wednesday and was ultimately dc'd. She presents today due to ongoing epigastric abdominal pain with associated N&V. No fever/chills. No dyspnea or chest pain. No hematemesis. She is having normal / regular bowel movements. ED course: Afebrile. On RA. VSS. CXR showed no pneumoperitoneum. Lab work was unremarkable. GI was consulted in the ED. Hospitalist was consulted for admission. Subjective:   Pt is on RA. Afebrile. VSS. No N/V. +abdominal pain. No dyspnea.      Medications:  Reviewed    Infusion Medications    sodium chloride       Scheduled Medications    Lip Balm        sucralfate  1 g Oral 4 times per day    ALPRAZolam  1 mg Oral Nightly    lamoTRIgine  150 mg Oral BID    miSOPROStol  200 mcg Oral 4x Daily AC & HS    sodium chloride flush  5-40 mL Intravenous 2 times per day    enoxaparin  40 mg Subcutaneous Daily    pantoprazole  40 mg Intravenous BID     PRN Meds: propranolol, oxyCODONE-acetaminophen, sodium chloride flush, sodium chloride, ondansetron **OR** ondansetron, polyethylene glycol, acetaminophen **OR** acetaminophen, morphine **OR** morphine      Intake/Output Summary (Last 24 hours) at 8/14/2021 1300  Last data filed at 8/14/2021 0912  Gross per 24 hour   Intake 820.53 ml   Output --   Net 820.53 ml       Physical Exam Performed:    /89   Pulse 91   Temp 98.5 °F (36.9 °C) (Oral)   Resp 16   Ht 5' 3\" (1.6 m)   Wt 180 lb (81.6 kg)   LMP 07/27/2021   SpO2 95%   BMI 31.89 kg/m²     General appearance: No apparent distress, appears stated age and cooperative. HEENT: Pupils equal, round, and reactive to light. Conjunctivae/corneas clear. Neck: Supple, with full range of motion. No jugular venous distention. Trachea midline. Respiratory:  Normal respiratory effort. Clear to auscultation, bilaterally without Rales/Wheezes/Rhonchi. Cardiovascular: Regular rate and rhythm with normal S1/S2 without murmurs, rubs or gallops. Abdomen: Soft, non-tender, non-distended with normal bowel sounds. Musculoskeletal: No clubbing, cyanosis or edema bilaterally. Full range of motion without deformity. Skin: Skin color, texture, turgor normal.  No rashes or lesions. Neurologic:  Neurovascularly intact without any focal sensory/motor deficits.  Cranial nerves: II-XII intact, grossly non-focal.  Psychiatric: Alert and oriented, thought content appropriate, normal insight  Capillary Refill: Brisk,3 seconds, normal   Peripheral Pulses: +2 palpable, equal bilaterally       Labs:   Recent Labs     08/12/21  0331 08/13/21  1045 08/14/21  0548   WBC 8.6 6.8 6.1   HGB 13.0 13.1 12.5   HCT 38.2 40.0 37.9    361 322     Recent Labs 08/12/21  0331 08/13/21  1045 08/14/21  0548   * 137 140   K 3.8 4.4 3.5    104 107   CO2 23 23 23   BUN 12 10 9   CREATININE 0.7 0.8 0.9   CALCIUM 9.2 9.7 8.9     Recent Labs     08/12/21  0331 08/13/21  1045 08/14/21  0548   AST 20 18 17   ALT 46* 33 27   BILITOT <0.2 <0.2 0.3   ALKPHOS 104 93 85     Urinalysis:      Lab Results   Component Value Date    NITRU Negative 08/13/2021    WBCUA 0-2 08/13/2021    BACTERIA 1+ 08/13/2021    RBCUA 3-4 08/13/2021    BLOODU SMALL 08/13/2021    SPECGRAV 1.010 08/13/2021    GLUCOSEU Negative 08/13/2021    GLUCOSEU NEGATIVE 12/22/2011       Radiology:  XR CHEST 1 VIEW   Final Result   Rotated exam.  No acute airspace disease. Assessment/Plan:    Active Hospital Problems    Diagnosis     PUD (peptic ulcer disease) [K27.9]        Epigastric abdominal pain due to PUD:  - CT A/P and MRCP during previous hospitalization were unremarkable. - Pt is s/p EGD on 7/27, found to have deep 12 mm penetrating ulcer located in the pyloric channel with extension into the duodenal bulb and patches of erosive antral gastritis. Path was negative. - Continue PPI, cytotec and carafate. Continue prn analgesia. - Pt advised to avoid NSAIDs for the foreseeable future. - GI and general surgery consulted/following  -- plan for possible EGD on 8/16.   - Defer diet to GI/surgery.      Tobacco abuse:  - Smoking cessation discussed/advised.       DVT Prophylaxis: Lovenox   Diet: ADULT DIET;  Full Liquid  Code Status: Full Code    PT/OT Eval Status: not indicated     Dispo - uncertain, pending clinical course     Vanessa Webb, APRN - CNP

## 2021-08-14 NOTE — CONSULTS
Gastroenterology Consult Note    Patient:   Rosa Anton   YOB: 1980   Facility:   [place] [address]   Referring/PCP: DAQUAN Carlton CNP  Date:     8/14/2021  Consultant:   Iveth Lemos MD, MD    Subjective: This 45 y.o. female was admitted 8/13/2021 with a diagnosis of \"Abdominal pain, epigastric [R10.13]  PUD (peptic ulcer disease) [K27.9]  Acute gastric ulcer, unspecified whether gastric ulcer hemorrhage or perforation present [K25. 3]\" and is seen in consultation regarding \"abd pain\". Information was obtained from interview of  the patient, examination of the patient, and review of records. I did  update the past medical, surgical, social and / or family history. abd pain for weeks in epig mod assoc w N/V    Current status  Present  Diet Order: Diet NPO and she is tolerating diet. Recently, she has experienced moderate, maximum 6/10 epigastric abdominal  Pain and she has required Intravenous narcotic analgesics. The patient has also experienced no constipation, diarrhea, fever, hematochezia and melena      Prior to Admission medications    Medication Sig Start Date End Date Taking?  Authorizing Provider   lidocaine (LIDODERM) 5 % Place 1 patch onto the skin daily 12 hours on, 12 hours off. 8/9/21  Yes DAQUAN Carlton CNP   chlorhexidine (PERIDEX) 0.12 % solution  6/18/21  Yes Historical Provider, MD   lamoTRIgine (LAMICTAL) 150 MG tablet TAKE ONE TABLET BY MOUTH TWICE DAILY 7/13/21  Yes Historical Provider, MD   Omega-3 Fatty Acids (FISH OIL) 1000 MG CAPS Take 1,000 mg by mouth every morning   Yes Historical Provider, MD   propranolol (INDERAL) 10 MG tablet TAKE ONE TABLET BY MOUTH TWICE DAILY AS NEEDED 7/27/21  Yes Historical Provider, MD   pantoprazole (PROTONIX) 40 MG tablet Take 1 tablet by mouth 2 times daily (before meals) 8/2/21  Yes DAQUAN Recio CNP   ondansetron (ZOFRAN-ODT) 4 MG disintegrating tablet Take 1 tablet by mouth every 8 hours as needed for Nausea or Vomiting 8/2/21  Yes DAQUAN Figueroa CNP   miSOPROStol (CYTOTEC) 200 MCG tablet Take 1 tablet by mouth 4 times daily (before meals and nightly) 8/2/21  Yes DAQUAN Figueroa CNP   sucralfate (CARAFATE) 1 GM/10ML suspension Take 10 mLs by mouth 4 times daily 8/2/21  Yes DAQUAN Figueroa - CNP   ALPRAZolam [place]) 1 MG tablet Take 1 mg by mouth nightly. Yes Historical Provider, MD      Scheduled Medications:    Lip Balm        sucralfate  1 g Oral 4 times per day    ALPRAZolam  1 mg Oral Nightly    lamoTRIgine  150 mg Oral BID    miSOPROStol  200 mcg Oral 4x Daily AC & HS    sodium chloride flush  5-40 mL Intravenous 2 times per day    enoxaparin  40 mg Subcutaneous Daily    pantoprazole  40 mg Intravenous BID     Infusions:    sodium chloride      sodium chloride 75 mL/hr at 08/14/21 0912     PRN Medications: propranolol, oxyCODONE-acetaminophen, sodium chloride flush, sodium chloride, ondansetron **OR** ondansetron, polyethylene glycol, acetaminophen **OR** acetaminophen, morphine **OR** morphine  Allergies:    Allergies   Allergen Reactions    Demerol Hives    Doxycycline Nausea And Vomiting    Lorazepam Other (See Comments)     Makes her anxiety worsen    Vancomycin Hives       Past Medical History:   Diagnosis Date    Abnormal Pap smear     WNL since    Anxiety     Back pain     herniated disc    Back pain     had back surgery (fusion of 3 discs) in march 2011    Placenta previa     resolved at 28 weeks with this 3rd pregnancy    Tubal pregnancy     Pt states bilateral fallopian tubes removed     Past Surgical History:   Procedure Laterality Date    ANESTHESIA NERVE BLOCK Right 5/21/2020    RIGHT HIP INJECTION SITE CONFIRMED BY FLUOROSCOPY performed by Jael Hanson MD at [address] ARTHROSCOPY Left 11/19/2020    VIDEO ARTHROSCOPY LEFT ANKLE, SYNOVECTOMY, LOOSE BODY REMOVAL, MICROFRACTURE -BLOCK- performed by Esa Holman MD at [facility] Right     CHOLECYSTECTOMY      laparoscopic    ECTOPIC PREGNANCY SURGERY  10/2017    LAPAROSCOPY  4/20/10    with salpingostomy  ( ectopic pregnancy)    LUMBAR DISC SURGERY      LUMBAR FUSION  3-    L3-L5 fusion    TONSILLECTOMY      UPPER GASTROINTESTINAL ENDOSCOPY N/A 2021    EGD BIOPSY performed by Pete Carrasquillo MD at [address] [place] [address]       Social:   Social History     Tobacco Use    Smoking status: Former Smoker     Packs/day: 0.50     Years: 21.00     Pack years: 10.50     Types: Cigarettes     Start date: 1999     Quit date: 2021     Years since quittin.0    Smokeless tobacco: Never Used   Substance Use Topics    Alcohol use: Yes     Alcohol/week: 1.0 standard drinks     Types: 1 Cans of beer per week     Comment: social drinker     Family:   Family History   Problem Relation Age of Onset    High Blood Pressure Father     Diabetes Maternal Aunt        ROS: Pertinent items are noted in HPI.     Objective:   Vital Signs:  Temp (24hrs), Av.3 °F (36.8 °C), Min:98.1 °F (36.7 °C), Max:98.5 °F (24.5 °C)     Systolic (20GZH), II , Min:117 , XDS:950      Diastolic (80OUZ), OQS:51, Min:79, Max:105     Pulse  Av.2  Min: 70  Max: 91  /89   Pulse 91   Temp 98.5 °F (36.9 °C) (Oral)   Resp 16   Ht 5' 3\" (1.6 m)   Wt 180 lb (81.6 kg)   LMP 2021   SpO2 95%   BMI 31.89 kg/m²      Physical Exam:   /89   Pulse 91   Temp 98.5 °F (36.9 °C) (Oral)   Resp 16   Ht 5' 3\" (1.6 m)   Wt 180 lb (81.6 kg)   LMP 2021   SpO2 95%   BMI 31.89 kg/m²   General appearance: alert, appears stated age and cooperative  Lungs: clear to auscultation bilaterally  Chest wall: no tenderness  Heart: regular rate and rhythm, S1, S2 normal, no murmur, click, rub or gallop  Abdomen: abnormal findings:  tenderness mild in the epigastrium  Extremities: extremities normal, atraumatic, no cyanosis or edema  Skin: Skin color, texture, turgor normal. No rashes or lesions  Neurologic: Grossly normal    Lab and Imaging Review   Recent Labs     08/12/21  0331 08/13/21  1045 08/14/21  0548   WBC 8.6 6.8 6.1   HGB 13.0 13.1 12.5   MCV 88.4 88.6 88.9    361 322   * 137 140   K 3.8 4.4 3.5    104 107   CO2 23 23 23   BUN 12 10 9   CREATININE 0.7 0.8 0.9   GLUCOSE 84 98 86   CALCIUM 9.2 9.7 8.9   PROT 7.1 7.3 6.5   LABALBU 4.4 4.4 3.9   AST 20 18 17   ALT 46* 33 27   ALKPHOS 104 93 85   BILITOT <0.2 <0.2 0.3   LIPASE 52.0 43.0  --    MG  --   --  1.90       Assessment:     Patient Active Problem List    Diagnosis Date Noted    PUD (peptic ulcer disease) 08/13/2021    Abdominal pain, epigastric     Abdominal pain 07/27/2021    Osteochondritis dissecans 12/04/2020    Osteochondral defect of ankle 08/14/2020    Chronic pain of left ankle 08/07/2020    Right hip pain     Headache 02/29/2020    Lymphadenopathy, inguinal     Vasovagal syncope 12/19/2019    Palpitations 12/19/2019    Displacement of lumbar intervertebral disc without myelopathy 05/10/2012    Displacement of lumbar intervertebral disc without myelopathy 05/10/2012    Sprain of neck 05/10/2012    Normal vaginal delivery 04/17/2012    False labor after 37 weeks of gestation without delivery 04/08/2012    Bursitis of hip 08/15/2011     44 yo w h/o a deep penetrating ulcer in the pyloric channel on EGD 7/27/21, on bid PPI, admitted for intractable constant epig pain and nausea w occ vomiting. Labs are non revealing and CT and MRCP 8/1/21 neg except for s/p CCY. Plan:   1. Continue IV PPI  2. Will consider adding Carafate  3. Supportive care for now  4. Full liquid diet  5.  Will need a repeat EGD prior to D/C    Mendoza Foley MD       [phone]

## 2021-08-14 NOTE — CONSULTS
Department of General Surgery Consult    PATIENT NAME: Luna Bonilla   YOB: 1980    ADMISSION DATE: 8/13/2021 10:28 AM      TODAY'S DATE: 8/14/2021    Reason for Consult:  PUD    Chief Complaint: epigastric pain    Requesting Physician:  Brunilda Bruno    HISTORY OF PRESENT ILLNESS:              The patient is a 45 y.o. female who presents with epigastric pain, nausea and emesis. Reports continued symptoms of her known PUD. No hematemesis. No fevers or chills.     Past Medical History:        Diagnosis Date    Abnormal Pap smear     WNL since    Anxiety     Back pain     herniated disc    Back pain     had back surgery (fusion of 3 discs) in march 2011    Placenta previa     resolved at 28 weeks with this 3rd pregnancy    Tubal pregnancy     Pt states bilateral fallopian tubes removed       Past Surgical History:        Procedure Laterality Date    ANESTHESIA NERVE BLOCK Right 5/21/2020    RIGHT HIP INJECTION SITE CONFIRMED BY FLUOROSCOPY performed by [place] MD at [address] ARTHROSCOPY Left 11/19/2020    VIDEO ARTHROSCOPY LEFT ANKLE, SYNOVECTOMY, LOOSE BODY REMOVAL, MICROFRACTURE -BLOCK- performed by Claudio Mayo MD at [facility] Right     CHOLECYSTECTOMY      laparoscopic    ECTOPIC PREGNANCY SURGERY  10/2017    LAPAROSCOPY  4/20/10    with salpingostomy  ( ectopic pregnancy)    LUMBAR DISC SURGERY      LUMBAR FUSION  3-2011    L3-L5 fusion   [address] [facility]    UPPER GASTROINTESTINAL ENDOSCOPY N/A 7/27/2021    EGD BIOPSY performed by Antwan Dover MD at [address] [place] [address]       Current Medications:   Current Facility-Administered Medications: Lip Balm ointment, , ,   propranolol (INDERAL) tablet 10 mg, 10 mg, Oral, BID PRN  oxyCODONE-acetaminophen (PERCOCET) 5-325 MG per tablet 1 tablet, 1 tablet, Oral, Q4H PRN  sucralfate (CARAFATE) tablet 1 g, 1 g, Oral, 4 times per day  ALPRAZolam (XANAX) tablet 1 mg, 1 mg, Oral, Nightly  lamoTRIgine (LAMICTAL) tablet 150 mg, 150 mg, Oral, BID  miSOPROStol (CYTOTEC) tablet 200 mcg, 200 mcg, Oral, 4x Daily AC & HS  sodium chloride flush 0.9 % injection 5-40 mL, 5-40 mL, Intravenous, 2 times per day  sodium chloride flush 0.9 % injection 5-40 mL, 5-40 mL, Intravenous, PRN  0.9 % sodium chloride infusion, 25 mL, Intravenous, PRN  enoxaparin (LOVENOX) injection 40 mg, 40 mg, Subcutaneous, Daily  ondansetron (ZOFRAN-ODT) disintegrating tablet 4 mg, 4 mg, Oral, Q8H PRN **OR** ondansetron (ZOFRAN) injection 4 mg, 4 mg, Intravenous, Q6H PRN  polyethylene glycol (GLYCOLAX) packet 17 g, 17 g, Oral, Daily PRN  acetaminophen (TYLENOL) tablet 650 mg, 650 mg, Oral, Q6H PRN **OR** acetaminophen (TYLENOL) suppository 650 mg, 650 mg, Rectal, Q6H PRN  pantoprazole (PROTONIX) injection 40 mg, 40 mg, Intravenous, BID  0.9 % sodium chloride infusion, , Intravenous, Continuous  morphine (PF) injection 2 mg, 2 mg, Intravenous, Q2H PRN **OR** morphine (PF) injection 4 mg, 4 mg, Intravenous, Q2H PRN  Prior to Admission medications    Medication Sig Start Date End Date Taking?  Authorizing Provider   lidocaine (LIDODERM) 5 % Place 1 patch onto the skin daily 12 hours on, 12 hours off. 8/9/21  Yes DAQUAN Franklin - CNP   chlorhexidine (PERIDEX) 0.12 % solution  6/18/21  Yes Historical Provider, MD   lamoTRIgine (LAMICTAL) 150 MG tablet TAKE ONE TABLET BY MOUTH TWICE DAILY 7/13/21  Yes Historical Provider, MD   Omega-3 Fatty Acids (FISH OIL) 1000 MG CAPS Take 1,000 mg by mouth every morning   Yes Historical Provider, MD   propranolol (INDERAL) 10 MG tablet TAKE ONE TABLET BY MOUTH TWICE DAILY AS NEEDED 7/27/21  Yes Historical Provider, MD   pantoprazole (PROTONIX) 40 MG tablet Take 1 tablet by mouth 2 times daily (before meals) 8/2/21  Yes Cass Tripp, APRN - CNP   ondansetron (ZOFRAN-ODT) 4 MG disintegrating tablet Take 1 tablet by mouth every 8 hours as needed for Nausea or Vomiting 8/2/21  Yes Tara Glow, APRN - CNP   miSOPROStol (CYTOTEC) 200 MCG tablet Take 1 tablet by mouth 4 times daily (before meals and nightly) 8/2/21  Yes Tara Glow, APRN - CNP   sucralfate (CARAFATE) 1 GM/10ML suspension Take 10 mLs by mouth 4 times daily 8/2/21  Yes Tara Glow, APRN - CNP   ALPRAZolam Gregoria Gather) 1 MG tablet Take 1 mg by mouth nightly. Yes Historical Provider, MD        Allergies:  Demerol, Doxycycline, Lorazepam, and Vancomycin    Social History:   TOBACCO:  quit  ETOH:  yes    Family History:        Problem Relation Age of Onset    High Blood Pressure Father     Diabetes Maternal Aunt        REVIEW OF SYSTEMS:  CONSTITUTIONAL:  negative  HEENT:  negative  RESPIRATORY:  negative  CARDIOVASCULAR:  negative  GASTROINTESTINAL:  negative except for nausea, vomiting and abdominal pain  GENITOURINARY:  negative  HEMATOLOGIC/LYMPHATIC:  negative  NEUROLOGICAL:  Negative  * All other ROS reviewed and negative. PHYSICAL EXAM:  VITALS:  /89   Pulse 91   Temp 98.5 °F (36.9 °C) (Oral)   Resp 16   Ht 5' 3\" (1.6 m)   Wt 180 lb (81.6 kg)   LMP 07/27/2021   SpO2 95%   BMI 31.89 kg/m²   24HR INTAKE/OUTPUT:    No intake/output data recorded. I/O this shift:   In: 820.5 [I.V.:820.5]  Out: -       CONSTITUTIONAL:  alert, no apparent distress and mildly obese  EYES:  PERRL, sclera clear  ENT:  Normocephalic,atraumatic, without obvious abnormality  NECK:  supple, symmetrical, trachea midline  LUNGS: Resp effort easy and unlabored, clear to auscultation  CARDIOVASCULAR:  NO JVD, regular rate  ABDOMEN:  , normal bowel sounds, soft, non-distended, mild epigastric tender,   MUSCULOSKELETAL: No clubbing or cyanosis, 0+ pitting edema lower extremities  NEUROLOGIC:  Mental Status Exam:  Level of Alertness:   awake  PSYCHIATRIC:   person, place, time  SKIN:  no bruising or bleeding    DATA:    CBC:   Recent Labs     08/12/21  0331 08/13/21  1045 08/14/21  0548 WBC 8.6 6.8 6.1   HGB 13.0 13.1 12.5   HCT 38.2 40.0 37.9    361 322     BMP:    Recent Labs     08/12/21  0331 08/13/21  1045 08/14/21  0548   * 137 140   K 3.8 4.4 3.5    104 107   CO2 23 23 23   BUN 12 10 9   CREATININE 0.7 0.8 0.9   GLUCOSE 84 98 86     Hepatic:   Recent Labs     08/12/21  0331 08/13/21  1045 08/14/21  0548   AST 20 18 17   ALT 46* 33 27   BILITOT <0.2 <0.2 0.3   ALKPHOS 104 93 85     Mag:      Recent Labs     08/14/21  0548   MG 1.90      Phos:   No results for input(s): PHOS in the last 72 hours. INR: No results for input(s): INR in the last 72 hours. Radiology Review: Images personally reviewed by me. NA      IMPRESSION/RECOMMENDATIONS:    46 yo with peptic ulcer disease  1. Discussed that given risks of operation and possible long term complications would prefer continuing with current treatment of PUD. Ultimately would need repeat of EGD to assess for healing. If fails to improve with medications or worsens despite this then would be a candidate for antrectomy. 2.  Risk factor reduction - no nsaids, tobacco, alcohol  3.   Gastrin normal at last hospitalization    Electronically signed by Yulisa Morton MD     [address] Surgery  [id number]

## 2021-08-14 NOTE — PROGRESS NOTES
Pt admitted to room 343 from ED. Pt oriented to room, call light, and telephone. Vital signs are stable. Pt is A/Ox4. Assesment is as charted. Pt reports abdominal pain with mild abdominal bloating.

## 2021-08-15 PROCEDURE — 99232 SBSQ HOSP IP/OBS MODERATE 35: CPT | Performed by: SURGERY

## 2021-08-15 PROCEDURE — 6360000002 HC RX W HCPCS: Performed by: REGISTERED NURSE

## 2021-08-15 PROCEDURE — 6370000000 HC RX 637 (ALT 250 FOR IP): Performed by: REGISTERED NURSE

## 2021-08-15 PROCEDURE — 1200000000 HC SEMI PRIVATE

## 2021-08-15 PROCEDURE — 6360000002 HC RX W HCPCS: Performed by: NURSE PRACTITIONER

## 2021-08-15 PROCEDURE — C9113 INJ PANTOPRAZOLE SODIUM, VIA: HCPCS | Performed by: REGISTERED NURSE

## 2021-08-15 PROCEDURE — 2580000003 HC RX 258: Performed by: REGISTERED NURSE

## 2021-08-15 RX ORDER — DIPHENHYDRAMINE HCL 25 MG
25 TABLET ORAL NIGHTLY PRN
Status: DISCONTINUED | OUTPATIENT
Start: 2021-08-15 | End: 2021-08-20 | Stop reason: HOSPADM

## 2021-08-15 RX ORDER — PROMETHAZINE HYDROCHLORIDE 25 MG/1
12.5 TABLET ORAL EVERY 6 HOURS PRN
Status: DISCONTINUED | OUTPATIENT
Start: 2021-08-15 | End: 2021-08-20 | Stop reason: HOSPADM

## 2021-08-15 RX ADMIN — MORPHINE SULFATE 4 MG: 4 INJECTION INTRAVENOUS at 20:19

## 2021-08-15 RX ADMIN — OXYCODONE HYDROCHLORIDE AND ACETAMINOPHEN 1 TABLET: 5; 325 TABLET ORAL at 14:23

## 2021-08-15 RX ADMIN — MISOPROSTOL 200 MCG: 100 TABLET ORAL at 06:31

## 2021-08-15 RX ADMIN — MORPHINE SULFATE 4 MG: 4 INJECTION INTRAVENOUS at 23:27

## 2021-08-15 RX ADMIN — ONDANSETRON 4 MG: 2 INJECTION INTRAMUSCULAR; INTRAVENOUS at 05:48

## 2021-08-15 RX ADMIN — OXYCODONE HYDROCHLORIDE AND ACETAMINOPHEN 1 TABLET: 5; 325 TABLET ORAL at 18:25

## 2021-08-15 RX ADMIN — ONDANSETRON 4 MG: 2 INJECTION INTRAMUSCULAR; INTRAVENOUS at 20:24

## 2021-08-15 RX ADMIN — MORPHINE SULFATE 4 MG: 4 INJECTION INTRAVENOUS at 00:34

## 2021-08-15 RX ADMIN — MORPHINE SULFATE 4 MG: 4 INJECTION INTRAVENOUS at 17:39

## 2021-08-15 RX ADMIN — LAMOTRIGINE 150 MG: 100 TABLET ORAL at 08:26

## 2021-08-15 RX ADMIN — MORPHINE SULFATE 4 MG: 4 INJECTION INTRAVENOUS at 10:57

## 2021-08-15 RX ADMIN — SUCRALFATE 1 G: 1 TABLET ORAL at 10:57

## 2021-08-15 RX ADMIN — PROMETHAZINE HYDROCHLORIDE 12.5 MG: 25 TABLET ORAL at 10:27

## 2021-08-15 RX ADMIN — ONDANSETRON 4 MG: 2 INJECTION INTRAMUSCULAR; INTRAVENOUS at 13:29

## 2021-08-15 RX ADMIN — OXYCODONE HYDROCHLORIDE AND ACETAMINOPHEN 1 TABLET: 5; 325 TABLET ORAL at 22:25

## 2021-08-15 RX ADMIN — ENOXAPARIN SODIUM 40 MG: 40 INJECTION SUBCUTANEOUS at 08:26

## 2021-08-15 RX ADMIN — PANTOPRAZOLE SODIUM 40 MG: 40 INJECTION, POWDER, FOR SOLUTION INTRAVENOUS at 08:26

## 2021-08-15 RX ADMIN — OXYCODONE HYDROCHLORIDE AND ACETAMINOPHEN 1 TABLET: 5; 325 TABLET ORAL at 02:44

## 2021-08-15 RX ADMIN — SODIUM CHLORIDE, PRESERVATIVE FREE 10 ML: 5 INJECTION INTRAVENOUS at 20:19

## 2021-08-15 RX ADMIN — MORPHINE SULFATE 4 MG: 4 INJECTION INTRAVENOUS at 13:19

## 2021-08-15 RX ADMIN — ALPRAZOLAM 1 MG: 1 TABLET ORAL at 20:21

## 2021-08-15 RX ADMIN — MORPHINE SULFATE 4 MG: 4 INJECTION INTRAVENOUS at 15:30

## 2021-08-15 RX ADMIN — DIPHENHYDRAMINE HCL 25 MG: 25 TABLET ORAL at 22:25

## 2021-08-15 RX ADMIN — LAMOTRIGINE 150 MG: 100 TABLET ORAL at 20:19

## 2021-08-15 RX ADMIN — SUCRALFATE 1 G: 1 TABLET ORAL at 23:29

## 2021-08-15 RX ADMIN — MISOPROSTOL 200 MCG: 100 TABLET ORAL at 09:53

## 2021-08-15 RX ADMIN — MORPHINE SULFATE 4 MG: 4 INJECTION INTRAVENOUS at 03:47

## 2021-08-15 RX ADMIN — SUCRALFATE 1 G: 1 TABLET ORAL at 17:38

## 2021-08-15 RX ADMIN — MISOPROSTOL 200 MCG: 100 TABLET ORAL at 20:19

## 2021-08-15 RX ADMIN — POLYETHYLENE GLYCOL 3350 17 G: 17 POWDER, FOR SOLUTION ORAL at 15:28

## 2021-08-15 RX ADMIN — PANTOPRAZOLE SODIUM 40 MG: 40 INJECTION, POWDER, FOR SOLUTION INTRAVENOUS at 20:19

## 2021-08-15 RX ADMIN — MORPHINE SULFATE 4 MG: 4 INJECTION INTRAVENOUS at 05:48

## 2021-08-15 RX ADMIN — MISOPROSTOL 200 MCG: 100 TABLET ORAL at 17:39

## 2021-08-15 RX ADMIN — MORPHINE SULFATE 4 MG: 4 INJECTION INTRAVENOUS at 08:26

## 2021-08-15 RX ADMIN — SUCRALFATE 1 G: 1 TABLET ORAL at 05:48

## 2021-08-15 RX ADMIN — OXYCODONE HYDROCHLORIDE AND ACETAMINOPHEN 1 TABLET: 5; 325 TABLET ORAL at 09:53

## 2021-08-15 ASSESSMENT — PAIN SCALES - GENERAL
PAINLEVEL_OUTOF10: 6
PAINLEVEL_OUTOF10: 8
PAINLEVEL_OUTOF10: 8
PAINLEVEL_OUTOF10: 6
PAINLEVEL_OUTOF10: 7
PAINLEVEL_OUTOF10: 6
PAINLEVEL_OUTOF10: 7
PAINLEVEL_OUTOF10: 7
PAINLEVEL_OUTOF10: 8
PAINLEVEL_OUTOF10: 6
PAINLEVEL_OUTOF10: 6
PAINLEVEL_OUTOF10: 8
PAINLEVEL_OUTOF10: 6

## 2021-08-15 ASSESSMENT — PAIN DESCRIPTION - LOCATION: LOCATION: ABDOMEN

## 2021-08-15 ASSESSMENT — PAIN DESCRIPTION - PAIN TYPE: TYPE: ACUTE PAIN

## 2021-08-15 NOTE — PROGRESS NOTES
Patient a/ox4. VSS. Rm air. Patient c/o abd pain, see MAR. Tolerating full liquid diet. Will reassess.

## 2021-08-15 NOTE — PROGRESS NOTES
[facility]    PATIENT NAME: Iveth Mathias     TODAY'S DATE: 8/15/2021    CHIEF COMPLAINT: abd pain    INTERVAL HISTORY/HPI:    Pt with continued epigastric pain, mild nausea, no emesis, no fevers. REVIEW OF SYSTEMS:  Pertinent positives and negatives as per interval history section    OBJECTIVE:  VITALS:  /85   Pulse 87   Temp 98.5 °F (36.9 °C) (Oral)   Resp 16   Ht 5' 3\" (1.6 m)   Wt 180 lb (81.6 kg)   LMP 07/27/2021   SpO2 100%   BMI 31.89 kg/m²     INTAKE/OUTPUT:    I/O last 3 completed shifts: In: 2665.8 [P.O.:1400; I.V.:1265.8]  Out: -   No intake/output data recorded. CONSTITUTIONAL:  awake and alert  LUNGS:  Respirations easy and unlabored,  CARD:  regular rate and rhythm  ABDOMEN:  normal bowel sounds, soft, non-distended, mild tender     Data:  CBC: Recent Labs     08/13/21  1045 08/14/21  0548   WBC 6.8 6.1   HGB 13.1 12.5   HCT 40.0 37.9    322     BMP:    Recent Labs     08/13/21  1045 08/14/21  0548    140   K 4.4 3.5    107   CO2 23 23   BUN 10 9   CREATININE 0.8 0.9   GLUCOSE 98 86     Hepatic:   Recent Labs     08/13/21  1045 08/14/21  0548   AST 18 17   ALT 33 27   BILITOT <0.2 0.3   ALKPHOS 93 85     Mag:      Recent Labs     08/14/21  0548   MG 1.90      Phos:   No results for input(s): PHOS in the last 72 hours. INR: No results for input(s): INR in the last 72 hours. Radiology Review:  *Imaging personally reviewed by me. NA      ASSESSMENT AND PLAN:  46 yo with PUD  1. Tolerating liquid diet  2. Continue PPI  3.   EGD  Prior to discharge per GI     Electronically signed by Ana Hunt MD     [id number]

## 2021-08-15 NOTE — PROGRESS NOTES
Progress Note  Date:8/15/2021       Room:[id number]  Patient Sincere Anton     YOB: 1980     Age:40 y.o. Subjective    Subjective   Review of Systems  Objective         Vitals Last 24 Hours:  TEMPERATURE:  Temp  Av.2 °F (36.8 °C)  Min: 97.7 °F (36.5 °C)  Max: 98.5 °F (36.9 °C)  RESPIRATIONS RANGE: Resp  Av.7  Min: 16  Max: 18  PULSE OXIMETRY RANGE: SpO2  Av %  Min: 97 %  Max: 100 %  PULSE RANGE: Pulse  Av.7  Min: 71  Max: 87  BLOOD PRESSURE RANGE: Systolic (59ETQ), UYF:960 , Min:104 , OGA:816   ; Diastolic (82ZWW), OGY:39, Min:67, Max:89    I/O (24Hr): Intake/Output Summary (Last 24 hours) at 8/15/2021 1803  Last data filed at 8/15/2021 1330  Gross per 24 hour   Intake 1885.22 ml   Output --   Net 1885.22 ml     Objective:  General Appearance: In no acute distress. Vital signs: (most recent): Blood pressure 104/67, pulse 87, temperature 97.7 °F (36.5 °C), temperature source Oral, resp. rate 18, height 5' 3\" (1.6 m), weight 180 lb (81.6 kg), last menstrual period 2021, SpO2 97 %, not currently breastfeeding. Heart: Normal rate. Regular rhythm. S1 normal and S2 normal.    Abdomen: Abdomen is soft. Bowel sounds are normal.   There is epigastric tenderness. Labs/Imaging/Diagnostics    Labs:  CBC:  Recent Labs     21  1045 21  0548   WBC 6.8 6.1   RBC 4.51 4.26   HGB 13.1 12.5   HCT 40.0 37.9   MCV 88.6 88.9   RDW 14.3 14.2    322     CHEMISTRIES:  Recent Labs     21  1045 21  0548    140   K 4.4 3.5    107   CO2 23 23   BUN 10 9   CREATININE 0.8 0.9   GLUCOSE 98 86   MG  --  1.90     PT/INR:No results for input(s): PROTIME, INR in the last 72 hours. APTT:No results for input(s): APTT in the last 72 hours. LIVER PROFILE:  Recent Labs     21  1045 21  0548   AST 18 17   ALT 33 27   BILITOT <0.2 0.3   ALKPHOS 93 85       Imaging Last 24 Hours:  No results found.   Assessment//Plan Hospital Problems         Last Modified POA    PUD (peptic ulcer disease) 8/13/2021 Yes        Assessment & Plan  46 yo w h/o a deep penetrating ulcer in the pyloric channel on EGD 7/27/21, on bid PPI, admitted for intractable constant epig pain and nausea w occ vomiting. Labs are non revealing and CT and MRCP 8/1/21 neg except for s/p CCY.     Plan:   1. Continue IV PPI  2. Will consider adding Carafate  3. Supportive care for now  4. Full liquid diet  5.  Will need a repeat EGD prior to D/C, probably on Monday     Sharia Bloch, MD [facility]) [phone]       Electronically signed by Sharia Bloch, MD on 8/15/21 at 6:03 PM EDT

## 2021-08-15 NOTE — PROGRESS NOTES
Hospitalist Progress Note      PCP: DAQUAN Díaz CNP    Date of Admission: 8/13/2021    Chief Complaint: Abdominal pain     Hospital Course:   45 y.o. female who presented to [facility] with complaints of abdominal pain. Pt was hospitalized at [facility] from 7/27 to 8/2 for peptic ulcer. She underwent EGD on 7/27 and found to have a deep 12 mm penetrating ulcer located in the pyloric channel with extension into the duodenal bulb and patches of erosive antral gastritis attributed to ibuprofen use. She was treated with protonix, carafate and cytotec and was discharged on this regimen. General surgery was also consulted during previous stay and found no role for surgery and recommended medical management. She had a CT A/P and MRCP during previous hospital stay which was unremarkable. The pt was dc'd on 8/2 with the aforementioned regimen as well as zofran and percocet. Pt presented to her PCP on Wednesday due to ongoing pain and possible pain med refill. Per pt, her PCP reached out to Dr. Jovanni Tavarez office and stated that meds should not be refilled. Pt has had no pain meds since last Thursday and has resorted to taking ibuprofen again even though she was advised not to do so however pt felt like she had no other choice to control her pain. She presented to the ED last Wednesday and was ultimately dc'd. She presents today due to ongoing epigastric abdominal pain with associated N&V. No fever/chills. No dyspnea or chest pain. No hematemesis. She is having normal / regular bowel movements. ED course: Afebrile. On RA. VSS. CXR showed no pneumoperitoneum. Lab work was unremarkable. GI was consulted in the ED. Hospitalist was consulted for admission. Subjective:   Pt is on RA. Afebrile. VSS. +epigastric abdominal pain. +nausea but no vomiting. Did not sleep well last night.      Medications:  Reviewed    Infusion Medications    sodium chloride       Scheduled Medications    sucralfate  1 g Oral 4 times per day    ALPRAZolam  1 mg Oral Nightly    lamoTRIgine  150 mg Oral BID    miSOPROStol  200 mcg Oral 4x Daily AC & HS    sodium chloride flush  5-40 mL Intravenous 2 times per day    enoxaparin  40 mg Subcutaneous Daily    pantoprazole  40 mg Intravenous BID     PRN Meds: diphenhydrAMINE, propranolol, oxyCODONE-acetaminophen, sodium chloride flush, sodium chloride, ondansetron **OR** ondansetron, polyethylene glycol, acetaminophen **OR** acetaminophen, morphine **OR** morphine      Intake/Output Summary (Last 24 hours) at 8/15/2021 0631  Last data filed at 8/15/2021 0551  Gross per 24 hour   Intake 2665.75 ml   Output --   Net 2665.75 ml       Physical Exam Performed:    BP (!) 144/89   Pulse 71   Temp 98.3 °F (36.8 °C) (Oral)   Resp 16   Ht 5' 3\" (1.6 m)   Wt 180 lb (81.6 kg)   LMP 07/27/2021   SpO2 100%   BMI 31.89 kg/m²     General appearance: No apparent distress, appears stated age and cooperative. HEENT: Pupils equal, round, and reactive to light. Conjunctivae/corneas clear. Neck: Supple, with full range of motion. No jugular venous distention. Trachea midline. Respiratory:  Normal respiratory effort. Clear to auscultation, bilaterally without Rales/Wheezes/Rhonchi. Cardiovascular: Regular rate and rhythm with normal S1/S2 without murmurs, rubs or gallops. Abdomen: Soft, non-distended with normal bowel sounds. +tender epigastric region. Musculoskeletal: No clubbing, cyanosis or edema bilaterally. Full range of motion without deformity. Skin: Skin color, texture, turgor normal.  No rashes or lesions. Neurologic:  Neurovascularly intact without any focal sensory/motor deficits.  Cranial nerves: II-XII intact, grossly non-focal.  Psychiatric: Alert and oriented, thought content appropriate, normal insight  Capillary Refill: Brisk,3 seconds, normal   Peripheral Pulses: +2 palpable, equal bilaterally       Labs:   Recent Labs     08/13/21  1045 08/14/21  0548   WBC 6.8 6.1   HGB 13.1 12.5   HCT 40.0 37.9    322     Recent Labs     08/13/21  1045 08/14/21  0548    140   K 4.4 3.5    107   CO2 23 23   BUN 10 9   CREATININE 0.8 0.9   CALCIUM 9.7 8.9     Recent Labs     08/13/21  1045 08/14/21  0548   AST 18 17   ALT 33 27   BILITOT <0.2 0.3   ALKPHOS 93 85     Urinalysis:      Lab Results   Component Value Date    NITRU Negative 08/13/2021    WBCUA 0-2 08/13/2021    BACTERIA 1+ 08/13/2021    RBCUA 3-4 08/13/2021    BLOODU SMALL 08/13/2021    SPECGRAV 1.010 08/13/2021    GLUCOSEU Negative 08/13/2021    GLUCOSEU NEGATIVE 12/22/2011       Radiology:  XR CHEST 1 VIEW   Final Result   Rotated exam.  No acute airspace disease. Assessment/Plan:    Active Hospital Problems    Diagnosis     PUD (peptic ulcer disease) [K27.9]        Epigastric abdominal pain due to PUD:  - CT A/P and MRCP during previous hospitalization were unremarkable. - Pt is s/p EGD on 7/27, found to have deep 12 mm penetrating ulcer located in the pyloric channel with extension into the duodenal bulb and patches of erosive antral gastritis. Path was negative. - Continue PPI BID, cytotec and carafate. Continue prn analgesia. - Pt advised to avoid NSAIDs for the foreseeable future. - GI and general surgery consulted/following  -- plan for possible EGD on 8/16.   - Defer diet to GI/surgery.      Tobacco abuse:  - Smoking cessation discussed/advised.       DVT Prophylaxis: Lovenox   Diet: ADULT DIET;  Full Liquid  Code Status: Full Code    PT/OT Eval Status: not indicated     Dispo - uncertain, pending clinical course     Vanessa Tafoya Heading, APRN - CNP

## 2021-08-16 ENCOUNTER — ANESTHESIA EVENT (OUTPATIENT)
Dept: ENDOSCOPY | Age: 41
DRG: 384 | End: 2021-08-16
Payer: COMMERCIAL

## 2021-08-16 ENCOUNTER — ANESTHESIA (OUTPATIENT)
Dept: ENDOSCOPY | Age: 41
DRG: 384 | End: 2021-08-16
Payer: COMMERCIAL

## 2021-08-16 LAB
ANION GAP SERPL CALCULATED.3IONS-SCNC: 11 MMOL/L (ref 3–16)
BUN BLDV-MCNC: 5 MG/DL (ref 7–20)
CALCIUM SERPL-MCNC: 9.3 MG/DL (ref 8.3–10.6)
CHLORIDE BLD-SCNC: 106 MMOL/L (ref 99–110)
CO2: 24 MMOL/L (ref 21–32)
CREAT SERPL-MCNC: 1 MG/DL (ref 0.6–1.1)
GFR AFRICAN AMERICAN: >60
GFR NON-AFRICAN AMERICAN: >60
GLUCOSE BLD-MCNC: 87 MG/DL (ref 70–99)
HCT VFR BLD CALC: 38 % (ref 36–48)
HEMOGLOBIN: 12.7 G/DL (ref 12–16)
MCH RBC QN AUTO: 29.4 PG (ref 26–34)
MCHC RBC AUTO-ENTMCNC: 33.5 G/DL (ref 31–36)
MCV RBC AUTO: 87.8 FL (ref 80–100)
PDW BLD-RTO: 14.1 % (ref 12.4–15.4)
PLATELET # BLD: 321 K/UL (ref 135–450)
PMV BLD AUTO: 7.7 FL (ref 5–10.5)
POTASSIUM REFLEX MAGNESIUM: 4 MMOL/L (ref 3.5–5.1)
RBC # BLD: 4.33 M/UL (ref 4–5.2)
SODIUM BLD-SCNC: 141 MMOL/L (ref 136–145)
WBC # BLD: 4.9 K/UL (ref 4–11)

## 2021-08-16 PROCEDURE — 3700000001 HC ADD 15 MINUTES (ANESTHESIA): Performed by: INTERNAL MEDICINE

## 2021-08-16 PROCEDURE — 2709999900 HC NON-CHARGEABLE SUPPLY: Performed by: INTERNAL MEDICINE

## 2021-08-16 PROCEDURE — 6360000002 HC RX W HCPCS: Performed by: REGISTERED NURSE

## 2021-08-16 PROCEDURE — 3700000000 HC ANESTHESIA ATTENDED CARE: Performed by: INTERNAL MEDICINE

## 2021-08-16 PROCEDURE — 85027 COMPLETE CBC AUTOMATED: CPT

## 2021-08-16 PROCEDURE — 6360000002 HC RX W HCPCS: Performed by: NURSE ANESTHETIST, CERTIFIED REGISTERED

## 2021-08-16 PROCEDURE — 2580000003 HC RX 258: Performed by: NURSE ANESTHETIST, CERTIFIED REGISTERED

## 2021-08-16 PROCEDURE — 0DB68ZX EXCISION OF STOMACH, VIA NATURAL OR ARTIFICIAL OPENING ENDOSCOPIC, DIAGNOSTIC: ICD-10-PCS | Performed by: INTERNAL MEDICINE

## 2021-08-16 PROCEDURE — 6370000000 HC RX 637 (ALT 250 FOR IP): Performed by: REGISTERED NURSE

## 2021-08-16 PROCEDURE — 1200000000 HC SEMI PRIVATE

## 2021-08-16 PROCEDURE — 2580000003 HC RX 258: Performed by: ANESTHESIOLOGY

## 2021-08-16 PROCEDURE — 3609012400 HC EGD TRANSORAL BIOPSY SINGLE/MULTIPLE: Performed by: INTERNAL MEDICINE

## 2021-08-16 PROCEDURE — 2500000003 HC RX 250 WO HCPCS: Performed by: NURSE ANESTHETIST, CERTIFIED REGISTERED

## 2021-08-16 PROCEDURE — 99232 SBSQ HOSP IP/OBS MODERATE 35: CPT | Performed by: SURGERY

## 2021-08-16 PROCEDURE — 6360000002 HC RX W HCPCS: Performed by: NURSE PRACTITIONER

## 2021-08-16 PROCEDURE — 88305 TISSUE EXAM BY PATHOLOGIST: CPT

## 2021-08-16 PROCEDURE — C9113 INJ PANTOPRAZOLE SODIUM, VIA: HCPCS | Performed by: REGISTERED NURSE

## 2021-08-16 PROCEDURE — 7100000010 HC PHASE II RECOVERY - FIRST 15 MIN: Performed by: INTERNAL MEDICINE

## 2021-08-16 PROCEDURE — 80048 BASIC METABOLIC PNL TOTAL CA: CPT

## 2021-08-16 PROCEDURE — 7100000011 HC PHASE II RECOVERY - ADDTL 15 MIN: Performed by: INTERNAL MEDICINE

## 2021-08-16 PROCEDURE — 36415 COLL VENOUS BLD VENIPUNCTURE: CPT

## 2021-08-16 RX ORDER — PROPOFOL 10 MG/ML
INJECTION, EMULSION INTRAVENOUS PRN
Status: DISCONTINUED | OUTPATIENT
Start: 2021-08-16 | End: 2021-08-16 | Stop reason: SDUPTHER

## 2021-08-16 RX ORDER — LABETALOL HYDROCHLORIDE 5 MG/ML
5 INJECTION, SOLUTION INTRAVENOUS EVERY 10 MIN PRN
Status: DISCONTINUED | OUTPATIENT
Start: 2021-08-16 | End: 2021-08-16 | Stop reason: HOSPADM

## 2021-08-16 RX ORDER — MORPHINE SULFATE 2 MG/ML
1 INJECTION, SOLUTION INTRAMUSCULAR; INTRAVENOUS EVERY 5 MIN PRN
Status: DISCONTINUED | OUTPATIENT
Start: 2021-08-16 | End: 2021-08-16 | Stop reason: HOSPADM

## 2021-08-16 RX ORDER — DIPHENHYDRAMINE HYDROCHLORIDE 50 MG/ML
12.5 INJECTION INTRAMUSCULAR; INTRAVENOUS
Status: DISCONTINUED | OUTPATIENT
Start: 2021-08-16 | End: 2021-08-16 | Stop reason: HOSPADM

## 2021-08-16 RX ORDER — PROMETHAZINE HYDROCHLORIDE 25 MG/ML
6.25 INJECTION, SOLUTION INTRAMUSCULAR; INTRAVENOUS
Status: DISCONTINUED | OUTPATIENT
Start: 2021-08-16 | End: 2021-08-16 | Stop reason: HOSPADM

## 2021-08-16 RX ORDER — LIDOCAINE HYDROCHLORIDE 20 MG/ML
INJECTION, SOLUTION INFILTRATION; PERINEURAL PRN
Status: DISCONTINUED | OUTPATIENT
Start: 2021-08-16 | End: 2021-08-16 | Stop reason: SDUPTHER

## 2021-08-16 RX ORDER — OXYCODONE HYDROCHLORIDE AND ACETAMINOPHEN 5; 325 MG/1; MG/1
2 TABLET ORAL PRN
Status: DISCONTINUED | OUTPATIENT
Start: 2021-08-16 | End: 2021-08-16 | Stop reason: HOSPADM

## 2021-08-16 RX ORDER — HYDRALAZINE HYDROCHLORIDE 20 MG/ML
5 INJECTION INTRAMUSCULAR; INTRAVENOUS EVERY 10 MIN PRN
Status: DISCONTINUED | OUTPATIENT
Start: 2021-08-16 | End: 2021-08-16 | Stop reason: HOSPADM

## 2021-08-16 RX ORDER — SODIUM CHLORIDE, SODIUM LACTATE, POTASSIUM CHLORIDE, CALCIUM CHLORIDE 600; 310; 30; 20 MG/100ML; MG/100ML; MG/100ML; MG/100ML
INJECTION, SOLUTION INTRAVENOUS CONTINUOUS PRN
Status: DISCONTINUED | OUTPATIENT
Start: 2021-08-16 | End: 2021-08-16 | Stop reason: SDUPTHER

## 2021-08-16 RX ORDER — OXYCODONE HYDROCHLORIDE AND ACETAMINOPHEN 5; 325 MG/1; MG/1
1 TABLET ORAL PRN
Status: DISCONTINUED | OUTPATIENT
Start: 2021-08-16 | End: 2021-08-16 | Stop reason: HOSPADM

## 2021-08-16 RX ORDER — ONDANSETRON 2 MG/ML
4 INJECTION INTRAMUSCULAR; INTRAVENOUS
Status: DISCONTINUED | OUTPATIENT
Start: 2021-08-16 | End: 2021-08-16 | Stop reason: HOSPADM

## 2021-08-16 RX ORDER — SODIUM CHLORIDE, SODIUM LACTATE, POTASSIUM CHLORIDE, CALCIUM CHLORIDE 600; 310; 30; 20 MG/100ML; MG/100ML; MG/100ML; MG/100ML
INJECTION, SOLUTION INTRAVENOUS CONTINUOUS
Status: DISCONTINUED | OUTPATIENT
Start: 2021-08-16 | End: 2021-08-17

## 2021-08-16 RX ORDER — MORPHINE SULFATE 2 MG/ML
2 INJECTION, SOLUTION INTRAMUSCULAR; INTRAVENOUS EVERY 5 MIN PRN
Status: DISCONTINUED | OUTPATIENT
Start: 2021-08-16 | End: 2021-08-16 | Stop reason: HOSPADM

## 2021-08-16 RX ADMIN — MORPHINE SULFATE 4 MG: 4 INJECTION INTRAVENOUS at 01:23

## 2021-08-16 RX ADMIN — MORPHINE SULFATE 4 MG: 4 INJECTION INTRAVENOUS at 06:06

## 2021-08-16 RX ADMIN — OXYCODONE HYDROCHLORIDE AND ACETAMINOPHEN 1 TABLET: 5; 325 TABLET ORAL at 09:27

## 2021-08-16 RX ADMIN — MISOPROSTOL 200 MCG: 100 TABLET ORAL at 10:40

## 2021-08-16 RX ADMIN — LAMOTRIGINE 150 MG: 100 TABLET ORAL at 20:10

## 2021-08-16 RX ADMIN — LAMOTRIGINE 150 MG: 100 TABLET ORAL at 08:22

## 2021-08-16 RX ADMIN — MISOPROSTOL 200 MCG: 100 TABLET ORAL at 06:06

## 2021-08-16 RX ADMIN — SUCRALFATE 1 G: 1 TABLET ORAL at 13:11

## 2021-08-16 RX ADMIN — MISOPROSTOL 200 MCG: 100 TABLET ORAL at 17:47

## 2021-08-16 RX ADMIN — OXYCODONE HYDROCHLORIDE AND ACETAMINOPHEN 1 TABLET: 5; 325 TABLET ORAL at 14:29

## 2021-08-16 RX ADMIN — ONDANSETRON 4 MG: 2 INJECTION INTRAMUSCULAR; INTRAVENOUS at 16:48

## 2021-08-16 RX ADMIN — ONDANSETRON 4 MG: 2 INJECTION INTRAMUSCULAR; INTRAVENOUS at 04:07

## 2021-08-16 RX ADMIN — MORPHINE SULFATE 4 MG: 4 INJECTION INTRAVENOUS at 20:10

## 2021-08-16 RX ADMIN — PANTOPRAZOLE SODIUM 40 MG: 40 INJECTION, POWDER, FOR SOLUTION INTRAVENOUS at 20:10

## 2021-08-16 RX ADMIN — MORPHINE SULFATE 4 MG: 4 INJECTION INTRAVENOUS at 13:11

## 2021-08-16 RX ADMIN — SUCRALFATE 1 G: 1 TABLET ORAL at 22:51

## 2021-08-16 RX ADMIN — MORPHINE SULFATE 4 MG: 4 INJECTION INTRAVENOUS at 22:20

## 2021-08-16 RX ADMIN — MORPHINE SULFATE 4 MG: 4 INJECTION INTRAVENOUS at 17:47

## 2021-08-16 RX ADMIN — MORPHINE SULFATE 2 MG: 2 INJECTION, SOLUTION INTRAMUSCULAR; INTRAVENOUS at 10:35

## 2021-08-16 RX ADMIN — PROMETHAZINE HYDROCHLORIDE 12.5 MG: 25 TABLET ORAL at 21:13

## 2021-08-16 RX ADMIN — SODIUM CHLORIDE, SODIUM LACTATE, POTASSIUM CHLORIDE, AND CALCIUM CHLORIDE: .6; .31; .03; .02 INJECTION, SOLUTION INTRAVENOUS at 12:18

## 2021-08-16 RX ADMIN — OXYCODONE HYDROCHLORIDE AND ACETAMINOPHEN 1 TABLET: 5; 325 TABLET ORAL at 21:16

## 2021-08-16 RX ADMIN — SODIUM CHLORIDE, SODIUM LACTATE, POTASSIUM CHLORIDE, AND CALCIUM CHLORIDE: .6; .31; .03; .02 INJECTION, SOLUTION INTRAVENOUS at 12:19

## 2021-08-16 RX ADMIN — ENOXAPARIN SODIUM 40 MG: 40 INJECTION SUBCUTANEOUS at 08:24

## 2021-08-16 RX ADMIN — MISOPROSTOL 200 MCG: 100 TABLET ORAL at 23:02

## 2021-08-16 RX ADMIN — PROPOFOL 30 MG: 10 INJECTION, EMULSION INTRAVENOUS at 12:29

## 2021-08-16 RX ADMIN — SUCRALFATE 1 G: 1 TABLET ORAL at 17:47

## 2021-08-16 RX ADMIN — ONDANSETRON 4 MG: 2 INJECTION INTRAMUSCULAR; INTRAVENOUS at 10:40

## 2021-08-16 RX ADMIN — MORPHINE SULFATE 4 MG: 4 INJECTION INTRAVENOUS at 15:42

## 2021-08-16 RX ADMIN — PROPOFOL 150 MG: 10 INJECTION, EMULSION INTRAVENOUS at 12:25

## 2021-08-16 RX ADMIN — PROPOFOL 20 MG: 10 INJECTION, EMULSION INTRAVENOUS at 12:31

## 2021-08-16 RX ADMIN — PANTOPRAZOLE SODIUM 40 MG: 40 INJECTION, POWDER, FOR SOLUTION INTRAVENOUS at 08:22

## 2021-08-16 RX ADMIN — SUCRALFATE 1 G: 1 TABLET ORAL at 06:06

## 2021-08-16 RX ADMIN — ALPRAZOLAM 1 MG: 1 TABLET ORAL at 20:10

## 2021-08-16 RX ADMIN — MORPHINE SULFATE 4 MG: 4 INJECTION INTRAVENOUS at 04:07

## 2021-08-16 RX ADMIN — DIPHENHYDRAMINE HCL 25 MG: 25 TABLET ORAL at 22:51

## 2021-08-16 RX ADMIN — LIDOCAINE HYDROCHLORIDE 60 MG: 20 INJECTION, SOLUTION INFILTRATION; PERINEURAL at 12:25

## 2021-08-16 RX ADMIN — MORPHINE SULFATE 4 MG: 4 INJECTION INTRAVENOUS at 08:18

## 2021-08-16 RX ADMIN — ONDANSETRON 4 MG: 2 INJECTION INTRAMUSCULAR; INTRAVENOUS at 22:51

## 2021-08-16 RX ADMIN — PROMETHAZINE HYDROCHLORIDE 12.5 MG: 25 TABLET ORAL at 13:29

## 2021-08-16 ASSESSMENT — PAIN SCALES - GENERAL
PAINLEVEL_OUTOF10: 0
PAINLEVEL_OUTOF10: 7
PAINLEVEL_OUTOF10: 8
PAINLEVEL_OUTOF10: 7
PAINLEVEL_OUTOF10: 8
PAINLEVEL_OUTOF10: 8
PAINLEVEL_OUTOF10: 6
PAINLEVEL_OUTOF10: 7
PAINLEVEL_OUTOF10: 7
PAINLEVEL_OUTOF10: 6
PAINLEVEL_OUTOF10: 6
PAINLEVEL_OUTOF10: 7
PAINLEVEL_OUTOF10: 8
PAINLEVEL_OUTOF10: 0
PAINLEVEL_OUTOF10: 8

## 2021-08-16 ASSESSMENT — PAIN - FUNCTIONAL ASSESSMENT: PAIN_FUNCTIONAL_ASSESSMENT: 0-10

## 2021-08-16 NOTE — OP NOTE
Esophagogastroduodenoscopy Note    Patient:   Zeus Amaral    YOB: 1980    Facility:   [facility] [Inpatient]   Referring/PCP: DAQUAN Hernandez CNP    Procedure:   Esophagogastroduodenoscopy --diagnostic  Date:     8/16/2021   Endoscopist:  Hung Cohen MD     Preoperative Diagnosis:   Epig pain and Pyloric ulcer  Postoperative Diagnosis: Pyloric tiny ulcer (markedly improved)    Anesthesia: MAC  Estimated blood loss: Estimated amount of blood loss is <1ml. Complications: None    Description of Procedure:  Informed consent was obtained from the patient after explanation of the procedure including indications, description of the procedure,  benefits and possible risks and complications of the procedure, and alternatives. Questions were answered. The patient's history was reviewed and a directed physical examination was performed prior to the procedure. Patient was monitored throughout the procedure with pulse oximetry and periodic assessment of vital signs. Patient was sedated as noted above. The Nursing staff and I performed a time out. With the patient in the left lateral decubitus position, the Olympus videoendoscope was placed in the patient's mouth and under direct visualization passed into the esophagus. The scope was ultimately passed to the third portion of the duodenum. Visualization was performed during both introduction and withdrawal of the endoscope and retroflexed view of the proximal stomach was obtained. Findings[de-identified]   Esophagus: normal. The findings do not support a diagnosis of Velasco's Esophagus. Stomach: Gastritis, biopsied  Duodenum: Pyloric tiny 3 mm white based superficial residual ulcer (markedly improved)    Recommendations: -Continue acid suppression. , -Await pathology. , -Follow symptoms.     Hung Cohen MD       (O) [phone]          Hung Cohen MD, MD

## 2021-08-16 NOTE — PROGRESS NOTES
Assessment complete and charted earlier in shift. Pt denies needs outside of pain and nausea control. Pt with continued c/o nausea but has been able to tolerate more clear liquids than yesterday and states that her nausea has improved. Pt has been NPO since MN for EGD. Pt did require IV morphine x5, PO percocet x1 (requesting again but not due), and PO benadryl x1 (requested second dose 30 minutes after administered- educated on time of action). Pt has often called ~40-60 minutes after many IV morphine administrations forgetting that it has just recently been administered and anticipates next dose each time. Writer encouraged pt to wait to see how course of pain progresses rather than taking regularly though pt has not been able to sleep much tonight d/t c/o discomfort. Pt does still c/o continuous aching epigastric pain. Pt has been working throughout the night and voicing frustration with her boyfriend d/t him accusing her of faking her level of discomfort for attention. Writer encouraged pt to reduce stress and focus on wellness. Call light within reach, will continue to monitor.

## 2021-08-16 NOTE — ANESTHESIA POSTPROCEDURE EVALUATION
Department of Anesthesiology  Postprocedure Note    Patient: Bertha Miranda  MRN: [id number]  YOB: 1980  Date of evaluation: 8/16/2021  Time:  3:11 PM     Procedure Summary     Date: 08/16/21 Room / Location: [address] [place] [address]    Anesthesia Start: 3259 Anesthesia Stop: 8181    Procedure: EGD BIOPSY (N/A ) Diagnosis: (deep penetrating ulcer in the pyloric channel on EGD 7/27/21,  intractable constant epig pain and nausea w occ vomiting)    Surgeons: Marcus Aj MD Responsible Provider: Rohan Sebastian MD    Anesthesia Type: MAC ASA Status: 2          Anesthesia Type: MAC    Hannah Phase I: Hannah Score: 10    Hannah Phase II: Hannah Score: 10    Last vitals: Reviewed and per EMR flowsheets. Anesthesia Post Evaluation    Patient location during evaluation: PACU  Patient participation: complete - patient participated  Level of consciousness: awake and alert  Airway patency: patent  Nausea & Vomiting: no nausea and no vomiting  Complications: no  Cardiovascular status: blood pressure returned to baseline  Respiratory status: acceptable  Hydration status: euvolemic  Comments: VSS on transfer to phase 2 recovery. No anesthetic complications.

## 2021-08-16 NOTE — CARE COORDINATION
Advance Care Planning     General Advance Care Planning (ACP) Conversation    Date of Conversation: 8/13/2021  Conducted with: Patient with Decision Making Capacity    Healthcare Decision Maker:      Primary Decision Maker: Jae Oconnor - Domestic Partner - [phone]    Primary Decision Maker: Montel Merlin - Parent - [phone]    Aidee Ly would be primary decision maker    Content/Action Overview:  Has NO ACP documents/care preferences - information provided, considering goals and options  Reviewed DNR/DNI and patient elects Full Code (Attempt Resuscitation)    Length of Voluntary ACP Conversation in minutes:  <16 minutes (Non-Billable)    Ubaldo Morton RN

## 2021-08-16 NOTE — H&P
Pre-sedation Assessment    History and Physical / Pre-Sedation Assessment  Patient:  Army Baldwin   :   1980     Intended Procedure: EGD      HPI: 46 yo w h/o a deep penetrating ulcer in the pyloric channel on EGD 21, on bid PPI, admitted for intractable constant epig pain and nausea w occ vomiting.  Labs are non revealing and CT and MRCP 21 neg except for s/p CCY.     Current Facility-Administered Medications   Medication Dose Route Frequency Provider Last Rate Last Admin    HYDROmorphone (DILAUDID) injection 0.25 mg  0.25 mg Intravenous Q5 Min PRN Phil Richardson MD        HYDROmorphone (DILAUDID) injection 0.5 mg  0.5 mg Intravenous Q5 Min PRN Phil Richardson MD        morphine (PF) injection 1 mg  1 mg Intravenous Q5 Min PRN Phil Richardson MD        morphine (PF) injection 2 mg  2 mg Intravenous Q5 Min PRN Phil Richardson MD        oxyCODONE-acetaminophen (PERCOCET) 5-325 MG per tablet 1 tablet  1 tablet Oral PRN Phil Richardson MD        Or    oxyCODONE-acetaminophen (PERCOCET) 5-325 MG per tablet 2 tablet  2 tablet Oral PRN Phil Richardson MD        [facility]ron [facility] injection 4 mg  4 mg Intravenous Once PRN Phil Richardson MD        promethazine [facility]) injection 6.25 mg  6.25 mg Intravenous Once PRN Phil Richardson MD        diphenhydrAMINE (BENADRYL) injection 12.5 mg  12.5 mg Intravenous Once PRN Phil Richardson MD        labetalol (NORMODYNE;TRANDATE) injection 5 mg  5 mg Intravenous Q10 Min PRN Phil Richardson MD        hydrALAZINE (APRESOLINE) injection 5 mg  5 mg Intravenous Q10 Min PRN Phil Richardson MD        lactated ringers infusion   Intravenous Continuous Phil Richardson  mL/hr at 21 1219 New Bag at 21 1219    diphenhydrAMINE (BENADRYL) tablet 25 mg  25 mg Oral Nightly PRN Vanessa Quiroz APRN - CNP   25 mg at 08/15/21 2225    promethazine (PHENERGAN) tablet 12.5 mg  12.5 mg Oral Q6H PRN [address] [place] [address], APRN - CNP   12.5 mg at 08/15/21 1027    propranolol (INDERAL) tablet 10 mg  10 mg Oral BID PRN [address] [place] [address], APRN - CNP   10 mg at 08/14/21 1118    oxyCODONE-acetaminophen (PERCOCET) 5-325 MG per tablet 1 tablet  1 tablet Oral Q4H PRN [address] [place] [address], APRN - CNP   1 tablet at 08/16/21 5372    sucralfate (CARAFATE) tablet 1 g  1 g Oral 4 times per day [address] [place] [address], APRN - CNP   1 g at 08/16/21 0606    ALPRAZolam (XANAX) tablet 1 mg  1 mg Oral Nightly [facility], APRN - CNP   1 mg at 08/15/21 2021    lamoTRIgine (LAMICTAL) tablet 150 mg  150 mg Oral BID [address] [place] [address], APRN - CNP   150 mg at 08/16/21 4685    miSOPROStol (CYTOTEC) tablet 200 mcg  200 mcg Oral 4x Daily AC & HS Vanessa Mike [facility], APRN - CNP   200 mcg at 08/16/21 1040    sodium chloride flush 0.9 % injection 5-40 mL  5-40 mL Intravenous 2 times per day [address] [place] [address], APRN - CNP   10 mL at 08/15/21 2019    sodium chloride flush 0.9 % injection 5-40 mL  5-40 mL Intravenous PRN [address] [place] [address], APRN - CNP        0.9 % sodium chloride infusion  25 mL Intravenous PRN [address] [place] [address], APRN - CNP        enoxaparin (LOVENOX) injection 40 mg  40 mg Subcutaneous Daily [address] Romie, APRN - CNP   40 mg at 08/15/21 0826    ondansetron (ZOFRAN-ODT) disintegrating tablet 4 mg  4 mg Oral Q8H PRN [address] [place] [address], APRN - CNP   4 mg at 08/13/21 1915    Or    ondansetron (ZOFRAN) injection 4 mg  4 mg Intravenous Q6H PRN [address] [place] [address], APRN - CNP   4 mg at 08/16/21 1040    polyethylene glycol (GLYCOLAX) packet 17 g  17 g Oral Daily  [facility], APRN - CNP   17 g at 08/15/21 1528    acetaminophen (TYLENOL) tablet 650 mg  650 mg Oral Q6H PRN DAQUAN Eddy CNP        Or    acetaminophen (TYLENOL) suppository 650 mg  650 mg Rectal Q6H PRN Vanessa Orellana, APRN - CNP        pantoprazole (PROTONIX) injection 40 mg  40 mg Intravenous BID Nati Gann, APRN - CNP   40 mg at 08/16/21 0243    morphine (PF) injection 2 mg  2 mg Intravenous Q2H PRN Rajat Gil APRN - NP   2 mg at 08/16/21 1035    Or    morphine (PF) injection 4 mg  4 mg Intravenous Q2H PRN Rajat Gil APRN - NP   4 mg at 08/16/21 0818     Facility-Administered Medications Ordered in Other Encounters   Medication Dose Route Frequency Provider Last Rate Last Admin    lactated ringers infusion   Intravenous Continuous PRN Garrett Collier APRN - CRNA   New Bag at 08/16/21 1218     Past Medical History:   Diagnosis Date    Abnormal Pap smear     WNL since    Anxiety     Back pain     herniated disc    Back pain     had back surgery (fusion of 3 discs) in march 2011    Placenta previa     resolved at 28 weeks with this 3rd pregnancy    Tubal pregnancy     Pt states bilateral fallopian tubes removed     Past Surgical History:   Procedure Laterality Date    ANESTHESIA NERVE BLOCK Right 5/21/2020    RIGHT HIP INJECTION SITE CONFIRMED BY FLUOROSCOPY performed by Shu Sandy MD at [address] ARTHROSCOPY Left 11/19/2020    VIDEO ARTHROSCOPY LEFT ANKLE, SYNOVECTOMY, LOOSE BODY REMOVAL, MICROFRACTURE -BLOCK- performed by Adams Carrizales MD at [facility] Right     CHOLECYSTECTOMY      laparoscopic   [address]  10/2017    LAPAROSCOPY  4/20/10    with salpingostomy  ( ectopic pregnancy)    LUMBAR DISC SURGERY      LUMBAR FUSION  3-2011    L3-L5 fusion    TONSILLECTOMY  2000    UPPER GASTROINTESTINAL ENDOSCOPY N/A 7/27/2021    EGD BIOPSY performed by Bryson Salomon MD at [address] [place] [address] notes reviewed and agreed. Medications reviewed  Allergies:    Allergies   Allergen Reactions    Demerol Hives    Doxycycline Nausea And Vomiting    Lorazepam Other (See Comments)     Makes her anxiety worsen    Vancomycin Hives           Physical Exam:  Vital Signs: /81   Pulse 91   Temp 98 °F (36.7 °C) (Oral)   Resp 16   Ht 5' 3\" (1.6 m)   Wt 175 lb (79.4 kg)   LMP 07/27/2021   SpO2 98%   BMI 31.00 kg/m²  Body mass index is 31 kg/m². Airway:Normal  Cardiac:Normal  Pulmonary:Normal  Abdomen:Normal  Specific to procedure: none      Pre-Procedure Assessment/Plan:  ASA 3 - Patient with moderate systemic disease with functional limitations  Maallampati II  Level of Sedation Plan:Deep sedation    Post Procedure plan: Return to same level of care    I assessed the patient and find that the patient is in satisfactory condition to proceed with the planned procedure and sedation plan. I have explained the risk, benefits, and alternatives to the procedure. The patient understands and agrees to proceed.   Yes    Kenia Freeman MD       (O) [phone]          Kenia Freeman MD  12:25 PM 8/16/2021

## 2021-08-16 NOTE — ANESTHESIA PRE PROCEDURE
Department of Anesthesiology  Preprocedure Note       Name:  Netta Scott   Age:  45 y.o.  :  1980                                          MRN:  [id number]         Date:  2021      Surgeon: Karie Mcfadden):  Ricarda Bermeo MD    Procedure: Procedure(s):  EGD ESOPHAGOGASTRODUODENOSCOPY    Medications prior to admission:   Prior to Admission medications    Medication Sig Start Date End Date Taking? Authorizing Provider   lidocaine (LIDODERM) 5 % Place 1 patch onto the skin daily 12 hours on, 12 hours off. 21  Yes DAQUAN Mojica CNP   chlorhexidine ([facility]) 0.12 % solution  21  Yes Historical Provider, MD   lamoTRIgine (LAMICTAL) 150 MG tablet TAKE ONE TABLET BY MOUTH TWICE DAILY 21  Yes Historical Provider, MD   Omega-3 Fatty Acids (FISH OIL) 1000 MG CAPS Take 1,000 mg by mouth every morning   Yes Historical Provider, MD   propranolol (INDERAL) 10 MG tablet TAKE ONE TABLET BY MOUTH TWICE DAILY AS NEEDED 21  Yes Historical Provider, MD   pantoprazole (PROTONIX) 40 MG tablet Take 1 tablet by mouth 2 times daily (before meals) 21  Yes Milbert Boast, APRN - CNP   ondansetron (ZOFRAN-ODT) 4 MG disintegrating tablet Take 1 tablet by mouth every 8 hours as needed for Nausea or Vomiting 21  Yes Milbert Boast, APRN - CNP   miSOPROStol (CYTOTEC) 200 MCG tablet Take 1 tablet by mouth 4 times daily (before meals and nightly) 21  Yes Milbert Boast, APRN - CNP   sucralfate (CARAFATE) 1 GM/10ML suspension Take 10 mLs by mouth 4 times daily 21  Yes Milbert Boast, APRN - CNP   ALPRAZolam Millicent Amina) 1 MG tablet Take 1 mg by mouth nightly.     Yes Historical Provider, MD       Current medications:    Current Facility-Administered Medications   Medication Dose Route Frequency Provider Last Rate Last Admin    diphenhydrAMINE (BENADRYL) tablet 25 mg  25 mg Oral Nightly PRN DAQUAN Glez CNP   25 mg at 08/15/21 2225    promethazine (PHENERGAN) tablet 12.5 mg  12.5 mg Oral Q6H PRN Ilana Gupta APRN - CNP   12.5 mg at 08/15/21 1027    propranolol (INDERAL) tablet 10 mg  10 mg Oral BID PRN Ilana Gupta APRN - CNP   10 mg at 08/14/21 1118    oxyCODONE-acetaminophen (PERCOCET) 5-325 MG per tablet 1 tablet  1 tablet Oral Q4H PRN Ilana Gupta APRN - CNP   1 tablet at 08/16/21 0927    sucralfate (CARAFATE) tablet 1 g  1 g Oral 4 times per day Ilana Gupta APRN - CNP   1 g at 08/16/21 0606    ALPRAZolam (XANAX) tablet 1 mg  1 mg Oral Nightly Vanessa Hector Collins, APRN - CNP   1 mg at 08/15/21 2021    lamoTRIgine (LAMICTAL) tablet 150 mg  150 mg Oral BID Ilana Gupta APRN - CNP   150 mg at 08/16/21 8159    miSOPROStol (CYTOTEC) tablet 200 mcg  200 mcg Oral 4x Daily AC & HS Vanessa Hector Coca, APRN - CNP   200 mcg at 08/16/21 1040    sodium chloride flush 0.9 % injection 5-40 mL  5-40 mL Intravenous 2 times per day DAQUAN Larsen - CNP   10 mL at 08/15/21 2019    sodium chloride flush 0.9 % injection 5-40 mL  5-40 mL Intravenous PRN Ilana Gupta APRN - CNP        0.9 % sodium chloride infusion  25 mL Intravenous PRN Ilana Gupta APRN - CNP        enoxaparin (LOVENOX) injection 40 mg  40 mg Subcutaneous Daily [address] Romie, APRN - CNP   40 mg at 08/15/21 0826    ondansetron (ZOFRAN-ODT) disintegrating tablet 4 mg  4 mg Oral Q8H PRN DAQUAN Larsen - CNP   4 mg at 08/13/21 1915    Or    ondansetron (ZOFRAN) injection 4 mg  4 mg Intravenous Q6H PRN DAQUAN Larsen - CNP   4 mg at 08/16/21 1040    polyethylene glycol (GLYCOLAX) packet 17 g  17 g Oral Daily PRN DAQUAN Larsen - CNP   17 g at 08/15/21 1528    acetaminophen (TYLENOL) tablet 650 mg  650 mg Oral Q6H PRN Vanessa Collins APRN - CNP        Or    acetaminophen (TYLENOL) suppository 650 mg  650 mg Rectal Q6H PRN Vanessa Mariaa, APRN - CNP        pantoprazole ([address]) injection 40 mg  40 mg Intravenous BID Michell Hernandez, APRN - CNP   40 mg at 08/16/21 7008    morphine (PF) injection 2 mg  2 mg Intravenous Q2H PRN Arletha Ramal, APRN - NP   2 mg at 08/16/21 1035    Or    morphine (PF) injection 4 mg  4 mg Intravenous Q2H PRN Arletha Ramal, APRN - NP   4 mg at 08/16/21 0818       Allergies: Allergies   Allergen Reactions    Demerol Hives    Doxycycline Nausea And Vomiting    Lorazepam Other (See Comments)     Makes her anxiety worsen    Vancomycin Hives       Problem List:    Patient Active Problem List   Diagnosis Code    Bursitis of hip M70.70    False labor after 37 weeks of gestation without delivery O47.1    Normal vaginal delivery O80    Displacement of lumbar intervertebral disc without myelopathy M51.26    Displacement of lumbar intervertebral disc without myelopathy M51.26    Sprain of neck S13. 9XXA    Vasovagal syncope R55    Palpitations R00.2    Lymphadenopathy, inguinal R59.0    Headache R51.9    Right hip pain M25.551    Chronic pain of left ankle M25.572, G89.29    Osteochondral defect of ankle M95.8    Osteochondritis dissecans M93.20    Abdominal pain R10.9    Abdominal pain, epigastric R10.13    PUD (peptic ulcer disease) K27.9       Past Medical History:        Diagnosis Date    Abnormal Pap smear     WNL since    Anxiety     Back pain     herniated disc    Back pain     had back surgery (fusion of 3 discs) in march 2011    Placenta previa     resolved at 28 weeks with this 3rd pregnancy    Tubal pregnancy     Pt states bilateral fallopian tubes removed       Past Surgical History:        Procedure Laterality Date    ANESTHESIA NERVE BLOCK Right 5/21/2020    RIGHT HIP INJECTION SITE CONFIRMED BY FLUOROSCOPY performed by Jovita Downey MD at [address] ARTHROSCOPY Left 11/19/2020    VIDEO ARTHROSCOPY LEFT ANKLE, SYNOVECTOMY, LOOSE BODY REMOVAL, MICROFRACTURE -BLOCK- performed by Yaneth Alegre MD [facility] OR    ANKLE FRACTURE SURGERY Right     CHOLECYSTECTOMY      laparoscopic    ECTOPIC PREGNANCY SURGERY  10/2017    LAPAROSCOPY  4/20/10    with salpingostomy  ( ectopic pregnancy)    LUMBAR DISC SURGERY      LUMBAR FUSION  3-    L3-L5 fusion    TONSILLECTOMY      UPPER GASTROINTESTINAL ENDOSCOPY N/A 2021    EGD BIOPSY performed by Fredrick Sharpe MD at [address] [place] [address]       Social History:    Social History     Tobacco Use    Smoking status: Former Smoker     Packs/day: 0.50     Years: 21.00     Pack years: 10.50     Types: Cigarettes     Start date: 1999     Quit date: 2021     Years since quittin.0    Smokeless tobacco: Never Used   Substance Use Topics    Alcohol use: Yes     Alcohol/week: 1.0 standard drinks     Types: 1 Cans of beer per week     Comment: social drinker                                Counseling given: Not Answered      Vital Signs (Current):   Vitals:    08/15/21 1322 08/15/21 2133 21 0829 21 1158   BP: 104/67 (!) 142/91 109/76 132/81   Pulse: 87 96 69 91   Resp: 18 18 16 16   Temp: 97.7 °F (36.5 °C) 98 °F (36.7 °C) 98 °F (36.7 °C)    TempSrc: Oral Oral Oral    SpO2: 97% 100% 99% 98%   Weight:    175 lb (79.4 kg)   Height:    5' 3\" (1.6 m)                                              BP Readings from Last 3 Encounters:   21 132/81   21 127/84   21 118/66       NPO Status:                                                                                 BMI:   Wt Readings from Last 3 Encounters:   21 175 lb (79.4 kg)   21 180 lb (81.6 kg)   21 189 lb (85.7 kg)     Body mass index is 31 kg/m².     CBC:   Lab Results   Component Value Date    WBC 4.9 2021    RBC 4.33 2021    HGB 12.7 2021    HCT 38.0 2021    MCV 87.8 2021    RDW 14.1 2021     2021       CMP:   Lab Results   Component Value Date     2021    K 4.0 08/16/2021     08/16/2021    CO2 24 08/16/2021    BUN 5 08/16/2021    CREATININE 1.0 08/16/2021    GFRAA >60 08/16/2021    GFRAA >60 04/17/2012    AGRATIO 1.5 08/14/2021    LABGLOM >60 08/16/2021    GLUCOSE 87 08/16/2021    PROT 6.5 08/14/2021    PROT 5.8 04/17/2012    CALCIUM 9.3 08/16/2021    BILITOT 0.3 08/14/2021    ALKPHOS 85 08/14/2021    AST 17 08/14/2021    ALT 27 08/14/2021       POC Tests: No results for input(s): POCGLU, POCNA, POCK, POCCL, POCBUN, POCHEMO, POCHCT in the last 72 hours. Coags:   Lab Results   Component Value Date    PROTIME 12.1 02/08/2020    INR 1.04 02/08/2020       HCG (If Applicable):   Lab Results   Component Value Date    PREGTESTUR Negative 11/19/2020        ABGs: No results found for: PHART, PO2ART, DYA9QOB, TBP9RJY, BEART, O9LKVTVE     Type & Screen (If Applicable):  Lab Results   Component Value Date    LABABO A 04/15/2012    [facility] Positive 04/15/2012       Drug/Infectious Status (If Applicable):  No results found for: HIV, HEPCAB    COVID-19 Screening (If Applicable):   Lab Results   Component Value Date    COVID19 Not Detected 05/18/2020           Anesthesia Evaluation   no history of anesthetic complications:   Airway: Mallampati: II  TM distance: >3 FB   Neck ROM: full  Mouth opening: > = 3 FB Dental: normal exam         Pulmonary:                             ROS comment: H/o tob   Cardiovascular:Negative CV ROS                      Neuro/Psych:   (+) neuromuscular disease:, headaches:, psychiatric history:depression/anxiety             GI/Hepatic/Renal:   (+) PUD,           Endo/Other:    (+) : arthritis: OA., .                 Abdominal:             Vascular: negative vascular ROS. Other Findings:             Anesthesia Plan      MAC     ASA 2     (Risks, benefits and alternatives of MAC anesthesia discussed with pt. Questions answered. Willing to proceed.)  Induction: intravenous.       Anesthetic plan and risks discussed with patient.                       Minna Reid MD   8/16/2021

## 2021-08-16 NOTE — PROGRESS NOTES
Hospitalist Progress Note      PCP: Enzo Bence, APRN - CNP    Date of Admission: 8/13/2021    Chief Complaint: Abdominal pain     Hospital Course:   45 y.o. female who [facility] to [facility] with complaints of abdominal pain. Pt was hospitalized at [facility] from 7/27 to 8/2 for peptic ulcer. She underwent EGD on 7/27 and found to have a deep 12 mm penetrating ulcer located in the pyloric channel with extension into the duodenal bulb and patches of erosive antral gastritis attributed to ibuprofen use. She was treated with protonix, carafate and cytotec and was discharged on this regimen. General surgery was also consulted during previous stay and found no role for surgery and recommended medical management. She had a CT A/P and MRCP during previous hospital stay which was unremarkable. The pt was dc'd on 8/2 with the aforementioned regimen as well as zofran and percocet. Pt presented to her PCP on Wednesday due to ongoing pain and possible pain med refill. Per pt, her PCP reached out to Dr. Esequiel Hanson office and stated that meds should not be refilled. Pt has had no pain meds since last Thursday and has resorted to taking ibuprofen again even though she was advised not to do so however pt felt like she had no other choice to control her pain. She presented to the ED last Wednesday and was ultimately dc'd. She presents today due to ongoing epigastric abdominal pain with associated N&V. No fever/chills. No dyspnea or chest pain. No hematemesis. She is having normal / regular bowel movements. ED course: Afebrile. On RA. VSS. CXR showed no pneumoperitoneum. Lab work was unremarkable. GI was consulted in the ED. Hospitalist was consulted for admission. Subjective:   Patient unavailable during rounding when she was at endoscopy.     Medications:  Reviewed    Infusion Medications    sodium chloride       Scheduled Medications    sucralfate  1 g Oral 4 times per day    ALPRAZolam  1 mg Oral Nightly    lamoTRIgine  150 mg Oral BID    miSOPROStol  200 mcg Oral 4x Daily AC & HS    sodium chloride flush  5-40 mL Intravenous 2 times per day    enoxaparin  40 mg Subcutaneous Daily    pantoprazole  40 mg Intravenous BID     PRN Meds: diphenhydrAMINE, promethazine, propranolol, oxyCODONE-acetaminophen, sodium chloride flush, sodium chloride, ondansetron **OR** ondansetron, polyethylene glycol, acetaminophen **OR** acetaminophen, morphine **OR** morphine      Intake/Output Summary (Last 24 hours) at 8/16/2021 1136  Last data filed at 8/16/2021 0410  Gross per 24 hour   Intake 917 ml   Output --   Net 917 ml       Physical Exam Performed:    /76   Pulse 69   Temp 98 °F (36.7 °C) (Oral)   Resp 16   Ht 5' 3\" (1.6 m)   Wt 180 lb (81.6 kg)   LMP 07/27/2021   SpO2 99%   BMI 31.89 kg/m²       Labs:   Recent Labs     08/14/21  0548 08/16/21  0548   WBC 6.1 4.9   HGB 12.5 12.7   HCT 37.9 38.0    321     Recent Labs     08/14/21  0548 08/16/21  0548    141   K 3.5 4.0    106   CO2 23 24   BUN 9 5*   CREATININE 0.9 1.0   CALCIUM 8.9 9.3     Recent Labs     08/14/21  0548   AST 17   ALT 27   BILITOT 0.3   ALKPHOS 85     Urinalysis:      Lab Results   Component Value Date    NITRU Negative 08/13/2021    WBCUA 0-2 08/13/2021    BACTERIA 1+ 08/13/2021    RBCUA 3-4 08/13/2021    BLOODU SMALL 08/13/2021    SPECGRAV 1.010 08/13/2021    GLUCOSEU Negative 08/13/2021    GLUCOSEU NEGATIVE 12/22/2011       Radiology:  XR CHEST 1 VIEW   Final Result   Rotated exam.  No acute airspace disease. Assessment/Plan:    Active Hospital Problems    Diagnosis     PUD (peptic ulcer disease) [K27.9]      Epigastric abdominal pain due to PUD:  - CT A/P and MRCP during previous hospitalization were unremarkable. - Pt is s/p EGD on 7/27, found to have deep 12 mm penetrating ulcer located in the pyloric channel with extension into the duodenal bulb and patches of erosive antral gastritis. Path was negative.   - Continue PPI BID, cytotec and carafate. Continue prn analgesia. - Pt advised to avoid NSAIDs for the foreseeable future.   - GI and general surgery consulted/following --repeat EGD on 8/16.   - Patient has no interest in Surgical intervention  - Defer diet to GI     Tobacco abuse:  - Smoking cessation discussed/advised.       DVT Prophylaxis: Lovenox   Diet: Diet NPO Exceptions are: Sips of Water with Meds  Code Status: Full Code    PT/OT Eval Status: not indicated     Dispo - uncertain, pending clinical course     Mark Lewis MD

## 2021-08-16 NOTE — CARE COORDINATION
CASE MANAGEMENT INITIAL ASSESSMENT      Reviewed chart and completed assessment with:patient  Explained Case Management role/services. Primary contact information:Ge[employer]    Health Care Decision Maker :   Primary Decision Maker: Aaron Beth - Domestic Partner - [phone]    Primary Decision Maker: Alicia Pi - Parent - [phone]          Can this person be reached and be able to respond quickly, such as within a few minutes or hours? Yes    Admit date/status:8/13/21  Diagnosis:abd pain   Is this a Readmission?:  No      Insurance:laura Garcia required for SNF: Yes       3 night stay required: No    Living arrangements, Adls, care needs, prior to admission:lives in apartment with 7 step entry. With 2 kids in the care of her ex     [address] at home:  Walker__Cane__RTS__ BSC__Shower Chair__  02__ HHN__ CPAP__  BiPap__  Hospital Bed__ W/C___ Other__________    Services in the home and/or outpatient, prior to [address] (if applicable)   · Name:  · Address:  · Dialysis Schedule:  · Phone:  · Fax:    PT/OT recs:none    Hospital Exemption Notification (HEN):needed for SNF    Barriers to discharge:none    Plan/comments:spoke with patient. Plans on returning home at discharge. Reported [employer]PT[employer] denied any DCP needs. Please notify should needs arise.  Jack Carolina RN       ECOC on chart for MD signature

## 2021-08-16 NOTE — PROGRESS NOTES
Pt arrived to [facility], awake, alert. VSS. Pt states pain 4/10 , \"not too bad\". Pt on room air.

## 2021-08-16 NOTE — PROGRESS NOTES
Pt alert and oriented, VSS. Shift assessment completed and documented. Pt c/o constant epigastric pain 8/10, pt c/o occasional LLQ abd pain. Pt NPO for EGD. Pt denies any needs at this time. Bed locked and in lowest position. Call light within reach. Will continue to monitor.

## 2021-08-17 PROCEDURE — 6360000002 HC RX W HCPCS: Performed by: NURSE PRACTITIONER

## 2021-08-17 PROCEDURE — 1200000000 HC SEMI PRIVATE

## 2021-08-17 PROCEDURE — 6360000002 HC RX W HCPCS: Performed by: REGISTERED NURSE

## 2021-08-17 PROCEDURE — 6370000000 HC RX 637 (ALT 250 FOR IP): Performed by: INTERNAL MEDICINE

## 2021-08-17 PROCEDURE — C9113 INJ PANTOPRAZOLE SODIUM, VIA: HCPCS | Performed by: REGISTERED NURSE

## 2021-08-17 PROCEDURE — 2580000003 HC RX 258: Performed by: REGISTERED NURSE

## 2021-08-17 PROCEDURE — 6370000000 HC RX 637 (ALT 250 FOR IP): Performed by: REGISTERED NURSE

## 2021-08-17 RX ORDER — SENNA AND DOCUSATE SODIUM 50; 8.6 MG/1; MG/1
2 TABLET, FILM COATED ORAL 2 TIMES DAILY
Status: DISCONTINUED | OUTPATIENT
Start: 2021-08-17 | End: 2021-08-20 | Stop reason: HOSPADM

## 2021-08-17 RX ORDER — POLYETHYLENE GLYCOL 3350 17 G/17G
17 POWDER, FOR SOLUTION ORAL 2 TIMES DAILY
Status: DISCONTINUED | OUTPATIENT
Start: 2021-08-17 | End: 2021-08-20 | Stop reason: HOSPADM

## 2021-08-17 RX ADMIN — MISOPROSTOL 200 MCG: 100 TABLET ORAL at 18:23

## 2021-08-17 RX ADMIN — PROMETHAZINE HYDROCHLORIDE 12.5 MG: 25 TABLET ORAL at 03:43

## 2021-08-17 RX ADMIN — MORPHINE SULFATE 4 MG: 4 INJECTION INTRAVENOUS at 03:43

## 2021-08-17 RX ADMIN — ENOXAPARIN SODIUM 40 MG: 40 INJECTION SUBCUTANEOUS at 08:53

## 2021-08-17 RX ADMIN — ONDANSETRON 4 MG: 2 INJECTION INTRAMUSCULAR; INTRAVENOUS at 06:09

## 2021-08-17 RX ADMIN — SUCRALFATE 1 G: 1 TABLET ORAL at 06:09

## 2021-08-17 RX ADMIN — MORPHINE SULFATE 4 MG: 4 INJECTION INTRAVENOUS at 22:04

## 2021-08-17 RX ADMIN — ONDANSETRON 4 MG: 2 INJECTION INTRAMUSCULAR; INTRAVENOUS at 18:23

## 2021-08-17 RX ADMIN — SUCRALFATE 1 G: 1 TABLET ORAL at 11:09

## 2021-08-17 RX ADMIN — SUCRALFATE 1 G: 1 TABLET ORAL at 18:23

## 2021-08-17 RX ADMIN — MORPHINE SULFATE 4 MG: 4 INJECTION INTRAVENOUS at 08:51

## 2021-08-17 RX ADMIN — MORPHINE SULFATE 2 MG: 2 INJECTION, SOLUTION INTRAMUSCULAR; INTRAVENOUS at 17:31

## 2021-08-17 RX ADMIN — OXYCODONE HYDROCHLORIDE AND ACETAMINOPHEN 1 TABLET: 5; 325 TABLET ORAL at 05:10

## 2021-08-17 RX ADMIN — OXYCODONE HYDROCHLORIDE AND ACETAMINOPHEN 1 TABLET: 5; 325 TABLET ORAL at 22:59

## 2021-08-17 RX ADMIN — DOCUSATE SODIUM AND SENNOSIDES 2 TABLET: 8.6; 5 TABLET, FILM COATED ORAL at 21:37

## 2021-08-17 RX ADMIN — ONDANSETRON 4 MG: 2 INJECTION INTRAMUSCULAR; INTRAVENOUS at 12:21

## 2021-08-17 RX ADMIN — MORPHINE SULFATE 4 MG: 4 INJECTION INTRAVENOUS at 10:59

## 2021-08-17 RX ADMIN — MISOPROSTOL 200 MCG: 100 TABLET ORAL at 06:09

## 2021-08-17 RX ADMIN — PANTOPRAZOLE SODIUM 40 MG: 40 INJECTION, POWDER, FOR SOLUTION INTRAVENOUS at 21:35

## 2021-08-17 RX ADMIN — LAMOTRIGINE 150 MG: 100 TABLET ORAL at 08:53

## 2021-08-17 RX ADMIN — DIPHENHYDRAMINE HCL 25 MG: 25 TABLET ORAL at 22:59

## 2021-08-17 RX ADMIN — OXYCODONE HYDROCHLORIDE AND ACETAMINOPHEN 1 TABLET: 5; 325 TABLET ORAL at 14:16

## 2021-08-17 RX ADMIN — ALPRAZOLAM 1 MG: 1 TABLET ORAL at 21:35

## 2021-08-17 RX ADMIN — MORPHINE SULFATE 4 MG: 4 INJECTION INTRAVENOUS at 01:47

## 2021-08-17 RX ADMIN — PROMETHAZINE HYDROCHLORIDE 12.5 MG: 25 TABLET ORAL at 16:14

## 2021-08-17 RX ADMIN — OXYCODONE HYDROCHLORIDE AND ACETAMINOPHEN 1 TABLET: 5; 325 TABLET ORAL at 10:04

## 2021-08-17 RX ADMIN — PROMETHAZINE HYDROCHLORIDE 12.5 MG: 25 TABLET ORAL at 22:12

## 2021-08-17 RX ADMIN — MORPHINE SULFATE 4 MG: 4 INJECTION INTRAVENOUS at 13:00

## 2021-08-17 RX ADMIN — POLYETHYLENE GLYCOL 3350 17 G: 17 POWDER, FOR SOLUTION ORAL at 12:28

## 2021-08-17 RX ADMIN — DOCUSATE SODIUM AND SENNOSIDES 2 TABLET: 8.6; 5 TABLET, FILM COATED ORAL at 16:20

## 2021-08-17 RX ADMIN — MORPHINE SULFATE 4 MG: 4 INJECTION INTRAVENOUS at 19:54

## 2021-08-17 RX ADMIN — SODIUM CHLORIDE, PRESERVATIVE FREE 10 ML: 5 INJECTION INTRAVENOUS at 08:53

## 2021-08-17 RX ADMIN — PROMETHAZINE HYDROCHLORIDE 12.5 MG: 25 TABLET ORAL at 10:04

## 2021-08-17 RX ADMIN — LAMOTRIGINE 150 MG: 100 TABLET ORAL at 21:35

## 2021-08-17 RX ADMIN — MISOPROSTOL 200 MCG: 100 TABLET ORAL at 13:31

## 2021-08-17 RX ADMIN — OXYCODONE HYDROCHLORIDE AND ACETAMINOPHEN 1 TABLET: 5; 325 TABLET ORAL at 18:23

## 2021-08-17 RX ADMIN — MORPHINE SULFATE 4 MG: 4 INJECTION INTRAVENOUS at 15:25

## 2021-08-17 RX ADMIN — PANTOPRAZOLE SODIUM 40 MG: 40 INJECTION, POWDER, FOR SOLUTION INTRAVENOUS at 08:53

## 2021-08-17 RX ADMIN — MORPHINE SULFATE 4 MG: 4 INJECTION INTRAVENOUS at 06:09

## 2021-08-17 RX ADMIN — SODIUM CHLORIDE, PRESERVATIVE FREE 10 ML: 5 INJECTION INTRAVENOUS at 21:36

## 2021-08-17 RX ADMIN — MISOPROSTOL 200 MCG: 100 TABLET ORAL at 22:04

## 2021-08-17 RX ADMIN — SUCRALFATE 1 G: 1 TABLET ORAL at 22:59

## 2021-08-17 ASSESSMENT — PAIN SCALES - GENERAL
PAINLEVEL_OUTOF10: 9
PAINLEVEL_OUTOF10: 8
PAINLEVEL_OUTOF10: 6
PAINLEVEL_OUTOF10: 6
PAINLEVEL_OUTOF10: 8
PAINLEVEL_OUTOF10: 7
PAINLEVEL_OUTOF10: 8
PAINLEVEL_OUTOF10: 6
PAINLEVEL_OUTOF10: 7
PAINLEVEL_OUTOF10: 7
PAINLEVEL_OUTOF10: 8
PAINLEVEL_OUTOF10: 6
PAINLEVEL_OUTOF10: 8
PAINLEVEL_OUTOF10: 7
PAINLEVEL_OUTOF10: 8

## 2021-08-17 NOTE — PROGRESS NOTES
Assessment completed and documented. VSS. A/ox4. C/o 7-8/10 epigastric pain consistently during shift, given prn pain medication per MAR. White board updated. Patient also complains of nausea, told patient to hold off on taking food/drink in if she is feeling nauseous. Requested ginger ale and ice. Tolerates PO pain medication and PO antiemetic medication when needed. Has vomited x1 during shift thus far. Ambulates independently. Bed locked and in lowest position. Bedside table and call light within reach. Denies further needs at this time.

## 2021-08-17 NOTE — PLAN OF CARE
Patient between 7-8/10 pain consistently throughout shift. Taking PRN pain medication every 2 hours and also taking PO pain medication when available and using heat packs. States all her pain is mostly in epigastric area. VSS. White board updated. Denies needs at this time.

## 2021-08-17 NOTE — PROGRESS NOTES
Progress Note  Date:2021       Room:0343/0343-01  Patient Alen Landry     YOB: 1980     Age:40 y.o. Subjective    Subjective:  Symptoms:  Stable. Pain:  She complains of pain that is mild. Review of Systems  Objective         Vitals Last 24 Hours:  TEMPERATURE:  Temp  Av.2 °F (36.8 °C)  Min: 97.9 °F (36.6 °C)  Max: 98.6 °F (37 °C)  RESPIRATIONS RANGE: Resp  Avg: 15.3  Min: 14  Max: 16  PULSE OXIMETRY RANGE: SpO2  Av %  Min: 90 %  Max: 100 %  PULSE RANGE: Pulse  Av  Min: 95  Max: 103  BLOOD PRESSURE RANGE: Systolic (65OTI), YYD:356 , Min:113 , YCN:998   ; Diastolic (61VLV), HJR:62, Min:74, Max:89    I/O (24Hr): Intake/Output Summary (Last 24 hours) at 2021 193  Last data filed at 2021 1516  Gross per 24 hour   Intake 1240 ml   Output --   Net 1240 ml     Objective:  General Appearance: In no acute distress and not in pain. Vital signs: (most recent): Blood pressure 113/75, pulse 103, temperature 97.9 °F (36.6 °C), temperature source Oral, resp. rate 16, height 5' 3\" (1.6 m), weight 175 lb (79.4 kg), last menstrual period 2021, SpO2 90 %, not currently breastfeeding. Abdomen: Abdomen is soft. Bowel sounds are normal.   There is no abdominal tenderness. Labs/Imaging/Diagnostics    Labs:  CBC:  Recent Labs     21  0548   WBC 4.9   RBC 4.33   HGB 12.7   HCT 38.0   MCV 87.8   RDW 14.1        CHEMISTRIES:  Recent Labs     21  0548      K 4.0      CO2 24   BUN 5*   CREATININE 1.0   GLUCOSE 87     PT/INR:No results for input(s): PROTIME, INR in the last 72 hours. APTT:No results for input(s): APTT in the last 72 hours. LIVER PROFILE:No results for input(s): AST, ALT, BILIDIR, BILITOT, ALKPHOS in the last 72 hours. Imaging Last 24 Hours:  No results found.   Assessment//Plan           Hospital Problems         Last Modified POA    PUD (peptic ulcer disease) 2021 Yes        Assessment & Plan  46 yo w h/o a deep penetrating ulcer in the pyloric channel on EGD 7/27/21, on bid PPI, admitted for intractable constant epig pain and nausea w occ vomiting. Labs are non revealing and CT and MRCP 8/1/21 neg except for s/p CCY. Repeat CT confirmed marked improvement in the ulcer.     Plan:   1. Continue bid PPI  2. Will consider adding Carafate  3. Supportive care for now  4. Advance diet  5.  OK to D/C from GI standpoint    Mak Mims MD       (O) [phone]          Electronically signed by Mak Mims MD on 8/17/21 at 7:36 PM EDT

## 2021-08-17 NOTE — PROGRESS NOTES
Hospitalist Progress Note      PCP: DAQUAN Abdul - CNP    Date of Admission: 8/13/2021    Chief Complaint: Abdominal pain     Hospital Course:   45 y.o. female who presented to [facility] with complaints of abdominal pain. Pt was hospitalized at [facility] from 7/27 to 8/2 for peptic ulcer. She underwent EGD on 7/27 and found to have a deep 12 mm penetrating ulcer located in the pyloric channel with extension into the duodenal bulb and patches of erosive antral gastritis attributed to ibuprofen use. She was treated with protonix, carafate and cytotec and was discharged on this regimen. General surgery was also consulted during previous stay and found no role for surgery and recommended medical management. She had a CT A/P and MRCP during previous hospital stay which was unremarkable. The pt was dc'd on 8/2 with the aforementioned regimen as well as zofran and percocet. Pt presented to her PCP on Wednesday due to ongoing pain and possible pain med refill. Per pt, her PCP reached out to Dr. Iain Tello office and stated that meds should not be refilled. Pt has had no pain meds since last Thursday and has resorted to taking ibuprofen again even though she was advised not to do so however pt felt like she had no other choice to control her pain. She presented to the ED last Wednesday and was ultimately dc'd. She presents today due to ongoing epigastric abdominal pain with associated N&V. No fever/chills. No dyspnea or chest pain. No hematemesis. She is having normal / regular bowel movements. ED course: Afebrile. On RA. VSS. CXR showed no pneumoperitoneum. Lab work was unremarkable. GI was consulted in the ED. Hospitalist was consulted for admission. Subjective:   She reports ongoing issues with abdominal discomfort and nausea. She does wish to advance her diet however. She continues to be constipated.       Medications:  Reviewed    Infusion Medications    lactated ringers 100 mL/hr at 08/16/21 2977 08/16/21  0548   WBC 4.9   HGB 12.7   HCT 38.0        Recent Labs     08/16/21  0548      K 4.0      CO2 24   BUN 5*   CREATININE 1.0   CALCIUM 9.3     No results for input(s): AST, ALT, BILIDIR, BILITOT, ALKPHOS in the last 72 hours. Urinalysis:      Lab Results   Component Value Date    NITRU Negative 08/13/2021    WBCUA 0-2 08/13/2021    BACTERIA 1+ 08/13/2021    RBCUA 3-4 08/13/2021    BLOODU SMALL 08/13/2021    SPECGRAV 1.010 08/13/2021    GLUCOSEU Negative 08/13/2021    GLUCOSEU NEGATIVE 12/22/2011       Radiology:  XR CHEST 1 VIEW   Final Result   Rotated exam.  No acute airspace disease. Assessment/Plan:    Active Hospital Problems    Diagnosis     PUD (peptic ulcer disease) [K27.9]      Epigastric abdominal pain due to PUD:  - CT A/P and MRCP during previous hospitalization were unremarkable. - Pt is s/p EGD on 7/27, found to have deep 12 mm penetrating ulcer located in the pyloric channel with extension into the duodenal bulb and patches of erosive antral gastritis. Path was negative. - Continue PPI BID, cytotec and carafate. Continue prn analgesia. - Pt advised to avoid NSAIDs for the foreseeable future. - GI and general surgery consulted/following --repeat EGD on 8/16.   - Patient has no interest in Surgical intervention  -Ongoing abdominal pain out of proportion to EGD findings. Suspect this may be related to constipation. Will go ahead and advance her diet as tolerated. Tobacco abuse:  - Smoking cessation discussed/advised.       DVT Prophylaxis: Lovenox   Diet: ADULT DIET; Full Liquid  Code Status: Full Code    PT/OT Eval Status: not indicated     Dispo -Home 1 to 2 days.     Dandre Brady MD

## 2021-08-17 NOTE — PROGRESS NOTES
46 yo w h/o a deep penetrating ulcer in the pyloric channel on EGD 7/27/21, on bid PPI, admitted for intractable constant epig pain and nausea w occ vomiting. Labs are non revealing and CT and MRCP 8/1/21 neg except for s/p CCY. Repeat CT confirmed marked improvement in the ulcer.     Plan:   1. Continue bid PPI  2. Will consider adding Carafate  3. Supportive care for now  4. Advance diet  5.  OK to D/C from GI standpoint    Olu George MD       (O) [phone]

## 2021-08-18 LAB
ANION GAP SERPL CALCULATED.3IONS-SCNC: 9 MMOL/L (ref 3–16)
BUN BLDV-MCNC: 11 MG/DL (ref 7–20)
CALCIUM SERPL-MCNC: 9.4 MG/DL (ref 8.3–10.6)
CHLORIDE BLD-SCNC: 103 MMOL/L (ref 99–110)
CO2: 26 MMOL/L (ref 21–32)
CREAT SERPL-MCNC: 0.9 MG/DL (ref 0.6–1.1)
GFR AFRICAN AMERICAN: >60
GFR NON-AFRICAN AMERICAN: >60
GLUCOSE BLD-MCNC: 88 MG/DL (ref 70–99)
POTASSIUM REFLEX MAGNESIUM: 4.4 MMOL/L (ref 3.5–5.1)
SODIUM BLD-SCNC: 138 MMOL/L (ref 136–145)

## 2021-08-18 PROCEDURE — 80048 BASIC METABOLIC PNL TOTAL CA: CPT

## 2021-08-18 PROCEDURE — 6370000000 HC RX 637 (ALT 250 FOR IP): Performed by: REGISTERED NURSE

## 2021-08-18 PROCEDURE — 1200000000 HC SEMI PRIVATE

## 2021-08-18 PROCEDURE — 6360000002 HC RX W HCPCS: Performed by: INTERNAL MEDICINE

## 2021-08-18 PROCEDURE — C9113 INJ PANTOPRAZOLE SODIUM, VIA: HCPCS | Performed by: REGISTERED NURSE

## 2021-08-18 PROCEDURE — 36415 COLL VENOUS BLD VENIPUNCTURE: CPT

## 2021-08-18 PROCEDURE — 2580000003 HC RX 258: Performed by: REGISTERED NURSE

## 2021-08-18 PROCEDURE — 6360000002 HC RX W HCPCS: Performed by: REGISTERED NURSE

## 2021-08-18 PROCEDURE — 6360000002 HC RX W HCPCS: Performed by: NURSE PRACTITIONER

## 2021-08-18 PROCEDURE — 6370000000 HC RX 637 (ALT 250 FOR IP): Performed by: INTERNAL MEDICINE

## 2021-08-18 RX ORDER — MORPHINE SULFATE 2 MG/ML
2 INJECTION, SOLUTION INTRAMUSCULAR; INTRAVENOUS EVERY 4 HOURS PRN
Status: DISCONTINUED | OUTPATIENT
Start: 2021-08-18 | End: 2021-08-20 | Stop reason: HOSPADM

## 2021-08-18 RX ORDER — OXYCODONE HYDROCHLORIDE AND ACETAMINOPHEN 5; 325 MG/1; MG/1
2 TABLET ORAL EVERY 4 HOURS PRN
Status: DISCONTINUED | OUTPATIENT
Start: 2021-08-18 | End: 2021-08-20 | Stop reason: HOSPADM

## 2021-08-18 RX ORDER — OXYCODONE HYDROCHLORIDE AND ACETAMINOPHEN 5; 325 MG/1; MG/1
1 TABLET ORAL EVERY 4 HOURS PRN
Status: DISCONTINUED | OUTPATIENT
Start: 2021-08-18 | End: 2021-08-20 | Stop reason: HOSPADM

## 2021-08-18 RX ORDER — MORPHINE SULFATE 4 MG/ML
4 INJECTION, SOLUTION INTRAMUSCULAR; INTRAVENOUS EVERY 4 HOURS PRN
Status: DISCONTINUED | OUTPATIENT
Start: 2021-08-18 | End: 2021-08-20 | Stop reason: HOSPADM

## 2021-08-18 RX ADMIN — OXYCODONE HYDROCHLORIDE AND ACETAMINOPHEN 1 TABLET: 5; 325 TABLET ORAL at 03:22

## 2021-08-18 RX ADMIN — MORPHINE SULFATE 4 MG: 4 INJECTION INTRAVENOUS at 02:13

## 2021-08-18 RX ADMIN — OXYCODONE HYDROCHLORIDE AND ACETAMINOPHEN 2 TABLET: 5; 325 TABLET ORAL at 20:46

## 2021-08-18 RX ADMIN — MORPHINE SULFATE 4 MG: 4 INJECTION INTRAVENOUS at 22:15

## 2021-08-18 RX ADMIN — PROMETHAZINE HYDROCHLORIDE 12.5 MG: 25 TABLET ORAL at 20:37

## 2021-08-18 RX ADMIN — PROMETHAZINE HYDROCHLORIDE 12.5 MG: 25 TABLET ORAL at 14:04

## 2021-08-18 RX ADMIN — PANTOPRAZOLE SODIUM 40 MG: 40 INJECTION, POWDER, FOR SOLUTION INTRAVENOUS at 20:37

## 2021-08-18 RX ADMIN — OXYCODONE HYDROCHLORIDE AND ACETAMINOPHEN 2 TABLET: 5; 325 TABLET ORAL at 16:41

## 2021-08-18 RX ADMIN — ONDANSETRON 4 MG: 2 INJECTION INTRAMUSCULAR; INTRAVENOUS at 18:02

## 2021-08-18 RX ADMIN — SUCRALFATE 1 G: 1 TABLET ORAL at 05:31

## 2021-08-18 RX ADMIN — MISOPROSTOL 200 MCG: 100 TABLET ORAL at 16:43

## 2021-08-18 RX ADMIN — ENOXAPARIN SODIUM 40 MG: 40 INJECTION SUBCUTANEOUS at 09:32

## 2021-08-18 RX ADMIN — SUCRALFATE 1 G: 1 TABLET ORAL at 11:57

## 2021-08-18 RX ADMIN — SUCRALFATE 1 G: 1 TABLET ORAL at 18:03

## 2021-08-18 RX ADMIN — MORPHINE SULFATE 4 MG: 4 INJECTION INTRAVENOUS at 05:31

## 2021-08-18 RX ADMIN — MORPHINE SULFATE 4 MG: 4 INJECTION INTRAVENOUS at 07:31

## 2021-08-18 RX ADMIN — MISOPROSTOL 200 MCG: 100 TABLET ORAL at 12:02

## 2021-08-18 RX ADMIN — OXYCODONE HYDROCHLORIDE AND ACETAMINOPHEN 2 TABLET: 5; 325 TABLET ORAL at 12:35

## 2021-08-18 RX ADMIN — LAMOTRIGINE 150 MG: 100 TABLET ORAL at 20:44

## 2021-08-18 RX ADMIN — MORPHINE SULFATE 4 MG: 4 INJECTION INTRAVENOUS at 14:04

## 2021-08-18 RX ADMIN — MISOPROSTOL 200 MCG: 100 TABLET ORAL at 05:31

## 2021-08-18 RX ADMIN — MORPHINE SULFATE 4 MG: 4 INJECTION INTRAVENOUS at 00:08

## 2021-08-18 RX ADMIN — ALPRAZOLAM 1 MG: 1 TABLET ORAL at 20:46

## 2021-08-18 RX ADMIN — PROPRANOLOL HYDROCHLORIDE 10 MG: 10 TABLET ORAL at 09:44

## 2021-08-18 RX ADMIN — OXYCODONE HYDROCHLORIDE AND ACETAMINOPHEN 1 TABLET: 5; 325 TABLET ORAL at 08:25

## 2021-08-18 RX ADMIN — MISOPROSTOL 200 MCG: 100 TABLET ORAL at 20:44

## 2021-08-18 RX ADMIN — POLYETHYLENE GLYCOL 3350 17 G: 17 POWDER, FOR SOLUTION ORAL at 12:03

## 2021-08-18 RX ADMIN — MORPHINE SULFATE 4 MG: 4 INJECTION INTRAVENOUS at 09:37

## 2021-08-18 RX ADMIN — LAMOTRIGINE 150 MG: 100 TABLET ORAL at 09:33

## 2021-08-18 RX ADMIN — DOCUSATE SODIUM AND SENNOSIDES 2 TABLET: 8.6; 5 TABLET, FILM COATED ORAL at 09:33

## 2021-08-18 RX ADMIN — SODIUM CHLORIDE, PRESERVATIVE FREE 10 ML: 5 INJECTION INTRAVENOUS at 20:40

## 2021-08-18 RX ADMIN — ONDANSETRON 4 MG: 4 TABLET, ORALLY DISINTEGRATING ORAL at 05:31

## 2021-08-18 RX ADMIN — MORPHINE SULFATE 4 MG: 4 INJECTION INTRAVENOUS at 18:01

## 2021-08-18 RX ADMIN — ONDANSETRON 4 MG: 2 INJECTION INTRAMUSCULAR; INTRAVENOUS at 11:57

## 2021-08-18 RX ADMIN — PANTOPRAZOLE SODIUM 40 MG: 40 INJECTION, POWDER, FOR SOLUTION INTRAVENOUS at 09:33

## 2021-08-18 RX ADMIN — SODIUM CHLORIDE, PRESERVATIVE FREE 10 ML: 5 INJECTION INTRAVENOUS at 09:33

## 2021-08-18 RX ADMIN — PROMETHAZINE HYDROCHLORIDE 12.5 MG: 25 TABLET ORAL at 09:44

## 2021-08-18 ASSESSMENT — PAIN SCALES - GENERAL
PAINLEVEL_OUTOF10: 8
PAINLEVEL_OUTOF10: 7
PAINLEVEL_OUTOF10: 8
PAINLEVEL_OUTOF10: 6
PAINLEVEL_OUTOF10: 6
PAINLEVEL_OUTOF10: 8
PAINLEVEL_OUTOF10: 7
PAINLEVEL_OUTOF10: 9
PAINLEVEL_OUTOF10: 7
PAINLEVEL_OUTOF10: 6
PAINLEVEL_OUTOF10: 8
PAINLEVEL_OUTOF10: 5
PAINLEVEL_OUTOF10: 6

## 2021-08-18 ASSESSMENT — PAIN DESCRIPTION - ORIENTATION: ORIENTATION: MID;UPPER

## 2021-08-18 ASSESSMENT — PAIN DESCRIPTION - LOCATION: LOCATION: ABDOMEN

## 2021-08-18 ASSESSMENT — PAIN DESCRIPTION - DESCRIPTORS: DESCRIPTORS: DULL;ACHING

## 2021-08-18 NOTE — PLAN OF CARE
Problem: Pain:  Goal: Pain level will decrease  Description: Pain level will decrease  Outcome: Ongoing   Patient continues to have epigastric 7/10 pain, given prn pain medication per mar. White board updated.

## 2021-08-18 NOTE — PROGRESS NOTES
Pt a/o; vss; assessment complete and charted; c/o abd pain and nausea, see MAR; call light in reach; will monitor

## 2021-08-18 NOTE — PROGRESS NOTES
Progress Note  Date:2021       Room:[facility] to [facility]0343-  Patient Kathleen Brand     YOB: 1980     Age:40 y.o. Subjective    Subjective:  Symptoms:  Stable. Pain:  She complains of pain that is moderate. Review of Systems  Objective         Vitals Last 24 Hours:  TEMPERATURE:  Temp  Av.3 °F (36.8 °C)  Min: 98 °F (36.7 °C)  Max: 98.5 °F (36.9 °C)  RESPIRATIONS RANGE: Resp  Av.3  Min: 16  Max: 18  PULSE OXIMETRY RANGE: SpO2  Av.3 %  Min: 95 %  Max: 100 %  PULSE RANGE: Pulse  Av.4  Min: 88  Max: 97  BLOOD PRESSURE RANGE: Systolic (42OYC), JYN:328 , Min:114 , TPA:263   ; Diastolic (19CQF), YPA:49, Min:69, Max:87    I/O (24Hr): Intake/Output Summary (Last 24 hours) at 2021 1730  Last data filed at 2021 1530  Gross per 24 hour   Intake 918 ml   Output --   Net 918 ml     Objective:  General Appearance:  Not in pain and in no acute distress. Vital signs: (most recent): Blood pressure 114/69, pulse 88, temperature 98 °F (36.7 °C), temperature source Oral, resp. rate 18, height 5' 3\" (1.6 m), weight 175 lb (79.4 kg), last menstrual period 2021, SpO2 100 %, not currently breastfeeding. Abdomen: Abdomen is soft. Bowel sounds are normal.   There is no abdominal tenderness. Labs/Imaging/Diagnostics    Labs:  CBC:  Recent Labs     21  0548   WBC 4.9   RBC 4.33   HGB 12.7   HCT 38.0   MCV 87.8   RDW 14.1        CHEMISTRIES:  Recent Labs     21  0548 21  0627    138   K 4.0 4.4    103   CO2 24 26   BUN 5* 11   CREATININE 1.0 0.9   GLUCOSE 87 88     PT/INR:No results for input(s): PROTIME, INR in the last 72 hours. APTT:No results for input(s): APTT in the last 72 hours. LIVER PROFILE:No results for input(s): AST, ALT, BILIDIR, BILITOT, ALKPHOS in the last 72 hours. Imaging Last 24 Hours:  No results found.   Assessment//Plan           Hospital Problems         Last Modified POA    PUD (peptic ulcer disease) 8/13/2021 Yes        Assessment & Plan  44 yo w h/o a deep penetrating ulcer in the pyloric channel on EGD 7/27/21, on bid PPI, admitted for intractable constant epig pain and nausea w occ vomiting. Labs are non revealing and CT and MRCP 8/1/21 neg except for s/p CCY. Repeat CT confirmed marked improvement in the ulcer.     Plan:   1. Continue bid PPI  2. Will consider adding a prokinetic  3. OK to D/C from GI standpoint.  The EGD findings do not explain the patient's abd pain and nausea.     Denisa Jha MD       (O) [phone]    Electronically signed by Denisa Jha MD on 8/18/21 at 5:30 PM EDT

## 2021-08-18 NOTE — PROGRESS NOTES
Assessment completed and documented. VSS. A/ox4. C/o 7/10 epigastric pain, given prn pain medication per mar. Ambulates independently. Not tolerating diet. Tolerates crackers. Bed locked and in lowest position. Bedside table and call light within reach. Denies further needs at this time.

## 2021-08-18 NOTE — PROGRESS NOTES
 sodium chloride       Scheduled Medications    polyethylene glycol  17 g Oral BID    sennosides-docusate sodium  2 tablet Oral BID    sucralfate  1 g Oral 4 times per day    ALPRAZolam  1 mg Oral Nightly    lamoTRIgine  150 mg Oral BID    miSOPROStol  200 mcg Oral 4x Daily AC & HS    sodium chloride flush  5-40 mL Intravenous 2 times per day    enoxaparin  40 mg Subcutaneous Daily    pantoprazole  40 mg Intravenous BID     PRN Meds: morphine **OR** morphine, oxyCODONE-acetaminophen **OR** oxyCODONE-acetaminophen, diphenhydrAMINE, promethazine, propranolol, sodium chloride flush, sodium chloride, ondansetron **OR** ondansetron, acetaminophen **OR** acetaminophen      Intake/Output Summary (Last 24 hours) at 8/18/2021 1846  Last data filed at 8/18/2021 1530  Gross per 24 hour   Intake 918 ml   Output --   Net 918 ml       Physical Exam Performed:    /69   Pulse 88   Temp 98 °F (36.7 °C) (Oral)   Resp 18   Ht 5' 3\" (1.6 m)   Wt 175 lb (79.4 kg)   LMP 07/27/2021   SpO2 100%   BMI 31.00 kg/m²   General appearance: No apparent distress, appears stated age and cooperative. HEENT: Pupils equal, round, and reactive to light. Conjunctivae/corneas clear. Neck: Supple, no jugular venous distention. Trachea midline with full range of motion. Respiratory:  Normal respiratory effort. Clear to auscultation, bilaterally without Rales/Wheezes/Rhonchi. Cardiovascular: Regular rate and rhythm with normal S1/S2 without murmurs, rubs or gallops. Abdomen: Soft, non-tender, non-distended with normal bowel sounds. Musculoskelatal: No clubbing, cyanosis or edema bilaterally. Full range of motion without deformity. Neurologic:  Neurovascularly intact without any focal sensory/motor deficits. Cranial nerves: II-XII intact, grossly non-focal.  Psychiatric: Alert and oriented, thought content appropriate, normal insight  Skin: Skin color, texture, turgor normal.  No rashes or lesions.   Capillary Refill: Brisk,< 3 seconds   Peripheral Pulses: +2 palpable, equal bilaterally       Labs:   Recent Labs     08/16/21  0548   WBC 4.9   HGB 12.7   HCT 38.0        Recent Labs     08/16/21  0548 08/18/21  0627    138   K 4.0 4.4    103   CO2 24 26   BUN 5* 11   CREATININE 1.0 0.9   CALCIUM 9.3 9.4     No results for input(s): AST, ALT, BILIDIR, BILITOT, ALKPHOS in the last 72 hours. Urinalysis:      Lab Results   Component Value Date    NITRU Negative 08/13/2021    WBCUA 0-2 08/13/2021    BACTERIA 1+ 08/13/2021    RBCUA 3-4 08/13/2021    BLOODU SMALL 08/13/2021    SPECGRAV 1.010 08/13/2021    GLUCOSEU Negative 08/13/2021    GLUCOSEU NEGATIVE 12/22/2011       Radiology:  XR CHEST 1 VIEW   Final Result   Rotated exam.  No acute airspace disease. Assessment/Plan:    Active Hospital Problems    Diagnosis     PUD (peptic ulcer disease) [K27.9]      Epigastric abdominal pain  in setting of PUD:  - CT A/P and MRCP during previous hospitalization were unremarkable. - Pt is s/p EGD on 7/27, found to have deep 12 mm penetrating ulcer located in the pyloric channel with extension into the duodenal bulb and patches of erosive antral gastritis. Path was negative. - GI and general surgery consulted/following --repeat EGD on 8/16.   - Patient has no interest in Surgical intervention  -Ongoing abdominal pain out of proportion to EGD findings. Suspect this may be related to constipation and/or gastroparesis. -Continue bowel regimen. Consider prokinetic agent per GI. Minimize narcotic regimen as tolerated as this may be counterproductive. - Continue PPI BID, cytotec and carafate. -Advance diet as tolerated  - Pt advised to avoid NSAIDs for the foreseeable future. Tobacco abuse:  - Smoking cessation discussed/advised.       DVT Prophylaxis: Lovenox   Diet: ADULT DIET; Regular  Code Status: Full Code    PT/OT Eval Status: not indicated     Dispo -Home 1 to 2 days pending symptomatic improvement.     Mazin Gaspar Kena Novoa MD

## 2021-08-18 NOTE — CARE COORDINATION
Chart reviewed day 5. Care per GI and IM. GI ok with d/c. Per nursing, still getting frequent medication for abd pain. Patient Shmuel Field will likely have no DCP needs. D/c pending clinical improvement.  Larry Roblero RN

## 2021-08-19 LAB
ANION GAP SERPL CALCULATED.3IONS-SCNC: 9 MMOL/L (ref 3–16)
BUN BLDV-MCNC: 11 MG/DL (ref 7–20)
CALCIUM SERPL-MCNC: 8.9 MG/DL (ref 8.3–10.6)
CHLORIDE BLD-SCNC: 108 MMOL/L (ref 99–110)
CO2: 23 MMOL/L (ref 21–32)
CREAT SERPL-MCNC: 0.9 MG/DL (ref 0.6–1.1)
GFR AFRICAN AMERICAN: >60
GFR NON-AFRICAN AMERICAN: >60
GLUCOSE BLD-MCNC: 80 MG/DL (ref 70–99)
POTASSIUM REFLEX MAGNESIUM: 4.1 MMOL/L (ref 3.5–5.1)
SODIUM BLD-SCNC: 140 MMOL/L (ref 136–145)

## 2021-08-19 PROCEDURE — 6370000000 HC RX 637 (ALT 250 FOR IP): Performed by: INTERNAL MEDICINE

## 2021-08-19 PROCEDURE — 80048 BASIC METABOLIC PNL TOTAL CA: CPT

## 2021-08-19 PROCEDURE — 6360000002 HC RX W HCPCS: Performed by: REGISTERED NURSE

## 2021-08-19 PROCEDURE — 6360000002 HC RX W HCPCS: Performed by: INTERNAL MEDICINE

## 2021-08-19 PROCEDURE — 2580000003 HC RX 258: Performed by: REGISTERED NURSE

## 2021-08-19 PROCEDURE — 6370000000 HC RX 637 (ALT 250 FOR IP): Performed by: REGISTERED NURSE

## 2021-08-19 PROCEDURE — C9113 INJ PANTOPRAZOLE SODIUM, VIA: HCPCS | Performed by: REGISTERED NURSE

## 2021-08-19 PROCEDURE — 1200000000 HC SEMI PRIVATE

## 2021-08-19 PROCEDURE — 6360000002 HC RX W HCPCS: Performed by: NURSE PRACTITIONER

## 2021-08-19 PROCEDURE — 36415 COLL VENOUS BLD VENIPUNCTURE: CPT

## 2021-08-19 RX ORDER — METOCLOPRAMIDE HYDROCHLORIDE 5 MG/ML
10 INJECTION INTRAMUSCULAR; INTRAVENOUS EVERY 6 HOURS
Status: DISCONTINUED | OUTPATIENT
Start: 2021-08-19 | End: 2021-08-20 | Stop reason: HOSPADM

## 2021-08-19 RX ADMIN — SUCRALFATE 1 G: 1 TABLET ORAL at 17:37

## 2021-08-19 RX ADMIN — DIPHENHYDRAMINE HCL 25 MG: 25 TABLET ORAL at 21:52

## 2021-08-19 RX ADMIN — LAMOTRIGINE 150 MG: 100 TABLET ORAL at 20:28

## 2021-08-19 RX ADMIN — PROMETHAZINE HYDROCHLORIDE 12.5 MG: 25 TABLET ORAL at 21:52

## 2021-08-19 RX ADMIN — SUCRALFATE 1 G: 1 TABLET ORAL at 06:08

## 2021-08-19 RX ADMIN — ONDANSETRON 4 MG: 2 INJECTION INTRAMUSCULAR; INTRAVENOUS at 17:37

## 2021-08-19 RX ADMIN — MISOPROSTOL 200 MCG: 100 TABLET ORAL at 21:52

## 2021-08-19 RX ADMIN — MORPHINE SULFATE 4 MG: 4 INJECTION INTRAVENOUS at 02:12

## 2021-08-19 RX ADMIN — OXYCODONE HYDROCHLORIDE AND ACETAMINOPHEN 2 TABLET: 5; 325 TABLET ORAL at 04:19

## 2021-08-19 RX ADMIN — MISOPROSTOL 200 MCG: 100 TABLET ORAL at 17:36

## 2021-08-19 RX ADMIN — PROMETHAZINE HYDROCHLORIDE 12.5 MG: 25 TABLET ORAL at 08:29

## 2021-08-19 RX ADMIN — PANTOPRAZOLE SODIUM 40 MG: 40 INJECTION, POWDER, FOR SOLUTION INTRAVENOUS at 08:19

## 2021-08-19 RX ADMIN — SODIUM CHLORIDE, PRESERVATIVE FREE 10 ML: 5 INJECTION INTRAVENOUS at 20:29

## 2021-08-19 RX ADMIN — METOCLOPRAMIDE HYDROCHLORIDE 10 MG: 5 INJECTION INTRAMUSCULAR; INTRAVENOUS at 22:04

## 2021-08-19 RX ADMIN — MORPHINE SULFATE 4 MG: 4 INJECTION INTRAVENOUS at 06:07

## 2021-08-19 RX ADMIN — SUCRALFATE 1 G: 1 TABLET ORAL at 11:53

## 2021-08-19 RX ADMIN — ONDANSETRON 4 MG: 2 INJECTION INTRAMUSCULAR; INTRAVENOUS at 11:44

## 2021-08-19 RX ADMIN — MISOPROSTOL 200 MCG: 100 TABLET ORAL at 11:51

## 2021-08-19 RX ADMIN — SODIUM CHLORIDE, PRESERVATIVE FREE 10 ML: 5 INJECTION INTRAVENOUS at 22:03

## 2021-08-19 RX ADMIN — ONDANSETRON 4 MG: 2 INJECTION INTRAMUSCULAR; INTRAVENOUS at 00:17

## 2021-08-19 RX ADMIN — ALPRAZOLAM 1 MG: 1 TABLET ORAL at 20:28

## 2021-08-19 RX ADMIN — OXYCODONE HYDROCHLORIDE AND ACETAMINOPHEN 2 TABLET: 5; 325 TABLET ORAL at 00:18

## 2021-08-19 RX ADMIN — SUCRALFATE 1 G: 1 TABLET ORAL at 00:18

## 2021-08-19 RX ADMIN — LAMOTRIGINE 150 MG: 100 TABLET ORAL at 08:19

## 2021-08-19 RX ADMIN — MISOPROSTOL 200 MCG: 100 TABLET ORAL at 08:20

## 2021-08-19 RX ADMIN — ENOXAPARIN SODIUM 40 MG: 40 INJECTION SUBCUTANEOUS at 08:20

## 2021-08-19 RX ADMIN — DIPHENHYDRAMINE HCL 25 MG: 25 TABLET ORAL at 00:24

## 2021-08-19 RX ADMIN — SUCRALFATE 1 G: 1 TABLET ORAL at 22:04

## 2021-08-19 RX ADMIN — MORPHINE SULFATE 4 MG: 4 INJECTION INTRAVENOUS at 15:40

## 2021-08-19 RX ADMIN — MORPHINE SULFATE 2 MG: 2 INJECTION, SOLUTION INTRAMUSCULAR; INTRAVENOUS at 11:43

## 2021-08-19 RX ADMIN — PROMETHAZINE HYDROCHLORIDE 12.5 MG: 25 TABLET ORAL at 02:13

## 2021-08-19 RX ADMIN — OXYCODONE HYDROCHLORIDE AND ACETAMINOPHEN 2 TABLET: 5; 325 TABLET ORAL at 14:05

## 2021-08-19 RX ADMIN — OXYCODONE HYDROCHLORIDE AND ACETAMINOPHEN 2 TABLET: 5; 325 TABLET ORAL at 08:28

## 2021-08-19 RX ADMIN — MORPHINE SULFATE 4 MG: 4 INJECTION INTRAVENOUS at 20:28

## 2021-08-19 RX ADMIN — PANTOPRAZOLE SODIUM 40 MG: 40 INJECTION, POWDER, FOR SOLUTION INTRAVENOUS at 20:27

## 2021-08-19 RX ADMIN — OXYCODONE HYDROCHLORIDE AND ACETAMINOPHEN 2 TABLET: 5; 325 TABLET ORAL at 17:59

## 2021-08-19 RX ADMIN — PROMETHAZINE HYDROCHLORIDE 12.5 MG: 25 TABLET ORAL at 14:19

## 2021-08-19 RX ADMIN — OXYCODONE HYDROCHLORIDE AND ACETAMINOPHEN 2 TABLET: 5; 325 TABLET ORAL at 21:52

## 2021-08-19 RX ADMIN — ONDANSETRON 4 MG: 2 INJECTION INTRAMUSCULAR; INTRAVENOUS at 06:07

## 2021-08-19 ASSESSMENT — PAIN SCALES - GENERAL
PAINLEVEL_OUTOF10: 8
PAINLEVEL_OUTOF10: 7
PAINLEVEL_OUTOF10: 8
PAINLEVEL_OUTOF10: 7
PAINLEVEL_OUTOF10: 8
PAINLEVEL_OUTOF10: 8
PAINLEVEL_OUTOF10: 7
PAINLEVEL_OUTOF10: 8

## 2021-08-19 ASSESSMENT — PAIN DESCRIPTION - PAIN TYPE: TYPE: ACUTE PAIN

## 2021-08-19 ASSESSMENT — PAIN DESCRIPTION - LOCATION: LOCATION: ABDOMEN

## 2021-08-19 ASSESSMENT — PAIN - FUNCTIONAL ASSESSMENT: PAIN_FUNCTIONAL_ASSESSMENT: ACTIVITIES ARE NOT PREVENTED

## 2021-08-19 NOTE — PROGRESS NOTES
Hospitalist Progress Note      PCP: [facility], APRN - CNP    Date of Admission: 8/13/2021    Chief Complaint: Abdominal pain     Hospital Course:   45 y.o. female who presented to [facility] with complaints of abdominal pain. Pt was hospitalized at [facility] from 7/27 to 8/2 for peptic ulcer. She underwent EGD on 7/27 and found to have a deep 12 mm penetrating ulcer located in the pyloric channel with extension into the duodenal bulb and patches of erosive antral gastritis attributed to ibuprofen use. She was treated with protonix, carafate and cytotec and was discharged on this regimen. General surgery was also consulted during previous stay and found no role for surgery and recommended medical management. She had a CT A/P and MRCP during previous hospital stay which was unremarkable. The pt was dc'd on 8/2 with the aforementioned regimen as well as zofran and percocet. Pt presented to her PCP on Wednesday due to ongoing pain and possible pain med refill. Per pt, her PCP reached out to Dr. Talley Pop office and stated that meds should not be refilled. Pt has had no pain meds since last Thursday and has resorted to taking ibuprofen again even though she was advised not to do so however pt felt like she had no other choice to control her pain. She presented to the ED last Wednesday and was ultimately dc'd. She presents today due to ongoing epigastric abdominal pain with associated N&V. No fever/chills. No dyspnea or chest pain. No hematemesis. She is having normal / regular bowel movements. ED course: Afebrile. On RA. VSS. CXR showed no pneumoperitoneum. Lab work was unremarkable. GI was consulted in the ED. Hospitalist was consulted for admission. Subjective:   Limited PO intake. Ongoing complaint of abdominal pain. Now having adequate bowel movements.     Medications:  Reviewed    Infusion Medications    sodium chloride       Scheduled Medications    polyethylene glycol  17 g Oral BID    sennosides-docusate sodium  2 tablet Oral BID    sucralfate  1 g Oral 4 times per day    ALPRAZolam  1 mg Oral Nightly    lamoTRIgine  150 mg Oral BID    miSOPROStol  200 mcg Oral 4x Daily AC & HS    sodium chloride flush  5-40 mL Intravenous 2 times per day    enoxaparin  40 mg Subcutaneous Daily    pantoprazole  40 mg Intravenous BID     PRN Meds: morphine **OR** morphine, oxyCODONE-acetaminophen **OR** oxyCODONE-acetaminophen, diphenhydrAMINE, promethazine, propranolol, sodium chloride flush, sodium chloride, ondansetron **OR** ondansetron, acetaminophen **OR** acetaminophen      Intake/Output Summary (Last 24 hours) at 8/19/2021 1221  Last data filed at 8/19/2021 0302  Gross per 24 hour   Intake 730 ml   Output --   Net 730 ml       Physical Exam Performed:    BP 99/63   Pulse 84   Temp 98.5 °F (36.9 °C) (Oral)   Resp 16   Ht 5' 3\" (1.6 m)   Wt 175 lb (79.4 kg)   LMP 07/27/2021   SpO2 99%   BMI 31.00 kg/m²   General appearance: No apparent distress, appears stated age and cooperative. HEENT: Pupils equal, round, and reactive to light. Conjunctivae/corneas clear. Neck: Supple, no jugular venous distention. Trachea midline with full range of motion. Respiratory:  Normal respiratory effort. Clear to auscultation, bilaterally without Rales/Wheezes/Rhonchi. Cardiovascular: Regular rate and rhythm with normal S1/S2 without murmurs, rubs or gallops. Abdomen: Soft, non-tender, non-distended with normal bowel sounds. Musculoskelatal: No clubbing, cyanosis or edema bilaterally. Full range of motion without deformity. Neurologic:  Neurovascularly intact without any focal sensory/motor deficits. Cranial nerves: II-XII intact, grossly non-focal.  Psychiatric: Alert and oriented, thought content appropriate, normal insight  Skin: Skin color, texture, turgor normal.  No rashes or lesions.   Capillary Refill: Brisk,< 3 seconds   Peripheral Pulses: +2 palpable, equal bilaterally       Labs:   No results for input(s): WBC, HGB, HCT, PLT in the last 72 hours. Recent Labs     08/18/21  0627 08/19/21  0524    140   K 4.4 4.1    108   CO2 26 23   BUN 11 11   CREATININE 0.9 0.9   CALCIUM 9.4 8.9     No results for input(s): AST, ALT, BILIDIR, BILITOT, ALKPHOS in the last 72 hours. Urinalysis:      Lab Results   Component Value Date    NITRU Negative 08/13/2021    WBCUA 0-2 08/13/2021    BACTERIA 1+ 08/13/2021    RBCUA 3-4 08/13/2021    BLOODU SMALL 08/13/2021    SPECGRAV 1.010 08/13/2021    GLUCOSEU Negative 08/13/2021    GLUCOSEU NEGATIVE 12/22/2011       Radiology:  XR CHEST 1 VIEW   Final Result   Rotated exam.  No acute airspace disease. Assessment/Plan:    Active Hospital Problems    Diagnosis     PUD (peptic ulcer disease) [K27.9]      Epigastric abdominal pain  in setting of PUD:  - CT A/P and MRCP during previous hospitalization were unremarkable. - Pt is s/p EGD on 7/27, found to have deep 12 mm penetrating ulcer located in the pyloric channel with extension into the duodenal bulb and patches of erosive antral gastritis. Path was negative. - GI and general surgery consulted/following --repeat EGD on 8/16.   - Patient has no interest in Surgical intervention  -Ongoing abdominal pain out of proportion to EGD findings. Suspect this may be related to constipation and/or gastroparesis. -Constipation improved with bowel regimen. -Given ongoing symptoms, recommend starting trial of IV Reglan as suggested by GI.   - Minimize narcotic regimen as tolerated as this may be counterproductive. - Continue PPI BID, cytotec and carafate. -Advance diet as tolerated  - Pt advised to avoid NSAIDs for the foreseeable future. Tobacco abuse:  - Smoking cessation discussed/advised.       DVT Prophylaxis: Lovenox   Diet: ADULT DIET; Regular  Code Status: Full Code    PT/OT Eval Status: not indicated     [facility] [address] home tomorrow if symptoms improving.      Gloria Danielle MD

## 2021-08-19 NOTE — PROGRESS NOTES
Pt alert and oriented, VSS. Shift assessment completed and documented. Pt c/o abd pain 7/10, prn pain medication given. Pt denies any needs at thist time. Bed locked and in lowest position. Call light within reach.

## 2021-08-19 NOTE — PROGRESS NOTES
Progress Note  Date:2021       Room:[facility]0343-  Patient Rocío Palmer     YOB: 1980     Age:40 y.o. Subjective    Subjective:  Symptoms:  Stable. Pain:  She complains of pain that is moderate. Review of Systems  Objective         Vitals Last 24 Hours:  TEMPERATURE:  Temp  Av.6 °F (37 °C)  Min: 98.4 °F (36.9 °C)  Max: 98.8 °F (37.1 °C)  RESPIRATIONS RANGE: Resp  Av.5  Min: 16  Max: 18  PULSE OXIMETRY RANGE: SpO2  Av.4 %  Min: 95 %  Max: 100 %  PULSE RANGE: Pulse  Av.2  Min: 69  Max: 98  BLOOD PRESSURE RANGE: Systolic (45QLL), TGN:446 , Min:99 , TJN:633   ; Diastolic (97NNE), KSS:03, Min:63, Max:84    I/O (24Hr): Intake/Output Summary (Last 24 hours) at 2021 1825  Last data filed at 2021 1200  Gross per 24 hour   Intake 790 ml   Output --   Net 790 ml     Objective:  General Appearance: In no acute distress and not in pain. Vital signs: (most recent): Blood pressure 99/63, pulse 98, temperature 98.8 °F (37.1 °C), temperature source Oral, resp. rate 16, height 5' 3\" (1.6 m), weight 175 lb (79.4 kg), last menstrual period 2021, SpO2 95 %, not currently breastfeeding. Abdomen: Abdomen is soft. Bowel sounds are normal.   There is epigastric tenderness. Labs/Imaging/Diagnostics    Labs:  CBC:No results for input(s): WBC, RBC, HGB, HCT, MCV, RDW, PLT in the last 72 hours. CHEMISTRIES:  Recent Labs     21  0627 21  0524    140   K 4.4 4.1    108   CO2 26 23   BUN 11 11   CREATININE 0.9 0.9   GLUCOSE 88 80     PT/INR:No results for input(s): PROTIME, INR in the last 72 hours. APTT:No results for input(s): APTT in the last 72 hours. LIVER PROFILE:No results for input(s): AST, ALT, BILIDIR, BILITOT, ALKPHOS in the last 72 hours. Imaging Last 24 Hours:  No results found.   Assessment//Plan           Hospital Problems         Last Modified POA    PUD (peptic ulcer disease) 2021 Yes

## 2021-08-19 NOTE — PROGRESS NOTES
Report received from [facility]. Agree with previous assessment. Pt A&Ox4. VSS. Reports nausea- antiemetics administered per order. Pain medication given as needed. Call light within reach, bed in lowest position, wheels locked.

## 2021-08-20 VITALS
TEMPERATURE: 98.2 F | WEIGHT: 175 LBS | DIASTOLIC BLOOD PRESSURE: 59 MMHG | BODY MASS INDEX: 31.01 KG/M2 | HEIGHT: 63 IN | OXYGEN SATURATION: 95 % | RESPIRATION RATE: 18 BRPM | SYSTOLIC BLOOD PRESSURE: 92 MMHG | HEART RATE: 80 BPM

## 2021-08-20 LAB
ANION GAP SERPL CALCULATED.3IONS-SCNC: 10 MMOL/L (ref 3–16)
BUN BLDV-MCNC: 9 MG/DL (ref 7–20)
CALCIUM SERPL-MCNC: 8.9 MG/DL (ref 8.3–10.6)
CHLORIDE BLD-SCNC: 106 MMOL/L (ref 99–110)
CO2: 23 MMOL/L (ref 21–32)
CREAT SERPL-MCNC: 0.9 MG/DL (ref 0.6–1.1)
GFR AFRICAN AMERICAN: >60
GFR NON-AFRICAN AMERICAN: >60
GLUCOSE BLD-MCNC: 86 MG/DL (ref 70–99)
POTASSIUM REFLEX MAGNESIUM: 4.4 MMOL/L (ref 3.5–5.1)
SODIUM BLD-SCNC: 139 MMOL/L (ref 136–145)

## 2021-08-20 PROCEDURE — 80048 BASIC METABOLIC PNL TOTAL CA: CPT

## 2021-08-20 PROCEDURE — 6360000002 HC RX W HCPCS: Performed by: REGISTERED NURSE

## 2021-08-20 PROCEDURE — 6360000002 HC RX W HCPCS: Performed by: NURSE PRACTITIONER

## 2021-08-20 PROCEDURE — 6360000002 HC RX W HCPCS: Performed by: INTERNAL MEDICINE

## 2021-08-20 PROCEDURE — 6370000000 HC RX 637 (ALT 250 FOR IP): Performed by: INTERNAL MEDICINE

## 2021-08-20 PROCEDURE — 2580000003 HC RX 258: Performed by: REGISTERED NURSE

## 2021-08-20 PROCEDURE — C9113 INJ PANTOPRAZOLE SODIUM, VIA: HCPCS | Performed by: REGISTERED NURSE

## 2021-08-20 PROCEDURE — 36415 COLL VENOUS BLD VENIPUNCTURE: CPT

## 2021-08-20 PROCEDURE — 6370000000 HC RX 637 (ALT 250 FOR IP): Performed by: REGISTERED NURSE

## 2021-08-20 RX ORDER — OXYCODONE HYDROCHLORIDE AND ACETAMINOPHEN 5; 325 MG/1; MG/1
1 TABLET ORAL EVERY 6 HOURS PRN
Qty: 28 TABLET | Refills: 0 | Status: SHIPPED | OUTPATIENT
Start: 2021-08-20 | End: 2021-08-27

## 2021-08-20 RX ORDER — ONDANSETRON 4 MG/1
4 TABLET, ORALLY DISINTEGRATING ORAL EVERY 6 HOURS PRN
Qty: 30 TABLET | Refills: 0 | Status: SHIPPED | OUTPATIENT
Start: 2021-08-20 | End: 2022-02-21 | Stop reason: SDUPTHER

## 2021-08-20 RX ORDER — PROMETHAZINE HYDROCHLORIDE 12.5 MG/1
12.5 TABLET ORAL EVERY 6 HOURS PRN
Qty: 20 TABLET | Refills: 0 | Status: SHIPPED | OUTPATIENT
Start: 2021-08-20 | End: 2021-08-27

## 2021-08-20 RX ADMIN — OXYCODONE HYDROCHLORIDE AND ACETAMINOPHEN 2 TABLET: 5; 325 TABLET ORAL at 06:07

## 2021-08-20 RX ADMIN — SODIUM CHLORIDE, PRESERVATIVE FREE 10 ML: 5 INJECTION INTRAVENOUS at 09:38

## 2021-08-20 RX ADMIN — METOCLOPRAMIDE HYDROCHLORIDE 10 MG: 5 INJECTION INTRAMUSCULAR; INTRAVENOUS at 09:33

## 2021-08-20 RX ADMIN — PROMETHAZINE HYDROCHLORIDE 12.5 MG: 25 TABLET ORAL at 05:14

## 2021-08-20 RX ADMIN — OXYCODONE HYDROCHLORIDE AND ACETAMINOPHEN 2 TABLET: 5; 325 TABLET ORAL at 02:05

## 2021-08-20 RX ADMIN — METOCLOPRAMIDE HYDROCHLORIDE 10 MG: 5 INJECTION INTRAMUSCULAR; INTRAVENOUS at 04:05

## 2021-08-20 RX ADMIN — SUCRALFATE 1 G: 1 TABLET ORAL at 11:25

## 2021-08-20 RX ADMIN — MISOPROSTOL 200 MCG: 100 TABLET ORAL at 06:06

## 2021-08-20 RX ADMIN — ONDANSETRON 4 MG: 2 INJECTION INTRAMUSCULAR; INTRAVENOUS at 11:35

## 2021-08-20 RX ADMIN — MORPHINE SULFATE 2 MG: 2 INJECTION, SOLUTION INTRAMUSCULAR; INTRAVENOUS at 05:14

## 2021-08-20 RX ADMIN — PANTOPRAZOLE SODIUM 40 MG: 40 INJECTION, POWDER, FOR SOLUTION INTRAVENOUS at 09:33

## 2021-08-20 RX ADMIN — ENOXAPARIN SODIUM 40 MG: 40 INJECTION SUBCUTANEOUS at 09:32

## 2021-08-20 RX ADMIN — LAMOTRIGINE 150 MG: 100 TABLET ORAL at 09:33

## 2021-08-20 RX ADMIN — ONDANSETRON 4 MG: 2 INJECTION INTRAMUSCULAR; INTRAVENOUS at 02:00

## 2021-08-20 RX ADMIN — OXYCODONE HYDROCHLORIDE AND ACETAMINOPHEN 2 TABLET: 5; 325 TABLET ORAL at 11:31

## 2021-08-20 RX ADMIN — MISOPROSTOL 200 MCG: 100 TABLET ORAL at 11:25

## 2021-08-20 RX ADMIN — SUCRALFATE 1 G: 1 TABLET ORAL at 05:14

## 2021-08-20 ASSESSMENT — PAIN SCALES - GENERAL
PAINLEVEL_OUTOF10: 7
PAINLEVEL_OUTOF10: 6
PAINLEVEL_OUTOF10: 6
PAINLEVEL_OUTOF10: 7
PAINLEVEL_OUTOF10: 7

## 2021-08-20 ASSESSMENT — PAIN - FUNCTIONAL ASSESSMENT: PAIN_FUNCTIONAL_ASSESSMENT: ACTIVITIES ARE NOT PREVENTED

## 2021-08-20 ASSESSMENT — PAIN DESCRIPTION - PAIN TYPE: TYPE: ACUTE PAIN

## 2021-08-20 ASSESSMENT — PAIN DESCRIPTION - LOCATION: LOCATION: ABDOMEN

## 2021-08-20 NOTE — CARE COORDINATION
CASE MANAGEMENT DISCHARGE SUMMARY      Discharge to: home      New Durable Medical Equipment ordered/agency: none    Transportation: private     Confirmed discharge plan with:     Patient: yes attempted on phone with someone else will try back per [place], no DCP needs.           RN, name: Amparo Huerta RN

## 2021-08-20 NOTE — PLAN OF CARE
Bed locked and in low position, bedside table and call light within reach, nonskid socks on. Improved pain and nausea control after addition of reglan.   Problem: Pain:  Goal: Pain level will decrease  Description: Pain level will decrease  8/20/2021 0508 by Aleah Aparicio RN  Outcome: Ongoing  Goal: Control of acute pain  Description: Control of acute pain  8/20/2021 0508 by Aleah Aparicio, [facility]  Outcome: Ongoing  Goal: Control of chronic pain  Description: Control of chronic pain  Outcome: Ongoing     Problem: Falls - Risk of:  Goal: Will remain free from falls  Description: Will remain free from falls  Outcome: Ongoing  Goal: Absence of physical injury  Description: Absence of physical injury  Outcome: Ongoing

## 2021-08-20 NOTE — DISCHARGE SUMMARY
Hospital Medicine Discharge Summary    Patient: Isabelle Andersen     Age: 45 y.o. Gender: female  : 1980   MRN: [id number]  Code Status: Full     Admit Date: 2021   Discharge Date: 2021    Disposition:  Home     Condition at Discharge: Stable    Primary Care Provider: DAQUAN Mathews CNP    Admitting Physician: Starla Wellington MD  Discharge Physician: Ildefonso Mistry MD       Discharge Diagnoses: Active Hospital Problems    Diagnosis     PUD (peptic ulcer disease) [K27.9]        Hospital Course:   45 y.o. female who presented to [facility] with complaints of abdominal pain. Pt was hospitalized at [facility] from  to  for peptic ulcer. She underwent EGD on  and found to have a deep 12 mm penetrating ulcer located in the pyloric channel with extension into the duodenal bulb and patches of erosive antral gastritis attributed to ibuprofen use. She was treated with protonix, carafate and cytotec and was discharged on this regimen. General surgery was also consulted during previous stay and found no role for surgery and recommended medical management. She had a CT A/P and MRCP during previous hospital stay which was unremarkable. The pt was dc'd on  with the aforementioned regimen as well as zofran and percocet. Pt presented to her PCP on Wednesday due to ongoing pain and possible pain med refill. Per pt, her PCP reached out to Dr. Gupta Frames office and stated that meds should not be refilled. Pt has had no pain meds since last Thursday and has resorted to taking ibuprofen again even though she was advised not to do so however pt felt like she had no other choice to control her pain. She presented to the ED last Wednesday and was ultimately dc'd. She presents today due to ongoing epigastric abdominal pain with associated N&V. No fever/chills. No dyspnea or chest pain. No hematemesis. She is having normal / regular bowel movements. ED course: Afebrile. On . VSS. CXR showed no pneumoperitoneum. Lab work was unremarkable. GI was consulted in the ED. Hospitalist was consulted for admission. Assessment/Plan:    Epigastric abdominal pain  in setting of PUD:  - CT A/P and MRCP during previous hospitalization were unremarkable. - Pt is s/p EGD on 7/27, found to have deep 12 mm penetrating ulcer located in the pyloric channel with extension into the duodenal bulb and patches of erosive antral gastritis. Path was negative. - GI and general surgery consulted/following --repeat EGD on 8/16.   -Ongoing abdominal pain out of proportion to EGD findings. Suspect this may be related to constipation and/or gastroparesis. -Constipation improved with bowel regimen. -Given ongoing symptoms, recommended trial of IV Reglan as suggested by GI; seems to have helped with symptoms.   - Minimize narcotic regimen as tolerated as this may be counterproductive. - Continue PPI BID, cytotec and carafate.   -Tolerating diet   Pt advised to avoid NSAIDs for the foreseeable future. Tobacco abuse:  - Smoking cessation discussed/advised.             Exam:   BP (!) 92/59   Pulse 80   Temp 98.2 °F (36.8 °C) (Oral)   Resp 18   Ht 5' 3\" (1.6 m)   Wt 175 lb (79.4 kg)   LMP 07/27/2021   SpO2 95%   BMI 31.00 kg/m²   General appearance: No apparent distress, appears stated age and cooperative. HEENT: Pupils equal, round, and reactive to light. Conjunctivae/corneas clear. Neck: Supple, no jugular venous distention. Trachea midline with full range of motion. Respiratory:  Normal respiratory effort. Clear to auscultation, bilaterally without Rales/Wheezes/Rhonchi. Cardiovascular: Regular rate and rhythm with normal S1/S2 without murmurs, rubs or gallops. Abdomen: Soft, non-tender, non-distended with normal bowel sounds. Musculoskelatal: No clubbing, cyanosis or edema bilaterally. Full range of motion without deformity.   Neurologic:  Neurovascularly intact without any focal sensory/motor tablet by mouth 4 times daily (before meals and nightly), Disp-120 tablet, R-3Normal      sucralfate (CARAFATE) 1 GM/10ML suspension Take 10 mLs by mouth 4 times daily, Disp-1200 mL, R-3Normal      ALPRAZolam (XANAX) 1 MG tablet Take 1 mg by mouth nightly. Historical Med           Discharge Medication List as of 8/20/2021  1:07 PM            Significant Test Results    No results found. Consults:     IP CONSULT TO GI  IP CONSULT TO GENERAL SURGERY    Labs: For convenience and continuity at follow-up the following most recent labs are provided:    Lab Results   Component Value Date    WBC 4.9 08/16/2021    HGB 12.7 08/16/2021    HCT 38.0 08/16/2021    MCV 87.8 08/16/2021     08/16/2021     08/20/2021    K 4.4 08/20/2021     08/20/2021    CO2 23 08/20/2021    BUN 9 08/20/2021    CREATININE 0.9 08/20/2021    CALCIUM 8.9 08/20/2021    TROPONINI <0.01 07/27/2021    ALKPHOS 85 08/14/2021    ALT 27 08/14/2021    AST 17 08/14/2021    BILITOT 0.3 08/14/2021    BILIDIR <0.2 07/31/2021    LABALBU 3.9 08/14/2021     Lab Results   Component Value Date    INR 1.04 02/08/2020    INR 1.03 02/05/2020    INR 1.00 10/22/2019         The patient was seen and examined on day of discharge and this discharge summary is in conjunction with any daily progress note from day of discharge. Time spent on discharge is more than 30 minutes in the examination, evaluation, counseling and review of medications and discharge plan. Signed:    Ildefonso Mistry MD   9/18/2021    Thank you DAQUAN Mathews CNP for the opportunity to be involved in this patient's care. If you have any questions or concerns please feel free to contact my office [phone].

## 2021-08-20 NOTE — PROGRESS NOTES
Pt A&Ox4. VSS- BP runs low. Shift assessment completed. Reglan working better for pt's nausea. Still with abdominal pain- pain medication administered per orders. Denies other needs. Call light within reach, bed in lowest position, wheels locked.

## 2021-08-26 ENCOUNTER — TELEPHONE (OUTPATIENT)
Dept: PRIMARY CARE CLINIC | Age: 41
End: 2021-08-26

## 2021-08-26 NOTE — TELEPHONE ENCOUNTER
Last seen 8/5   [facility] 8/12  Hospital 8/13    Off work from 8/12, needs return to work note.

## 2021-08-27 NOTE — TELEPHONE ENCOUNTER
Patient was being refused a note from [address] [place] [address]- so I called to see if she was cleared to go back to work. Staff nurse is generating an excuse note and faxing it here. Patient asked for it to be emailed to [email or web address]

## 2021-11-05 ENCOUNTER — OFFICE VISIT (OUTPATIENT)
Dept: URGENT CARE | Age: 41
End: 2021-11-05
Payer: COMMERCIAL

## 2021-11-05 ENCOUNTER — TELEPHONE (OUTPATIENT)
Dept: PRIMARY CARE CLINIC | Age: 41
End: 2021-11-05

## 2021-11-05 VITALS
WEIGHT: 187 LBS | BODY MASS INDEX: 31.16 KG/M2 | OXYGEN SATURATION: 98 % | HEART RATE: 80 BPM | DIASTOLIC BLOOD PRESSURE: 90 MMHG | HEIGHT: 65 IN | SYSTOLIC BLOOD PRESSURE: 146 MMHG | TEMPERATURE: 97.6 F

## 2021-11-05 DIAGNOSIS — K27.9 PEPTIC ULCER DISEASE: Primary | ICD-10-CM

## 2021-11-05 DIAGNOSIS — R10.13 ABDOMINAL PAIN, EPIGASTRIC: Primary | ICD-10-CM

## 2021-11-05 DIAGNOSIS — K25.9 GASTRIC ULCER WITHOUT HEMORRHAGE, PERFORATION, OR OBSTRUCTION, UNSPECIFIED ULCER CHRONICITY: ICD-10-CM

## 2021-11-05 PROCEDURE — 99202 OFFICE O/P NEW SF 15 MIN: CPT

## 2021-11-05 RX ORDER — PROMETHAZINE HYDROCHLORIDE 25 MG/1
25 TABLET ORAL 4 TIMES DAILY PRN
Qty: 20 TABLET | Refills: 0 | Status: SHIPPED | OUTPATIENT
Start: 2021-11-05 | End: 2021-11-12

## 2021-11-05 RX ORDER — OXYCODONE HYDROCHLORIDE AND ACETAMINOPHEN 5; 325 MG/1; MG/1
1 TABLET ORAL EVERY 6 HOURS PRN
Qty: 20 TABLET | Refills: 0 | Status: SHIPPED | OUTPATIENT
Start: 2021-11-05 | End: 2021-11-10

## 2021-11-05 RX ORDER — PANTOPRAZOLE SODIUM 40 MG/1
40 TABLET, DELAYED RELEASE ORAL
Qty: 60 TABLET | Refills: 0 | Status: SHIPPED | OUTPATIENT
Start: 2021-11-05 | End: 2022-01-31

## 2021-11-05 RX ORDER — SUCRALFATE 1 G/1
1 TABLET ORAL 4 TIMES DAILY
Qty: 120 TABLET | Refills: 3 | Status: SHIPPED | OUTPATIENT
Start: 2021-11-05

## 2021-11-05 RX ORDER — ONDANSETRON 8 MG/1
8 TABLET, ORALLY DISINTEGRATING ORAL EVERY 8 HOURS PRN
Qty: 15 TABLET | Refills: 0 | Status: SHIPPED | OUTPATIENT
Start: 2021-11-05 | End: 2021-11-10

## 2021-11-05 ASSESSMENT — ENCOUNTER SYMPTOMS
NAUSEA: 1
CHEST TIGHTNESS: 0
SHORTNESS OF BREATH: 0
VOMITING: 1
ABDOMINAL PAIN: 1

## 2021-11-05 NOTE — PROGRESS NOTES
Arthur Stinson (: 1980) is a 37 y.o. female here for evaluation of the following chief complaint(s):  Abdominal Pain (Hiatal hernia)      SUBJECTIVE:  HPI   Pt presents today with c/o ongoing stomach pain that initially was diagnosed as a peptic ulcer in 2021. She notes she ultimately decided to not get surgery for repair and was hopeful medications would help instead. She states she was placed on \"numerous\" medications that were helpful and she was able to stop taking the percocet. Over the last few days she states the pain has again intensified and she decided to talk to her GI doctor. She states she talked to Dr. Ardith Olszewski whom stated he does not prescribe medications and that she would need to go to her PCP to get refills. Pt states she then notified her PCP to see if she could be seen today or get refills called in and was told her PCP was on vacation and there was nobody on call that could see her and she would need to go to urgent care. Review of Systems   Constitutional: Positive for fatigue. Negative for activity change, chills and fever. Respiratory: Negative for chest tightness and shortness of breath. Cardiovascular: Negative for chest pain and palpitations. Gastrointestinal: Positive for abdominal pain, nausea and vomiting. Musculoskeletal: Negative for arthralgias and myalgias. Neurological: Negative for dizziness and headaches. OBJECTIVE:  BP (!) 146/90 (Site: Right Lower Arm, Position: Sitting, Cuff Size: Medium Adult)   Pulse 80   Temp 97.6 °F (36.4 °C)   Ht 5' 5\" (1.651 m)   Wt 187 lb (84.8 kg)   SpO2 98%   BMI 31.12 kg/m²    Physical Exam  Constitutional:       General: She is not in acute distress. Appearance: Normal appearance. She is normal weight. She is not ill-appearing. HENT:      Head: Normocephalic. Cardiovascular:      Rate and Rhythm: Normal rate and regular rhythm. Pulses: Normal pulses.       Heart sounds: Normal heart to continue care with her GI physician      PLAN:  Take medication as prescribed  Follow up with PCP and GI Physician  Maintain fluid intake. Wash hands often with soap and water. Follow BRAT diet (bananas, rice, apple sauce, toast, bland foods, and soups as tolerated). Avoid foods/drinks that aggravate symptoms. Return for worsening symptoms such as abdominal pain, blood in stool, fever, or inability to keep liquids down. Discussed precautions and indications for seeking ED care. Return if symptoms worsen or fail to improve.      Electronically signed by DAQUAN Shields CNP on 11/5/2021 at 7:15 PM.

## 2021-11-05 NOTE — PATIENT INSTRUCTIONS
alcohol. · Eat a balanced diet of small, frequent meals. See a dietitian if you need help planning your meals. When should you call for help? Call 911  anytime you think you may need emergency care. For example, call if:    · You vomit blood or what looks like coffee grounds.     · You pass maroon or very bloody stools. Call your doctor now or seek immediate medical care if:    · You have new or worse belly pain.     · Your stools are black and look like tar, or they have streaks of blood.     · You vomit. Watch closely for changes in your health, and be sure to contact your doctor if:    · You do not get better as expected. Where can you learn more? Go to [email or web address] org and sign in to your [employer] account. Enter H163 in the [employer]Plus box to learn more about \"Peptic Ulcer Disease: Care Instructions. \"     If you do not have an account, please click on the \"Sign Up Now\" link. Current as of: February 10, 2021               Content Version: 13.0  © 3355-5361 Healthwise, Incorporated. Care instructions adapted under license by [facility] ([facility]). If you have questions about a medical condition or this instruction, always ask your healthcare professional. [address] any warranty or liability for your use of this information.

## 2021-11-05 NOTE — TELEPHONE ENCOUNTER
Patient called after hours reporting upper GI pain that is likely related to her previously diagnosed gastric ulcer. Patient spoke to her GI specialist today and she now has a scheduled upper GI scope on Tuesday November 9th. She was instructed by the GI specialist office that should either have her PCP prescribe her something for her pain or go to the ED. Patient also called her PCP this morning but she is on PTO. She is currently at the [facility] urgent care clinic requesting for pain medication because she doesn't want to go to the hospital. However reportedly the provider at the urgent care does not have the credentials to prescribe controlled substances. She put me on speaker phone with the urgent care provider who confirmed that her vitals are stable with just slightly elevated BP (146/90). Since patient has a scheduled EGD, I prescribed her a short course of Percocet to cover her for a couple of days. She currently denies dark stools or hematemesis, however I instructed her that if symptoms worsen, she should go to the ED. I also instructed her that she should follow up with her PCP ASAP. Patient endorsed understanding. OARRS reviewed.     Jame Zamorano MD  11/05/21  6:30 PM

## 2021-11-05 NOTE — TELEPHONE ENCOUNTER
----- Message from Mady Sauer sent at 11/5/2021  9:29 AM EDT -----  Subject: Message to Provider    QUESTIONS  Information for Provider? Claudean Coder just left her GI dr. Saurabh Soriano) office   and her GI  stated he sent over prescriptions that he is requesting to   be called in by her PCP Shay Alonso). Please reach out to   Claudean Coder if she needs to schedule an appointment.   ---------------------------------------------------------------------------  --------------  [address]  What is the best way for the office to contact you? OK to leave message on   voicemail  Preferred Call Back Phone Number? [phone]  ---------------------------------------------------------------------------  --------------  SCRIPT ANSWERS  Relationship to Patient?  Self

## 2021-12-06 ENCOUNTER — TELEPHONE (OUTPATIENT)
Dept: PRIMARY CARE CLINIC | Age: 41
End: 2021-12-06

## 2021-12-06 NOTE — TELEPHONE ENCOUNTER
Pt. Has ear drainage. It is a sticky fluid. It is happening a couple times through out a 24 hour period with mostly at night. Pt. Would like to know if this is something to worry about. Pt was informed to schedule an appt but didn't want to unless the physician would like one made.

## 2021-12-07 ENCOUNTER — VIRTUAL VISIT (OUTPATIENT)
Dept: PRIMARY CARE CLINIC | Age: 41
End: 2021-12-07
Payer: COMMERCIAL

## 2021-12-07 DIAGNOSIS — R05.9 COUGH: Primary | ICD-10-CM

## 2021-12-07 PROCEDURE — 99213 OFFICE O/P EST LOW 20 MIN: CPT | Performed by: FAMILY MEDICINE

## 2021-12-07 RX ORDER — HYDROXYZINE HYDROCHLORIDE 25 MG/1
TABLET, FILM COATED ORAL
COMMUNITY
Start: 2021-11-02 | End: 2022-01-31

## 2021-12-07 RX ORDER — ALBUTEROL SULFATE 90 UG/1
2 AEROSOL, METERED RESPIRATORY (INHALATION) 4 TIMES DAILY PRN
Qty: 1 EACH | Refills: 0 | Status: SHIPPED | OUTPATIENT
Start: 2021-12-07 | End: 2022-01-31

## 2021-12-07 RX ORDER — ALBUTEROL SULFATE 90 UG/1
2 AEROSOL, METERED RESPIRATORY (INHALATION) EVERY 6 HOURS PRN
COMMUNITY
Start: 2021-09-16 | End: 2022-01-31

## 2021-12-07 RX ORDER — BENZONATATE 200 MG/1
200 CAPSULE ORAL 3 TIMES DAILY PRN
Qty: 30 CAPSULE | Refills: 0 | Status: SHIPPED | OUTPATIENT
Start: 2021-12-07 | End: 2022-05-31

## 2021-12-07 NOTE — PROGRESS NOTES
Damion Gross (:  1980) is a 37 y.o. female,Established patient, here for evaluation of the following chief complaint(s): Congestion ([phone]), Cough (dry cough 2 months.), and Nasal Congestion      ASSESSMENT/PLAN:  1. Cough  -     benzonatate (TESSALON) 200 MG capsule; Take 1 capsule by mouth 3 times daily as needed for Cough, Disp-30 capsule, R-0Normal  -     albuterol sulfate HFA (VENTOLIN HFA) 108 (90 Base) MCG/ACT inhaler; Inhale 2 puffs into the lungs 4 times daily as needed for Wheezing, Disp-1 each, R-0Normal  Discussed with patient viral illness causing symptoms. Do recommend Covid test to ensure this is not the cause. Do recommend pseudoephedrine for nasal congestion and Tessalon sent for her cough. Expect symptoms to worsen over the next 2 days and then begin to improve lasting for a total of a week to 10 days. Do recommend at future appointments she be further evaluated for continued use of albuterol and his cough with pulmonary function test.    Return if symptoms worsen or fail to improve. SUBJECTIVE/OBJECTIVE:    She was in her normal state of health until 3 days ago when she started sneezing. 2 days ago she began having a headache and pain across her face. She is also experiencing a cough. She has had a cough on and off for the past 2 months but recently worsened with the symptoms. No sputum production. No chest pain, she does have shortness of breath and using albuterol.   She has never been diagnosed with asthma and has not had a pulmonary function test in the past  No fevers or chills  Has not had a Covid test    [INSTRUCTIONS:  \"[x]\" Indicates a positive item  \"[]\" Indicates a negative item  -- DELETE ALL ITEMS NOT EXAMINED]    Constitutional: [x] Appears well-developed and well-nourished [x] No apparent distress      [] Abnormal -     Mental status: [x] Alert and awake  [x] Oriented to person/place/time [x] Able to follow commands    [] Abnormal -     Eyes:   EOM [x]  Normal    [] Abnormal -   Sclera  [x]  Normal    [] Abnormal -          Discharge [x]  None visible   [] Abnormal -     HENT: [x] Normocephalic, atraumatic  [] Abnormal -   [x] Mouth/Throat: Mucous membranes are moist    External Ears [x] Normal  [] Abnormal -    Neck: [x] No visualized mass [] Abnormal -     Pulmonary/Chest: [x] Respiratory effort normal   [x] No visualized signs of difficulty breathing or respiratory distress        [] Abnormal -      Musculoskeletal:   [x] Normal gait with no signs of ataxia         [x] Normal range of motion of neck        [] Abnormal -     Neurological:        [x] No Facial Asymmetry (Cranial nerve 7 motor function) (limited exam due to video visit)          [x] No gaze palsy        [] Abnormal -          Skin:        [x] No significant exanthematous lesions or discoloration noted on facial skin         [] Abnormal -            Psychiatric:       [x] Normal Affect [] Abnormal -        [x] No Hallucinations    Other pertinent observable physical exam findings:-    Electronically signed by Kristopher Rico MD on 12/7/2021 at 12:27 PM     Please note this chart was generated using dragon dictation software. Although every effort was made to ensure the accuracy of this automated transcription, some errors in transcription may have occurred. Rehan Oseguera, was evaluated through a synchronous (real-time) audio-video encounter. The patient (or guardian if applicable) is aware that this is a billable service. Verbal consent to proceed has been obtained within the past 12 months. The visit was conducted pursuant to the emergency declaration under the [address] [place] [address] and the [employer] and [employer] General Act. Patient identification was verified, and a caregiver was present when appropriate. The patient was located in a state where the provider was credentialed to provide care.

## 2021-12-14 ENCOUNTER — TELEPHONE (OUTPATIENT)
Dept: PRIMARY CARE CLINIC | Age: 41
End: 2021-12-14

## 2021-12-14 NOTE — TELEPHONE ENCOUNTER
Patient called to let us know she was diagnosed with Covid-19 and she is going to fax information to us regarding where she got her test and insurance information for Shwetha Or to fill out

## 2021-12-20 ENCOUNTER — VIRTUAL VISIT (OUTPATIENT)
Dept: PRIMARY CARE CLINIC | Age: 41
End: 2021-12-20
Payer: COMMERCIAL

## 2021-12-20 DIAGNOSIS — U07.1 COVID-19: Primary | ICD-10-CM

## 2021-12-20 PROCEDURE — 99213 OFFICE O/P EST LOW 20 MIN: CPT | Performed by: FAMILY MEDICINE

## 2021-12-20 SDOH — ECONOMIC STABILITY: FOOD INSECURITY: WITHIN THE PAST 12 MONTHS, THE FOOD YOU BOUGHT JUST DIDN'T LAST AND YOU DIDN'T HAVE MONEY TO GET MORE.: NEVER TRUE

## 2021-12-20 SDOH — ECONOMIC STABILITY: FOOD INSECURITY: WITHIN THE PAST 12 MONTHS, YOU WORRIED THAT YOUR FOOD WOULD RUN OUT BEFORE YOU GOT MONEY TO BUY MORE.: NEVER TRUE

## 2021-12-20 ASSESSMENT — SOCIAL DETERMINANTS OF HEALTH (SDOH): HOW HARD IS IT FOR YOU TO PAY FOR THE VERY BASICS LIKE FOOD, HOUSING, MEDICAL CARE, AND HEATING?: NOT HARD AT ALL

## 2021-12-20 NOTE — PROGRESS NOTES
Radha Freedman (:  1980) is a 37 y.o. female,Established patient, here for evaluation of the following chief complaint(s): Other (forms) and Fatigue ([phone])      ASSESSMENT/PLAN:  1. COVID-19  Discussed with patient that testing for resolution of COVID-19 is not completed. She can have positive results for many weeks beyond active infections time. She is able to return to work on 2021 from a medical standpoint. Do monitor fatigue and take naps as needed with healthy nutrition as well. She reports she has forms for supplemental insurance that he completed stating she has been out of work from 2021 to return 2021. Await these forms and will complete at that time. Do recommend third Covid dose as soon as she is able to schedule    No follow-ups on file. SUBJECTIVE/OBJECTIVE:  On he reported she was in her normal state of health until 21  when she started sneezing. On 21 ago she began having a headache and pain across her face. She is also experiencing a cough. She has had a cough on and off for the past 2 months but recently worsened with the symptoms. No sputum production. No chest pain, she does have shortness of breath and using albuterol. She has never been diagnosed with asthma and has not had a pulmonary function test in the past  No fevers or chills  She had Covid test on 2021 which was positive. She is experienced other symptoms such as brain fog and fatigue. Symptoms such as her cough, headache sinus pressure have all improved but she continues to have severe fatigue at times.       [INSTRUCTIONS:  \"[x]\" Indicates a positive item  \"[]\" Indicates a negative item  -- DELETE ALL ITEMS NOT EXAMINED]    Constitutional: [x] Appears well-developed and well-nourished [x] No apparent distress      [] Abnormal -     Mental status: [x] Alert and awake  [x] Oriented to person/place/time [x] Able to follow commands    [] Abnormal -     Eyes: EOM    [x]  Normal    [] Abnormal -   Sclera  [x]  Normal    [] Abnormal -          Discharge [x]  None visible   [] Abnormal -     HENT: [x] Normocephalic, atraumatic  [] Abnormal -   [x] Mouth/Throat: Mucous membranes are moist    External Ears [x] Normal  [] Abnormal -    Neck: [x] No visualized mass [] Abnormal -     Pulmonary/Chest: [x] Respiratory effort normal   [x] No visualized signs of difficulty breathing or respiratory distress        [] Abnormal -      Musculoskeletal:   [x] Normal gait with no signs of ataxia         [x] Normal range of motion of neck        [] Abnormal -     Neurological:        [x] No Facial Asymmetry (Cranial nerve 7 motor function) (limited exam due to video visit)          [x] No gaze palsy        [] Abnormal -          Skin:        [x] No significant exanthematous lesions or discoloration noted on facial skin         [] Abnormal -            Psychiatric:       [x] Normal Affect [] Abnormal -        [x] No Hallucinations    Other pertinent observable physical exam findings:-    Approximately 20 minutes of time were spent in preparation, direct patient contact, care coordination, documentation and activities otherwise related to this encounter. Electronically signed by Diamond Zaman MD on 12/20/2021 at 4:45 PM     Please note this chart was generated using dragon dictation software. Although every effort was made to ensure the accuracy of this automated transcription, some errors in transcription may have occurredCa Messina was evaluated through a synchronous (real-time) audio-video encounter. The patient (or guardian if applicable) is aware that this is a billable service. Verbal consent to proceed has been obtained within the past 12 months. The visit was conducted pursuant to the emergency declaration under the [facility], [address] [place] [address] authority and the [employer] and [employer] General Act. Patient identification was verified, and a caregiver was present when appropriate. The patient was located in a state where the provider was credentialed to provide care.

## 2021-12-21 ENCOUNTER — TELEPHONE (OUTPATIENT)
Dept: PRIMARY CARE CLINIC | Age: 41
End: 2021-12-21

## 2021-12-21 NOTE — TELEPHONE ENCOUNTER
----- Message from Bj Wellington sent at 12/21/2021  2:20 PM EST -----  Subject: Message to Provider    QUESTIONS  Information for Provider? Pt wants to know if the office received her   paperwork that was faxed over 1-2 weeks ago for [facility] due to having COVID   to get paid for being off work; please send back to pt, email is   [email or web address]  ---------------------------------------------------------------------------  --------------  [address]  What is the best way for the office to contact you? OK to leave message on   [employer]  Preferred Call Back Phone Number?  [phone]  ---------------------------------------------------------------------------  --------------  SCRIPT ANSWERS  undefined

## 2021-12-21 NOTE — TELEPHONE ENCOUNTER
Called pt to inform her we did not receive the fax that she sent this morning. Pt. Will refax forms.

## 2021-12-28 ENCOUNTER — TELEPHONE (OUTPATIENT)
Dept: PRIMARY CARE CLINIC | Age: 41
End: 2021-12-28

## 2021-12-28 NOTE — TELEPHONE ENCOUNTER
Patient called and while on the phone faxed over work forms for provider to fill out. Patient asked that we fill out the paperwork and then email forms back to her at [email or web address]. Forms scanned in and originals given to MA.

## 2022-01-03 ENCOUNTER — TELEPHONE (OUTPATIENT)
Dept: PRIMARY CARE CLINIC | Age: 42
End: 2022-01-03

## 2022-01-03 NOTE — TELEPHONE ENCOUNTER
Patient called to check on status of paperwork that had to be filled out my Dr. Lata Koch. Advised that as of the previous note, forms were given to Dr. Lata Koch, but don't know if the forms have been filled out and emailed back to patient.  Please advise

## 2022-01-04 NOTE — TELEPHONE ENCOUNTER
Patient called on status of paperwork- I explained that we received it on New Years Marilyn and that LS has been out for the holiday and just returned. I explained that it may take a few days to complete the form and that we would call her upon competion.

## 2022-01-31 ENCOUNTER — VIRTUAL VISIT (OUTPATIENT)
Dept: PRIMARY CARE CLINIC | Age: 42
End: 2022-01-31
Payer: COMMERCIAL

## 2022-01-31 DIAGNOSIS — R11.2 NAUSEA AND VOMITING, INTRACTABILITY OF VOMITING NOT SPECIFIED, UNSPECIFIED VOMITING TYPE: Primary | ICD-10-CM

## 2022-01-31 DIAGNOSIS — U07.1 SARS-COV-2 POSITIVE: ICD-10-CM

## 2022-01-31 PROCEDURE — 99214 OFFICE O/P EST MOD 30 MIN: CPT | Performed by: NURSE PRACTITIONER

## 2022-01-31 RX ORDER — ZOLPIDEM TARTRATE 5 MG/1
TABLET ORAL
COMMUNITY
Start: 2022-01-07

## 2022-01-31 RX ORDER — AMOXICILLIN 875 MG/1
875 TABLET, COATED ORAL EVERY 12 HOURS
COMMUNITY
Start: 2022-01-28 | End: 2022-01-31

## 2022-01-31 RX ORDER — DICYCLOMINE HYDROCHLORIDE 10 MG/1
CAPSULE ORAL
COMMUNITY
Start: 2021-12-01 | End: 2022-01-31

## 2022-01-31 RX ORDER — PROMETHAZINE HYDROCHLORIDE 12.5 MG/1
12.5-25 TABLET ORAL 4 TIMES DAILY PRN
Qty: 20 TABLET | Refills: 0 | Status: SHIPPED | OUTPATIENT
Start: 2022-01-31 | End: 2022-02-02 | Stop reason: SDUPTHER

## 2022-01-31 RX ORDER — ALPRAZOLAM 0.5 MG/1
TABLET ORAL
COMMUNITY
Start: 2022-01-18

## 2022-01-31 ASSESSMENT — ENCOUNTER SYMPTOMS
VOMITING: 1
SHORTNESS OF BREATH: 1
ABDOMINAL PAIN: 1
NAUSEA: 1
CONSTIPATION: 0
DIARRHEA: 0
COUGH: 1

## 2022-01-31 NOTE — PROGRESS NOTES
2022    TELEHEALTH EVALUATION -- Audio/Visual (During YNKGL-29 public health emergency)    HPI:    Jaylyn Aguiar (: 1980) has requested an audio/video evaluation for the following concern(s):    Patient is on vv with positive covid, patient said that her body hurts, she is having nausea and vomiting. Patient said she feels the ulcer coming back as well,she said she has been taking ibuprofen. Patient said she was able to keep down 4 sips of water, she said she is vomiting. Patient said she has has 4 sips of water. Patient is not urinating. Patient said she tried zofran again, patient said it is not helping. Review of Systems   Constitutional: Positive for chills, fatigue and fever. Respiratory: Positive for cough and shortness of breath. Gastrointestinal: Positive for abdominal pain, nausea and vomiting. Negative for constipation and diarrhea. All other systems reviewed and are negative. PHYSICAL EXAMINATION:  [ INSTRUCTIONS:  \"[x]\" Indicates a positive item  \"[]\" Indicates a negative item  -- DELETE ALL ITEMS NOT EXAMINED]  Vital Signs: (As obtained by patient/caregiver or practitioner observation)    Patient-Reported Vitals 2021   Patient-Reported Weight 185lb 180 lbs 180 lbs   Patient-Reported Height 63in 5 3 5 3         Constitutional: [x] Appears well-developed and well-nourished [x] No apparent distress      [] Abnormal-   Mental status  [x] Alert and awake  [x] Oriented to person/place/time [x]Able to follow commands      Eyes:  EOM    [x]  Normal  [] Abnormal-  Sclera  []  Normal  [] Abnormal -         Discharge []  None visible  [] Abnormal -    HENT:   [x] Normocephalic, atraumatic.   [] Abnormal   [] Mouth/Throat: Mucous membranes are moist.     External Ears [x] Normal  [] Abnormal-     Neck: [x] No visualized mass     Pulmonary/Chest: [x] Respiratory effort normal.  [x] No visualized signs of difficulty breathing or respiratory distress [] Abnormal-      Musculoskeletal:   [x] Normal gait with no signs of ataxia         [] Normal range of motion of neck        [] Abnormal-       Neurological:        [x] No Facial Asymmetry (Cranial nerve 7 motor function) (limited exam to video visit)          [] No gaze palsy        [] Abnormal-         Skin:        [x] No significant exanthematous lesions or discoloration noted on facial skin         [] Abnormal-            Psychiatric:       [x] Normal Affect [x] No Hallucinations        [] Abnormal-     Other pertinent observable physical exam findings-     ASSESSMENT/PLAN:  1. Nausea and vomiting, intractability of vomiting not specified, unspecified vomiting type  -Patient's been having nausea she has a history of ulcer. Patient started back on Protonix will contact her GI doctor. Will try Phenergan patient tried Zofran patient I would keep any fluids down will then go to ER to get IV fluids patient agrees with plan patient does not urinate next couple hours will go to her ER for fluids. - promethazine (PHENERGAN) 12.5 MG tablet; Take 1-2 tablets by mouth 4 times daily as needed for Nausea  Dispense: 20 tablet; Refill: 0    2. SARS-CoV-2 positive  Patient has COVID for the second time she said that she is not having problems she feels like with her Covid she says is more her stomach ulcer she feels like it is coming back. Recommend patient call GI as well. No follow-ups on file. Zhane Cunha is a 37 y.o. female being evaluated by a Virtual Visit (video visit) encounter to address concerns as mentioned above. A caregiver was present when appropriate. Due to this being a TeleHealth encounter (During LTBVV-08 public health emergency), evaluation of the following organ systems was limited: Vitals/Constitutional/EENT/Resp/CV/GI//MS/Neuro/Skin/Heme-Lymph-Imm.   Pursuant to the emergency declaration under the [address], 1135 waiver authority and the Coronavirus Preparedness and Response Supplemental Appropriations Act, this Virtual Visit was conducted with patient's (and/or legal guardian's) consent, to reduce the patient's risk of exposure to COVID-19 and provide necessary medical care. The patient (and/or legal guardian) has also been advised to contact this office for worsening conditions or problems, and seek emergency medical treatment and/or call 911 if deemed necessary. Patient identification was verified at the start of the visit: Yes    Not billed for time. Services were provided through a video synchronous discussion virtually to substitute for in-person clinic visit. Patient and provider were located at their individual homes. --DAQUAN Jaffe CNP on 1/31/2022 at 3:17 PM    An electronic signature was used to authenticate this note. This dictation was generated by voice recognition computer software. Although all attempts are made to edit the dictation for accuracy, there may be errors in the transcription that were not intended.

## 2022-02-02 ENCOUNTER — VIRTUAL VISIT (OUTPATIENT)
Dept: PRIMARY CARE CLINIC | Age: 42
End: 2022-02-02
Payer: COMMERCIAL

## 2022-02-02 DIAGNOSIS — R11.2 NAUSEA AND VOMITING, INTRACTABILITY OF VOMITING NOT SPECIFIED, UNSPECIFIED VOMITING TYPE: ICD-10-CM

## 2022-02-02 DIAGNOSIS — K27.9 PUD (PEPTIC ULCER DISEASE): Primary | ICD-10-CM

## 2022-02-02 PROCEDURE — 99214 OFFICE O/P EST MOD 30 MIN: CPT | Performed by: NURSE PRACTITIONER

## 2022-02-02 RX ORDER — PROMETHAZINE HYDROCHLORIDE 25 MG/1
25 TABLET ORAL 4 TIMES DAILY PRN
Qty: 20 TABLET | Refills: 0 | Status: SHIPPED | OUTPATIENT
Start: 2022-02-02 | End: 2022-02-09

## 2022-02-02 NOTE — PROGRESS NOTES
2022    TELEHEALTH EVALUATION -- Audio/Visual (During MXOXZ-59 public health emergency)    HPI:    Earl Barba (: 1980) has requested an audio/video evaluation for the following concern(s):    Patient is on vv for her nausea and vomiting. Patient said phenergan helped a lot with her nausea. Patient said she is in a lot of pain for her ulcer. Patient said she would like to be on her medication again. Patient is seeing Dr Caryle Comes about her ulcer. Patient has protonix at home, and carafate but patient feels her symptoms are coming back. Patient said that she is having 7/10 pain. She is able to eat more with the 25 of phenergan. Review of Systems   Constitutional: Negative for chills and fatigue. All other systems reviewed and are negative. PHYSICAL EXAMINATION:  [ INSTRUCTIONS:  \"[x]\" Indicates a positive item  \"[]\" Indicates a negative item  -- DELETE ALL ITEMS NOT EXAMINED]  Vital Signs: (As obtained by patient/caregiver or practitioner observation)    Patient-Reported Vitals 2021   Patient-Reported Weight 185lb 185lb 180 lbs   Patient-Reported Height 65in 63in 5 3         Constitutional: [x] Appears well-developed and well-nourished [x] No apparent distress      [] Abnormal-   Mental status  [x] Alert and awake  [x] Oriented to person/place/time [x]Able to follow commands      Eyes:  EOM    [x]  Normal  [] Abnormal-  Sclera  []  Normal  [] Abnormal -         Discharge []  None visible  [] Abnormal -    HENT:   [x] Normocephalic, atraumatic.   [] Abnormal   [] Mouth/Throat: Mucous membranes are moist.     External Ears [] Normal  [] Abnormal-     Neck: [] No visualized mass     Pulmonary/Chest: [x] Respiratory effort normal.  [x] No visualized signs of difficulty breathing or respiratory distress        [] Abnormal-      Musculoskeletal:   [x] Normal gait with no signs of ataxia         [] Normal range of motion of neck        [] Abnormal- Neurological:        [x] No Facial Asymmetry (Cranial nerve 7 motor function) (limited exam to video visit)          [] No gaze palsy        [] Abnormal-         Skin:        [x] No significant exanthematous lesions or discoloration noted on facial skin         [] Abnormal-            Psychiatric:       [x] Normal Affect [x] No Hallucinations        [] Abnormal-     Other pertinent observable physical exam findings-     ASSESSMENT/PLAN:  1. PUD (peptic ulcer disease)  Patient is a history of peptic ulcers which she has been hospitalized for. Patient last saw GI in it was healing. Patient is due for esphogogastroduodenoscopy in the next couple months we will send patient's GI doctor and note about her recent pain. Patient will start back on Protonix and Carafate. 2. Nausea and vomiting, intractability of vomiting not specified, unspecified vomiting type  Patient understands Phenergan is not going to be a long-term medication but will work acutely right now to help with nausea so she can continue drinking plenty of fluids. - promethazine (PHENERGAN) 25 MG tablet; Take 1 tablet by mouth 4 times daily as needed for Nausea  Dispense: 20 tablet; Refill: 0      No follow-ups on file. Rios Coleman is a 37 y.o. female being evaluated by a Virtual Visit (video visit) encounter to address concerns as mentioned above. A caregiver was present when appropriate. Due to this being a TeleHealth encounter (During [facility]- public health emergency), evaluation of the following organ systems was limited: Vitals/Constitutional/EENT/Resp/CV/GI//MS/Neuro/Skin/Heme-Lymph-Imm.   Pursuant to the emergency declaration under the [address] [facility], [address] [place] [address] authority and the [employer] Act, this Virtual Visit was conducted with patient's (and/or legal guardian's) consent, to reduce the patient's risk of exposure to COVID-19 and provide necessary medical care. The patient (and/or legal guardian) has also been advised to contact this office for worsening conditions or problems, and seek emergency medical treatment and/or call 911 if deemed necessary. Patient identification was verified at the start of the visit: Yes    Not billed for time. Services were provided through a video synchronous discussion virtually to substitute for in-person clinic visit. Patient and provider were located at their individual homes. --DAQUAN Garcia - CNP on 2/2/2022 at 1:44 PM    An electronic signature was used to authenticate this note. This dictation was generated by voice recognition computer software. Although all attempts are made to edit the dictation for accuracy, there may be errors in the transcription that were not intended.

## 2022-02-08 DIAGNOSIS — M25.572 ACUTE LEFT ANKLE PAIN: ICD-10-CM

## 2022-02-08 DIAGNOSIS — G89.29 CHRONIC PAIN OF LEFT ANKLE: ICD-10-CM

## 2022-02-08 DIAGNOSIS — M25.572 LEFT ANKLE PAIN, UNSPECIFIED CHRONICITY: ICD-10-CM

## 2022-02-08 DIAGNOSIS — M25.572 CHRONIC PAIN OF LEFT ANKLE: ICD-10-CM

## 2022-02-08 DIAGNOSIS — M95.8 OSTEOCHONDRAL DEFECT OF ANKLE: ICD-10-CM

## 2022-02-08 NOTE — TELEPHONE ENCOUNTER
Patient requesting a medication refill. Medication Lidocaine  Dosage 5%  Frequency 1 patch 12hours on  [address]  Next office visit None    Patient also stated she is feeling much better from Covid. She sees her GI doctor tomorrow and will let Catee know what they say.

## 2022-02-08 NOTE — TELEPHONE ENCOUNTER
Medication:   Requested Prescriptions     Pending Prescriptions Disp Refills    lidocaine (LIDODERM) 5 % 30 patch 1     Sig: Place 1 patch onto the skin daily 12 hours on, 12 hours off. Last Filled: [id number]     Patient Phone Number: [phone] (home)     Last appt: 2/2/2022   Next appt: Visit date not found    Last OARRS:   RX Monitoring 8/2/2021   Acute Pain Prescriptions Severe pain not adequately treated with lower dose.    Periodic Controlled Substance Monitoring -

## 2022-02-09 RX ORDER — LIDOCAINE 50 MG/G
1 PATCH TOPICAL DAILY
Qty: 30 PATCH | Refills: 1 | Status: SHIPPED | OUTPATIENT
Start: 2022-02-09 | End: 2022-07-01 | Stop reason: SDUPTHER

## 2022-02-10 ENCOUNTER — TELEPHONE (OUTPATIENT)
Dept: PRIMARY CARE CLINIC | Age: 42
End: 2022-02-10

## 2022-02-10 NOTE — TELEPHONE ENCOUNTER
Patient has an ulcer and is getting a scope done on Monday.  She is wondering if she is able to get something to help manage the pain until monday

## 2022-02-10 NOTE — TELEPHONE ENCOUNTER
Patient states I refuses to call in pain medication for her- she says she will go to ED if her pain level  worsens.

## 2022-02-18 ENCOUNTER — TELEPHONE (OUTPATIENT)
Dept: PRIMARY CARE CLINIC | Age: 42
End: 2022-02-18

## 2022-02-18 NOTE — TELEPHONE ENCOUNTER
Unable to contact patient- no VM- no answer- sent message to her [facility] account.

## 2022-02-18 NOTE — TELEPHONE ENCOUNTER
----- Message from Rosa sent at 2/17/2022  5:41 PM EST -----  Subject: Message to Provider    QUESTIONS  Information for Provider? pt called to ask what she can do about ulcer   discomfort. Please contact pt  ---------------------------------------------------------------------------  --------------  CALL BACK INFO  What is the best way for the office to contact you? OK to leave message on   voicemail  Preferred Call Back Phone Number? [phone]  ---------------------------------------------------------------------------  --------------  SCRIPT ANSWERS  Relationship to Patient?  Self

## 2022-02-18 NOTE — TELEPHONE ENCOUNTER
She needs to contact her gastroenterologist if she is continuing to have pain.  They will give her directions on next steps with continued pain

## 2022-02-21 ENCOUNTER — TELEMEDICINE (OUTPATIENT)
Dept: PRIMARY CARE CLINIC | Age: 42
End: 2022-02-21
Payer: COMMERCIAL

## 2022-02-21 DIAGNOSIS — K27.9 PUD (PEPTIC ULCER DISEASE): Primary | ICD-10-CM

## 2022-02-21 PROCEDURE — 99214 OFFICE O/P EST MOD 30 MIN: CPT | Performed by: FAMILY MEDICINE

## 2022-02-21 RX ORDER — PANTOPRAZOLE SODIUM 40 MG/1
40 TABLET, DELAYED RELEASE ORAL DAILY
COMMUNITY
End: 2022-02-21 | Stop reason: SDUPTHER

## 2022-02-21 RX ORDER — PANTOPRAZOLE SODIUM 40 MG/1
40 TABLET, DELAYED RELEASE ORAL 2 TIMES DAILY
Qty: 60 TABLET | Refills: 0 | Status: SHIPPED | OUTPATIENT
Start: 2022-02-21

## 2022-02-21 RX ORDER — ONDANSETRON 4 MG/1
4 TABLET, ORALLY DISINTEGRATING ORAL EVERY 12 HOURS PRN
Qty: 15 TABLET | Refills: 0 | Status: SHIPPED | OUTPATIENT
Start: 2022-02-21 | End: 2022-08-31

## 2022-02-21 ASSESSMENT — PATIENT HEALTH QUESTIONNAIRE - PHQ9
1. LITTLE INTEREST OR PLEASURE IN DOING THINGS: 0
2. FEELING DOWN, DEPRESSED OR HOPELESS: 0
SUM OF ALL RESPONSES TO PHQ QUESTIONS 1-9: 0
SUM OF ALL RESPONSES TO PHQ9 QUESTIONS 1 & 2: 0
SUM OF ALL RESPONSES TO PHQ QUESTIONS 1-9: 0

## 2022-02-21 NOTE — PROGRESS NOTES
Elsy Alberts (:  1980) is a 37 y.o. female,Established patient, here for evaluation of the following chief complaint(s): Medication Check (discuss medication for her ulcer. [phone])      ASSESSMENT/PLAN:  1. PUD (peptic ulcer disease)  -     [facility] - Nico French MD, Gastroenterology, [place]  -     ondansetron (ZOFRAN-ODT) 4 MG disintegrating tablet; Take 1 tablet by mouth every 12 hours as needed for Nausea or Vomiting, Disp-15 tablet, R-0Normal  -     pantoprazole (PROTONIX) 40 MG tablet; Take 1 tablet by mouth 2 times daily, Disp-60 tablet, R-0Normal    Recommend appointment with new gastroenterologist.  Discussed with patient that it is beyond my scope of practice to manage pain from a peptic ulcer. This should come from the gastroenterologist and as she has been unable to reach her current gastroenterologist referral is made to a new physician. Do recommend that she increase the pantoprazole to twice daily dosing. To use the Carafate at least 3 times a day and short prescription of Zofran was sent to her pharmacy. Discussed with patient that increasing symptoms does warrant further evaluation which will be with the new gastroenterologist or at the emergency room if symptoms are severe. Do recommend complete cessation of any anti-inflammatory medication. Return for as needed. SUBJECTIVE/OBJECTIVE:    Peptic ulcer disease-patient had onset of severe epigastric pain and was found on 2021 to have a deep 12 mm penetrating ulcer located the pyloric channel with extension into the duodenal bulb patches erosive antral gastritis. She was hospitalized and discharged on 2021. Per patient her medications were not continued by her gastroenterologist when she was out of her medication she began to have pain again.   A repeat endoscopy was completed 2021 which found a normal esophagus, gastritis in the stomach which was biopsied and 3 mm white-based superficial [x] No Hallucinations    Other pertinent observable physical exam findings:-    Electronically signed by Dmitriy Birmingham MD on 2/21/2022 at 4:01 PM     Please note this chart was generated using dragon dictation software. Although every effort was made to ensure the accuracy of this automated transcription, some errors in transcription may have occurred. [employer], was evaluated through a synchronous (real-time) audio-video encounter. The patient (or guardian if applicable) is aware that this is a billable service. Verbal consent to proceed has been obtained within the past 12 months. The visit was conducted pursuant to the emergency declaration under the [address] [place] [address], [address] [place] [address] authority and the [employer] and [employer] Act. Patient identification was verified, and a caregiver was present when appropriate. The patient was located in a state where the provider was credentialed to provide care.

## 2022-03-21 DIAGNOSIS — K27.9 PUD (PEPTIC ULCER DISEASE): ICD-10-CM

## 2022-03-21 RX ORDER — PANTOPRAZOLE SODIUM 40 MG/1
TABLET, DELAYED RELEASE ORAL
Qty: 60 TABLET | Refills: 0 | OUTPATIENT
Start: 2022-03-21

## 2022-03-21 NOTE — TELEPHONE ENCOUNTER
Medication:   Requested Prescriptions     Pending Prescriptions Disp Refills    pantoprazole (PROTONIX) 40 MG tablet [Pharmacy Med Name: PANTOPRAZOLE 40MG TABLETS] 60 tablet 0     Sig: TAKE 1 TABLET BY MOUTH TWICE DAILY        Last Filled:  02/21/2022 # 60    Patient Phone Number: [phone] (home)     Last appt: 2/21/2022   Next appt: Visit date not found    Last OARRS:   RX Monitoring 8/2/2021   Acute Pain Prescriptions Severe pain not adequately treated with lower dose.    Periodic Controlled Substance Monitoring -

## 2022-03-21 NOTE — TELEPHONE ENCOUNTER
Blanca Alaniz MA 46 minutes ago (2:30 PM)     MD       Patient states that she does get her medication refilled by GI, it was a mistake that it got requested here in the office.

## 2022-03-21 NOTE — TELEPHONE ENCOUNTER
Patient states that she does get her medication refilled by GI, it was a mistake that it got requested here in the office.

## 2022-05-30 ENCOUNTER — HOSPITAL ENCOUNTER (EMERGENCY)
Age: 42
Discharge: HOME | End: 2022-05-30
Payer: COMMERCIAL

## 2022-05-30 VITALS
RESPIRATION RATE: 18 BRPM | WEIGHT: 187 LBS | SYSTOLIC BLOOD PRESSURE: 156 MMHG | TEMPERATURE: 98.3 F | HEIGHT: 65 IN | OXYGEN SATURATION: 100 % | BODY MASS INDEX: 31.16 KG/M2 | DIASTOLIC BLOOD PRESSURE: 101 MMHG | HEART RATE: 85 BPM

## 2022-05-30 DIAGNOSIS — M25.561 ACUTE PAIN OF RIGHT KNEE: Primary | ICD-10-CM

## 2022-05-30 PROCEDURE — 6370000000 HC RX 637 (ALT 250 FOR IP): Performed by: PHYSICIAN ASSISTANT

## 2022-05-30 PROCEDURE — 99283 EMERGENCY DEPT VISIT LOW MDM: CPT

## 2022-05-30 RX ORDER — HYDROCODONE BITARTRATE AND ACETAMINOPHEN 7.5; 325 MG/1; MG/1
1 TABLET ORAL ONCE
Status: COMPLETED | OUTPATIENT
Start: 2022-05-30 | End: 2022-05-30

## 2022-05-30 RX ADMIN — HYDROCODONE BITARTRATE AND ACETAMINOPHEN 1 TABLET: 7.5; 325 TABLET ORAL at 14:18

## 2022-05-30 ASSESSMENT — PAIN - FUNCTIONAL ASSESSMENT: PAIN_FUNCTIONAL_ASSESSMENT: 0-10

## 2022-05-30 ASSESSMENT — PAIN DESCRIPTION - FREQUENCY: FREQUENCY: CONTINUOUS

## 2022-05-30 ASSESSMENT — PAIN DESCRIPTION - PAIN TYPE: TYPE: ACUTE PAIN

## 2022-05-30 ASSESSMENT — PAIN DESCRIPTION - ONSET: ONSET: ON-GOING

## 2022-05-30 ASSESSMENT — PAIN DESCRIPTION - DESCRIPTORS: DESCRIPTORS: DISCOMFORT;SORE

## 2022-05-30 ASSESSMENT — PAIN SCALES - GENERAL: PAINLEVEL_OUTOF10: 6

## 2022-05-30 ASSESSMENT — PAIN DESCRIPTION - ORIENTATION: ORIENTATION: RIGHT

## 2022-05-30 ASSESSMENT — PAIN DESCRIPTION - LOCATION: LOCATION: KNEE

## 2022-05-30 NOTE — ED NOTES
Placed Knee Immobilizer on pt,  Stated pain in knee when trying to position knee in immobilizer. Pt stated she may want pain meds for knee.    Pt tolerated well      Maye Fabian  05/30/22 1334

## 2022-05-30 NOTE — ED PROVIDER NOTES
[facility] Emergency Department    CHIEF COMPLAINT  Knee Pain (Patient reports R knee pain \"for months\" has seen ortho for it who told patient \"it because you had children wear and tear\" and patient reports playing soft ball on tuesday and wants \"a MRI\")      SHARED SERVICE VISIT:  Evaluated by KARMA. My supervising physician was available for consultation. HISTORY OF PRESENT ILLNESS  Rianna Wallace is a 37 y.o. female who presents to the ED complaining of several month history of knee pain. Observe lying in bed, appears nontoxic and in no acute distress. Reports pain in knee ongoing for past several weeks. Worsened last week. Reports pain is throbbing aching that becomes sharp and stabbing in nature with touch and movement. Does not appear to radiate. She denies any numbness, tingling, weakness of extremity. No pain in the back. Is attempted Tylenol and ibuprofen without significant improvement of symptoms. No other complaints, modifying factors or associated symptoms. Nursing notes reviewed.    Past Medical History:   Diagnosis Date    Abnormal Pap smear     WNL since    Anxiety     Back pain     herniated disc    Back pain     had back surgery (fusion of 3 discs) in march 2011    COVID 12/2021    Placenta previa     resolved at 28 weeks with this 3rd pregnancy    Tubal pregnancy     Pt states bilateral fallopian tubes removed     Past Surgical History:   Procedure Laterality Date    ANESTHESIA NERVE BLOCK Right 5/21/2020    RIGHT HIP INJECTION SITE CONFIRMED BY FLUOROSCOPY performed by Nata Méndez MD at [address] ARTHROSCOPY Left 11/19/2020    VIDEO ARTHROSCOPY LEFT ANKLE, SYNOVECTOMY, LOOSE BODY REMOVAL, MICROFRACTURE -BLOCK- performed by Bony Koehler MD at [facility] Right     CHOLECYSTECTOMY      laparoscopic    ECTOPIC PREGNANCY SURGERY  10/2017    LAPAROSCOPY  4/20/10    with salpingostomy  ( ectopic pregnancy)    LUMBAR DISC SURGERY      LUMBAR FUSION  3-2011    L3-L5 fusion    TONSILLECTOMY  2000    UPPER GASTROINTESTINAL ENDOSCOPY N/A 2021    EGD BIOPSY performed by Jaciel Pascal MD at [facility] N/A 2021    EGD BIOPSY performed by Christian Vitale MD at [facility]     Family History   Problem Relation Age of Onset    High Blood Pressure Father     Diabetes Maternal Aunt      Social History     Socioeconomic History    Marital status:      Spouse name: Not on file    Number of children: Not on file    Years of education: Not on file    Highest education level: Not on file   Occupational History    Not on file   Tobacco Use    Smoking status: Former Smoker     Packs/day: 0.50     Years: 21.00     Pack years: 10.50     Types: Cigarettes     Start date: 1999     Quit date: 2021     Years since quittin.8    Smokeless tobacco: Never Used   Vaping Use    Vaping Use: Never used   Substance and Sexual Activity    Alcohol use: Yes     Alcohol/week: 1.0 standard drink     Types: 1 Cans of beer per week     Comment: social drinker    Drug use: No    Sexual activity: Yes     Partners: Male   Other Topics Concern    Not on file   Social History Narrative    Not on file     Social Determinants of Health     Financial Resource Strain: Low Risk     Difficulty of Paying Living Expenses: Not hard at all   Food Insecurity: No Food Insecurity    Worried About [address] in the Last Year: Never true    [facility] in the Last Year: Never true   Transportation Needs:     Lack of Transportation (Medical): Not on file    Lack of Transportation (Non-Medical):  Not on file   Physical Activity:     Days of Exercise per Week: Not on file    Minutes of Exercise per Session: Not on file   Stress:     Feeling of Stress : Not on file   Social Connections:     Frequency of Communication with Friends and Family: Not on file    Frequency of Social Gatherings with Friends and Family: Not on file    Attends Alevism Services: Not on file    Active Member of Clubs or Organizations: Not on file    Attends Club or Organization Meetings: Not on file    Marital Status: Not on file   Intimate Partner Violence:     Fear of Current or Ex-Partner: Not on file    Emotionally Abused: Not on file    Physically Abused: Not on file    Sexually Abused: Not on file   Housing Stability:     Unable to Pay for Housing in the Last Year: Not on file    Number of [place] in the Last Year: Not on file    Unstable Housing in the Last Year: Not on file     No current facility-administered medications for this encounter. Current Outpatient Medications   Medication Sig Dispense Refill    ondansetron (ZOFRAN-ODT) 4 MG disintegrating tablet Take 1 tablet by mouth every 12 hours as needed for Nausea or Vomiting 15 tablet 0    pantoprazole (PROTONIX) 40 MG tablet Take 1 tablet by mouth 2 times daily 60 tablet 0    promethazine (PHENERGAN) 12.5 MG tablet TAKE 1 TO 2 TABLETS BY MOUTH FOUR TIMES DAILY AS NEEDED FOR NAUSEA 20 tablet 0    lidocaine (LIDODERM) 5 % Place 1 patch onto the skin daily 12 hours on, 12 hours off.  30 patch 1    ALPRAZolam (XANAX) 0.5 MG tablet TAKE 1 TABLET BY MOUTH TWICE DAILY AS NEEDED      zolpidem (AMBIEN) 5 MG tablet TAKE 1 TABLET BY MOUTH EVERY DAY      benzonatate (TESSALON) 200 MG capsule Take 1 capsule by mouth 3 times daily as needed for Cough 30 capsule 0    sucralfate (CARAFATE) 1 GM tablet Take 1 tablet by mouth 4 times daily 120 tablet 3    chlorhexidine (PERIDEX) 0.12 % solution       lamoTRIgine (LAMICTAL) 150 MG tablet TAKE ONE TABLET BY MOUTH TWICE DAILY      Omega-3 Fatty Acids (FISH OIL) 1000 MG CAPS Take 1,000 mg by mouth every morning      propranolol (INDERAL) 10 MG tablet TAKE ONE TABLET BY MOUTH TWICE DAILY AS NEEDED      ALPRAZolam discharge. .  A discussion was had with Ms. Shreya Motta regarding knee pain, ED findings and recommendations for follow-up. Risk management discussed and shared decision making had with patient and/or surrogate. All questions were answered. Patient will follow up with orthopedist within 1 week for further evaluation/treatment. Patient will return to ED for new/worsening symptoms. Patient was informed to continue over-the-counter Tylenol and/or Motrin as needed for pain. MDM  I estimate there is LOW risk for COMPARTMENT SYNDROME, DEEP VENOUS THROMBOSIS, SEPTIC ARTHRITIS, TENDON OR NEUROVASCULAR INJURY, thus I consider the discharge disposition reasonable. Ignacio Askew and I have discussed the diagnosis and risks, and we agree with discharging home to follow-up with their primary doctor or the referral orthopedist. We also discussed returning to the Emergency Department immediately if new or worsening symptoms occur. We have discussed the symptoms which are most concerning (e.g., changing or worsening pain, numbness, weakness) that necessitate immediate return. Final Impression  1. Acute pain of right knee      Blood pressure (!) 156/101, pulse 85, temperature 98.3 °F (36.8 °C), temperature source Oral, resp. rate 18, height 5' 5\" (1.651 m), weight 187 lb (84.8 kg), last menstrual period 05/16/2022, SpO2 100 %, not currently breastfeeding. DISPOSITION  Patient was discharged to home in good condition.            Nicki Gustafson, [address]  05/30/22 4094

## 2022-05-31 ENCOUNTER — OFFICE VISIT (OUTPATIENT)
Dept: PRIMARY CARE CLINIC | Age: 42
End: 2022-05-31
Payer: COMMERCIAL

## 2022-05-31 VITALS
SYSTOLIC BLOOD PRESSURE: 146 MMHG | HEIGHT: 65 IN | HEART RATE: 89 BPM | DIASTOLIC BLOOD PRESSURE: 92 MMHG | WEIGHT: 197 LBS | OXYGEN SATURATION: 97 % | RESPIRATION RATE: 18 BRPM | TEMPERATURE: 98.3 F | BODY MASS INDEX: 32.82 KG/M2

## 2022-05-31 DIAGNOSIS — M25.561 ACUTE PAIN OF RIGHT KNEE: Primary | ICD-10-CM

## 2022-05-31 DIAGNOSIS — K27.9 PUD (PEPTIC ULCER DISEASE): ICD-10-CM

## 2022-05-31 PROCEDURE — 99213 OFFICE O/P EST LOW 20 MIN: CPT | Performed by: FAMILY MEDICINE

## 2022-05-31 PROCEDURE — 96372 THER/PROPH/DIAG INJ SC/IM: CPT | Performed by: FAMILY MEDICINE

## 2022-05-31 RX ORDER — TRIAMCINOLONE ACETONIDE 40 MG/ML
40 INJECTION, SUSPENSION INTRA-ARTICULAR; INTRAMUSCULAR ONCE
Status: COMPLETED | OUTPATIENT
Start: 2022-05-31 | End: 2022-05-31

## 2022-05-31 RX ORDER — METHYLPREDNISOLONE 4 MG/1
TABLET ORAL
COMMUNITY
Start: 2022-05-26 | End: 2022-08-31

## 2022-05-31 RX ORDER — CELECOXIB 50 MG/1
CAPSULE ORAL
COMMUNITY
Start: 2022-03-18 | End: 2022-08-31

## 2022-05-31 RX ORDER — TRAZODONE HYDROCHLORIDE 50 MG/1
TABLET ORAL
COMMUNITY
Start: 2022-05-24

## 2022-05-31 RX ADMIN — TRIAMCINOLONE ACETONIDE 40 MG: 40 INJECTION, SUSPENSION INTRA-ARTICULAR; INTRAMUSCULAR at 15:57

## 2022-05-31 ASSESSMENT — PATIENT HEALTH QUESTIONNAIRE - PHQ9
2. FEELING DOWN, DEPRESSED OR HOPELESS: 0
SUM OF ALL RESPONSES TO PHQ QUESTIONS 1-9: 0
SUM OF ALL RESPONSES TO PHQ9 QUESTIONS 1 & 2: 0
SUM OF ALL RESPONSES TO PHQ QUESTIONS 1-9: 0
1. LITTLE INTEREST OR PLEASURE IN DOING THINGS: 0

## 2022-05-31 NOTE — PROGRESS NOTES
PROGRESS NOTE  Date of Service:  5/31/2022    SUBJECTIVE:  Patient ID: Rosa Anton is a 37 y.o. female    HPI:     Right knee pain on and off for several years. recently completed P T though her ortho. Symptoms did improve. approx 1 week ago she played softball and did not have a discrete injury or fall but later had worsening of her knee pain. She notes swelling, throbbing pain  Went to [facility] last night, given brace    Using NSAId which bothers her stomach  History peptic ulcer secondary to nsaid  Has appt with ortho next week    Patient's medications, allergies, past medical, surgical, social and family histories were reviewed and updated as appropriate. OBJECTIVE:  Vitals:    05/31/22 1510   BP: (!) 146/92   Site: Right Upper Arm   Position: Sitting   Cuff Size: Large Adult   Pulse: 89   Resp: 18   Temp: 98.3 °F (36.8 °C)   TempSrc: Temporal   SpO2: 97%   Weight: 197 lb (89.4 kg)   Height: 5' 5\" (1.651 m)      Body mass index is 32.78 kg/m². Physical Exam  Vitals reviewed. Constitutional:       Appearance: Normal appearance. Eyes:      Conjunctiva/sclera: Conjunctivae normal.   Musculoskeletal:      Right lower leg: No edema. Left lower leg: No edema. Comments: Right knee with decreased passive ROM, + lateral tenderness   Neurological:      Mental Status: She is alert. ASSESSMENT:  1. Acute pain of right knee    2. PUD (peptic ulcer disease)         PLAN:   1. Acute pain of right knee  -     triamcinolone acetonide (KENALOG-40) injection 40 mg; 40 mg, IntraMUSCular, ONCE, 1 dose, On Tue 5/31/22 at 1600  2. PUD  toradol  On back order. To try to decrease nsaid use with known history of PUD patient given steroid for anti inflammatory effects. Continue brace, limit activities, ice,   Will follow up with ortho next week               Electronically signed by Anjana Grayson MD on 5/31/2022 at 9:58 PM.    Please note this chart was generated using dragon dictation software.

## 2022-07-01 DIAGNOSIS — M25.572 CHRONIC PAIN OF LEFT ANKLE: ICD-10-CM

## 2022-07-01 DIAGNOSIS — M25.572 LEFT ANKLE PAIN, UNSPECIFIED CHRONICITY: ICD-10-CM

## 2022-07-01 DIAGNOSIS — M25.572 ACUTE LEFT ANKLE PAIN: ICD-10-CM

## 2022-07-01 DIAGNOSIS — G89.29 CHRONIC PAIN OF LEFT ANKLE: ICD-10-CM

## 2022-07-01 DIAGNOSIS — M95.8 OSTEOCHONDRAL DEFECT OF ANKLE: ICD-10-CM

## 2022-07-01 RX ORDER — LIDOCAINE 50 MG/G
1 PATCH TOPICAL DAILY
Qty: 30 PATCH | Refills: 1 | Status: SHIPPED | OUTPATIENT
Start: 2022-07-01

## 2022-07-01 NOTE — TELEPHONE ENCOUNTER
Medication:   Requested Prescriptions     Pending Prescriptions Disp Refills    lidocaine (LIDODERM) 5 % 30 patch 1     Sig: Place 1 patch onto the skin daily 12 hours on, 12 hours off. Last Filled:  [id number]    Patient Phone Number: [phone] (home)     Last appt: 5/31/2022   Next appt: Visit date not found    Last OARRS:   RX Monitoring 8/2/2021   Acute Pain Prescriptions Severe pain not adequately treated with lower dose.    Periodic Controlled Substance Monitoring -

## 2022-07-02 NOTE — PROGRESS NOTES
[facility] GENERAL SURGERY    PATIENT NAME: Mitzi Aranda     TODAY'S DATE: 7/30/2021    CHIEF COMPLAINT: pain    INTERVAL HISTORY/HPI:    Pt reports feeling better overall, upper abdominal pain steadily improving. Getting hungry. OBJECTIVE:  VITALS:  /84   Pulse 64   Temp 98.2 °F (36.8 °C) (Oral)   Resp 18   Ht 5' 3\" (1.6 m)   Wt 180 lb (81.6 kg)   LMP 07/27/2021   SpO2 98%   BMI 31.89 kg/m²     INTAKE/OUTPUT:    I/O last 3 completed shifts: In: 2336.9 [I.V.:2336.9]  Out: -   No intake/output data recorded. CONSTITUTIONAL:  awake and alert  LUNGS:     ABDOMEN:   , soft, non-distended, non-tender     Data:  CBC:   Recent Labs     07/28/21  1018 07/29/21  0716 07/30/21  0400   WBC 6.3 5.3 4.9   HGB 12.9 13.1 12.4   HCT 38.0 38.7 37.5    232 245     BMP:    Recent Labs     07/28/21  1018 07/29/21  0716 07/30/21  0400    141 142   K 4.1 4.4 3.8    107 106   CO2 26 24 26   BUN 6* 6* 5*   CREATININE 0.8 0.7 0.8   GLUCOSE 92 85 82     Hepatic:   Recent Labs     07/29/21  0716 07/30/21  0400   AST 95* 154*   * 181*   BILITOT <0.2 <0.2   ALKPHOS 55 92     Mag:    No results for input(s): MG in the last 72 hours. Phos:   No results for input(s): PHOS in the last 72 hours. INR: No results for input(s): INR in the last 72 hours. Radiology Review:  MRCP  Mild biliary ductal dilatation, likely secondary to the post cholecystectomy   state.  No retained common duct stone noted.       Moderate stool load           ASSESSMENT AND PLAN:  Gastric ulcer, on medical therapy to promote ulcer healing. Symptomatically improving. Again, no role for surgical anti-ulcer therapy at this time. Would continue w/ medical therapy for ulcer healing as per Dr Wilian Peterson, GI.  Presume he will arrange for repeat EGD in 1-2 months to assess ulcer healing. Will sign off for now. Please call w/ further questions.     OK to advance diet, start with clears and advance to low fiber over next 1-2 days if tolerated    Electronically signed by Naeem Carrion MD denies pain/discomfort

## 2022-07-16 NOTE — ED NOTES
Patient ambulated to the restroom and back to bed. Friend is at bedside. Patient bed changed and given new blanket. Patient placed back on monitor and MD Pedrito notified face to face that patient dc'd IV.       Jermain Jamil, [address] [place] [address]  02/28/20 8361 Fall with Harm Risk

## 2022-08-31 ENCOUNTER — TELEMEDICINE (OUTPATIENT)
Dept: PRIMARY CARE CLINIC | Age: 42
End: 2022-08-31
Payer: COMMERCIAL

## 2022-08-31 DIAGNOSIS — R05.9 COUGH: Primary | ICD-10-CM

## 2022-08-31 PROCEDURE — 99213 OFFICE O/P EST LOW 20 MIN: CPT | Performed by: NURSE PRACTITIONER

## 2022-08-31 RX ORDER — AZITHROMYCIN 250 MG/1
250 TABLET, FILM COATED ORAL SEE ADMIN INSTRUCTIONS
Qty: 6 TABLET | Refills: 0 | Status: SHIPPED | OUTPATIENT
Start: 2022-08-31 | End: 2022-09-05

## 2022-08-31 ASSESSMENT — ENCOUNTER SYMPTOMS
COUGH: 1
SHORTNESS OF BREATH: 1
WHEEZING: 1

## 2022-08-31 NOTE — PROGRESS NOTES
start of the visit: Yes    Not billed for time. Services were provided through a video synchronous discussion virtually to substitute for in-person clinic visit. Patient and provider were located at their individual homes. --DAQUAN Vega CNP on 8/31/2022 at 12:05 PM    An electronic signature was used to authenticate this note. This dictation was generated by voice recognition computer software. Although all attempts are made to edit the dictation for accuracy, there may be errors in the transcription that were not intended.

## 2022-10-21 ENCOUNTER — OFFICE VISIT (OUTPATIENT)
Dept: ORTHOPEDIC SURGERY | Age: 42
End: 2022-10-21
Payer: COMMERCIAL

## 2022-10-21 VITALS — BODY MASS INDEX: 31.89 KG/M2 | WEIGHT: 180 LBS | HEIGHT: 63 IN

## 2022-10-21 DIAGNOSIS — M79.672 LEFT FOOT PAIN: Primary | ICD-10-CM

## 2022-10-21 PROCEDURE — 99213 OFFICE O/P EST LOW 20 MIN: CPT | Performed by: PHYSICIAN ASSISTANT

## 2022-10-21 NOTE — PROGRESS NOTES
Chief Complaint    Ankle Pain (Rolled her ankle at [facility] 2 days ago, got home yesterday. /)      History of Present Illness:  Allean Mcburney is a 37 y.o. female here for evaluation chief complaint of left ankle injury. Patient states that she was at [facility] 2 days ago when she sustained an inversion injury to her left ankle. She had immediate pain with swelling. Patient states that she is continue to have pain with weightbearing mainly over the lateral aspect of her right foot and ankle. She is ambulating with a slight limp. She has had previous injuries to the left ankle and foot. Pain Assessment  Location of Pain: Ankle  Location Modifiers: Left  Severity of Pain: 6  Quality of Pain: Throbbing  Date Pain First Started: 10/19/22  Aggravating Factors: Walking, Bending  Limiting Behavior: Some  Relieving Factors:  (lidocane patch & ice)  Result of Injury: Yes  Work-Related Injury: No  Are there other pain locations you wish to document?: No]    Medical History:  Patient's medications, allergies, past medical, surgical, social and family histories were reviewed and updated as appropriate. Review of Systems:  Pertinent items are noted in HPI  Review of systems reviewed from Patient History Form dated on 10/21/2022 and available in the patient's chart under the Media tab. Vital Signs:  Ht 5' 3\" (1.6 m)   Wt 180 lb (81.6 kg)   BMI 31.89 kg/m²     General Exam:   Constitutional: Patient is adequately groomed with no evidence of malnutrition  DTRs: Deep tendon reflexes are intact  Mental Status: The patient is oriented to time, place and person. The patient's mood and affect are appropriate. Lymphatic: The lymphatic examination bilaterally reveals all areas to be without enlargement or induration.     Left foot and ankle examination:    Inspection: Today's inspection left foot and ankle reveals skin to be intact with soft tissue edema with minimal ecchymosis noted over the lateral aspect of the left ankle. Palpation: Patient is exquisitely tender to palpation over the ATFL, calcaneofibular ligament, and the distal fibula. There is mild discomfort over the deltoid ligament. Range of Motion: Decreased range of motion all planes secondary to pain    Strength: Decreased strength in all planes secondary to pain    Special Tests: Negative anterior drawer    Skin: There are no rashes, ulcerations or lesions. Gait: Antalgic    Distal neurovascular status was grossly intact    Radiology:     X-rays obtained and reviewed in office:  Views 3 views of the left foot that were obtained today in the office and independently reviewed with the patient shows no acute fractures or dislocations noted within the left foot or ankle. Assessment : Left foot/ankle sprain    Impression:  Encounter Diagnosis   Name Primary? Left foot pain Yes       Office Procedures:  Orders Placed This Encounter   Procedures    XR FOOT LEFT (MIN 3 VIEWS)     Standing Status:   Future     Number of Occurrences:   1     Standing Expiration Date:   10/21/2023        Treatment Plan:  The etiology of left foot/ankle sprain and it's appropriate treatment were discussed in great detail. Tonight we wrapped the foot with a Ace wrap that she should have on during the day and off at night and continue icing it. Patient has a postop shoe at home from previous injury that she is to return to wear this with all her weightbearing activity. She may take Tylenol since she has a current ulcer. She will follow-up with Dr. Mary Ann Valle in 2 weeks for his continue evaluation care.     Jason Neff PA-C  Board certified by the [facility]

## 2022-10-24 ENCOUNTER — TELEPHONE (OUTPATIENT)
Dept: PRIMARY CARE CLINIC | Age: 42
End: 2022-10-24

## 2022-10-24 DIAGNOSIS — R05.9 COUGH: ICD-10-CM

## 2022-10-24 RX ORDER — FLUTICASONE PROPIONATE AND SALMETEROL XINAFOATE 115; 21 UG/1; UG/1
AEROSOL, METERED RESPIRATORY (INHALATION)
Qty: 12 G | Refills: 1 | Status: SHIPPED | OUTPATIENT
Start: 2022-10-24

## 2022-10-24 NOTE — TELEPHONE ENCOUNTER
Prior Auth / Lidocaine patch     MESSAGE: Plan does not cover this medication. Please call plan at [phone] to initiate prior authorizaiton or call / fax pharmacy to change medication. Patient ID# is [id number]    Prior Auth received from pharmacy, scanned to media, routing to clinical staff for review prior to sending to pa dept.

## 2022-10-24 NOTE — TELEPHONE ENCOUNTER
lidocaine (LIDODERM) 5 %     Diagnosis Association: Chronic pain of left ankle (M25.572 , G89.29); Osteochondral defect of ankle (M95.8); Left ankle pain, unspecified chronicity (M25.572);  Acute left ankle pain (M25.572)       Routing to PA team to process

## 2022-10-24 NOTE — TELEPHONE ENCOUNTER
Medication:   Requested Prescriptions     Pending Prescriptions Disp Refills    ADVAIR -21 MCG/ACT inhaler [Pharmacy Med Name: Damian Morgan /21MCG ORAL 'S] 12 g      Sig: INHALE 2 PUFFS INTO THE LUNGS TWICE DAILY        Last Filled:  [id number]    Patient Phone Number: [phone] (home)     Last appt: 8/31/2022   Next appt: Visit date not found    Last OARRS:   RX Monitoring 8/2/2021   Acute Pain Prescriptions Severe pain not adequately treated with lower dose.    Periodic Controlled Substance Monitoring -

## 2022-10-25 ENCOUNTER — TELEPHONE (OUTPATIENT)
Dept: ADMINISTRATIVE | Age: 42
End: 2022-10-25

## 2022-10-26 NOTE — TELEPHONE ENCOUNTER
Received a DENIAL for Lidocaine 5% patches. Letter attached. If this requires a response please respond to the pool ( P [id number]). Thank you please advise patient.

## 2022-11-04 DIAGNOSIS — G89.29 CHRONIC PAIN OF LEFT ANKLE: ICD-10-CM

## 2022-11-04 DIAGNOSIS — M95.8 OSTEOCHONDRAL DEFECT OF ANKLE: ICD-10-CM

## 2022-11-04 DIAGNOSIS — M25.572 LEFT ANKLE PAIN, UNSPECIFIED CHRONICITY: ICD-10-CM

## 2022-11-04 DIAGNOSIS — M25.572 ACUTE LEFT ANKLE PAIN: ICD-10-CM

## 2022-11-04 DIAGNOSIS — M25.572 CHRONIC PAIN OF LEFT ANKLE: ICD-10-CM

## 2022-11-04 RX ORDER — LIDOCAINE 50 MG/G
1 PATCH TOPICAL DAILY
Qty: 30 PATCH | Refills: 1 | Status: CANCELLED | OUTPATIENT
Start: 2022-11-04

## 2022-11-04 NOTE — TELEPHONE ENCOUNTER
Medication:   Requested Prescriptions     Pending Prescriptions Disp Refills    lidocaine (LIDODERM) 5 % 30 patch 1     Sig: Place 1 patch onto the skin daily 12 hours on, 12 hours off. Last Filled:  07/01/2022 # 30    Patient Phone Number: [phone] (home)     Last appt: 8/31/2022   Next appt: Visit date not found    Last OARRS:   RX Monitoring 8/2/2021   Acute Pain Prescriptions Severe pain not adequately treated with lower dose.    Periodic Controlled Substance Monitoring -

## 2022-11-22 ENCOUNTER — TELEPHONE (OUTPATIENT)
Dept: PRIMARY CARE CLINIC | Age: 42
End: 2022-11-22

## 2022-11-22 NOTE — TELEPHONE ENCOUNTER
Please see if patient used good rx, also she an try diclofenac gel which is over the counter as well.

## 2022-11-22 NOTE — TELEPHONE ENCOUNTER
Patient called in and I advised her of her status for PA on lidocaine patches, patches were denied is there anything else she could take?

## 2022-11-30 ENCOUNTER — TELEPHONE (OUTPATIENT)
Dept: PRIMARY CARE CLINIC | Age: 42
End: 2022-11-30

## 2022-11-30 NOTE — TELEPHONE ENCOUNTER
Patient can try pain management versus good Rx to see if she can get a better deal with her good Rx. Another way is to for her to contact the drug company to see if she is able to qualify for any assistance.

## 2022-11-30 NOTE — TELEPHONE ENCOUNTER
Patient has had multiple prior auths get denied for her Lidocaine patches 5%. Patient stated lidocaine patches are her lifeline due to her being so fragile and is wondering if there is another recommendation that would help.

## 2023-01-20 ENCOUNTER — HOSPITAL ENCOUNTER (EMERGENCY)
Age: 43
Discharge: HOME | End: 2023-01-20
Attending: EMERGENCY MEDICINE
Payer: COMMERCIAL

## 2023-01-20 ENCOUNTER — TELEPHONE (OUTPATIENT)
Dept: ORTHOPEDIC SURGERY | Age: 43
End: 2023-01-20

## 2023-01-20 VITALS
RESPIRATION RATE: 18 BRPM | OXYGEN SATURATION: 100 % | HEIGHT: 63 IN | TEMPERATURE: 98.1 F | BODY MASS INDEX: 31.89 KG/M2 | HEART RATE: 89 BPM | DIASTOLIC BLOOD PRESSURE: 94 MMHG | WEIGHT: 180 LBS | SYSTOLIC BLOOD PRESSURE: 145 MMHG

## 2023-01-20 DIAGNOSIS — M25.572 ACUTE LEFT ANKLE PAIN: ICD-10-CM

## 2023-01-20 DIAGNOSIS — M54.32 SCIATICA OF LEFT SIDE: Primary | ICD-10-CM

## 2023-01-20 DIAGNOSIS — G89.29 CHRONIC PAIN OF LEFT ANKLE: ICD-10-CM

## 2023-01-20 DIAGNOSIS — M25.572 LEFT ANKLE PAIN, UNSPECIFIED CHRONICITY: ICD-10-CM

## 2023-01-20 DIAGNOSIS — M25.572 CHRONIC PAIN OF LEFT ANKLE: ICD-10-CM

## 2023-01-20 DIAGNOSIS — M95.8 OSTEOCHONDRAL DEFECT OF ANKLE: ICD-10-CM

## 2023-01-20 PROCEDURE — 6370000000 HC RX 637 (ALT 250 FOR IP): Performed by: EMERGENCY MEDICINE

## 2023-01-20 PROCEDURE — 99284 EMERGENCY DEPT VISIT MOD MDM: CPT

## 2023-01-20 PROCEDURE — 96372 THER/PROPH/DIAG INJ SC/IM: CPT

## 2023-01-20 PROCEDURE — 6360000002 HC RX W HCPCS: Performed by: EMERGENCY MEDICINE

## 2023-01-20 RX ORDER — LIDOCAINE 50 MG/G
1 PATCH TOPICAL DAILY
Qty: 10 PATCH | Refills: 0 | Status: SHIPPED | OUTPATIENT
Start: 2023-01-20 | End: 2023-01-30

## 2023-01-20 RX ORDER — ACETAMINOPHEN 500 MG
1000 TABLET ORAL ONCE
Status: DISCONTINUED | OUTPATIENT
Start: 2023-01-20 | End: 2023-01-21 | Stop reason: HOSPADM

## 2023-01-20 RX ORDER — METHYLPREDNISOLONE 4 MG/1
TABLET ORAL
Qty: 1 KIT | Refills: 0 | Status: SHIPPED | OUTPATIENT
Start: 2023-01-20 | End: 2023-01-26

## 2023-01-20 RX ORDER — TRAMADOL HYDROCHLORIDE 50 MG/1
50 TABLET ORAL ONCE
Status: COMPLETED | OUTPATIENT
Start: 2023-01-20 | End: 2023-01-20

## 2023-01-20 RX ORDER — KETOROLAC TROMETHAMINE 30 MG/ML
30 INJECTION, SOLUTION INTRAMUSCULAR; INTRAVENOUS ONCE
Status: COMPLETED | OUTPATIENT
Start: 2023-01-20 | End: 2023-01-20

## 2023-01-20 RX ORDER — PREDNISONE 20 MG/1
40 TABLET ORAL ONCE
Status: COMPLETED | OUTPATIENT
Start: 2023-01-20 | End: 2023-01-20

## 2023-01-20 RX ADMIN — PREDNISONE 40 MG: 20 TABLET ORAL at 21:18

## 2023-01-20 RX ADMIN — KETOROLAC TROMETHAMINE 30 MG: 30 INJECTION, SOLUTION INTRAMUSCULAR at 21:18

## 2023-01-20 RX ADMIN — TRAMADOL HYDROCHLORIDE 50 MG: 50 TABLET ORAL at 21:51

## 2023-01-20 ASSESSMENT — PAIN DESCRIPTION - ORIENTATION: ORIENTATION: LEFT;LOWER

## 2023-01-20 ASSESSMENT — PAIN DESCRIPTION - DESCRIPTORS: DESCRIPTORS: ACHING

## 2023-01-20 ASSESSMENT — PAIN - FUNCTIONAL ASSESSMENT
PAIN_FUNCTIONAL_ASSESSMENT: 0-10
PAIN_FUNCTIONAL_ASSESSMENT: 0-10
PAIN_FUNCTIONAL_ASSESSMENT: ACTIVITIES ARE NOT PREVENTED

## 2023-01-20 ASSESSMENT — PAIN DESCRIPTION - LOCATION: LOCATION: BACK;LEG

## 2023-01-20 ASSESSMENT — PAIN SCALES - GENERAL
PAINLEVEL_OUTOF10: 6
PAINLEVEL_OUTOF10: 4

## 2023-01-20 ASSESSMENT — PAIN DESCRIPTION - PAIN TYPE: TYPE: ACUTE PAIN

## 2023-01-20 ASSESSMENT — LIFESTYLE VARIABLES
HOW MANY STANDARD DRINKS CONTAINING ALCOHOL DO YOU HAVE ON A TYPICAL DAY: PATIENT DOES NOT DRINK
HOW OFTEN DO YOU HAVE A DRINK CONTAINING ALCOHOL: NEVER

## 2023-01-20 NOTE — TELEPHONE ENCOUNTER
Appointment Request     Patient requesting earlier appointment: Yes  Appointment offered to patient: 01/20/23   Patient Contact Number: [phone]    The patient call and she stated she is in a lot of pain she seen Charlotte Zurita for her foot but she would be new for her back she was wondering if she could be seen today at 3:30pm she just need a call back the spot is for follow-up but she was wondering if she could get into that spot today. Please Advise.

## 2023-01-21 NOTE — ED NOTES
Pt scripts x3 instructed to follow up with PCP.  Assessed per Dr Nataly Thurston, LPN  69/70/29 9290

## 2023-01-21 NOTE — ED PROVIDER NOTES
Emergency Department Provider Note  Location: [address] [place] [address]  ED  1/20/2023     Patient Identification  Iveth Mathias is a 40 y.o. female    Chief Complaint  Back Pain (C/o left lower back pain that radiates down left leg. Hx of chronic back pain but states over past three days pain has worsened. Denies injury/exertion. Denies urinary symptoms.)      Mode of Arrival  {Mode of arrival:19197::\"private car\",\"private car (dropped off)\",\"private car (patient drove)\",\"bus\",\"taxi\",\"walked\",\"EMS\",\"private ambulance/transfer\",\"police\",\"patient transferred from another area of the hospital\",\"patient was here accompanying another patient and then signed in as a patient\"}    HPI  (History provided by {Persons; family members:19675})  This is a 40 y.o. female {Medical history:19197::\"with a PMH significant for ***\",\"without relevant past medical history\"} presented today for ***.      ROS  ***    I have reviewed the following nursing documentation:  Allergies: Allergies   Allergen Reactions    Demerol Hives    Doxycycline Nausea And Vomiting    Lorazepam Other (See Comments)     Makes her anxiety worsen    Vancomycin Hives       Past medical history:  has a past medical history of Abnormal Pap smear, Anxiety, Back pain, Back pain, COVID (12/2021), Placenta previa, and Tubal pregnancy. Past surgical history:  has a past surgical history that includes Ankle fracture surgery (Right); Tonsillectomy (2000); [place] tooth extraction (1998); Lumbar disc surgery; laparoscopy (4/20/10); Ectopic pregnancy surgery (10/2017); lumbar fusion (3-2011); Anesthesia Nerve Block (Right, 5/21/2020); Cholecystectomy; Ankle arthroscopy (Left, 11/19/2020); Upper gastrointestinal endoscopy (N/A, 7/27/2021); and Upper gastrointestinal endoscopy (N/A, 8/16/2021). Home medications:   Prior to Admission medications    Medication Sig Start Date End Date Taking?  Authorizing Provider   ADVAIR -21 MCG/ACT inhaler INHALE 2 PUFFS INTO THE LUNGS TWICE DAILY 10/24/22   DAQUAN Mathews CNP   lidocaine (LIDODERM) 5 % Place 1 patch onto the skin daily 12 hours on, 12 hours off. 7/1/22   DAQUAN Mathews CNP   traZODone (DESYREL) 50 MG tablet TAKE 1 TABLET BY MOUTH EVERY NIGHT AT BEDTIME 5/24/22   Historical Provider, MD   pantoprazole (PROTONIX) 40 MG tablet Take 1 tablet by mouth 2 times daily 2/21/22   Nicholas Lopez MD   ALPRAZolam Rhoderick Mullet) 0.5 MG tablet TAKE 1 TABLET BY MOUTH TWICE DAILY AS NEEDED 1/18/22   Historical Provider, MD   zolpidem (AMBIEN) 5 MG tablet TAKE 1 TABLET BY MOUTH EVERY DAY 1/7/22   Historical Provider, MD   sucralfate (CARAFATE) 1 GM tablet Take 1 tablet by mouth 4 times daily 11/5/21   DAQUAN Waldrop CNP   chlorhexidine (PERIDEX) 0.12 % solution  6/18/21   Historical Provider, MD   lamoTRIgine (LAMICTAL) 150 MG tablet TAKE ONE TABLET BY MOUTH TWICE DAILY 7/13/21   Historical Provider, MD   Omega-3 Fatty Acids (FISH OIL) 1000 MG CAPS Take 1,000 mg by mouth every morning    Historical Provider, MD   propranolol (INDERAL) 10 MG tablet TAKE ONE TABLET BY MOUTH TWICE DAILY AS NEEDED 7/27/21   Historical Provider, MD       Social history:  reports that she quit smoking about 18 months ago. Her smoking use included cigarettes. She started smoking about 24 years ago. She has a 10.50 pack-year smoking history. She has never used smokeless tobacco. She reports current alcohol use of about 1.0 standard drink per week. She reports that she does not use drugs. Family history:    Family History   Problem Relation Age of Onset    High Blood Pressure Father     Diabetes Maternal Aunt        Exam  ED Triage Vitals [01/20/23 2105]   BP Temp Temp Source Heart Rate Resp SpO2 Height Weight   (!) 161/103 98.1 °F (36.7 °C) Oral 89 18 100 % 5' 3\" (1.6 m) 180 lb (81.6 kg)       ***      MDM/ED Course  EKG  The Ekg interpreted by me in the absence of a cardiologist shows.   {Exam; heart rhythm:89360} with a rate of ***  Axis is   {Left-right-normal:15509}  QTc is  {normal/acceptable:15943}  Intervals and Durations are unremarkable. No specific ST-T wave changes appreciated. No evidence of acute ischemia. No significant change from prior EKG dated ***        Radiology  No results found. Labs  No results found for this visit on 01/20/23.      - Patient seen and evaluated in room ***.  40 y.o. female presented for ***  - Exam showed ***  - Patient was placed on telemetry during his/her ED stay and no malignant dysrhythmia observed. - Pertinent old records reviewed; specifically      ***    - Patient was given    ED Medication Orders (From admission, onward)      Start Ordered     Status Ordering Provider    01/20/23 2130 01/20/23 2116  acetaminophen (TYLENOL) tablet 1,000 mg  ONCE         Acknowledged [facility]    01/20/23 2115 01/20/23 2113  predniSONE (DELTASONE) tablet 40 mg  ONCE         Last MAR action: Given - by CHAPARRO FRIEDMAN on 01/20/23 at 2[address] [facility]    01/20/23 2115 01/20/23 2113  ketorolac (TORADOL) injection 30 mg  ONCE         Last MAR action: Given - by Reina Holm on 01/20/23 at [address] [place] [address]            Upon reassessment, Rosa Anton said ***.      - Diagnostic studies reviewed. ***  - Lab: *** did not show clinically significant pathology. ***test*** were also considered but not ordered because ***    Social Determinants of Health: ***    - I discussed the results with {:52017}. We agreed to ***  - Incidental findings of *** also discussed. - Return precautions also discussed. {:18515} verbalized understanding of care plan and agreed to follow-up with *** as advised. - Is this patient to be included in the SEP-1 Core Measure due to severe sepsis or septic shock? {Arg3GtcVkSo:03462}    Clinical Impression:  No diagnosis found. Disposition:  {Plan; disposition:55453} in {Condition:77953::\"good\",\"fair\",\"stable\",\"guarded\",\"critical\"} condition.     Blood pressure (!) 161/103, pulse 89, temperature 98.1 °F (36.7 °C), temperature source Oral, resp. rate 18, height 5' 3\" (1.6 m), weight 180 lb (81.6 kg), SpO2 100 %, not currently breastfeeding. Patient was given scripts for the following medications. I counseled patient how to take these medications. New Prescriptions    No medications on file     Modified Medications    No medications on file       Disposition referral (if applicable):  No follow-up provider specified. [facility], am the primary attending of record and contributed the majority of evaluation and treatment of emergent care for this encounter. Total critical care time is *** minutes, which excludes separately billable procedures and updating family. Time spent is specifically for management of the presenting complaint and symptoms initially, direct bedside care, reevaluation, review of records, and consultation. There was a high probability of clinically significant life-threatening deterioration in the patient's condition, which required my urgent intervention. This chart was generated in part by using Dragon Dictation system and may contain errors related to that system including errors in grammar, punctuation, and spelling, as well as words and phrases that may be inappropriate. If there are any questions or concerns please feel free to contact the dictating provider for clarification.      Bay Wills MD  [facility] STENOSIS, OR HERNIATED DISK CAUSING SEVERE STENOSIS, thus I consider the discharge disposition reasonable. Lisa Romero and I have discussed the diagnosis and risks, and we agree with discharging home to follow-up with their primary doctor. We also discussed returning to the Emergency Department immediately if new or worsening symptoms occur. We have discussed the symptoms which are most concerning (e.g., saddle anesthesia, urinary or bowel incontinence or retention, changing or worsening pain) that necessitate immediate return. Clinical Impression:  1. Sciatica of left side        Disposition:  Discharge to home in good condition. Blood pressure (!) 161/103, pulse 89, temperature 98.1 °F (36.7 °C), temperature source Oral, resp. rate 18, height 5' 3\" (1.6 m), weight 180 lb (81.6 kg), SpO2 100 %, not currently breastfeeding. Patient was given scripts for the following medications. I counseled patient how to take these medications. Discharge Medication List as of 1/20/2023 10:06 PM        START taking these medications    Details   diclofenac sodium (VOLTAREN) 1 % GEL Apply 4 g topically 4 times daily as needed for Pain, Topical, 4 TIMES DAILY PRN Starting Fri 1/20/2023, Disp-100 g, R-0, Normal      methylPREDNISolone (MEDROL DOSEPACK) 4 MG tablet Take by mouth., Disp-1 kit, R-0Normal           Discharge Medication List as of 1/20/2023 10:06 PM        CONTINUE these medications which have CHANGED    Details   lidocaine (LIDODERM) 5 % Place 1 patch onto the skin daily for 10 days 12 hours on, 12 hours off., Disp-10 patch, R-0Normal             Disposition referral (if applicable):  Cheryle Harlem, PA-C  [address] [place] [address]  [phone]    Schedule an appointment as soon as possible for a visit       I, Edelmira Ambrosio, am the primary attending of record and contributed the majority of evaluation and treatment of emergent care for this encounter.      Total critical care time is 0 minutes, which excludes separately billable procedures and updating family. Time spent is specifically for management of the presenting complaint and symptoms initially, direct bedside care, reevaluation, review of records, and consultation. There was a high probability of clinically significant life-threatening deterioration in the patient's condition, which required my urgent intervention. This chart was generated in part by using Dragon Dictation system and may contain errors related to that system including errors in grammar, punctuation, and spelling, as well as words and phrases that may be inappropriate. If there are any questions or concerns please feel free to contact the dictating provider for clarification.      Delbert Loya MD  [facility] aPto Mcdonough MD  01/27/23 5327

## 2023-01-23 ENCOUNTER — TELEPHONE (OUTPATIENT)
Dept: ORTHOPEDIC SURGERY | Age: 43
End: 2023-01-23

## 2023-01-23 NOTE — TELEPHONE ENCOUNTER
Appointment Request     Patient requesting earlier appointment: Yes  Appointment offered to patient: [address]  Patient Contact Number: [phone]

## 2023-01-25 ENCOUNTER — OFFICE VISIT (OUTPATIENT)
Dept: ORTHOPEDIC SURGERY | Age: 43
End: 2023-01-25
Payer: COMMERCIAL

## 2023-01-25 ENCOUNTER — TELEPHONE (OUTPATIENT)
Dept: ORTHOPEDIC SURGERY | Age: 43
End: 2023-01-25

## 2023-01-25 VITALS — BODY MASS INDEX: 31.89 KG/M2 | HEIGHT: 63 IN | WEIGHT: 180 LBS

## 2023-01-25 DIAGNOSIS — M54.16 LUMBAR RADICULOPATHY: Primary | ICD-10-CM

## 2023-01-25 DIAGNOSIS — M54.50 LUMBAR PAIN: ICD-10-CM

## 2023-01-25 DIAGNOSIS — Z98.1 H/O SPINAL FUSION: ICD-10-CM

## 2023-01-25 PROCEDURE — 99214 OFFICE O/P EST MOD 30 MIN: CPT | Performed by: PHYSICIAN ASSISTANT

## 2023-01-25 RX ORDER — TRAMADOL HYDROCHLORIDE 50 MG/1
50 TABLET ORAL EVERY 6 HOURS PRN
Qty: 28 TABLET | Refills: 0 | Status: SHIPPED | OUTPATIENT
Start: 2023-01-25 | End: 2023-02-01

## 2023-01-25 NOTE — TELEPHONE ENCOUNTER
Other PATIENT IS  REQUESTING A CALL BACK SHE HAS A QUESTION ABOUT LIDOCAINE PATCHES.  [facility] [address] [phone]

## 2023-01-25 NOTE — PROGRESS NOTES
New Patient: LUMBAR SPINE    Referring Provider:  No ref. provider found    CHIEF COMPLAINT:    Chief Complaint   Patient presents with    Back Pain     lumbar       HISTORY OF PRESENT ILLNESS:       Ms. Preston Echols  is a pleasant 40 y.o. female, who has a history of a spinal fusion with instrumentation from L4 to the sacrum approximately 11 years ago, here for consultation regarding her LBP and left leg pain. She states her pain began after waking up in the morning bowel 1 week ago. Patient denies any precipitating event prior to the onset. Her pain has steadily continued since then patient was seen in the ED recently and was given a Toradol injection 1 day of tramadol, and a prednisone taper. She states that her current pain is a 6/10 within the lumbar spine at her waistline, with 6/10 pain in her left buttock and 6/10 pain within the lateral aspect of her left leg. He denies any numbness or tingling into her left or right leg. She does have occasional pain within her left groin. She denies any progressive weakness of either right or left leg, bowel or bladder dysfunction, or saddle anesthesia. . The pain at times disrupts her sleep. Pain Assessment  Location of Pain: Back  Severity of Pain: 8  Quality of Pain: Other (Comment)  Duration of Pain: Other (Comment)  Frequency of Pain: Other (Comment)  Aggravating Factors:  Other (Comment)]  Current/Past Treatment:   Physical Therapy: None recently  Chiropractic: None  Injection: Prior to her spinal fusion in 2011  Medications: Medrol Dosepak    Past Medical History:   Past Medical History:   Diagnosis Date    Abnormal Pap smear     WNL since    Anxiety     Back pain     herniated disc    Back pain     had back surgery (fusion of 3 discs) in march 2011    COVID 12/2021    Placenta previa     resolved at 28 weeks with this 3rd pregnancy    Tubal pregnancy     Pt states bilateral fallopian tubes removed        Past Surgical History:     Past Surgical History:   Procedure Laterality Date    ANESTHESIA NERVE BLOCK Right 5/21/2020    RIGHT HIP INJECTION SITE CONFIRMED BY FLUOROSCOPY performed by Abdirahman Davis MD at [address] ARTHROSCOPY Left 11/19/2020    VIDEO ARTHROSCOPY LEFT ANKLE, SYNOVECTOMY, LOOSE BODY REMOVAL, MICROFRACTURE -BLOCK- performed by Christianne Muor MD at [facility] Right     CHOLECYSTECTOMY      laparoscopic    ECTOPIC PREGNANCY SURGERY  10/2017    LAPAROSCOPY  4/20/10    with salpingostomy  ( ectopic pregnancy)    LUMBAR DISC SURGERY      LUMBAR FUSION  3-2011    L3-L5 fusion    TONSILLECTOMY  2000    UPPER GASTROINTESTINAL ENDOSCOPY N/A 7/27/2021    EGD BIOPSY performed by Amaryllis Jeans, MD at [facility] 8/16/2021    EGD BIOPSY performed by Oneil West MD at [facility]       Current Medications:     Current Outpatient Medications:     diclofenac sodium (VOLTAREN) 1 % GEL, Apply 4 g topically 4 times daily as needed for Pain, Disp: 100 g, Rfl: 0    methylPREDNISolone (MEDROL DOSEPACK) 4 MG tablet, Take by mouth., Disp: 1 kit, Rfl: 0    lidocaine (LIDODERM) 5 %, Place 1 patch onto the skin daily for 10 days 12 hours on, 12 hours off., Disp: 10 patch, Rfl: 0    ADVAIR -21 MCG/ACT inhaler, INHALE 2 PUFFS INTO THE LUNGS TWICE DAILY, Disp: 12 g, Rfl: 1    traZODone (DESYREL) 50 MG tablet, TAKE 1 TABLET BY MOUTH EVERY NIGHT AT BEDTIME, Disp: , Rfl:     pantoprazole (PROTONIX) 40 MG tablet, Take 1 tablet by mouth 2 times daily, Disp: 60 tablet, Rfl: 0    ALPRAZolam (XANAX) 0.5 MG tablet, TAKE 1 TABLET BY MOUTH TWICE DAILY AS NEEDED, Disp: , Rfl:     zolpidem (AMBIEN) 5 MG tablet, TAKE 1 TABLET BY MOUTH EVERY DAY, Disp: , Rfl:     sucralfate (CARAFATE) 1 GM tablet, Take 1 tablet by mouth 4 times daily, Disp: 120 tablet, Rfl: 3    chlorhexidine (PERIDEX) 0.12 % solution, , Disp: , Rfl:     lamoTRIgine (LAMICTAL) 150 MG tablet, TAKE ONE TABLET BY MOUTH TWICE DAILY, Disp: , Rfl:     Omega-3 Fatty Acids (FISH OIL) 1000 MG CAPS, Take 1,000 mg by mouth every morning, Disp: , Rfl:     propranolol (INDERAL) 10 MG tablet, TAKE ONE TABLET BY MOUTH TWICE DAILY AS NEEDED, Disp: , Rfl:     Allergies:  Demerol, Doxycycline, Lorazepam, and Vancomycin    Social History:    reports that she quit smoking about 18 months ago. Her smoking use included cigarettes. She started smoking about 24 years ago. She has a 10.50 pack-year smoking history. She has never used smokeless tobacco. She reports current alcohol use of about 1.0 standard drink per week. She reports that she does not use drugs. Family History:   Family History   Problem Relation Age of Onset    High Blood Pressure Father     Diabetes Maternal Aunt        REVIEW OF SYSTEMS: Full ROS noted & scanned   CONSTITUTIONAL: Denies unexplained weight loss, fevers, chills or fatigue  NEUROLOGICAL: Denies unsteady gait or progressive weakness  MUSCULOSKELETAL: Denies joint swelling or redness  PSYCHOLOGICAL: Denies anxiety, depression   SKIN: Denies skin changes, delayed healing, rash, itching   HEMATOLOGIC: Denies easy bleeding or bruising  ENDOCRINE: Denies excessive thirst, urination, heat/cold  RESPIRATORY: Denies current dyspnea, cough  GI: Denies nausea, vomiting, diarrhea   : Denies bowel or bladder issues      PHYSICAL EXAM:    Vitals: Height 5' 2.99\" (1.6 m), weight 180 lb (81.6 kg), not currently breastfeeding. GENERAL EXAM:  General Apparence: Patient is adequately groomed with no evidence of malnutrition. Orientation: The patient is oriented to time, place and person. Mood & Affect:The patient's mood and affect are appropriate. Vascular: Examination reveals no swelling tenderness in upper or lower extremities. Good capillary refill.   Lymphatic: The lymphatic examination bilaterally reveals all areas to be without enlargement or induration  Sensation: Sensation is intact without deficit  Coordination/Balance: Good coordination. LUMBAR/SACRAL EXAMINATION:  Inspection: Local inspection shows no step-off or bruising. Lumbar alignment is normal.  Sagittal and Coronal balance is neutral.      Palpation:   No evidence of tenderness at the midline. No tenderness bilaterally at the paraspinal or trochanters. There is no step-off or paraspinal spasm. Range of Motion: Lumbar flexion, extension and rotation are mildly limited due to pain. Strength:   Strength testing is 5/5 in all muscle groups tested. Special Tests:   Straight leg raise and crossed SLR negative. Leg length and pelvis level. Skin: There are no rashes, ulcerations or lesions. Reflexes: Reflexes are symmetrically 2+ at the patellar and ankle tendons. Clonus absent bilaterally at the feet. Gait & station: normal, patient ambulates without assistance    Additional Examinations:   RIGHT LOWER EXTREMITY: Inspection/examination of the right lower extremity does not show any tenderness, deformity or injury. Range of motion is unremarkable. There is no gross instability. There are no rashes, ulcerations or lesions. Strength and tone are normal.  LEFT LOWER EXTREMITY:  Inspection/examination of the left lower extremity does not show any tenderness, deformity or injury. Range of motion is unremarkable. There is no gross instability. There are no rashes, ulcerations or lesions. Strength and tone are normal.    Diagnostic Testing:    X-rays: 2 views of the lumbar spine to include AP and lateral lumbar spine were obtained today in the office and independently reviewed with the patient which shows pedicle screws from L4-S1 no significant degenerative changes above her fusion. Impression:   Low back pain  Lumbar radiculitis  1.  Lumbar pain        Plan:      We discussed the diagnosis and treatment options including observation, additional oral steroids, physical therapy, epidural injections and additional imaging. She wishes to proceed with outpatient physical therapy for lumbar core strengthening exercises with modalities of choice to include dry needling as needed. She was given a prescription for tramadol to take as needed. She was also referred for an MRI to further evaluate the lumbar radiculitis. Follow up -after the MRI to review the results and discuss treatment options    Old records were reviewed.     Total evaluation time 31 minutes    Holly Chester PA-C  Board certified by the [address] [place]

## 2023-01-27 NOTE — TELEPHONE ENCOUNTER
General Question     Subject: Lidocaine Patches  Patient and /or Facility Request: Alexa Kirby  Contact Number: [phone]       Patient request callback regarding Lidocaine patches.

## 2023-01-30 ENCOUNTER — HOSPITAL ENCOUNTER (OUTPATIENT)
Dept: PHYSICAL THERAPY | Age: 43
Setting detail: THERAPIES SERIES
Discharge: HOME | End: 2023-01-30

## 2023-01-30 NOTE — FLOWSHEET NOTE
[facility], [address], [address] [place] [address]        Physical Therapy  Cancellation/No-show Note  Patient Name:  Catherine Harden  :  1980   Date:  2023  Cancelled visits to date: 1  No-shows to date: 0    For today's appointment patient:  [x]  Cancelled  []  Rescheduled appointment  []  No-show     Reason given by patient:  []  Patient ill  []  Conflicting appointment  []  No transportation    []  Conflict with work  [x]  No reason given  []  Other:     Comments:      Electronically signed by:  Humphrey Garza, PT, [facility], [address]

## 2023-01-31 ENCOUNTER — HOSPITAL ENCOUNTER (OUTPATIENT)
Dept: PHYSICAL THERAPY | Age: 43
Setting detail: THERAPIES SERIES
Discharge: HOME | End: 2023-01-31

## 2023-01-31 NOTE — FLOWSHEET NOTE
[facility],  [address]        Physical Therapy  Cancellation/No-show Note  Patient Name:  Mayra Carrizales  :  1980   Date:  2023  Cancelled visits to date: 1  No-shows to date: 0    For today's appointment patient:  [x]  Cancelled  []  Rescheduled appointment  []  No-show     Reason given by patient:  []  Patient ill  []  Conflicting appointment  []  No transportation    [x]  Conflict with work  []  No reason given  []  Other:     Comments:      Electronically signed by:  Sarai Schmitz PT

## 2023-02-02 ENCOUNTER — TELEPHONE (OUTPATIENT)
Dept: ORTHOPEDIC SURGERY | Age: 43
End: 2023-02-02

## 2023-02-02 ENCOUNTER — HOSPITAL ENCOUNTER (OUTPATIENT)
Dept: PHYSICAL THERAPY | Age: 43
Setting detail: THERAPIES SERIES
Discharge: HOME | End: 2023-02-02
Payer: COMMERCIAL

## 2023-02-02 DIAGNOSIS — Z98.1 H/O SPINAL FUSION: ICD-10-CM

## 2023-02-02 DIAGNOSIS — M54.16 LUMBAR RADICULOPATHY: Primary | ICD-10-CM

## 2023-02-02 PROCEDURE — 97140 MANUAL THERAPY 1/> REGIONS: CPT | Performed by: PHYSICAL THERAPIST

## 2023-02-02 PROCEDURE — G0283 ELEC STIM OTHER THAN WOUND: HCPCS | Performed by: PHYSICAL THERAPIST

## 2023-02-02 PROCEDURE — 97161 PT EVAL LOW COMPLEX 20 MIN: CPT | Performed by: PHYSICAL THERAPIST

## 2023-02-02 NOTE — TELEPHONE ENCOUNTER
OTHER    Subject: BACK PAIN/ LUMBAR MRI  Patient Request: Laura Mills  Contact Number: [phone]    PATIENT IS ASKING FOR A RETURN CALL TODAY REGARDING HER BACK PAIN. PATIENT STATES SHE IS IN A LOT OF PAIN. CHECKING STATUS OF HER MRI APPROVAL, NEEDING TO SPEAK TO SOMEONE REGARDING LIDOCAINE PATCHES. PLEASE RETURN CALL TO ABOVE NUMBER.

## 2023-02-02 NOTE — PLAN OF CARE
[facility], [address] [facility]  Phone: [phone]  Fax [phone]    Physical Therapy Certification    Dear Sachi POOLE,    We had the pleasure of evaluating the following patient for physical therapy services at [address] [place] [address]. A summary of our findings can be found in the initial assessment below. This includes our plan of care. If you have any questions or concerns regarding these findings, please do not hesitate to contact me at the office phone number checked above. Thank you for the referral.       Physician Signature:_______________________________Date:__________________  By signing above (or electronic signature), therapists plan is approved by physician    Patient: Benjamin Avitia   : 1980   MRN: [id number]  Referring Physician: Paty Negrete,*      Evaluation Date: 2023      Medical Diagnosis Information:  Lumbar pain [M54.50]  Lumbar radiculopathy [M54.16]  H/O spinal fusion [Z98.1]   low back pain with sciatica M54.42, low back pain M54.5                                        Insurance information: Simona Hernandez 60/40% no deductible, BMN       Precautions/ Contra-indications: prior lumbar fusion    C-SSRS Triggered by Intake questionnaire (Past 2 wk assessment):   [x] No, Questionnaire did not trigger screening.   [] Yes, Patient intake triggered further evaluation      [] C-SSRS Screening completed  [] PCP notified via Plan of Care  [] Emergency services notified     Latex Allergy:  [x]NO      []YES  Preferred Language for Healthcare:   [x]English       []other:    SUBJECTIVE: Patient stated complaint:history of spinal fusion L4-5 about . She reports she had been doing fine until about 3 weeks ago. She woke up in pain. Her low back and into left leg and goes to lateral knee and also wraps around to groin.   MRI has been ordered, but waiting on insurance approval.  She was given Tramadol, Lidocaine patches, and a steroid dose pack. She reports that she does not think the meds are working. She thinks it may be getting worse. 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 [employer] on her feet    Living Status/Prior Level of Function: Independent with ADLs and IADLs, walking for exercise, working at [employer] on her feet, 3 flights of stairs in house    OBJECTIVE:     AROM     LUMBAR FLEX Supine lumbar flex WNL    LUMBAR EXT     Sidebend     Rotation      LEFT RIGHT   HIP Flex deferred    HIP Abd     HIP ER     HIP IR     Knee Flex     Knee ext     Hamstring FLEX     Piriformis                Strength  LEFT RIGHT   MfA Deferred     TrA     HIP Flexors     HIP Abductors     HIP ER     Hip IR     Knee EXT (quad)     Knee Flex (HS)     Ankle DF     Ankle PF     Great Toe Ext       Reflexes/Sensation: deferred   []Dermatomes/Myotomes intact    []UE Reflexes     []Normal []Hypo      []Hyper   []LE Reflexes     []Normal []Hypo      []Hyper   []Babinski/Clonus/Hoffmans:    []Other:    Joint mobility:    []Normal    [x]Hypo: currently due to pain   []Hyper    Palpation: tender to palpation of lumbar spine and surrounding paraspinals. No deformity or muscle spasm noted    Functional Mobility/Transfers: independent but gingerly    Posture:     Bandages/Dressings/Incisions: n/a    Gait: (include devices/WB status) independent and     Orthopedic Special Tests: pain with slr and crossed slr, but reports pain across her low back                       [x] Patient history, allergies, meds reviewed. Medical chart reviewed. See intake form. Review Of Systems (ROS):  [x]Performed Review of systems (Integumentary, CardioPulmonary, Neurological) by intake and observation. Intake form has been scanned into medical record. Patient has been instructed to contact their primary care physician regarding ROS issues if not already being addressed at this time. Co-morbidities/Complexities (which will affect course of rehabilitation):   []None           Arthritic conditions   []Rheumatoid arthritis (M05.9)  []Osteoarthritis (M19.91)   Cardiovascular conditions   []Hypertension (I10)  []Hyperlipidemia (E78.5)  []Angina pectoris (I20)  []Atherosclerosis (I70)   Musculoskeletal conditions   [x]Disc pathology   []Congenital spine pathologies   [x]Prior surgical intervention  []Osteoporosis (M81.8)  []Osteopenia (M85.8)   Endocrine conditions   []Hypothyroid (E03.9)  []Hyperthyroid Gastrointestinal conditions   []Constipation (K44.13)   Metabolic conditions   []Morbid obesity (E66.01)  []Diabetes type 1(E10.65) or 2 (E11.65)   []Neuropathy (G60.9)     Pulmonary conditions   []Asthma (J45)  []Coughing   []COPD (J44.9)   Psychological Disorders  [x]Anxiety (F41.9)  []Depression (F32.9)   []Other:   []Other:          Barriers to/and or personal factors that will affect rehab potential:              []Age  []Sex              []Motivation/Lack of Motivation                        []Co-Morbidities              []Cognitive Function, education/learning barriers              []Environmental, home barriers              []profession/work barriers  []past PT/medical experience  []other:  Justification: patient has history of L4-5 discectomy and fusion and current pain is in the general area with radiculopathy    Falls Risk Assessment (30 days):   [x] Falls Risk assessed and no intervention required.   [] Falls Risk assessed and Patient requires intervention due to being higher risk   TUG score (>12s at risk):     [] Falls education provided, including         ASSESSMENT:   Functional Impairments:     [x]Noted lumbar/proximal hip hypomobility   []Noted lumbosacral and/or generalized hypermobility   [x]Decreased Lumbosacral/hip/LE functional ROM   []Decreased core/proximal hip strength and neuromuscular control    []Decreased LE functional strength    []Abnormal reflexes/sensation/myotomal/dermatomal deficits  []Reduced balance/proprioceptive control    []other:      Functional Activity Limitations (from functional questionnaire and intake)   [x]Reduced ability to tolerate prolonged functional positions   [x]Reduced ability or difficulty with changes of positions or transfers between positions   [x]Reduced ability to maintain good posture and demonstrate good body mechanics with sitting, bending, and lifting   [x]Reduced ability to sleep   [x] Reduced ability or tolerance with driving and/or computer work   [x]Reduced ability to perform lifting, reaching, carrying tasks   [x]Reduced ability to squat   [x]Reduced ability to forward bend   [x]Reduced ability to ambulate prolonged functional periods/distances/surfaces   [x]Reduced ability to ascend/descend stairs   []other:       Participation Restrictions   []Reduced participation in self care activities   [x]Reduced participation in home management activities   [x]Reduced participation in work activities   [x]Reduced participation in social activities. []Reduced participation in sport/recreation activities. Classification:   []Signs/symptoms consistent with Lumbar instability/stabilization subgroup. []Signs/symptoms consistent with Lumbar mobilization/manipulation subgroup, myotomes and dermatomes intact. Meets manipulation criteria.     []Signs/symptoms consistent with Lumbar direction specific/centralization subgroup   []Signs/symptoms consistent with Lumbar traction subgroup     []Signs/symptoms consistent with lumbar facet dysfunction   []Signs/symptoms consistent with lumbar stenosis type dysfunction   [x]Signs/symptoms consistent with nerve root involvement including myotome & dermatome dysfunction   []Signs/symptoms consistent with post-surgical status including: decreased ROM, strength and function. []signs/symptoms consistent with pathology which may benefit from Dry needling     [x]other:  currently unable to assess which classification due to pain    Prognosis/Rehab Potential:      []Excellent   [x]Good    []Fair   []Poor    Tolerance of evaluation/treatment:    []Excellent   []Good    [x]Fair   []Poor  Physical Therapy Evaluation Complexity Justification  [x] A history of present problem with:  [] no personal factors and/or comorbidities that impact the plan of care;  [x]1-2 personal factors and/or comorbidities that impact the plan of care  []3 personal factors and/or comorbidities that impact the plan of care  [x] An examination of body systems using standardized tests and measures addressing any of the following: body structures and functions (impairments), activity limitations, and/or participation restrictions;:  [] a total of 1-2 or more elements   [] a total of 3 or more elements   [x] a total of 4 or more elements   [x] A clinical presentation with:  [x] stable and/or uncomplicated characteristics   [] evolving clinical presentation with changing characteristics  [] unstable and unpredictable characteristics;   [x] Clinical decision making of [x] low, [] moderate, [] high complexity using standardized patient assessment instrument and/or measurable assessment of functional outcome.     [x] EVAL (LOW) 48383 (typically 20 minutes face-to-face)  [] EVAL (MOD) 85378 (typically 30 minutes face-to-face)  [] EVAL (HIGH) 08896 (typically 45 minutes face-to-face)  [] RE-EVAL         PLAN: Begin PT focusing on: proximal hip mobilizations, LB mobs, LB core activation, proximal hip activation, and HEP    Frequency/Duration:  1-2 days per week for 6 Weeks:  Interventions:  [x] Therapeutic exercise including: strength training, ROM, for LE, Glutes and core   [x]  NMR activation and proprioception for glutes , LE and Core   [x]  Manual therapy as indicated for Hip complex, LE and spine to include: Dry Needling/IASTM, STM, PROM, Gr I-IV mobilizations, manipulation. [x]  Modalities as needed that may include: thermal agents, E-stim, Biofeedback, US, iontophoresis as indicated  [x]  Patient education on joint protection, postural re-education, activity modification, progression of HEP. HEP instruction: Access Code: [email or web address]  URL: [email or web address]  Date: 02/02/2023  Prepared by: Jose G Pascual    Exercises  Single Knee to Chest Stretch - 1-3 x daily - 7 x weekly - 5-10 reps - 5 hold  Quadruped Forward Backward Weight Shifts - 1-3 x daily - 7 x weekly - 10 reps - 5 hold  (see scanned forms)    GOALS:  Patient stated goal: eliminate pain  [] Progressing: [] Met: [] Not Met: [] Adjusted    Therapist goals for Patient:   Short Term Goals: To be achieved in: 2 weeks  1. Independent in HEP and progression per patient tolerance, in order to prevent re-injury. [] Progressing: [] Met: [] Not Met: [] Adjusted  2. Patient will have a decrease in pain to facilitate improvement in movement, function, and ADLs as indicated by Functional Deficits. [] Progressing: [] Met: [] Not Met: [] Adjusted      Long Term Goals: To be achieved in: 6 weeks  1. Disability index score of 10% or more per Modified Oswestry to assist with reaching prior level of function. [] Progressing: [] Met: [] Not Met: [] Adjusted  2. Patient will demonstrate increased AROM to WNL, good LS mobility, good hip ROM to allow for proper joint functioning as indicated by patients Functional Deficits. [] Progressing: [] Met: [] Not Met: [] Adjusted  3. Patient will demonstrate an increase in Strength to good proximal hip and core activation to allow for proper functional mobility as indicated by patients Functional Deficits. [] Progressing: [] Met: [] Not Met: [] Adjusted  4. Patient will return to sitting for job or find strategies to get through the work day functional activities without increased symptoms or restriction. [] Progressing: [] Met: [] Not Met: [] Adjusted  5.  Decrease pain from 8/10 to 0-3/10  [] Progressing: [] Met: [] Not Met: [] Adjusted       Electronically signed by:  Chelly Blancas PT

## 2023-02-02 NOTE — FLOWSHEET NOTE
[facility],  [address]  Phone: [phone]  Fax [phone]    Physical Therapy Treatment Note/ Progress Report:           Date:  2023    Patient Name:  Rayne De Los Santos    :  1980  MRN: [id number]  Restrictions/Precautions:    Medical/Treatment Diagnosis Information:  Medical Diagnosis Information:  Lumbar pain [M54.50]  Lumbar radiculopathy [M54.16]  H/O spinal fusion [Z98.1]   low back pain with sciatica M54.42, low back pain M54.5               Insurance/Certification information:  Mayi Lim 60/40% no deductible, BMN  Physician Information:  [place] Ceja,*  Has the plan of care been signed (Y/N):        []  Yes  [x]  No     Date of Patient follow up with Physician: Fadia Sandoval after MRI      Is this a Progress Report:     []  Yes  [x]  No        If Yes:  Date Range for reporting period:  Beginning 23  Ending 3/2/23    Progress report will be due (10 Rx or 30 days whichever is less): 94       Recertification will be due (POC Duration  / 90 days whichever is less): 3/16/23        Visit # Insurance Allowable Auth Required   In-person 1 BMN [x]  Yes []  No    Telehealth   []  Yes []  No    Total            Functional Scale: Modified Oswestry 20%    Date assessed:  23      Therapy Diagnosis/Practice Pattern:      Number of Comorbidities:  []0     [x]1-2    []3+    Latex Allergy:  [x]NO      []YES  Preferred Language for Healthcare:   [x]English       []other:      Pain level:  8/10    SUBJECTIVE:  See eval    OBJECTIVE: See eval  Observation:   Test measurements:      RESTRICTIONS/PRECAUTIONS: previous lumbar fusion at L4-5    Exercises/Interventions:     Therapeutic Ex (31473)/NMR re-education (53502) Sets/sec Notes/CUES   Supine single knee to chest 5 sec 10x each leg hep   Quadruped sit backs 5 sec 10x hep                                                     Manual Intervention ([phone])     Gentle PAs, sacral mobs, rotational mobs, prone quad stretch 10 min Helped somewhat                            Therapeutic Activity (44442)          HEP instruction: Access Code: [email or web address]  URL: [email or web address]  Date: 02/02/2023  Prepared by: Renee Rosenberg    Exercises  Single Knee to Chest Stretch - 1-3 x daily - 7 x weekly - 5-10 reps - 5 hold  Quadruped Forward Backward Weight Shifts - 1-3 x daily - 7 x weekly - 10 reps - 5 hold      Therapeutic Exercise and NMR EXR  [] (54722) Provided verbal/tactile cueing for activities related to strengthening, flexibility, endurance, ROM  for improvements in proximal hip and core control with self care, mobility, lifting and ambulation.  [] (40311) Provided verbal/tactile cueing for activities related to improving balance, coordination, kinesthetic sense, posture, motor skill, proprioception  to assist with core control in self care, mobility, lifting, and ambulation.      Therapeutic Activities:    [] (95535 or 40651) Provided verbal/tactile cueing for activities related to improving balance, coordination, kinesthetic sense, posture, motor skill, proprioception and motor activation to allow for proper function  with self care and ADLs  [] (96848) Provided training and instruction to the patient for proper core and proximal hip recruitment and positioning with ambulation re-education     Home Exercise Program:    [x] (70831) Reviewed/Progressed HEP activities related to strengthening, flexibility, endurance, ROM of core, proximal hip and LE for functional self-care, mobility, lifting and ambulation   [] (98670) Reviewed/Progressed HEP activities related to improving balance, coordination, kinesthetic sense, posture, motor skill, proprioception of core, proximal hip and LE for self care, mobility, lifting, and ambulation      Manual Treatments:  PROM / STM / Oscillations-Mobs:  G-I, II, III, IV (PA's, Inf., Post.)  [] (78715) Provided manual therapy to mobilize proximal hip and LS spine soft tissue/joints for the purpose of modulating pain, promoting relaxation,  increasing ROM, reducing/eliminating soft tissue swelling/inflammation/restriction, improving soft tissue extensibility and allowing for proper ROM for normal function with self care, mobility, lifting and ambulation. Modalities:   sidelying premod stim and cold pack x 15 min    Charges  Timed Code Treatment Minutes: 20   Total Treatment Minutes: 50       [x] EVAL (LOW) 86794   [] EVAL (MOD) 14510   [] EVAL (HIGH) 43866   [] RE-EVAL     [] YH(45137) x     [] IONTO  [] NMR (66641) x     [] VASO  [x] Manual (07582) x    1  [] Other:  [] TA x      [] Mech Traction (24428)  [] ES(attended) (84158)      [x] ES (un) (50615):       GOALS:  Patient stated goal: eliminate pain  [] Progressing: [] Met: [] Not Met: [] Adjusted    Therapist goals for Patient:   Short Term Goals: To be achieved in: 2 weeks  1. Independent in HEP and progression per patient tolerance, in order to prevent re-injury. [] Progressing: [] Met: [] Not Met: [] Adjusted  2. Patient will have a decrease in pain to facilitate improvement in movement, function, and ADLs as indicated by Functional Deficits. [] Progressing: [] Met: [] Not Met: [] Adjusted      Long Term Goals: To be achieved in: 6 weeks  1. Disability index score of 10% or more per Modified Oswestry to assist with reaching prior level of function. [] Progressing: [] Met: [] Not Met: [] Adjusted  2. Patient will demonstrate increased AROM to WNL, good LS mobility, good hip ROM to allow for proper joint functioning as indicated by patients Functional Deficits. [] Progressing: [] Met: [] Not Met: [] Adjusted  3. Patient will demonstrate an increase in Strength to good proximal hip and core activation to allow for proper functional mobility as indicated by patients Functional Deficits. [] Progressing: [] Met: [] Not Met: [] Adjusted  4.  Patient will return to sitting for job or find strategies to get through the work day functional activities without increased symptoms or restriction. [] Progressing: [] Met: [] Not Met: [] Adjusted  5. Decrease pain from 8/10 to 0-3/10  [] Progressing: [] Met: [] Not Met: [] Adjusted    (cut and paste from eval)    Progression Towards Functional goals:  [] Patient is progressing as expected towards functional goals listed. [] Progression is slowed due to complexities listed. [] Progression has been slowed due to co-morbidities. [x] Plan just implemented, too soon to assess goals progression  [] Other:     Overall Progression Towards Functional goals/ Treatment Progress Update:  [] Patient is progressing as expected towards functional goals listed. [] Progression is slowed due to complexities/Impairments listed. [] Progression has been slowed due to co-morbidities. [x] Plan just implemented, too soon to assess goals progression <30days   [] Goals require adjustment due to lack of progress  [] Patient is not progressing as expected and requires additional follow up with physician  [] Other    Prognosis for POC: [x] Good [] Fair  [] Poor      Patient requires continued skilled intervention: [x] Yes  [] No    Treatment/Activity Tolerance:  [x] Patient able to complete treatment  [] Patient limited by fatigue  [] Patient limited by pain    [] Patient limited by other medical complications  [] Other:     ASSESSMENT:     PLAN: Cont PT 1-2 times per week for up to 6 weeks. Focus on pain control and assess AROM and LE strength  [] Continue per plan of care [] Alter current plan (see comments above)  [x] Plan of care initiated [] Hold pending MD visit [] Discharge      Electronically signed by:  Debra Riddle PT    Note: If patient does not return for scheduled/ recommended follow up visits, this note will serve as a discharge from care along with most recent update on progress.

## 2023-02-07 ENCOUNTER — TELEPHONE (OUTPATIENT)
Dept: ORTHOPEDIC SURGERY | Age: 43
End: 2023-02-07

## 2023-02-07 NOTE — TELEPHONE ENCOUNTER
General Question     Subject: PATIENT HAS QUESTIONS REGARDING MRI TEST RESULTS. PLEASE ADVISE.   Patient Juanito John  Contact Number: [phone]

## 2023-07-12 RX ORDER — ALBUTEROL SULFATE 90 UG/1
AEROSOL, METERED RESPIRATORY (INHALATION)
Qty: 18 G | OUTPATIENT
Start: 2023-07-12

## 2023-07-12 NOTE — TELEPHONE ENCOUNTER
Medication:   Requested Prescriptions     Pending Prescriptions Disp Refills    albuterol sulfate HFA (PROVENTIL;VENTOLIN;PROAIR) 108 (90 Base) MCG/ACT inhaler [Pharmacy Med Name: ALBUTEROL HFA INH(200 PUFFS) 18GM] 18 g      Sig: INHALE 2 PUFFS BY MOUTH EVERY 6 HOURS AS NEEDED        Last Filled:  09/16/2021    Patient Phone Number: [phone] (home)     Last appt: 8/31/2022   Next appt: Visit date not found    Last OARRS:   RX Monitoring 8/2/2021   Acute Pain Prescriptions Severe pain not adequately treated with lower dose.    Periodic Controlled Substance Monitoring -

## 2023-07-12 NOTE — TELEPHONE ENCOUNTER
Unable to LM for patient to schedule- no answer- no VM- message sent to [facility]

## 2023-07-17 DIAGNOSIS — R05.9 COUGH: ICD-10-CM

## 2023-07-17 RX ORDER — FLUTICASONE PROPIONATE AND SALMETEROL XINAFOATE 115; 21 UG/1; UG/1
2 AEROSOL, METERED RESPIRATORY (INHALATION) 2 TIMES DAILY
Qty: 12 G | Refills: 1 | Status: SHIPPED | OUTPATIENT
Start: 2023-07-17

## 2023-07-17 NOTE — TELEPHONE ENCOUNTER
Patient scheduled the first available appointment (8/4) and is completely out of the inhaler- uses PRN

## 2023-09-24 DIAGNOSIS — R05.9 COUGH: ICD-10-CM

## 2023-09-25 RX ORDER — FLUTICASONE PROPIONATE AND SALMETEROL XINAFOATE 115; 21 UG/1; UG/1
2 AEROSOL, METERED RESPIRATORY (INHALATION) 2 TIMES DAILY
Qty: 12 G | Refills: 1 | OUTPATIENT
Start: 2023-09-25

## 2023-09-25 NOTE — TELEPHONE ENCOUNTER
Medication:   Requested Prescriptions     Pending Prescriptions Disp Refills    fluticasone-salmeterol (ADVAIR HFA) 115-21 MCG/ACT inhaler [Pharmacy Med Name: FLUTICASONE/SALM 115/21MCG INH 120S] 12 g 1     Sig: INHALE 2 PUFFS INTO THE LUNGS TWICE DAILY        Last Filled: 7/17/2023 w/ 1 refill  *pt needs in person appt - called pt to schedule - pt states she will call back to make appt*    Patient Phone Number: [phone] (home)     Last appt: 8/31/2022   Next appt: Visit date not found    Last OARRS:       8/2/2021     2:27 PM   RX Monitoring   Acute Pain Prescriptions Severe pain not adequately treated with lower dose.

## 2023-11-07 DIAGNOSIS — K27.9 PUD (PEPTIC ULCER DISEASE): Primary | ICD-10-CM

## 2023-11-08 LAB
ALBUMIN SERPL-MCNC: 4.8 G/DL (ref 3.4–5)
ALBUMIN/GLOB SERPL: 1.7 {RATIO} (ref 1.1–2.2)
ALP SERPL-CCNC: 66 U/L (ref 40–129)
ALT SERPL-CCNC: 69 U/L (ref 10–40)
ANION GAP SERPL CALCULATED.3IONS-SCNC: 16 MMOL/L (ref 3–16)
AST SERPL-CCNC: 31 U/L (ref 15–37)
BACTERIA URNS QL MICRO: ABNORMAL /HPF
BASOPHILS # BLD: 0.1 K/UL (ref 0–0.2)
BASOPHILS NFR BLD: 0.8 %
BILIRUB SERPL-MCNC: 0.7 MG/DL (ref 0–1)
BILIRUB UR QL STRIP.AUTO: NEGATIVE
BUN SERPL-MCNC: 14 MG/DL (ref 7–20)
CALCIUM SERPL-MCNC: 9.2 MG/DL (ref 8.3–10.6)
CHLORIDE SERPL-SCNC: 96 MMOL/L (ref 99–110)
CLARITY UR: CLEAR
CO2 SERPL-SCNC: 20 MMOL/L (ref 21–32)
COLOR UR: ABNORMAL
CREAT SERPL-MCNC: 0.9 MG/DL (ref 0.6–1.1)
DEPRECATED RDW RBC AUTO: 13.7 % (ref 12.4–15.4)
EOSINOPHIL # BLD: 0.1 K/UL (ref 0–0.6)
EOSINOPHIL NFR BLD: 0.9 %
GFR SERPLBLD CREATININE-BSD FMLA CKD-EPI: >60 ML/MIN/{1.73_M2}
GLUCOSE SERPL-MCNC: 88 MG/DL (ref 70–99)
GLUCOSE UR STRIP.AUTO-MCNC: NEGATIVE MG/DL
HCG UR QL: NEGATIVE
HCT VFR BLD AUTO: 42.3 % (ref 36–48)
HGB BLD-MCNC: 14.1 G/DL (ref 12–16)
HGB UR QL STRIP.AUTO: ABNORMAL
KETONES UR STRIP.AUTO-MCNC: NEGATIVE MG/DL
LEUKOCYTE ESTERASE UR QL STRIP.AUTO: NEGATIVE
LIPASE SERPL-CCNC: 84 U/L (ref 13–60)
LYMPHOCYTES # BLD: 4.1 K/UL (ref 1–5.1)
LYMPHOCYTES NFR BLD: 37.9 %
MAGNESIUM SERPL-MCNC: 1.8 MG/DL (ref 1.8–2.4)
MCH RBC QN AUTO: 29.9 PG (ref 26–34)
MCHC RBC AUTO-ENTMCNC: 33.3 G/DL (ref 31–36)
MCV RBC AUTO: 89.8 FL (ref 80–100)
MONOCYTES # BLD: 0.7 K/UL (ref 0–1.3)
MONOCYTES NFR BLD: 6.8 %
NEUTROPHILS # BLD: 5.9 K/UL (ref 1.7–7.7)
NEUTROPHILS NFR BLD: 53.6 %
NITRITE UR QL STRIP.AUTO: NEGATIVE
PH UR STRIP.AUTO: 6 [PH] (ref 5–8)
PLATELET # BLD AUTO: 279 K/UL (ref 135–450)
PMV BLD AUTO: 8.8 FL (ref 5–10.5)
POTASSIUM SERPL-SCNC: 3 MMOL/L (ref 3.5–5.1)
PROT SERPL-MCNC: 7.7 G/DL (ref 6.4–8.2)
PROT UR STRIP.AUTO-MCNC: NEGATIVE MG/DL
RBC # BLD AUTO: 4.71 M/UL (ref 4–5.2)
RBC #/AREA URNS HPF: ABNORMAL /HPF (ref 0–4)
SODIUM SERPL-SCNC: 132 MMOL/L (ref 136–145)
SP GR UR STRIP.AUTO: <=1.005 (ref 1–1.03)
UA COMPLETE W REFLEX CULTURE PNL UR: ABNORMAL
UA DIPSTICK W REFLEX MICRO PNL UR: YES
URN SPEC COLLECT METH UR: ABNORMAL
UROBILINOGEN UR STRIP-ACNC: 0.2 E.U./DL
WBC # BLD AUTO: 10.9 K/UL (ref 4–11)
WBC #/AREA URNS HPF: ABNORMAL /HPF (ref 0–5)

## 2023-11-08 PROCEDURE — 83735 ASSAY OF MAGNESIUM: CPT

## 2023-11-08 PROCEDURE — 81001 URINALYSIS AUTO W/SCOPE: CPT

## 2023-11-08 PROCEDURE — 80053 COMPREHEN METABOLIC PANEL: CPT

## 2023-11-08 PROCEDURE — 84703 CHORIONIC GONADOTROPIN ASSAY: CPT

## 2023-11-08 PROCEDURE — 85025 COMPLETE CBC W/AUTO DIFF WBC: CPT

## 2023-11-08 PROCEDURE — 83690 ASSAY OF LIPASE: CPT

## 2023-11-08 PROCEDURE — 99285 EMERGENCY DEPT VISIT HI MDM: CPT

## 2023-11-08 ASSESSMENT — PAIN DESCRIPTION - ONSET: ONSET: ON-GOING

## 2023-11-08 ASSESSMENT — PAIN DESCRIPTION - PAIN TYPE: TYPE: ACUTE PAIN

## 2023-11-08 ASSESSMENT — PAIN DESCRIPTION - DESCRIPTORS: DESCRIPTORS: ACHING

## 2023-11-08 ASSESSMENT — PAIN SCALES - GENERAL: PAINLEVEL_OUTOF10: 6

## 2023-11-08 ASSESSMENT — PAIN DESCRIPTION - FREQUENCY: FREQUENCY: CONTINUOUS

## 2023-11-08 ASSESSMENT — PAIN DESCRIPTION - LOCATION: LOCATION: ABDOMEN

## 2023-11-08 ASSESSMENT — PAIN DESCRIPTION - ORIENTATION: ORIENTATION: MID;UPPER

## 2023-11-08 ASSESSMENT — PAIN - FUNCTIONAL ASSESSMENT: PAIN_FUNCTIONAL_ASSESSMENT: 0-10

## 2023-11-09 ENCOUNTER — HOSPITAL ENCOUNTER (OUTPATIENT)
Age: 43
Setting detail: OBSERVATION
Discharge: HOME | End: 2023-11-10
Attending: STUDENT IN AN ORGANIZED HEALTH CARE EDUCATION/TRAINING PROGRAM | Admitting: INTERNAL MEDICINE
Payer: COMMERCIAL

## 2023-11-09 ENCOUNTER — APPOINTMENT (OUTPATIENT)
Dept: CT IMAGING | Age: 43
End: 2023-11-09
Payer: COMMERCIAL

## 2023-11-09 ENCOUNTER — ANESTHESIA (OUTPATIENT)
Dept: ENDOSCOPY | Age: 43
End: 2023-11-09
Payer: COMMERCIAL

## 2023-11-09 ENCOUNTER — ANESTHESIA EVENT (OUTPATIENT)
Dept: ENDOSCOPY | Age: 43
End: 2023-11-09
Payer: COMMERCIAL

## 2023-11-09 DIAGNOSIS — K27.9 PUD (PEPTIC ULCER DISEASE): ICD-10-CM

## 2023-11-09 DIAGNOSIS — Z87.11 HISTORY OF PEPTIC ULCER DISEASE: ICD-10-CM

## 2023-11-09 DIAGNOSIS — R10.13 ABDOMINAL PAIN, EPIGASTRIC: Primary | ICD-10-CM

## 2023-11-09 DIAGNOSIS — R10.9 ABDOMINAL PAIN, UNSPECIFIED ABDOMINAL LOCATION: ICD-10-CM

## 2023-11-09 DIAGNOSIS — R05.9 COUGH: ICD-10-CM

## 2023-11-09 LAB
ALBUMIN SERPL-MCNC: 4.5 G/DL (ref 3.4–5)
ALP SERPL-CCNC: 61 U/L (ref 40–129)
ALT SERPL-CCNC: 58 U/L (ref 10–40)
AST SERPL-CCNC: 27 U/L (ref 15–37)
BILIRUB DIRECT SERPL-MCNC: <0.2 MG/DL (ref 0–0.3)
BILIRUB INDIRECT SERPL-MCNC: ABNORMAL MG/DL (ref 0–1)
BILIRUB SERPL-MCNC: 0.6 MG/DL (ref 0–1)
LIPASE SERPL-CCNC: 61 U/L (ref 13–60)
PROT SERPL-MCNC: 7.1 G/DL (ref 6.4–8.2)
TROPONIN, HIGH SENSITIVITY: <6 NG/L (ref 0–14)

## 2023-11-09 PROCEDURE — 84484 ASSAY OF TROPONIN QUANT: CPT

## 2023-11-09 PROCEDURE — C9113 INJ PANTOPRAZOLE SODIUM, VIA: HCPCS | Performed by: INTERNAL MEDICINE

## 2023-11-09 PROCEDURE — 96374 THER/PROPH/DIAG INJ IV PUSH: CPT

## 2023-11-09 PROCEDURE — 6360000002 HC RX W HCPCS: Performed by: STUDENT IN AN ORGANIZED HEALTH CARE EDUCATION/TRAINING PROGRAM

## 2023-11-09 PROCEDURE — 6370000000 HC RX 637 (ALT 250 FOR IP): Performed by: INTERNAL MEDICINE

## 2023-11-09 PROCEDURE — 88305 TISSUE EXAM BY PATHOLOGIST: CPT

## 2023-11-09 PROCEDURE — 2580000003 HC RX 258: Performed by: INTERNAL MEDICINE

## 2023-11-09 PROCEDURE — 6360000002 HC RX W HCPCS: Performed by: INTERNAL MEDICINE

## 2023-11-09 PROCEDURE — 2500000003 HC RX 250 WO HCPCS: Performed by: NURSE ANESTHETIST, CERTIFIED REGISTERED

## 2023-11-09 PROCEDURE — 6370000000 HC RX 637 (ALT 250 FOR IP): Performed by: STUDENT IN AN ORGANIZED HEALTH CARE EDUCATION/TRAINING PROGRAM

## 2023-11-09 PROCEDURE — 74177 CT ABD & PELVIS W/CONTRAST: CPT

## 2023-11-09 PROCEDURE — 3609012400 HC EGD TRANSORAL BIOPSY SINGLE/MULTIPLE: Performed by: INTERNAL MEDICINE

## 2023-11-09 PROCEDURE — 2709999900 HC NON-CHARGEABLE SUPPLY: Performed by: INTERNAL MEDICINE

## 2023-11-09 PROCEDURE — 83690 ASSAY OF LIPASE: CPT

## 2023-11-09 PROCEDURE — 2580000003 HC RX 258: Performed by: NURSE ANESTHETIST, CERTIFIED REGISTERED

## 2023-11-09 PROCEDURE — 80076 HEPATIC FUNCTION PANEL: CPT

## 2023-11-09 PROCEDURE — A4216 STERILE WATER/SALINE, 10 ML: HCPCS | Performed by: INTERNAL MEDICINE

## 2023-11-09 PROCEDURE — 6360000002 HC RX W HCPCS: Performed by: NURSE ANESTHETIST, CERTIFIED REGISTERED

## 2023-11-09 PROCEDURE — 6360000004 HC RX CONTRAST MEDICATION: Performed by: STUDENT IN AN ORGANIZED HEALTH CARE EDUCATION/TRAINING PROGRAM

## 2023-11-09 PROCEDURE — 96376 TX/PRO/DX INJ SAME DRUG ADON: CPT

## 2023-11-09 PROCEDURE — G0378 HOSPITAL OBSERVATION PER HR: HCPCS

## 2023-11-09 PROCEDURE — 7100000010 HC PHASE II RECOVERY - FIRST 15 MIN: Performed by: INTERNAL MEDICINE

## 2023-11-09 PROCEDURE — 3700000000 HC ANESTHESIA ATTENDED CARE: Performed by: INTERNAL MEDICINE

## 2023-11-09 PROCEDURE — 96375 TX/PRO/DX INJ NEW DRUG ADDON: CPT

## 2023-11-09 PROCEDURE — 7100000011 HC PHASE II RECOVERY - ADDTL 15 MIN: Performed by: INTERNAL MEDICINE

## 2023-11-09 RX ORDER — NICOTINE 21 MG/24HR
1 PATCH, TRANSDERMAL 24 HOURS TRANSDERMAL DAILY
Status: DISCONTINUED | OUTPATIENT
Start: 2023-11-09 | End: 2023-11-10 | Stop reason: HOSPADM

## 2023-11-09 RX ORDER — ACETAMINOPHEN 650 MG/1
650 SUPPOSITORY RECTAL EVERY 6 HOURS PRN
Status: DISCONTINUED | OUTPATIENT
Start: 2023-11-09 | End: 2023-11-10 | Stop reason: HOSPADM

## 2023-11-09 RX ORDER — LIDOCAINE HYDROCHLORIDE 20 MG/ML
INJECTION, SOLUTION INFILTRATION; PERINEURAL PRN
Status: DISCONTINUED | OUTPATIENT
Start: 2023-11-09 | End: 2023-11-09 | Stop reason: SDUPTHER

## 2023-11-09 RX ORDER — ONDANSETRON 2 MG/ML
4 INJECTION INTRAMUSCULAR; INTRAVENOUS ONCE
Status: COMPLETED | OUTPATIENT
Start: 2023-11-09 | End: 2023-11-09

## 2023-11-09 RX ORDER — HYDROMORPHONE HYDROCHLORIDE 1 MG/ML
0.5 INJECTION, SOLUTION INTRAMUSCULAR; INTRAVENOUS; SUBCUTANEOUS EVERY 4 HOURS PRN
Status: DISCONTINUED | OUTPATIENT
Start: 2023-11-09 | End: 2023-11-09

## 2023-11-09 RX ORDER — POTASSIUM CHLORIDE 7.45 MG/ML
10 INJECTION INTRAVENOUS
Status: DISPENSED | OUTPATIENT
Start: 2023-11-09 | End: 2023-11-09

## 2023-11-09 RX ORDER — OXYCODONE HYDROCHLORIDE AND ACETAMINOPHEN 5; 325 MG/1; MG/1
1 TABLET ORAL EVERY 4 HOURS PRN
Status: DISCONTINUED | OUTPATIENT
Start: 2023-11-09 | End: 2023-11-10 | Stop reason: HOSPADM

## 2023-11-09 RX ORDER — SUCRALFATE 1 G/1
1 TABLET ORAL 4 TIMES DAILY
Status: DISCONTINUED | OUTPATIENT
Start: 2023-11-09 | End: 2023-11-10 | Stop reason: HOSPADM

## 2023-11-09 RX ORDER — SODIUM CHLORIDE 0.9 % (FLUSH) 0.9 %
10 SYRINGE (ML) INJECTION EVERY 12 HOURS SCHEDULED
Status: DISCONTINUED | OUTPATIENT
Start: 2023-11-09 | End: 2023-11-10 | Stop reason: HOSPADM

## 2023-11-09 RX ORDER — HYDROMORPHONE HYDROCHLORIDE 1 MG/ML
0.5 INJECTION, SOLUTION INTRAMUSCULAR; INTRAVENOUS; SUBCUTANEOUS ONCE
Status: COMPLETED | OUTPATIENT
Start: 2023-11-09 | End: 2023-11-09

## 2023-11-09 RX ORDER — HYDROMORPHONE HYDROCHLORIDE 1 MG/ML
1 INJECTION, SOLUTION INTRAMUSCULAR; INTRAVENOUS; SUBCUTANEOUS EVERY 4 HOURS PRN
Status: DISCONTINUED | OUTPATIENT
Start: 2023-11-09 | End: 2023-11-10

## 2023-11-09 RX ORDER — ENOXAPARIN SODIUM 100 MG/ML
40 INJECTION SUBCUTANEOUS DAILY
Status: DISCONTINUED | OUTPATIENT
Start: 2023-11-09 | End: 2023-11-10 | Stop reason: HOSPADM

## 2023-11-09 RX ORDER — ACETAMINOPHEN 325 MG/1
650 TABLET ORAL EVERY 6 HOURS PRN
Status: DISCONTINUED | OUTPATIENT
Start: 2023-11-09 | End: 2023-11-10 | Stop reason: HOSPADM

## 2023-11-09 RX ORDER — MAGNESIUM SULFATE 1 G/100ML
1000 INJECTION INTRAVENOUS ONCE
Status: COMPLETED | OUTPATIENT
Start: 2023-11-09 | End: 2023-11-09

## 2023-11-09 RX ORDER — HYDROMORPHONE HYDROCHLORIDE 1 MG/ML
0.5 INJECTION, SOLUTION INTRAMUSCULAR; INTRAVENOUS; SUBCUTANEOUS EVERY 4 HOURS PRN
Status: DISCONTINUED | OUTPATIENT
Start: 2023-11-09 | End: 2023-11-10

## 2023-11-09 RX ORDER — PROPOFOL 10 MG/ML
INJECTION, EMULSION INTRAVENOUS PRN
Status: DISCONTINUED | OUTPATIENT
Start: 2023-11-09 | End: 2023-11-09 | Stop reason: SDUPTHER

## 2023-11-09 RX ORDER — SODIUM CHLORIDE, SODIUM LACTATE, POTASSIUM CHLORIDE, CALCIUM CHLORIDE 600; 310; 30; 20 MG/100ML; MG/100ML; MG/100ML; MG/100ML
INJECTION, SOLUTION INTRAVENOUS CONTINUOUS PRN
Status: DISCONTINUED | OUTPATIENT
Start: 2023-11-09 | End: 2023-11-09 | Stop reason: SDUPTHER

## 2023-11-09 RX ORDER — ALPRAZOLAM 0.25 MG/1
0.5 TABLET ORAL 2 TIMES DAILY PRN
Status: DISCONTINUED | OUTPATIENT
Start: 2023-11-09 | End: 2023-11-10 | Stop reason: HOSPADM

## 2023-11-09 RX ORDER — POTASSIUM CHLORIDE 7.45 MG/ML
10 INJECTION INTRAVENOUS PRN
Status: DISCONTINUED | OUTPATIENT
Start: 2023-11-09 | End: 2023-11-09

## 2023-11-09 RX ORDER — ONDANSETRON 2 MG/ML
4 INJECTION INTRAMUSCULAR; INTRAVENOUS EVERY 6 HOURS PRN
Status: DISCONTINUED | OUTPATIENT
Start: 2023-11-09 | End: 2023-11-10 | Stop reason: HOSPADM

## 2023-11-09 RX ORDER — SODIUM CHLORIDE 9 MG/ML
INJECTION, SOLUTION INTRAVENOUS CONTINUOUS
Status: ACTIVE | OUTPATIENT
Start: 2023-11-09 | End: 2023-11-09

## 2023-11-09 RX ORDER — PROMETHAZINE HYDROCHLORIDE 25 MG/1
12.5 TABLET ORAL EVERY 6 HOURS PRN
Status: DISCONTINUED | OUTPATIENT
Start: 2023-11-09 | End: 2023-11-10

## 2023-11-09 RX ORDER — HYDROMORPHONE HYDROCHLORIDE 1 MG/ML
1 INJECTION, SOLUTION INTRAMUSCULAR; INTRAVENOUS; SUBCUTANEOUS ONCE
Status: COMPLETED | OUTPATIENT
Start: 2023-11-09 | End: 2023-11-09

## 2023-11-09 RX ORDER — PROCHLORPERAZINE EDISYLATE 5 MG/ML
10 INJECTION INTRAMUSCULAR; INTRAVENOUS EVERY 6 HOURS PRN
Status: DISCONTINUED | OUTPATIENT
Start: 2023-11-09 | End: 2023-11-10 | Stop reason: HOSPADM

## 2023-11-09 RX ORDER — PANTOPRAZOLE SODIUM 40 MG/1
40 TABLET, DELAYED RELEASE ORAL
Status: DISCONTINUED | OUTPATIENT
Start: 2023-11-10 | End: 2023-11-10 | Stop reason: HOSPADM

## 2023-11-09 RX ORDER — HYDROMORPHONE HYDROCHLORIDE 1 MG/ML
0.25 INJECTION, SOLUTION INTRAMUSCULAR; INTRAVENOUS; SUBCUTANEOUS EVERY 4 HOURS PRN
Status: DISCONTINUED | OUTPATIENT
Start: 2023-11-09 | End: 2023-11-09

## 2023-11-09 RX ORDER — SODIUM CHLORIDE 9 MG/ML
INJECTION, SOLUTION INTRAVENOUS PRN
Status: DISCONTINUED | OUTPATIENT
Start: 2023-11-09 | End: 2023-11-10 | Stop reason: HOSPADM

## 2023-11-09 RX ORDER — MAGNESIUM SULFATE IN WATER 40 MG/ML
2000 INJECTION, SOLUTION INTRAVENOUS PRN
Status: DISCONTINUED | OUTPATIENT
Start: 2023-11-09 | End: 2023-11-09

## 2023-11-09 RX ORDER — SODIUM CHLORIDE 0.9 % (FLUSH) 0.9 %
10 SYRINGE (ML) INJECTION PRN
Status: DISCONTINUED | OUTPATIENT
Start: 2023-11-09 | End: 2023-11-10 | Stop reason: HOSPADM

## 2023-11-09 RX ADMIN — MAGNESIUM SULFATE HEPTAHYDRATE 1000 MG: 1 INJECTION, SOLUTION INTRAVENOUS at 06:56

## 2023-11-09 RX ADMIN — PROPOFOL 50 MG: 10 INJECTION, EMULSION INTRAVENOUS at 14:02

## 2023-11-09 RX ADMIN — HYDROMORPHONE HYDROCHLORIDE 0.5 MG: 1 INJECTION, SOLUTION INTRAMUSCULAR; INTRAVENOUS; SUBCUTANEOUS at 08:53

## 2023-11-09 RX ADMIN — ONDANSETRON 4 MG: 2 INJECTION INTRAMUSCULAR; INTRAVENOUS at 20:32

## 2023-11-09 RX ADMIN — HYDROMORPHONE HYDROCHLORIDE 0.5 MG: 1 INJECTION, SOLUTION INTRAMUSCULAR; INTRAVENOUS; SUBCUTANEOUS at 02:39

## 2023-11-09 RX ADMIN — IOPAMIDOL 75 ML: 755 INJECTION, SOLUTION INTRAVENOUS at 02:04

## 2023-11-09 RX ADMIN — HYDROMORPHONE HYDROCHLORIDE 1 MG: 1 INJECTION, SOLUTION INTRAMUSCULAR; INTRAVENOUS; SUBCUTANEOUS at 04:05

## 2023-11-09 RX ADMIN — SODIUM CHLORIDE 40 MG: 9 INJECTION INTRAMUSCULAR; INTRAVENOUS; SUBCUTANEOUS at 06:34

## 2023-11-09 RX ADMIN — POTASSIUM CHLORIDE 10 MEQ: 10 INJECTION, SOLUTION INTRAVENOUS at 06:39

## 2023-11-09 RX ADMIN — PROPOFOL 50 MG: 10 INJECTION, EMULSION INTRAVENOUS at 14:01

## 2023-11-09 RX ADMIN — HYDROMORPHONE HYDROCHLORIDE 1 MG: 1 INJECTION, SOLUTION INTRAMUSCULAR; INTRAVENOUS; SUBCUTANEOUS at 20:20

## 2023-11-09 RX ADMIN — ALPRAZOLAM 0.5 MG: 0.25 TABLET ORAL at 20:20

## 2023-11-09 RX ADMIN — SODIUM CHLORIDE, SODIUM LACTATE, POTASSIUM CHLORIDE, AND CALCIUM CHLORIDE: .6; .31; .03; .02 INJECTION, SOLUTION INTRAVENOUS at 13:58

## 2023-11-09 RX ADMIN — Medication 10 ML: at 20:21

## 2023-11-09 RX ADMIN — ONDANSETRON 4 MG: 2 INJECTION INTRAMUSCULAR; INTRAVENOUS at 01:22

## 2023-11-09 RX ADMIN — POTASSIUM CHLORIDE 10 MEQ: 10 INJECTION, SOLUTION INTRAVENOUS at 08:03

## 2023-11-09 RX ADMIN — LAMOTRIGINE 150 MG: 100 TABLET ORAL at 20:19

## 2023-11-09 RX ADMIN — SUCRALFATE 1 G: 1 TABLET ORAL at 20:22

## 2023-11-09 RX ADMIN — PROPOFOL 50 MG: 10 INJECTION, EMULSION INTRAVENOUS at 14:03

## 2023-11-09 RX ADMIN — ONDANSETRON 4 MG: 2 INJECTION INTRAMUSCULAR; INTRAVENOUS at 04:49

## 2023-11-09 RX ADMIN — ONDANSETRON 4 MG: 2 INJECTION INTRAMUSCULAR; INTRAVENOUS at 15:36

## 2023-11-09 RX ADMIN — LIDOCAINE HYDROCHLORIDE 50 MG: 20 INJECTION, SOLUTION INFILTRATION; PERINEURAL at 14:01

## 2023-11-09 RX ADMIN — PROCHLORPERAZINE EDISYLATE 10 MG: 5 INJECTION INTRAMUSCULAR; INTRAVENOUS at 10:55

## 2023-11-09 RX ADMIN — HYDROMORPHONE HYDROCHLORIDE 1 MG: 1 INJECTION, SOLUTION INTRAMUSCULAR; INTRAVENOUS; SUBCUTANEOUS at 15:36

## 2023-11-09 RX ADMIN — HYDROMORPHONE HYDROCHLORIDE 1 MG: 1 INJECTION, SOLUTION INTRAMUSCULAR; INTRAVENOUS; SUBCUTANEOUS at 23:58

## 2023-11-09 RX ADMIN — ALUMINUM HYDROXIDE, MAGNESIUM HYDROXIDE, AND SIMETHICONE: 200; 200; 20 SUSPENSION ORAL at 01:24

## 2023-11-09 ASSESSMENT — PAIN DESCRIPTION - DESCRIPTORS
DESCRIPTORS: ACHING;CRAMPING
DESCRIPTORS: ACHING
DESCRIPTORS: ACHING
DESCRIPTORS: THROBBING
DESCRIPTORS: ACHING
DESCRIPTORS: THROBBING

## 2023-11-09 ASSESSMENT — PAIN - FUNCTIONAL ASSESSMENT
PAIN_FUNCTIONAL_ASSESSMENT: PREVENTS OR INTERFERES SOME ACTIVE ACTIVITIES AND ADLS
PAIN_FUNCTIONAL_ASSESSMENT: 0-10
PAIN_FUNCTIONAL_ASSESSMENT: 0-10

## 2023-11-09 ASSESSMENT — PAIN SCALES - GENERAL
PAINLEVEL_OUTOF10: 6
PAINLEVEL_OUTOF10: 7
PAINLEVEL_OUTOF10: 5
PAINLEVEL_OUTOF10: 7
PAINLEVEL_OUTOF10: 6
PAINLEVEL_OUTOF10: 6
PAINLEVEL_OUTOF10: 7
PAINLEVEL_OUTOF10: 6
PAINLEVEL_OUTOF10: 6
PAINLEVEL_OUTOF10: 7
PAINLEVEL_OUTOF10: 6
PAINLEVEL_OUTOF10: 0
PAINLEVEL_OUTOF10: 7

## 2023-11-09 ASSESSMENT — PAIN DESCRIPTION - ORIENTATION
ORIENTATION: ANTERIOR
ORIENTATION: ANTERIOR
ORIENTATION: RIGHT
ORIENTATION: UPPER

## 2023-11-09 ASSESSMENT — PAIN DESCRIPTION - LOCATION
LOCATION: ABDOMEN

## 2023-11-09 ASSESSMENT — PAIN DESCRIPTION - PAIN TYPE: TYPE: ACUTE PAIN

## 2023-11-09 ASSESSMENT — PAIN DESCRIPTION - FREQUENCY: FREQUENCY: CONTINUOUS

## 2023-11-09 ASSESSMENT — PAIN DESCRIPTION - ONSET: ONSET: ON-GOING

## 2023-11-09 NOTE — ANESTHESIA POSTPROCEDURE EVALUATION
Department of Anesthesiology  Postprocedure Note    Patient: Genna Johnson  MRN: [id number]  YOB: 1980  Date of evaluation: 11/9/2023      Procedure Summary     Date: 11/09/23 Room / Location: [facility] [address]    Anesthesia Start: 9630 Anesthesia Stop: 2692    Procedure: EGD BIOPSY Diagnosis:       Abdominal pain, unspecified abdominal location      (Abdominal pain, unspecified abdominal location [R10.9])    Surgeons: Lupe Pierce MD Responsible Provider: Berkley Bui MD    Anesthesia Type: general ASA Status: 2          Anesthesia Type: No value filed. Hannah Phase I: Hannah Score: 10    Hannah Phase II: Hannah Score: 10      Anesthesia Post Evaluation    Patient location during evaluation: PACU  Patient participation: complete - patient participated  Level of consciousness: awake and alert  Airway patency: patent  Nausea & Vomiting: no nausea and no vomiting  Complications: no  Cardiovascular status: blood pressure returned to baseline  Respiratory status: acceptable  Hydration status: euvolemic  Comments: VSS on transfer to phase 2 recovery. No anesthetic complications.   Pain management: adequate

## 2023-11-09 NOTE — ED NOTES
Report given to Aria Patel on A1. No tele orders noted for admission. Patient prepped and ready for transport. All personal belongings bagged and will be sent with patient to the floor.       Twyla Bone RN  11/09/23 5459

## 2023-11-09 NOTE — CONSULTS
patch, 1 patch, TransDERmal, Daily  acetaminophen (TYLENOL) tablet 650 mg, 650 mg, Oral, Q6H PRN **OR** acetaminophen (TYLENOL) suppository 650 mg, 650 mg, Rectal, Q6H PRN  potassium chloride 10 mEq/100 mL IVPB (Peripheral Line), 10 mEq, IntraVENous, Q1H  mometasone-formoterol (DULERA) 200-5 MCG/ACT inhaler 2 puff, 2 puff, Inhalation, BID PRN  HYDROmorphone HCl PF (DILAUDID) injection 0.25 mg, 0.25 mg, IntraVENous, Q4H PRN **OR** HYDROmorphone HCl PF (DILAUDID) injection 0.5 mg, 0.5 mg, IntraVENous, Q4H PRN     Social History:   Social History       Tobacco History       Smoking Status  Former Smoking Start Date  1/1/1999 Quit Date  7/13/2021 Smoking Frequency  0.50 packs/day for 21.00 years (10.50 ttl pk-yrs)    Smoking Tobacco Type  Cigarettes from 1/1/1999 to 7/13/2021      Smokeless Tobacco Use  Never              Alcohol History       Alcohol Use Status  Yes Drinks/Week  1 Cans of beer per week Amount  1.0 standard drink of alcohol/wk Comment  social drinker              Drug Use       Drug Use Status  No              Sexual Activity       Sexually Active  Yes Partners  Male                     Family History:  Family History   Problem Relation Age of Onset    High Blood Pressure Father     Diabetes Maternal Aunt         Allergies:   Allergies   Allergen Reactions    Demerol Hives    Doxycycline Nausea And Vomiting    Lorazepam Other (See Comments)     Makes her anxiety worsen    Vancomycin Hives        ROS:   General: No fever or weight change  Hematologic: No unexpected submucosal bleeding or bruising  HEENT: No sore throat or facial pain  Respiratory: No cough or dyspnea  Cardiovascular: No angina or dependent edema  Gastrointestinal: See HPI  Musculoskeletal: No usual joint pain or stiffness  Skin: No skin eruptions or changing lesions  Neurologic: No focal weakness or numbness  Psychiatric: No anxiety or sleep disturbance    Physical Exam:  Vital Signs:   Vitals:    11/09/23 0842   BP: 117/78   Pulse: 53 Resp: 18   Temp:    SpO2: 100%       General: Well-nourished, well-developed  HEENT: Sclera anicteric, mucosal membranes moist  Cardiovascular: Regular rate and rhythm. No murmurs. Respiratory: Respirations nonlabored, no crepitus  GI: Abdomen nondistended and soft. + epigastric tenderness. Normal active bowel sounds. No masses palpable. Rectal: Deferred  Musculoskeletal: No pitting edema of the lower legs. Neurological: Gross memory appears intact. Patient is alert and oriented      Recent Imaging:   CT ABDOMEN PELVIS W IV CONTRAST Additional Contrast? None  Narrative: EXAMINATION:  CT OF THE ABDOMEN AND PELVIS WITH CONTRAST 11/9/2023 1:56 am    TECHNIQUE:  CT of the abdomen and pelvis was performed with the administration of  intravenous contrast. Multiplanar reformatted images are provided for review. Automated exposure control, iterative reconstruction, and/or weight based  adjustment of the mA/kV was utilized to reduce the radiation dose to as low  as reasonably achievable. COMPARISON:  08/01/2021    HISTORY:  ORDERING SYSTEM PROVIDED HISTORY: upper abdominal pain, hx ulcer, mildy  elevated lipase at 80s  TECHNOLOGIST PROVIDED HISTORY:  Reason for exam:->upper abdominal pain, hx ulcer, mildy elevated lipase at 80s  Additional Contrast?->None  Decision Support Exception - unselect if not a suspected or confirmed  emergency medical condition->Emergency Medical Condition (MA)  Reason for Exam: upper abd pain x 4 days  Additional signs and symptoms: vomiting  Relevant Medical/Surgical History: elevated lipase    FINDINGS:  Lower Chest: No acute findings. Organs: The liver, pancreas, spleen, adrenals and kidneys reveal no acute  findings. Cholecystectomy. Mild prominence of the biliary tree is again  noted, suggestive of reservoir effect. GI/Bowel: There is no bowel dilatation or wall thickening identified. Mild  amount of liquid stool throughout the colon. Normal appendix.     Pelvis: No acute

## 2023-11-09 NOTE — PLAN OF CARE
Problem: Pain  Goal: Verbalizes/displays adequate comfort level or baseline comfort level  Outcome: Progressing  Flowsheets (Taken 11/9/2023 1158)  Verbalizes/displays adequate comfort level or baseline comfort level:   Encourage patient to monitor pain and request assistance   Assess pain using appropriate pain scale   Administer analgesics based on type and severity of pain and evaluate response   Implement non-pharmacological measures as appropriate and evaluate response   Consider cultural and social influences on pain and pain management   Notify Licensed Independent Practitioner if interventions unsuccessful or patient reports new pain     Problem: Gastrointestinal - Adult  Goal: Minimal or absence of nausea and vomiting  Outcome: Progressing  Flowsheets (Taken 11/9/2023 1158)  Minimal or absence of nausea and vomiting:   Administer IV fluids as ordered to ensure adequate hydration   Maintain NPO status until nausea and vomiting are resolved   Administer ordered antiemetic medications as needed   Provide nonpharmacologic comfort measures as appropriate     Problem: Gastrointestinal - Adult  Goal: Maintains or returns to baseline bowel function  Outcome: Progressing  Flowsheets (Taken 11/9/2023 1158)  Maintains or returns to baseline bowel function:   Assess bowel function   Administer IV fluids as ordered to ensure adequate hydration   Encourage oral fluids to ensure adequate hydration   Administer ordered medications as needed   Nutrition consult to assist patient with appropriate food choices   Encourage mobilization and activity     Problem: Gastrointestinal - Adult  Goal: Maintains adequate nutritional intake  Outcome: Progressing  Flowsheets (Taken 11/9/2023 1158)  Maintains adequate nutritional intake:   Identify factors contributing to decreased intake, treat as appropriate   Assist with meals as needed   Obtain nutritional consult as needed

## 2023-11-09 NOTE — ED NOTES
K+ and Mg compatible.  Verified with Edilia dixon and through [address] [place] [address]  11/09/23 0031

## 2023-11-09 NOTE — CARE COORDINATION
Case Management Assessment  Initial Evaluation    Date/Time of Evaluation: 11/9/2023 10:43 AM  Assessment Completed by: Jessica Lucio RN    If patient is discharged prior to next notation, then this note serves as note for discharge by case management. Patient Name: Chris Saunders                   YOB: 1980  Diagnosis: Abdominal pain, epigastric [R10.13]  Abdominal pain [R10.9]  History of peptic ulcer disease [Z87.11]                   Date / Time: 11/9/2023 12:53 AM    Patient Admission Status: Observation   Readmission Risk (Low < 19, Mod (19-27), High > 27): No data recorded  Current PCP: DAQUAN Norton CNP  PCP verified by CM? (P) Yes    Chart Reviewed: Yes      History Provided by: (P) Patient  Patient Orientation: (P) Alert and Oriented    Patient Cognition: (P) Alert    Hospitalization in the last 30 days (Readmission):  No    If yes, Readmission Assessment in  Navigator will be completed.     Advance Directives:      Code Status: Full Code   Patient's Primary Decision Maker is: (P) Patient Declined (Legal Next of Kin Remains as Decision Maker)    Primary Decision Maker: berny crespo - Parent - [phone]    Supplemental (Other) Decision Maker: Allie Shahid - Domestic Partner - [phone]    Discharge Planning:    Patient lives with: (P) Friends Type of Home: (P) Apartment  Primary Care Giver: (P) Self  Patient Support Systems include: (P) Family Members   Current Financial resources: (P) Other (Comment) (Has employment and commerical insurance)  Current community resources: (P) None  Current services prior to admission: (P) None            Current DME:              Type of Home Care services:  (P) None    ADLS  Prior functional level: (P) Independent in ADLs/IADLs  Current functional level: (P) Independent in ADLs/IADLs    PT AM-PAC:   /24  OT AM-PAC:   /24    Family can provide assistance at DC: (P) Yes  Would you like Case Management to discuss the discharge and her family were provided with a choice of provider and agrees with the discharge plan. Freedom of choice list with basic dialogue that supports the patient's individualized plan of care/goals and shares the quality data associated with the providers was provided to:     Patient Representative Name:       The Patient and/or Patient Representative Agree with the Discharge Plan?       Lindsay Dumont RN  Case Management Department  Ph: [phone] Fax: [phone]

## 2023-11-09 NOTE — OP NOTE
[address] [place] [address],  [address] [place] [address], [address] [facility],[address]  Phone: [phone]  [address] [facility], [address] [facility]  Phone: [phone]   MNF:[phone]    EGD Procedure Note    Patient: Bradford Lin  : 1980    Procedure: Esophagogastroduodenoscopy with biopsy    Date:  2023     Endoscopist:  Reina Hightower MD    Referring Physician:  DAQUAN Mg - CNP    Preoperative Diagnosis:  Abdominal pain, epigastric [R10.13]  Abdominal pain [R10.9]  History of peptic ulcer disease [Z87.11]    Postoperative Diagnosis:  antral ulcers, gastritis    Anesthesia: Anesthesia: MAC  Sedation: Propofol per anesthesia  Start Time: 14:03  Stop Time: 14:07  ASA Class: 2  Mallampati: II (soft palate, uvula, fauces visible)    Indications: This is a 44y.o. year old female with medical history of chronic back pain, anxiety, and peptic ulcer disease admitted for epigastric pain of 5 days duration . Associated with nausea, and vomiting and diarrhea. Denies fever, chills, unintentional weight loss, constipation, diarrhea, hematochezia or melenic stools. CT abdomen/pelvis 23 noted mild liquid stool throughout colon consistent with diarrheal process. Normal pancreas and liver. .     Procedure Details  Informed consent was obtained for the procedure, including conscious sedation. Risks of pancreatitis, infection, perforation, hemorrhage, adverse drug reaction and aspiration were discussed. The patient was placed in the left lateral decubitus position. Based on the pre-procedure assessment, including review of the patient's medical history, medications, allergies, and review of systems, she had been deemed to be an appropriate candidate for the above IV sedation; she was therefore sedated with the medications listed above. She was monitored continuously with ECG tracing, pulse oximetry, blood pressure monitoring, and direct observation.  A gastroscope was inserted

## 2023-11-09 NOTE — PROGRESS NOTES
1411 Received pt from endo in stable condition. VSS. 1430 Pt tolerating po intake. MD in to discuss results with pt. following   procedure. Patient complains of abdomen pain 6/10. States she will wait until she gets back to her room for pain medication. 1507 Report given to floor RN Red Calderon. Transport here to transport patient back to room 118.

## 2023-11-09 NOTE — ED NOTES
Patient transported to the floor via wheelchair. All personal belongings sent with patient to the floor.       Karen Comer RN  11/09/23 6549

## 2023-11-09 NOTE — ANESTHESIA PRE PROCEDURE
Department of Anesthesiology  Preprocedure Note       Name:  Edwina Aguilar   Age:  44 y.o.  :  1980                                          MRN:  [id number]         Date:  2023      Surgeon: Rafael Huston):  Nadine Kaufman MD    Procedure: Procedure(s):  EGD ESOPHAGOGASTRODUODENOSCOPY    Medications prior to admission:   Prior to Admission medications    Medication Sig Start Date End Date Taking? Authorizing Provider   fluticasone-salmeterol (ADVAIR HFA) 115-21 MCG/ACT inhaler Inhale 2 puffs into the lungs 2 times daily  Patient taking differently: Inhale 2 puffs into the lungs every 6 hours as needed (SOB) 23   Lurdes Phlegm, APRN - CNP   diclofenac sodium (VOLTAREN) 1 % GEL Apply 4 g topically 4 times daily as needed for Pain 23   Johnie Maher MD   traZODone (DESYREL) 50 MG tablet TAKE 1 TABLET BY MOUTH EVERY NIGHT AT BEDTIME  Patient not taking: Reported on 2023   Laura Prater MD   pantoprazole (PROTONIX) 40 MG tablet Take 1 tablet by mouth 2 times daily 22   Vee Perera MD   ALPRAZolam Von Price) 0.5 MG tablet TAKE 1 TABLET BY MOUTH TWICE DAILY AS NEEDED 22   Laura Prater MD   zolpidem (AMBIEN) 5 MG tablet Take 1 tablet by mouth nightly as needed for Sleep.  22   Laura Prater MD   sucralfate (CARAFATE) 1 GM tablet Take 1 tablet by mouth 4 times daily  Patient taking differently: Take 1 tablet by mouth 4 times daily Patient takes only when needed 21   [employer]bin [employer], APRN - CNP   chlorhexidine (52985 [employer]) 0.12 % solution  21   Laura Prater MD   lamoTRIgine (LAMICTAL) 150 MG tablet TAKE ONE TABLET BY MOUTH TWICE DAILY 21   Laura Prater MD   Omega-3 Fatty Acids (FISH OIL) 1000 MG CAPS Take 1,000 mg by mouth every morning  Patient not taking: Reported on 2023    Laura Prater MD   propranolol (INDERAL) 10 MG tablet TAKE ONE TABLET BY MOUTH TWICE DAILY AS NEEDED 21   Josi

## 2023-11-10 VITALS
TEMPERATURE: 98.3 F | RESPIRATION RATE: 16 BRPM | HEART RATE: 64 BPM | SYSTOLIC BLOOD PRESSURE: 103 MMHG | OXYGEN SATURATION: 100 % | HEIGHT: 63 IN | WEIGHT: 165.34 LBS | DIASTOLIC BLOOD PRESSURE: 67 MMHG | BODY MASS INDEX: 29.3 KG/M2

## 2023-11-10 LAB
ALBUMIN SERPL-MCNC: 4 G/DL (ref 3.4–5)
ALBUMIN/GLOB SERPL: 1.8 {RATIO} (ref 1.1–2.2)
ALP SERPL-CCNC: 57 U/L (ref 40–129)
ALT SERPL-CCNC: 42 U/L (ref 10–40)
ANION GAP SERPL CALCULATED.3IONS-SCNC: 11 MMOL/L (ref 3–16)
AST SERPL-CCNC: 20 U/L (ref 15–37)
BASOPHILS # BLD: 0.1 K/UL (ref 0–0.2)
BASOPHILS NFR BLD: 1.3 %
BILIRUB SERPL-MCNC: 0.4 MG/DL (ref 0–1)
BUN SERPL-MCNC: 8 MG/DL (ref 7–20)
CALCIUM SERPL-MCNC: 8.6 MG/DL (ref 8.3–10.6)
CHLORIDE SERPL-SCNC: 104 MMOL/L (ref 99–110)
CO2 SERPL-SCNC: 22 MMOL/L (ref 21–32)
CREAT SERPL-MCNC: 0.7 MG/DL (ref 0.6–1.1)
DEPRECATED RDW RBC AUTO: 14.5 % (ref 12.4–15.4)
EOSINOPHIL # BLD: 0.1 K/UL (ref 0–0.6)
EOSINOPHIL NFR BLD: 2.9 %
GFR SERPLBLD CREATININE-BSD FMLA CKD-EPI: >60 ML/MIN/{1.73_M2}
GLUCOSE SERPL-MCNC: 82 MG/DL (ref 70–99)
HCT VFR BLD AUTO: 38 % (ref 36–48)
HGB BLD-MCNC: 12.7 G/DL (ref 12–16)
LYMPHOCYTES # BLD: 2.5 K/UL (ref 1–5.1)
LYMPHOCYTES NFR BLD: 52.6 %
MCH RBC QN AUTO: 30.1 PG (ref 26–34)
MCHC RBC AUTO-ENTMCNC: 33.4 G/DL (ref 31–36)
MCV RBC AUTO: 90.1 FL (ref 80–100)
MONOCYTES # BLD: 0.4 K/UL (ref 0–1.3)
MONOCYTES NFR BLD: 8 %
NEUTROPHILS # BLD: 1.7 K/UL (ref 1.7–7.7)
NEUTROPHILS NFR BLD: 35.2 %
PLATELET # BLD AUTO: 246 K/UL (ref 135–450)
PMV BLD AUTO: 8.9 FL (ref 5–10.5)
POTASSIUM SERPL-SCNC: 4.3 MMOL/L (ref 3.5–5.1)
PROT SERPL-MCNC: 6.2 G/DL (ref 6.4–8.2)
RBC # BLD AUTO: 4.22 M/UL (ref 4–5.2)
SODIUM SERPL-SCNC: 137 MMOL/L (ref 136–145)
WBC # BLD AUTO: 4.8 K/UL (ref 4–11)

## 2023-11-10 PROCEDURE — 80053 COMPREHEN METABOLIC PANEL: CPT

## 2023-11-10 PROCEDURE — 2580000003 HC RX 258: Performed by: INTERNAL MEDICINE

## 2023-11-10 PROCEDURE — 6370000000 HC RX 637 (ALT 250 FOR IP): Performed by: INTERNAL MEDICINE

## 2023-11-10 PROCEDURE — 96376 TX/PRO/DX INJ SAME DRUG ADON: CPT

## 2023-11-10 PROCEDURE — 85025 COMPLETE CBC W/AUTO DIFF WBC: CPT

## 2023-11-10 PROCEDURE — 6360000002 HC RX W HCPCS: Performed by: INTERNAL MEDICINE

## 2023-11-10 PROCEDURE — 36415 COLL VENOUS BLD VENIPUNCTURE: CPT

## 2023-11-10 PROCEDURE — 96375 TX/PRO/DX INJ NEW DRUG ADDON: CPT

## 2023-11-10 PROCEDURE — G0378 HOSPITAL OBSERVATION PER HR: HCPCS

## 2023-11-10 PROCEDURE — 96374 THER/PROPH/DIAG INJ IV PUSH: CPT

## 2023-11-10 RX ORDER — PROMETHAZINE HYDROCHLORIDE 12.5 MG/1
12.5 TABLET ORAL EVERY 6 HOURS PRN
Qty: 21 TABLET | Refills: 0 | Status: SHIPPED | OUTPATIENT
Start: 2023-11-10 | End: 2023-11-17

## 2023-11-10 RX ORDER — FLUTICASONE PROPIONATE AND SALMETEROL XINAFOATE 115; 21 UG/1; UG/1
2 AEROSOL, METERED RESPIRATORY (INHALATION) EVERY 6 HOURS PRN
COMMUNITY
Start: 2023-11-10

## 2023-11-10 RX ORDER — PROMETHAZINE HYDROCHLORIDE 25 MG/1
12.5 TABLET ORAL EVERY 6 HOURS PRN
Status: DISCONTINUED | OUTPATIENT
Start: 2023-11-10 | End: 2023-11-10 | Stop reason: HOSPADM

## 2023-11-10 RX ORDER — OXYCODONE HYDROCHLORIDE AND ACETAMINOPHEN 5; 325 MG/1; MG/1
1 TABLET ORAL EVERY 6 HOURS PRN
Qty: 20 TABLET | Refills: 0 | Status: SHIPPED | OUTPATIENT
Start: 2023-11-10 | End: 2023-11-15

## 2023-11-10 RX ADMIN — Medication 10 ML: at 07:27

## 2023-11-10 RX ADMIN — ONDANSETRON 4 MG: 2 INJECTION INTRAMUSCULAR; INTRAVENOUS at 11:51

## 2023-11-10 RX ADMIN — PROMETHAZINE HYDROCHLORIDE 12.5 MG: 25 TABLET ORAL at 13:40

## 2023-11-10 RX ADMIN — PROCHLORPERAZINE EDISYLATE 10 MG: 5 INJECTION INTRAMUSCULAR; INTRAVENOUS at 07:28

## 2023-11-10 RX ADMIN — LAMOTRIGINE 150 MG: 100 TABLET ORAL at 07:27

## 2023-11-10 RX ADMIN — ALPRAZOLAM 0.5 MG: 0.25 TABLET ORAL at 05:33

## 2023-11-10 RX ADMIN — HYDROMORPHONE HYDROCHLORIDE 1 MG: 1 INJECTION, SOLUTION INTRAMUSCULAR; INTRAVENOUS; SUBCUTANEOUS at 08:40

## 2023-11-10 RX ADMIN — SUCRALFATE 1 G: 1 TABLET ORAL at 07:27

## 2023-11-10 RX ADMIN — HYDROMORPHONE HYDROCHLORIDE 1 MG: 1 INJECTION, SOLUTION INTRAMUSCULAR; INTRAVENOUS; SUBCUTANEOUS at 04:04

## 2023-11-10 RX ADMIN — SUCRALFATE 1 G: 1 TABLET ORAL at 11:50

## 2023-11-10 RX ADMIN — PANTOPRAZOLE SODIUM 40 MG: 40 TABLET, DELAYED RELEASE ORAL at 05:31

## 2023-11-10 RX ADMIN — ONDANSETRON 4 MG: 2 INJECTION INTRAMUSCULAR; INTRAVENOUS at 00:07

## 2023-11-10 RX ADMIN — OXYCODONE AND ACETAMINOPHEN 1 TABLET: 5; 325 TABLET ORAL at 11:59

## 2023-11-10 RX ADMIN — OXYCODONE AND ACETAMINOPHEN 1 TABLET: 5; 325 TABLET ORAL at 07:33

## 2023-11-10 RX ADMIN — ONDANSETRON 4 MG: 2 INJECTION INTRAMUSCULAR; INTRAVENOUS at 05:31

## 2023-11-10 ASSESSMENT — PAIN DESCRIPTION - DESCRIPTORS
DESCRIPTORS: ACHING

## 2023-11-10 ASSESSMENT — PAIN DESCRIPTION - LOCATION
LOCATION: ABDOMEN

## 2023-11-10 ASSESSMENT — PAIN SCALES - GENERAL
PAINLEVEL_OUTOF10: 3
PAINLEVEL_OUTOF10: 6
PAINLEVEL_OUTOF10: 5
PAINLEVEL_OUTOF10: 7
PAINLEVEL_OUTOF10: 6
PAINLEVEL_OUTOF10: 5
PAINLEVEL_OUTOF10: 7
PAINLEVEL_OUTOF10: 6

## 2023-11-10 ASSESSMENT — PAIN DESCRIPTION - ORIENTATION: ORIENTATION: ANTERIOR

## 2023-11-10 NOTE — PLAN OF CARE
Problem: Discharge Planning  Goal: Discharge to home or other facility with appropriate resources  Outcome: Adequate for Discharge     Problem: Pain  Goal: Verbalizes/displays adequate comfort level or baseline comfort level  Outcome: Adequate for Discharge     Problem: Gastrointestinal - Adult  Goal: Minimal or absence of nausea and vomiting  Outcome: Adequate for Discharge  Goal: Maintains or returns to baseline bowel function  Outcome: Adequate for Discharge  Goal: Maintains adequate nutritional intake  Outcome: Adequate for Discharge

## 2023-11-10 NOTE — PROGRESS NOTES
Pt d/c'd home. Removed peripheral IV and stopped bleeding. Catheter intact. Pt tolerated well. No redness noted at site. Reviewed d/c instructions, home meds, and  f/u information utilizing teach-back method. Scripts ready for patient at outpatient pharmacy. Patient verbalized understanding. Patient left the unit in stable condition.      Francine Grider RN

## 2023-11-10 NOTE — PROGRESS NOTES
44 y.o. female who presented to [facility] with abdominal pain. PMHx significant for Peptic Ulcer Disease, Gastritis, Gastroparesis. She was hospitalized here previously in 2021 for pyloric channel ulceration into the duodenum. Attributed to NSAID use. Managed medically with PPI, Carafate, Cytotec. She now presents with worsening epigastric abdominal pain, associated with Nausea. Assessment/Plan:      Current Principal Problem:  Abdominal pain    Epigastric Abdominal Pain: CT Abd/Pelvis benign aside from likely signs of diarrhea. GI consulted. Given her history of PUD, plan for EGD.

## 2023-11-13 ENCOUNTER — TELEPHONE (OUTPATIENT)
Dept: PRIMARY CARE CLINIC | Age: 43
End: 2023-11-13

## 2023-11-13 NOTE — TELEPHONE ENCOUNTER
Care Transitions Initial Follow Up Call    Outreach made within 2 business days of discharge: Yes    Patient: Adore Palmer  [phone] (home)      Patient : 1980   MRN: [id number]    Reason for Admission: ABD Pain  Discharge Date: 11/10/23       Spoke with: patient    Discharge department/facility: [facility]    TCM Interactive Patient Contact:  Was patient able to fill all prescriptions: Yes  Was patient instructed to bring all medications to the follow-up visit: Yes  Is patient taking all medications as directed in the discharge summary? Yes  Does patient understand their discharge instructions: Yes  Does patient have questions or concerns that need addressed prior to 7-14 day follow up office visit: YES -     Patient has Oral meds that do not seem to be helping as much as the same meds given in IV form. Patient will follow up with GI instead of PCP - she does have repeat endoscopy scheduled w/ Dr. Ileana Urias on .              Karina Ornelas MA

## 2023-11-19 ENCOUNTER — APPOINTMENT (OUTPATIENT)
Dept: CT IMAGING | Age: 43
End: 2023-11-19
Payer: COMMERCIAL

## 2023-11-19 ENCOUNTER — HOSPITAL ENCOUNTER (EMERGENCY)
Age: 43
Discharge: HOME | End: 2023-11-19
Attending: STUDENT IN AN ORGANIZED HEALTH CARE EDUCATION/TRAINING PROGRAM
Payer: COMMERCIAL

## 2023-11-19 VITALS
RESPIRATION RATE: 16 BRPM | BODY MASS INDEX: 29.73 KG/M2 | HEART RATE: 71 BPM | TEMPERATURE: 97.8 F | HEIGHT: 63 IN | SYSTOLIC BLOOD PRESSURE: 102 MMHG | WEIGHT: 167.8 LBS | OXYGEN SATURATION: 96 % | DIASTOLIC BLOOD PRESSURE: 76 MMHG

## 2023-11-19 DIAGNOSIS — K25.9 GASTRIC ULCER, UNSPECIFIED CHRONICITY, UNSPECIFIED WHETHER GASTRIC ULCER HEMORRHAGE OR PERFORATION PRESENT: ICD-10-CM

## 2023-11-19 DIAGNOSIS — K52.9 GASTROENTERITIS: ICD-10-CM

## 2023-11-19 DIAGNOSIS — R11.2 NAUSEA AND VOMITING, UNSPECIFIED VOMITING TYPE: Primary | ICD-10-CM

## 2023-11-19 LAB
ALBUMIN SERPL-MCNC: 4.7 G/DL (ref 3.4–5)
ALBUMIN/GLOB SERPL: 1.6 {RATIO} (ref 1.1–2.2)
ALP SERPL-CCNC: 67 U/L (ref 40–129)
ALT SERPL-CCNC: 19 U/L (ref 10–40)
ANION GAP SERPL CALCULATED.3IONS-SCNC: 14 MMOL/L (ref 3–16)
AST SERPL-CCNC: 23 U/L (ref 15–37)
BASOPHILS # BLD: 0.1 K/UL (ref 0–0.2)
BASOPHILS NFR BLD: 0.8 %
BILIRUB SERPL-MCNC: 0.5 MG/DL (ref 0–1)
BUN SERPL-MCNC: 12 MG/DL (ref 7–20)
CALCIUM SERPL-MCNC: 9.8 MG/DL (ref 8.3–10.6)
CHLORIDE SERPL-SCNC: 101 MMOL/L (ref 99–110)
CO2 SERPL-SCNC: 23 MMOL/L (ref 21–32)
CREAT SERPL-MCNC: 0.9 MG/DL (ref 0.6–1.1)
DEPRECATED RDW RBC AUTO: 14.1 % (ref 12.4–15.4)
EOSINOPHIL # BLD: 0 K/UL (ref 0–0.6)
EOSINOPHIL NFR BLD: 0.7 %
GFR SERPLBLD CREATININE-BSD FMLA CKD-EPI: >60 ML/MIN/{1.73_M2}
GLUCOSE SERPL-MCNC: 81 MG/DL (ref 70–99)
HCT VFR BLD AUTO: 41.1 % (ref 36–48)
HGB BLD-MCNC: 13.5 G/DL (ref 12–16)
LIPASE SERPL-CCNC: 25 U/L (ref 13–60)
LYMPHOCYTES # BLD: 3 K/UL (ref 1–5.1)
LYMPHOCYTES NFR BLD: 44.8 %
MAGNESIUM SERPL-MCNC: 1.9 MG/DL (ref 1.8–2.4)
MCH RBC QN AUTO: 29.7 PG (ref 26–34)
MCHC RBC AUTO-ENTMCNC: 32.8 G/DL (ref 31–36)
MCV RBC AUTO: 90.4 FL (ref 80–100)
MONOCYTES # BLD: 0.5 K/UL (ref 0–1.3)
MONOCYTES NFR BLD: 7 %
NEUTROPHILS # BLD: 3.1 K/UL (ref 1.7–7.7)
NEUTROPHILS NFR BLD: 46.7 %
PLATELET # BLD AUTO: 247 K/UL (ref 135–450)
PMV BLD AUTO: 8.7 FL (ref 5–10.5)
POTASSIUM SERPL-SCNC: 3.3 MMOL/L (ref 3.5–5.1)
PROT SERPL-MCNC: 7.6 G/DL (ref 6.4–8.2)
RBC # BLD AUTO: 4.55 M/UL (ref 4–5.2)
SODIUM SERPL-SCNC: 138 MMOL/L (ref 136–145)
WBC # BLD AUTO: 6.7 K/UL (ref 4–11)

## 2023-11-19 PROCEDURE — 99285 EMERGENCY DEPT VISIT HI MDM: CPT

## 2023-11-19 PROCEDURE — 83690 ASSAY OF LIPASE: CPT

## 2023-11-19 PROCEDURE — 83735 ASSAY OF MAGNESIUM: CPT

## 2023-11-19 PROCEDURE — C9113 INJ PANTOPRAZOLE SODIUM, VIA: HCPCS | Performed by: STUDENT IN AN ORGANIZED HEALTH CARE EDUCATION/TRAINING PROGRAM

## 2023-11-19 PROCEDURE — 80053 COMPREHEN METABOLIC PANEL: CPT

## 2023-11-19 PROCEDURE — 96374 THER/PROPH/DIAG INJ IV PUSH: CPT

## 2023-11-19 PROCEDURE — 2580000003 HC RX 258: Performed by: STUDENT IN AN ORGANIZED HEALTH CARE EDUCATION/TRAINING PROGRAM

## 2023-11-19 PROCEDURE — 6360000004 HC RX CONTRAST MEDICATION: Performed by: STUDENT IN AN ORGANIZED HEALTH CARE EDUCATION/TRAINING PROGRAM

## 2023-11-19 PROCEDURE — 74177 CT ABD & PELVIS W/CONTRAST: CPT

## 2023-11-19 PROCEDURE — 85025 COMPLETE CBC W/AUTO DIFF WBC: CPT

## 2023-11-19 PROCEDURE — 96375 TX/PRO/DX INJ NEW DRUG ADDON: CPT

## 2023-11-19 PROCEDURE — 6360000002 HC RX W HCPCS: Performed by: STUDENT IN AN ORGANIZED HEALTH CARE EDUCATION/TRAINING PROGRAM

## 2023-11-19 RX ORDER — METOCLOPRAMIDE HYDROCHLORIDE 5 MG/ML
10 INJECTION INTRAMUSCULAR; INTRAVENOUS ONCE
Status: COMPLETED | OUTPATIENT
Start: 2023-11-19 | End: 2023-11-19

## 2023-11-19 RX ORDER — METOCLOPRAMIDE 10 MG/1
10 TABLET ORAL
Qty: 20 TABLET | Refills: 3 | Status: SHIPPED | OUTPATIENT
Start: 2023-11-19

## 2023-11-19 RX ORDER — ONDANSETRON 2 MG/ML
4 INJECTION INTRAMUSCULAR; INTRAVENOUS ONCE
Status: COMPLETED | OUTPATIENT
Start: 2023-11-19 | End: 2023-11-19

## 2023-11-19 RX ORDER — DIPHENHYDRAMINE HYDROCHLORIDE 50 MG/ML
25 INJECTION INTRAMUSCULAR; INTRAVENOUS ONCE
Status: COMPLETED | OUTPATIENT
Start: 2023-11-19 | End: 2023-11-19

## 2023-11-19 RX ORDER — DICYCLOMINE HCL 20 MG
20 TABLET ORAL 4 TIMES DAILY
Qty: 40 TABLET | Refills: 0 | Status: SHIPPED | OUTPATIENT
Start: 2023-11-19

## 2023-11-19 RX ORDER — SODIUM CHLORIDE, SODIUM LACTATE, POTASSIUM CHLORIDE, AND CALCIUM CHLORIDE .6; .31; .03; .02 G/100ML; G/100ML; G/100ML; G/100ML
1000 INJECTION, SOLUTION INTRAVENOUS ONCE
Status: COMPLETED | OUTPATIENT
Start: 2023-11-19 | End: 2023-11-19

## 2023-11-19 RX ORDER — AMOXICILLIN AND CLAVULANATE POTASSIUM 875; 125 MG/1; MG/1
1 TABLET, FILM COATED ORAL 2 TIMES DAILY
Qty: 14 TABLET | Refills: 0 | Status: SHIPPED | OUTPATIENT
Start: 2023-11-19 | End: 2023-11-26

## 2023-11-19 RX ORDER — PANTOPRAZOLE SODIUM 40 MG/10ML
40 INJECTION, POWDER, LYOPHILIZED, FOR SOLUTION INTRAVENOUS ONCE
Status: COMPLETED | OUTPATIENT
Start: 2023-11-19 | End: 2023-11-19

## 2023-11-19 RX ADMIN — IOPAMIDOL 75 ML: 755 INJECTION, SOLUTION INTRAVENOUS at 02:49

## 2023-11-19 RX ADMIN — METOCLOPRAMIDE HYDROCHLORIDE 10 MG: 5 INJECTION INTRAMUSCULAR; INTRAVENOUS at 02:42

## 2023-11-19 RX ADMIN — PANTOPRAZOLE SODIUM 40 MG: 40 INJECTION, POWDER, FOR SOLUTION INTRAVENOUS at 02:09

## 2023-11-19 RX ADMIN — ONDANSETRON 4 MG: 2 INJECTION INTRAMUSCULAR; INTRAVENOUS at 02:08

## 2023-11-19 RX ADMIN — SODIUM CHLORIDE, POTASSIUM CHLORIDE, SODIUM LACTATE AND CALCIUM CHLORIDE 1000 ML: 600; 310; 30; 20 INJECTION, SOLUTION INTRAVENOUS at 02:09

## 2023-11-19 RX ADMIN — DIPHENHYDRAMINE HYDROCHLORIDE 25 MG: 50 INJECTION INTRAMUSCULAR; INTRAVENOUS at 02:42

## 2023-11-19 ASSESSMENT — PAIN DESCRIPTION - DESCRIPTORS: DESCRIPTORS: TIGHTNESS

## 2023-11-19 ASSESSMENT — PAIN SCALES - GENERAL: PAINLEVEL_OUTOF10: 6

## 2023-11-19 ASSESSMENT — LIFESTYLE VARIABLES
HOW OFTEN DO YOU HAVE A DRINK CONTAINING ALCOHOL: NEVER
HOW MANY STANDARD DRINKS CONTAINING ALCOHOL DO YOU HAVE ON A TYPICAL DAY: PATIENT DOES NOT DRINK

## 2023-11-19 ASSESSMENT — PAIN DESCRIPTION - LOCATION: LOCATION: ABDOMEN

## 2023-11-19 ASSESSMENT — PAIN - FUNCTIONAL ASSESSMENT: PAIN_FUNCTIONAL_ASSESSMENT: 0-10

## 2023-11-19 NOTE — ED NOTES
Writer reviewed discharge instructions, medications, and follow-up with Gastroenterology; patient verbalized understanding. Patient's IV removed with no complications with dressing in place. Patient stable, ambulatory with steady gait, GCS 15, no signs/ symptoms of acute distress, respirations unlabored, and with friend. Patient discharged with documented belongings.       [place] MINH Strong  11/19/23 1060

## 2023-11-19 NOTE — ED PROVIDER NOTES
Neutrophils % 46.7 %    Lymphocytes % 44.8 %    Monocytes % 7.0 %    Eosinophils % 0.7 %    Basophils % 0.8 %    Neutrophils Absolute 3.1 1.7 - 7.7 K/uL    Lymphocytes Absolute 3.0 1.0 - 5.1 K/uL    Monocytes Absolute 0.5 0.0 - 1.3 K/uL    Eosinophils Absolute 0.0 0.0 - 0.6 K/uL    Basophils Absolute 0.1 0.0 - 0.2 K/uL   CMP w/ Reflex to MG   Result Value Ref Range    Sodium 138 136 - 145 mmol/L    Potassium reflex Magnesium 3.3 (L) 3.5 - 5.1 mmol/L    Chloride 101 99 - 110 mmol/L    CO2 23 21 - 32 mmol/L    Anion Gap 14 3 - 16    Glucose 81 70 - 99 mg/dL    BUN 12 7 - 20 mg/dL    Creatinine 0.9 0.6 - 1.1 mg/dL    Est, Glom Filt Rate >60 >60    Calcium 9.8 8.3 - 10.6 mg/dL    Total Protein 7.6 6.4 - 8.2 g/dL    Albumin 4.7 3.4 - 5.0 g/dL    Albumin/Globulin Ratio 1.6 1.1 - 2.2    Total Bilirubin 0.5 0.0 - 1.0 mg/dL    Alkaline Phosphatase 67 40 - 129 U/L    ALT 19 10 - 40 U/L    AST 23 15 - 37 U/L   Lipase   Result Value Ref Range    Lipase 25.0 13.0 - 60.0 U/L   Magnesium   Result Value Ref Range    Magnesium 1.90 1.80 - 2.40 mg/dL         - Patient seen and evaluated in room 25.  44 y.o. female presented for nausea and abdominal pain. Patient presented hypertensive but vitals otherwise normal.  On exam she had mild epigastric abdominal pain with no rebound or guarding. Given history and exam my differential diagnosis includes but is not limited to gastritis, PUD, hepatobiliary disease, pancreatitis, esophageal spasm, dehydration, pregnancy, electrolyte abnormalities. She has no rebound or guarding to suggest acute surgical abdomen. She has no urinary symptoms to suggest underlying UTI or pyelonephritis. She has no vaginal bleeding or discharge to suggest PID, torsion or ruptured cyst.  I obtain labs as noted below  - Patient was placed on telemetry during her ED stay and no malignant dysrhythmia observed. - Pertinent old records reviewed.    - Chronic medical conditions: PUD  - Social determinants: Former

## 2023-11-19 NOTE — CONSULTS
Placed call to GI at 0228  RE: Hx of PUD, followed by Dr. Arthur Blood per MD Dr. Roly Ray called back and spoke to Nela at [facility]

## 2023-11-30 DIAGNOSIS — R10.13 EPIGASTRIC PAIN: ICD-10-CM

## 2023-11-30 DIAGNOSIS — R79.89 ABNORMAL LFTS (LIVER FUNCTION TESTS): Primary | ICD-10-CM

## 2023-12-19 NOTE — BRIEF OP NOTE
Brief Postoperative Note      Patient: Gricelda Mcgill  YOB: 1980  MRN: [id number]    Date of Procedure: 11/19/2020    Pre-Op Diagnosis: Left ankle osteochondral defect and loose bodies    Post-Op Diagnosis: Same       Procedure(s):  VIDEO ARTHROSCOPY LEFT ANKLE, SYNOVECTOMY, LOOSE BODY REMOVAL, MICROFRACTURE -BLOCK-   #1 left ankle arthroscopy with extensive synovectomy and debridement, CPT 03843  #2 microfracture for osteochondral defect, CPT 01020      Surgeon(s):  Giselle Harris MD    Assistant:  Surgical Assistant: Kaycee Bazan    Anesthesia: Monitor Anesthesia Care    Estimated Blood Loss (mL): Minimal    Complications: None    Specimens:   * No specimens in log *    Implants:  * No implants in log *      Drains: * No LDAs found *    Findings: 5 loose bodies appreciated and removed, 2 of which brought out in whole and 3 of which were removed via the shaver. Large talar medial shoulder lesion debrided of loose cartilage flaps. Microfracture performed for osteochondral defect.     Electronically signed by Giselle Harris MD on 11/19/2020 at 8:25 AM HPI    DATE OF SURGERY: 12/18/2023     IMPLANT: diboo 9.5    70 y/o female present to clinic for 1 day post-op of LT eye. She states   s/x went well. She has concern of headaches, diplopia, slight pain and   feeling imbalance.     Eyemeds  PGB TID OS  Last edited by Alia Lindsay on 12/19/2023  8:38 AM.            Assessment /Plan     For exam results, see Encounter Report.    Post-operative state    Nuclear sclerosis, bilateral      Slit lamp exam:  L/L: nl  K: clear, wound sealed  AC: 1+ cell  Lens: IOL centered and stable    POD1 s/p Phaco/IOL  Appropriate precautions and post op medications reviewed.  Patient instructed to call or come in if symptoms of redness, decreased vision, or pain are experienced.    Feels dissociated... Likely from meds and optical adjustments

## 2024-02-06 ENCOUNTER — APPOINTMENT (OUTPATIENT)
Dept: MRI IMAGING | Age: 44
DRG: 103 | End: 2024-02-06
Payer: COMMERCIAL

## 2024-02-06 ENCOUNTER — APPOINTMENT (OUTPATIENT)
Dept: CT IMAGING | Age: 44
DRG: 103 | End: 2024-02-06
Payer: COMMERCIAL

## 2024-02-06 ENCOUNTER — HOSPITAL ENCOUNTER (INPATIENT)
Age: 44
LOS: 1 days | Discharge: HOME | DRG: 103 | End: 2024-02-09
Attending: EMERGENCY MEDICINE | Admitting: INTERNAL MEDICINE
Payer: COMMERCIAL

## 2024-02-06 DIAGNOSIS — M54.12 CERVICAL RADICULOPATHY: ICD-10-CM

## 2024-02-06 DIAGNOSIS — G43.901 STATUS MIGRAINOSUS: Primary | ICD-10-CM

## 2024-02-06 DIAGNOSIS — R51.9 OCCIPITAL HEADACHE: ICD-10-CM

## 2024-02-06 DIAGNOSIS — G44.009 ATYPICAL CLUSTER HEADACHE: ICD-10-CM

## 2024-02-06 DIAGNOSIS — G44.201 ACUTE INTRACTABLE TENSION-TYPE HEADACHE: ICD-10-CM

## 2024-02-06 LAB
ALBUMIN SERPL-MCNC: 4.5 G/DL (ref 3.4–5)
ALBUMIN/GLOB SERPL: 1.8 {RATIO} (ref 1.1–2.2)
ALP SERPL-CCNC: 51 U/L (ref 40–129)
ALT SERPL-CCNC: 11 U/L (ref 10–40)
ANION GAP SERPL CALCULATED.3IONS-SCNC: 11 MMOL/L (ref 3–16)
AST SERPL-CCNC: 12 U/L (ref 15–37)
BASOPHILS # BLD: 0.1 K/UL (ref 0–0.2)
BASOPHILS NFR BLD: 0.5 %
BILIRUB SERPL-MCNC: <0.2 MG/DL (ref 0–1)
BILIRUB UR QL STRIP.AUTO: NEGATIVE
BUN SERPL-MCNC: 19 MG/DL (ref 7–20)
CALCIUM SERPL-MCNC: 9.3 MG/DL (ref 8.3–10.6)
CHLORIDE SERPL-SCNC: 106 MMOL/L (ref 99–110)
CLARITY UR: CLEAR
CO2 SERPL-SCNC: 24 MMOL/L (ref 21–32)
COLOR UR: ABNORMAL
CREAT SERPL-MCNC: 0.8 MG/DL (ref 0.6–1.1)
DEPRECATED RDW RBC AUTO: 14.4 % (ref 12.4–15.4)
EOSINOPHIL # BLD: 0 K/UL (ref 0–0.6)
EOSINOPHIL NFR BLD: 0 %
EPI CELLS #/AREA URNS HPF: ABNORMAL /HPF (ref 0–5)
GFR SERPLBLD CREATININE-BSD FMLA CKD-EPI: >60 ML/MIN/{1.73_M2}
GLUCOSE SERPL-MCNC: 87 MG/DL (ref 70–99)
GLUCOSE UR STRIP.AUTO-MCNC: NEGATIVE MG/DL
HCG SERPL QL: NEGATIVE
HCT VFR BLD AUTO: 39.7 % (ref 36–48)
HGB BLD-MCNC: 13.2 G/DL (ref 12–16)
HGB UR QL STRIP.AUTO: ABNORMAL
KETONES UR STRIP.AUTO-MCNC: NEGATIVE MG/DL
LEUKOCYTE ESTERASE UR QL STRIP.AUTO: NEGATIVE
LYMPHOCYTES # BLD: 3.7 K/UL (ref 1–5.1)
LYMPHOCYTES NFR BLD: 34.8 %
MCH RBC QN AUTO: 30.3 PG (ref 26–34)
MCHC RBC AUTO-ENTMCNC: 33.2 G/DL (ref 31–36)
MCV RBC AUTO: 91.1 FL (ref 80–100)
MONOCYTES # BLD: 0.6 K/UL (ref 0–1.3)
MONOCYTES NFR BLD: 5.5 %
NEUTROPHILS # BLD: 6.3 K/UL (ref 1.7–7.7)
NEUTROPHILS NFR BLD: 59.2 %
NITRITE UR QL STRIP.AUTO: NEGATIVE
PH UR STRIP.AUTO: 6 [PH] (ref 5–8)
PLATELET # BLD AUTO: 281 K/UL (ref 135–450)
PMV BLD AUTO: 8.4 FL (ref 5–10.5)
POTASSIUM SERPL-SCNC: 3.1 MMOL/L (ref 3.5–5.1)
PROT SERPL-MCNC: 7 G/DL (ref 6.4–8.2)
PROT UR STRIP.AUTO-MCNC: NEGATIVE MG/DL
RBC # BLD AUTO: 4.36 M/UL (ref 4–5.2)
RBC #/AREA URNS HPF: ABNORMAL /HPF (ref 0–4)
SODIUM SERPL-SCNC: 141 MMOL/L (ref 136–145)
SP GR UR STRIP.AUTO: 1.02 (ref 1–1.03)
UA DIPSTICK W REFLEX MICRO PNL UR: YES
URN SPEC COLLECT METH UR: ABNORMAL
UROBILINOGEN UR STRIP-ACNC: 0.2 E.U./DL
WBC # BLD AUTO: 10.7 K/UL (ref 4–11)
WBC #/AREA URNS HPF: ABNORMAL /HPF (ref 0–5)

## 2024-02-06 PROCEDURE — 96376 TX/PRO/DX INJ SAME DRUG ADON: CPT

## 2024-02-06 PROCEDURE — 2580000003 HC RX 258: Performed by: EMERGENCY MEDICINE

## 2024-02-06 PROCEDURE — 96368 THER/DIAG CONCURRENT INF: CPT

## 2024-02-06 PROCEDURE — 96365 THER/PROPH/DIAG IV INF INIT: CPT

## 2024-02-06 PROCEDURE — 2500000003 HC RX 250 WO HCPCS: Performed by: EMERGENCY MEDICINE

## 2024-02-06 PROCEDURE — G0378 HOSPITAL OBSERVATION PER HR: HCPCS

## 2024-02-06 PROCEDURE — 96375 TX/PRO/DX INJ NEW DRUG ADDON: CPT

## 2024-02-06 PROCEDURE — 6360000002 HC RX W HCPCS: Performed by: NURSE PRACTITIONER

## 2024-02-06 PROCEDURE — 84703 CHORIONIC GONADOTROPIN ASSAY: CPT

## 2024-02-06 PROCEDURE — 96366 THER/PROPH/DIAG IV INF ADDON: CPT

## 2024-02-06 PROCEDURE — 2580000003 HC RX 258: Performed by: NURSE PRACTITIONER

## 2024-02-06 PROCEDURE — 80053 COMPREHEN METABOLIC PANEL: CPT

## 2024-02-06 PROCEDURE — 72141 MRI NECK SPINE W/O DYE: CPT

## 2024-02-06 PROCEDURE — 96367 TX/PROPH/DG ADDL SEQ IV INF: CPT

## 2024-02-06 PROCEDURE — 6360000004 HC RX CONTRAST MEDICATION: Performed by: EMERGENCY MEDICINE

## 2024-02-06 PROCEDURE — 70498 CT ANGIOGRAPHY NECK: CPT

## 2024-02-06 PROCEDURE — 6360000002 HC RX W HCPCS: Performed by: EMERGENCY MEDICINE

## 2024-02-06 PROCEDURE — 6370000000 HC RX 637 (ALT 250 FOR IP): Performed by: NURSE PRACTITIONER

## 2024-02-06 PROCEDURE — 1200000000 HC SEMI PRIVATE

## 2024-02-06 PROCEDURE — 96361 HYDRATE IV INFUSION ADD-ON: CPT

## 2024-02-06 PROCEDURE — 70450 CT HEAD/BRAIN W/O DYE: CPT

## 2024-02-06 PROCEDURE — 85025 COMPLETE CBC W/AUTO DIFF WBC: CPT

## 2024-02-06 PROCEDURE — 99285 EMERGENCY DEPT VISIT HI MDM: CPT

## 2024-02-06 PROCEDURE — 81001 URINALYSIS AUTO W/SCOPE: CPT

## 2024-02-06 PROCEDURE — 6370000000 HC RX 637 (ALT 250 FOR IP): Performed by: EMERGENCY MEDICINE

## 2024-02-06 RX ORDER — BUTALBITAL, ACETAMINOPHEN AND CAFFEINE 50; 325; 40 MG/1; MG/1; MG/1
1 TABLET ORAL ONCE
Status: COMPLETED | OUTPATIENT
Start: 2024-02-06 | End: 2024-02-06

## 2024-02-06 RX ORDER — ONDANSETRON 4 MG/1
4 TABLET, ORALLY DISINTEGRATING ORAL EVERY 8 HOURS PRN
Status: DISCONTINUED | OUTPATIENT
Start: 2024-02-06 | End: 2024-02-09 | Stop reason: HOSPADM

## 2024-02-06 RX ORDER — ACETAMINOPHEN 325 MG/1
650 TABLET ORAL EVERY 6 HOURS PRN
Status: DISCONTINUED | OUTPATIENT
Start: 2024-02-06 | End: 2024-02-09 | Stop reason: HOSPADM

## 2024-02-06 RX ORDER — NICOTINE 21 MG/24HR
1 PATCH, TRANSDERMAL 24 HOURS TRANSDERMAL DAILY
Status: DISCONTINUED | OUTPATIENT
Start: 2024-02-07 | End: 2024-02-09 | Stop reason: HOSPADM

## 2024-02-06 RX ORDER — POTASSIUM CHLORIDE 20 MEQ/1
40 TABLET, EXTENDED RELEASE ORAL PRN
Status: DISCONTINUED | OUTPATIENT
Start: 2024-02-06 | End: 2024-02-09 | Stop reason: HOSPADM

## 2024-02-06 RX ORDER — POLYETHYLENE GLYCOL 3350 17 G/17G
17 POWDER, FOR SOLUTION ORAL DAILY PRN
Status: DISCONTINUED | OUTPATIENT
Start: 2024-02-06 | End: 2024-02-09 | Stop reason: HOSPADM

## 2024-02-06 RX ORDER — METOCLOPRAMIDE HYDROCHLORIDE 5 MG/ML
10 INJECTION INTRAMUSCULAR; INTRAVENOUS ONCE
Status: COMPLETED | OUTPATIENT
Start: 2024-02-06 | End: 2024-02-06

## 2024-02-06 RX ORDER — METOCLOPRAMIDE 10 MG/1
10 TABLET ORAL
Status: DISCONTINUED | OUTPATIENT
Start: 2024-02-07 | End: 2024-02-06

## 2024-02-06 RX ORDER — FENTANYL CITRATE 50 UG/ML
50 INJECTION, SOLUTION INTRAMUSCULAR; INTRAVENOUS ONCE
Status: COMPLETED | OUTPATIENT
Start: 2024-02-06 | End: 2024-02-06

## 2024-02-06 RX ORDER — PROCHLORPERAZINE EDISYLATE 5 MG/ML
10 INJECTION INTRAMUSCULAR; INTRAVENOUS ONCE
Status: COMPLETED | OUTPATIENT
Start: 2024-02-06 | End: 2024-02-06

## 2024-02-06 RX ORDER — 0.9 % SODIUM CHLORIDE 0.9 %
1000 INTRAVENOUS SOLUTION INTRAVENOUS ONCE
Status: COMPLETED | OUTPATIENT
Start: 2024-02-06 | End: 2024-02-06

## 2024-02-06 RX ORDER — HYDROMORPHONE HYDROCHLORIDE 1 MG/ML
0.5 INJECTION, SOLUTION INTRAMUSCULAR; INTRAVENOUS; SUBCUTANEOUS
Status: DISCONTINUED | OUTPATIENT
Start: 2024-02-06 | End: 2024-02-09

## 2024-02-06 RX ORDER — DIPHENHYDRAMINE HYDROCHLORIDE 50 MG/ML
25 INJECTION INTRAMUSCULAR; INTRAVENOUS ONCE
Status: COMPLETED | OUTPATIENT
Start: 2024-02-06 | End: 2024-02-06

## 2024-02-06 RX ORDER — SODIUM CHLORIDE 9 MG/ML
INJECTION, SOLUTION INTRAVENOUS PRN
Status: DISCONTINUED | OUTPATIENT
Start: 2024-02-06 | End: 2024-02-09 | Stop reason: HOSPADM

## 2024-02-06 RX ORDER — ONDANSETRON 2 MG/ML
4 INJECTION INTRAMUSCULAR; INTRAVENOUS EVERY 6 HOURS PRN
Status: DISCONTINUED | OUTPATIENT
Start: 2024-02-06 | End: 2024-02-09 | Stop reason: HOSPADM

## 2024-02-06 RX ORDER — KETOROLAC TROMETHAMINE 30 MG/ML
15 INJECTION, SOLUTION INTRAMUSCULAR; INTRAVENOUS ONCE
Status: COMPLETED | OUTPATIENT
Start: 2024-02-06 | End: 2024-02-06

## 2024-02-06 RX ORDER — SODIUM CHLORIDE 0.9 % (FLUSH) 0.9 %
5-40 SYRINGE (ML) INJECTION PRN
Status: DISCONTINUED | OUTPATIENT
Start: 2024-02-06 | End: 2024-02-09 | Stop reason: HOSPADM

## 2024-02-06 RX ORDER — ZOLPIDEM TARTRATE 5 MG/1
5 TABLET ORAL NIGHTLY PRN
Status: DISCONTINUED | OUTPATIENT
Start: 2024-02-06 | End: 2024-02-09 | Stop reason: HOSPADM

## 2024-02-06 RX ORDER — PANTOPRAZOLE SODIUM 40 MG/1
40 TABLET, DELAYED RELEASE ORAL 2 TIMES DAILY
Status: DISCONTINUED | OUTPATIENT
Start: 2024-02-06 | End: 2024-02-09 | Stop reason: HOSPADM

## 2024-02-06 RX ORDER — SODIUM CHLORIDE 0.9 % (FLUSH) 0.9 %
5-40 SYRINGE (ML) INJECTION EVERY 12 HOURS SCHEDULED
Status: DISCONTINUED | OUTPATIENT
Start: 2024-02-06 | End: 2024-02-09 | Stop reason: HOSPADM

## 2024-02-06 RX ORDER — KETOROLAC TROMETHAMINE 30 MG/ML
30 INJECTION, SOLUTION INTRAMUSCULAR; INTRAVENOUS ONCE
Status: DISCONTINUED | OUTPATIENT
Start: 2024-02-06 | End: 2024-02-06

## 2024-02-06 RX ORDER — MORPHINE SULFATE 4 MG/ML
4 INJECTION, SOLUTION INTRAMUSCULAR; INTRAVENOUS ONCE
Status: COMPLETED | OUTPATIENT
Start: 2024-02-06 | End: 2024-02-06

## 2024-02-06 RX ORDER — DIAZEPAM 5 MG/1
5 TABLET ORAL ONCE
Status: COMPLETED | OUTPATIENT
Start: 2024-02-06 | End: 2024-02-06

## 2024-02-06 RX ORDER — MAGNESIUM SULFATE IN WATER 40 MG/ML
4000 INJECTION, SOLUTION INTRAVENOUS ONCE
Status: COMPLETED | OUTPATIENT
Start: 2024-02-06 | End: 2024-02-06

## 2024-02-06 RX ORDER — ACETAMINOPHEN 650 MG/1
650 SUPPOSITORY RECTAL EVERY 6 HOURS PRN
Status: DISCONTINUED | OUTPATIENT
Start: 2024-02-06 | End: 2024-02-09 | Stop reason: HOSPADM

## 2024-02-06 RX ORDER — OXYCODONE HYDROCHLORIDE AND ACETAMINOPHEN 5; 325 MG/1; MG/1
1 TABLET ORAL EVERY 4 HOURS PRN
Status: DISCONTINUED | OUTPATIENT
Start: 2024-02-06 | End: 2024-02-09 | Stop reason: HOSPADM

## 2024-02-06 RX ORDER — DICYCLOMINE HCL 20 MG
20 TABLET ORAL 4 TIMES DAILY
Status: DISCONTINUED | OUTPATIENT
Start: 2024-02-06 | End: 2024-02-09 | Stop reason: HOSPADM

## 2024-02-06 RX ORDER — POTASSIUM CHLORIDE 7.45 MG/ML
10 INJECTION INTRAVENOUS PRN
Status: DISCONTINUED | OUTPATIENT
Start: 2024-02-06 | End: 2024-02-09 | Stop reason: HOSPADM

## 2024-02-06 RX ORDER — ALPRAZOLAM 0.25 MG/1
0.5 TABLET ORAL 2 TIMES DAILY PRN
Status: DISCONTINUED | OUTPATIENT
Start: 2024-02-06 | End: 2024-02-09 | Stop reason: HOSPADM

## 2024-02-06 RX ORDER — SODIUM CHLORIDE 9 MG/ML
INJECTION, SOLUTION INTRAVENOUS CONTINUOUS
Status: ACTIVE | OUTPATIENT
Start: 2024-02-06 | End: 2024-02-08

## 2024-02-06 RX ORDER — MAGNESIUM SULFATE IN WATER 40 MG/ML
2000 INJECTION, SOLUTION INTRAVENOUS PRN
Status: DISCONTINUED | OUTPATIENT
Start: 2024-02-06 | End: 2024-02-09 | Stop reason: HOSPADM

## 2024-02-06 RX ADMIN — IOPAMIDOL 75 ML: 755 INJECTION, SOLUTION INTRAVENOUS at 14:04

## 2024-02-06 RX ADMIN — FENTANYL CITRATE 50 MCG: 50 INJECTION INTRAMUSCULAR; INTRAVENOUS at 13:05

## 2024-02-06 RX ADMIN — MAGNESIUM SULFATE IN WATER FOR 4000 MG: 40 INJECTION INTRAVENOUS at 17:56

## 2024-02-06 RX ADMIN — MORPHINE SULFATE 4 MG: 4 INJECTION, SOLUTION INTRAMUSCULAR; INTRAVENOUS at 15:05

## 2024-02-06 RX ADMIN — SODIUM CHLORIDE: 9 INJECTION, SOLUTION INTRAVENOUS at 22:43

## 2024-02-06 RX ADMIN — SODIUM CHLORIDE 1000 ML: 9 INJECTION, SOLUTION INTRAVENOUS at 13:04

## 2024-02-06 RX ADMIN — PANTOPRAZOLE SODIUM 40 MG: 40 TABLET, DELAYED RELEASE ORAL at 22:44

## 2024-02-06 RX ADMIN — PROCHLORPERAZINE EDISYLATE 10 MG: 5 INJECTION INTRAMUSCULAR; INTRAVENOUS at 20:45

## 2024-02-06 RX ADMIN — LAMOTRIGINE 150 MG: 100 TABLET ORAL at 22:44

## 2024-02-06 RX ADMIN — KETOROLAC TROMETHAMINE 15 MG: 30 INJECTION INTRAMUSCULAR; INTRAVENOUS at 17:38

## 2024-02-06 RX ADMIN — KETOROLAC TROMETHAMINE 15 MG: 30 INJECTION, SOLUTION INTRAMUSCULAR; INTRAVENOUS at 20:45

## 2024-02-06 RX ADMIN — BUTALBITAL, ACETAMINOPHEN AND CAFFEINE 1 TABLET: 325; 50; 40 TABLET ORAL at 23:16

## 2024-02-06 RX ADMIN — DEXAMETHASONE SODIUM PHOSPHATE 12 MG: 4 INJECTION, SOLUTION INTRAMUSCULAR; INTRAVENOUS at 17:39

## 2024-02-06 RX ADMIN — VALPROATE SODIUM 500 MG: 100 INJECTION, SOLUTION INTRAVENOUS at 18:11

## 2024-02-06 RX ADMIN — DIPHENHYDRAMINE HYDROCHLORIDE 25 MG: 50 INJECTION INTRAMUSCULAR; INTRAVENOUS at 13:05

## 2024-02-06 RX ADMIN — DIPHENHYDRAMINE HYDROCHLORIDE 25 MG: 50 INJECTION INTRAMUSCULAR; INTRAVENOUS at 20:45

## 2024-02-06 RX ADMIN — SODIUM CHLORIDE, PRESERVATIVE FREE 10 ML: 5 INJECTION INTRAVENOUS at 22:44

## 2024-02-06 RX ADMIN — DIAZEPAM 5 MG: 5 TABLET ORAL at 20:44

## 2024-02-06 RX ADMIN — HYDROMORPHONE HYDROCHLORIDE 0.5 MG: 1 INJECTION, SOLUTION INTRAMUSCULAR; INTRAVENOUS; SUBCUTANEOUS at 22:43

## 2024-02-06 RX ADMIN — METOCLOPRAMIDE HYDROCHLORIDE 10 MG: 5 INJECTION INTRAMUSCULAR; INTRAVENOUS at 13:05

## 2024-02-06 ASSESSMENT — PAIN - FUNCTIONAL ASSESSMENT
PAIN_FUNCTIONAL_ASSESSMENT: 0-10
PAIN_FUNCTIONAL_ASSESSMENT: ACTIVITIES ARE NOT PREVENTED

## 2024-02-06 ASSESSMENT — PAIN DESCRIPTION - DIRECTION: RADIATING_TOWARDS: POSTERIOR NECK

## 2024-02-06 ASSESSMENT — PAIN SCALES - GENERAL
PAINLEVEL_OUTOF10: 7
PAINLEVEL_OUTOF10: 7
PAINLEVEL_OUTOF10: 6
PAINLEVEL_OUTOF10: 6
PAINLEVEL_OUTOF10: 4
PAINLEVEL_OUTOF10: 6

## 2024-02-06 ASSESSMENT — PAIN DESCRIPTION - LOCATION
LOCATION: HEAD;NECK
LOCATION: HEAD
LOCATION: HEAD
LOCATION: NECK
LOCATION: HEAD

## 2024-02-06 ASSESSMENT — PAIN DESCRIPTION - PAIN TYPE: TYPE: ACUTE PAIN

## 2024-02-06 ASSESSMENT — PAIN DESCRIPTION - FREQUENCY: FREQUENCY: CONTINUOUS

## 2024-02-06 ASSESSMENT — PAIN DESCRIPTION - ORIENTATION
ORIENTATION: MID

## 2024-02-06 ASSESSMENT — PAIN DESCRIPTION - DESCRIPTORS
DESCRIPTORS: THROBBING
DESCRIPTORS: ACHING

## 2024-02-06 NOTE — ED PROVIDER NOTES
[facility]  ED     EMERGENCY DEPARTMENT ENCOUNTER            Pt Name: Arlette Kate   MRN: [id number]   Birthdate 1980   Date of evaluation: 2/6/2024   Provider: Lex Fuentes II, DO   PCP: Christal Landaverde APRN - CNP   Note Started: 3:10 PM EST 2/6/24          CHIEF COMPLAINT     Chief Complaint   Patient presents with    Headache     Headache for a week.  Pt had spinal injection yesterday and immediately after patient now having neck pain/ headache.   Patient states she has been taking \"more ibuprofen than she should\" and has hx of ulcers and isn't supposed to take ibuprofen so wants blood work to make sure everything ok,             HISTORY OF PRESENT ILLNESS:   History from : Patient   Limitations to history : None     Arlette Kate is a 43 y.o. female who presents to the emergency department for neck pain and headache.  Patient reports known cervical and lumbar radiculopathy.  She states that over the last week she has had increasing neck pain/headache and had a cervical injection completed yesterday by pain management.  Patient states that pain has been worse today and spite of Norco and ibuprofen.  Pain radiates to the right upper chest from the cervical spine.  Patient denies muscle weakness or paresthesias.  No fevers, chills, or sweats.    Nursing Notes were all reviewed and agreed with, or any disagreements were addressed in the HPI.     REVIEW OF SYSTEMS :    Positives and Pertinent negatives as per HPI.      MEDICAL HISTORY   has a past medical history of Abnormal Pap smear, Anxiety, Back pain, Back pain, COVID (12/2021), Placenta previa, and Tubal pregnancy.    Past Surgical History:   Procedure Laterality Date    ANESTHESIA NERVE BLOCK Right 5/21/2020    RIGHT HIP INJECTION SITE CONFIRMED BY FLUOROSCOPY performed by Satya Garcia MD at [facility] OR    ANKLE ARTHROSCOPY Left 11/19/2020    VIDEO ARTHROSCOPY LEFT ANKLE, SYNOVECTOMY, LOOSE BODY REMOVAL,  MICROFRACTURE -BLOCK- performed by Pk Valerio MD at [facility] OR    ANKLE FRACTURE SURGERY Right     CHOLECYSTECTOMY      laparoscopic    ECTOPIC PREGNANCY SURGERY  10/2017    LAPAROSCOPY  4/20/10    with salpingostomy  ( ectopic pregnancy)    LUMBAR DISC SURGERY      LUMBAR FUSION  3-2011    L3-L5 fusion    TONSILLECTOMY  2000    UPPER GASTROINTESTINAL ENDOSCOPY N/A 7/27/2021    EGD BIOPSY performed by Glenn Rock MD at [facility] ENDOSCOPY    UPPER GASTROINTESTINAL ENDOSCOPY N/A 8/16/2021    EGD BIOPSY performed by Ezequiel Zaman MD at [facility] ENDOSCOPY    UPPER GASTROINTESTINAL ENDOSCOPY N/A 11/9/2023    EGD BIOPSY performed by Juan Pompa MD at [facility] ENDOSCOPY    WISDOM TOOTH EXTRACTION  1998      CURRENTMEDICATIONS       Previous Medications    ALPRAZOLAM (XANAX) 0.5 MG TABLET    TAKE 1 TABLET BY MOUTH TWICE DAILY AS NEEDED    DICLOFENAC SODIUM (VOLTAREN) 1 % GEL    Apply 4 g topically 4 times daily as needed for Pain    DICYCLOMINE (BENTYL) 20 MG TABLET    Take 1 tablet by mouth 4 times daily    FLUTICASONE-SALMETEROL (ADVAIR HFA) 115-21 MCG/ACT INHALER    Inhale 2 puffs into the lungs every 6 hours as needed (SOB)    LAMOTRIGINE (LAMICTAL) 150 MG TABLET    TAKE ONE TABLET BY MOUTH TWICE DAILY    METOCLOPRAMIDE (REGLAN) 10 MG TABLET    Take 1 tablet by mouth 3 times daily (with meals)    PANTOPRAZOLE (PROTONIX) 40 MG TABLET    Take 1 tablet by mouth 2 times daily    PROPRANOLOL (INDERAL) 10 MG TABLET    TAKE ONE TABLET BY MOUTH TWICE DAILY AS NEEDED    SUCRALFATE (CARAFATE) 1 GM TABLET    Take 1 tablet by mouth 4 times daily    ZOLPIDEM (AMBIEN) 5 MG TABLET    Take 1 tablet by mouth nightly as needed for Sleep.      SCREENINGS          Morton Coma Scale  Eye Opening: Spontaneous  Best Verbal Response: Oriented  Best Motor Response: Obeys commands  Keiko Coma Scale Score: 15                CIWA Assessment  BP: (!) 137/95  Pulse: 54                  PHYSICAL EXAM :  ED Triage Vitals [02/06/24

## 2024-02-06 NOTE — ED PROVIDER NOTES
Patient was signed out to me by the outgoing physician at 1500. Please see their note for the initial H&P, physical exam, medical decision making and management. Patient signed out pending MRI c spine, re-evaluation    Brief HPI:    This is a 43-year-old female presenting for evaluation of headache.  This headache has been intermittent for the past 1 week.  She did have a spinal injection yesterday and developed neck pain and headache since that time.  She has been taking ibuprofen without significant improvement.      ED COURSE/MDM  Patient seen and evaluated. Old records reviewed. Labs and imaging reviewed and results discussed with patient. Patient was ordered CT head, CTA of the head and neck which shows no acute intracranial abnormality.  She is given fentanyl, Benadryl, Reglan, IV fluids, morphine without significant improvement of her headache.  Because of this recent spinal injection, MRI of the cervical spine was ordered to rule out epidural abscess/hematoma development to explain the worsening of her headache.  This ultimately is unremarkable for acute abnormality.      Patient will be ordered Depakote 500 mg IV, magnesium, Decadron and Toradol to help with the continued headache.  She was agreeable with this plan.  She continues to have no focal neurologic abnormality.    On repeat evaluation, patient reports that her headache has come back.  She has been given additional Toradol, Valium as well as Compazine.  She has had persistent migraine, she essentially has status migrainosus at this time and I have recommended admission for continued management evaluation.  She is agreeable with this plan    Plan of care discussed with patient and family. Patient and family in agreement with plan.     Patient was given scripts for the following medications. I counseled patient how to take these medications.   New Prescriptions    No medications on file       CLINICAL IMPRESSION  1. Status migrainosus    2. Atypical

## 2024-02-07 LAB
ANION GAP SERPL CALCULATED.3IONS-SCNC: 10 MMOL/L (ref 3–16)
BUN SERPL-MCNC: 12 MG/DL (ref 7–20)
CALCIUM SERPL-MCNC: 8.4 MG/DL (ref 8.3–10.6)
CHLORIDE SERPL-SCNC: 104 MMOL/L (ref 99–110)
CO2 SERPL-SCNC: 25 MMOL/L (ref 21–32)
CREAT SERPL-MCNC: 0.6 MG/DL (ref 0.6–1.1)
GFR SERPLBLD CREATININE-BSD FMLA CKD-EPI: >60 ML/MIN/{1.73_M2}
GLUCOSE SERPL-MCNC: 109 MG/DL (ref 70–99)
POTASSIUM SERPL-SCNC: 4.3 MMOL/L (ref 3.5–5.1)
SODIUM SERPL-SCNC: 139 MMOL/L (ref 136–145)

## 2024-02-07 PROCEDURE — 99223 1ST HOSP IP/OBS HIGH 75: CPT | Performed by: PSYCHIATRY & NEUROLOGY

## 2024-02-07 PROCEDURE — 6370000000 HC RX 637 (ALT 250 FOR IP): Performed by: INTERNAL MEDICINE

## 2024-02-07 PROCEDURE — 96376 TX/PRO/DX INJ SAME DRUG ADON: CPT

## 2024-02-07 PROCEDURE — 80048 BASIC METABOLIC PNL TOTAL CA: CPT

## 2024-02-07 PROCEDURE — 6370000000 HC RX 637 (ALT 250 FOR IP): Performed by: PSYCHIATRY & NEUROLOGY

## 2024-02-07 PROCEDURE — G0378 HOSPITAL OBSERVATION PER HR: HCPCS

## 2024-02-07 PROCEDURE — 6360000002 HC RX W HCPCS: Performed by: NURSE PRACTITIONER

## 2024-02-07 PROCEDURE — 36415 COLL VENOUS BLD VENIPUNCTURE: CPT

## 2024-02-07 PROCEDURE — 2580000003 HC RX 258: Performed by: NURSE PRACTITIONER

## 2024-02-07 PROCEDURE — 1200000000 HC SEMI PRIVATE

## 2024-02-07 PROCEDURE — 6370000000 HC RX 637 (ALT 250 FOR IP): Performed by: NURSE PRACTITIONER

## 2024-02-07 RX ORDER — DIPHENHYDRAMINE HCL 25 MG
25 TABLET ORAL EVERY 6 HOURS PRN
Status: DISCONTINUED | OUTPATIENT
Start: 2024-02-07 | End: 2024-02-09 | Stop reason: HOSPADM

## 2024-02-07 RX ORDER — BUTALBITAL, ACETAMINOPHEN AND CAFFEINE 50; 325; 40 MG/1; MG/1; MG/1
1 TABLET ORAL EVERY 6 HOURS PRN
Status: DISCONTINUED | OUTPATIENT
Start: 2024-02-07 | End: 2024-02-09 | Stop reason: HOSPADM

## 2024-02-07 RX ORDER — CARBAMAZEPINE 100 MG/1
100 TABLET, EXTENDED RELEASE ORAL 2 TIMES DAILY
Status: DISCONTINUED | OUTPATIENT
Start: 2024-02-07 | End: 2024-02-09 | Stop reason: HOSPADM

## 2024-02-07 RX ORDER — BUTALBITAL, ACETAMINOPHEN AND CAFFEINE 50; 325; 40 MG/1; MG/1; MG/1
1 TABLET ORAL ONCE
Status: COMPLETED | OUTPATIENT
Start: 2024-02-07 | End: 2024-02-07

## 2024-02-07 RX ADMIN — BUTALBITAL, ACETAMINOPHEN AND CAFFEINE 1 TABLET: 325; 50; 40 TABLET ORAL at 10:46

## 2024-02-07 RX ADMIN — DICYCLOMINE HYDROCHLORIDE 20 MG: 20 TABLET ORAL at 16:16

## 2024-02-07 RX ADMIN — DICYCLOMINE HYDROCHLORIDE 20 MG: 20 TABLET ORAL at 11:45

## 2024-02-07 RX ADMIN — ZOLPIDEM TARTRATE 5 MG: 5 TABLET ORAL at 20:27

## 2024-02-07 RX ADMIN — PANTOPRAZOLE SODIUM 40 MG: 40 TABLET, DELAYED RELEASE ORAL at 20:27

## 2024-02-07 RX ADMIN — BUTALBITAL, ACETAMINOPHEN AND CAFFEINE 1 TABLET: 325; 50; 40 TABLET ORAL at 16:16

## 2024-02-07 RX ADMIN — HYDROMORPHONE HYDROCHLORIDE 0.5 MG: 1 INJECTION, SOLUTION INTRAMUSCULAR; INTRAVENOUS; SUBCUTANEOUS at 07:21

## 2024-02-07 RX ADMIN — DIPHENHYDRAMINE HYDROCHLORIDE 25 MG: 25 TABLET ORAL at 22:21

## 2024-02-07 RX ADMIN — DICYCLOMINE HYDROCHLORIDE 20 MG: 20 TABLET ORAL at 20:27

## 2024-02-07 RX ADMIN — BUTALBITAL, ACETAMINOPHEN AND CAFFEINE 1 TABLET: 325; 50; 40 TABLET ORAL at 03:14

## 2024-02-07 RX ADMIN — HYDROMORPHONE HYDROCHLORIDE 0.5 MG: 1 INJECTION, SOLUTION INTRAMUSCULAR; INTRAVENOUS; SUBCUTANEOUS at 16:17

## 2024-02-07 RX ADMIN — SODIUM CHLORIDE: 9 INJECTION, SOLUTION INTRAVENOUS at 10:48

## 2024-02-07 RX ADMIN — OXYCODONE HYDROCHLORIDE AND ACETAMINOPHEN 1 TABLET: 5; 325 TABLET ORAL at 20:36

## 2024-02-07 RX ADMIN — SODIUM CHLORIDE, PRESERVATIVE FREE 10 ML: 5 INJECTION INTRAVENOUS at 08:17

## 2024-02-07 RX ADMIN — OXYCODONE HYDROCHLORIDE AND ACETAMINOPHEN 1 TABLET: 5; 325 TABLET ORAL at 14:30

## 2024-02-07 RX ADMIN — DICYCLOMINE HYDROCHLORIDE 20 MG: 20 TABLET ORAL at 00:26

## 2024-02-07 RX ADMIN — HYDROMORPHONE HYDROCHLORIDE 0.5 MG: 1 INJECTION, SOLUTION INTRAMUSCULAR; INTRAVENOUS; SUBCUTANEOUS at 19:21

## 2024-02-07 RX ADMIN — ZOLPIDEM TARTRATE 5 MG: 5 TABLET ORAL at 00:26

## 2024-02-07 RX ADMIN — HYDROMORPHONE HYDROCHLORIDE 0.5 MG: 1 INJECTION, SOLUTION INTRAMUSCULAR; INTRAVENOUS; SUBCUTANEOUS at 22:40

## 2024-02-07 RX ADMIN — DICYCLOMINE HYDROCHLORIDE 20 MG: 20 TABLET ORAL at 08:16

## 2024-02-07 RX ADMIN — CARBAMAZEPINE 100 MG: 100 TABLET, EXTENDED RELEASE ORAL at 11:55

## 2024-02-07 RX ADMIN — HYDROMORPHONE HYDROCHLORIDE 0.5 MG: 1 INJECTION, SOLUTION INTRAMUSCULAR; INTRAVENOUS; SUBCUTANEOUS at 03:14

## 2024-02-07 RX ADMIN — BUTALBITAL, ACETAMINOPHEN AND CAFFEINE 1 TABLET: 325; 50; 40 TABLET ORAL at 22:21

## 2024-02-07 RX ADMIN — LAMOTRIGINE 150 MG: 100 TABLET ORAL at 20:29

## 2024-02-07 RX ADMIN — ALPRAZOLAM 0.5 MG: 0.25 TABLET ORAL at 20:27

## 2024-02-07 RX ADMIN — LAMOTRIGINE 150 MG: 100 TABLET ORAL at 08:16

## 2024-02-07 RX ADMIN — OXYCODONE HYDROCHLORIDE AND ACETAMINOPHEN 1 TABLET: 5; 325 TABLET ORAL at 00:26

## 2024-02-07 RX ADMIN — OXYCODONE HYDROCHLORIDE AND ACETAMINOPHEN 1 TABLET: 5; 325 TABLET ORAL at 05:09

## 2024-02-07 RX ADMIN — OXYCODONE HYDROCHLORIDE AND ACETAMINOPHEN 1 TABLET: 5; 325 TABLET ORAL at 09:43

## 2024-02-07 RX ADMIN — HYDROMORPHONE HYDROCHLORIDE 0.5 MG: 1 INJECTION, SOLUTION INTRAMUSCULAR; INTRAVENOUS; SUBCUTANEOUS at 12:41

## 2024-02-07 RX ADMIN — PANTOPRAZOLE SODIUM 40 MG: 40 TABLET, DELAYED RELEASE ORAL at 08:16

## 2024-02-07 RX ADMIN — CARBAMAZEPINE 100 MG: 100 TABLET, EXTENDED RELEASE ORAL at 21:51

## 2024-02-07 ASSESSMENT — PAIN SCALES - GENERAL
PAINLEVEL_OUTOF10: 6
PAINLEVEL_OUTOF10: 6
PAINLEVEL_OUTOF10: 9
PAINLEVEL_OUTOF10: 6
PAINLEVEL_OUTOF10: 8
PAINLEVEL_OUTOF10: 6
PAINLEVEL_OUTOF10: 5
PAINLEVEL_OUTOF10: 10
PAINLEVEL_OUTOF10: 6
PAINLEVEL_OUTOF10: 8
PAINLEVEL_OUTOF10: 5
PAINLEVEL_OUTOF10: 10
PAINLEVEL_OUTOF10: 5
PAINLEVEL_OUTOF10: 5
PAINLEVEL_OUTOF10: 6
PAINLEVEL_OUTOF10: 6
PAINLEVEL_OUTOF10: 5
PAINLEVEL_OUTOF10: 5
PAINLEVEL_OUTOF10: 8

## 2024-02-07 ASSESSMENT — PAIN DESCRIPTION - LOCATION
LOCATION: HEAD

## 2024-02-07 NOTE — CARE COORDINATION
Chart reviewed, pt in Observation.  Pt from home, has family support, has PCP, has insurance.  No needs noted prior to admission and no discharge needs noted at this time.  Please notify CM if needs arise.  MARILUZ Strong-MINH

## 2024-02-07 NOTE — PROGRESS NOTES
Arrival from ED to room 118 via wheelchair safely and in stable condition. VSS - afebrile. Pt is alert and oriented x 4 with no history of falls. Assessment completed as charted. Bed is in lowest position with 2/4 bed rails raised, wheels locked and call light within reach - patient wearing non-skid socks and verbalizes understanding to call out for assistance. No further requests at this time. Will continue to monitor.     Vitals:    02/06/24 2227   BP: (!) 154/93   Pulse: 71   Resp: 18   Temp: 98.2 °F (36.8 °C)   SpO2: 96%

## 2024-02-07 NOTE — PROGRESS NOTES
Shift assessment completed and charted. VSS. A&OX4. Pain noted per pt, prn meds given, see MAR. Bed locked and in lowest position. Call light within reach. Pt denies any other needs at this time. Will continue to monitor.

## 2024-02-07 NOTE — PROGRESS NOTES
NP PAGE via [employer]:     Pt said the fioricet I gave her at 8976 worked well and is requesting more. Please advise. Thank you!

## 2024-02-07 NOTE — PROGRESS NOTES
NP PAGE via [employer]:     Pt received several doses of 25mg IV benadryl in ED and says that helped her pain and to relax. Requesting this medication. Please advise. Thank you!

## 2024-02-07 NOTE — PROGRESS NOTES
NP PAGE via [employer]:     Pt here with headache. May we order fioricet for relief? Please advise. Thank you!

## 2024-02-07 NOTE — H&P
Hospital Medicine History & Physical        Date of Service: 02/06/2024    Time of Service: 2040    Disposition:    []Admitted to inpatient status with expected LOS greater than two midnights due to medical therapy.  [x]Placed in observation status.    Historian: Self/patient, chart review, Care Everywhere, ED documentation    Chief Admission Complaint: Posterior headache x 1 day    Presenting Admission History:      Arlette Kate is a/an 43 y.o. female with a significant past medical history of anxiety and chronic back pain who presents to [facility]'s emergency department with complaint of ongoing headache over the last week, posterior in location, radiates up the back of her head and also down her neck into the left frontal pectoral region.  She states has been ongoing about 7 days, average pain 7 out of 10, was seen by pain specialist, had a cervical medial branch block, then felt pain substantially increased to 10 out of 10.  She denies any numbness or loss of function in either extremity, no slurred speech, no facial paralysis, no vision changes.  Her evaluation emergency room included laboratory studies, CT of the head, and CT of the head and neck, and MRI C-spine.  All radiographs were negative for any acute findings.  Patient received multiple medications in the emergency department to include pain medications, anti-inflammatories, and Reglan IV for headache termination.  Hospital team was consulted to admit this patient for intractable headache.      Assessment/Plan:      Current Principal Problem:  Intractable headache, unspecified chronicity pattern, unspecified headache type    Intractable Headache  - Place in observation  - Neurology consult placed  - Continue pain control overnight  - All imaging including MRI c-spine negative; defer further imaging to Neurology team    Chronic Pain  - Continue home pain regimen    Discussed management of the case in detail with the emergency  abnormality of the salivary and thyroid glands. BONES: No acute osseous abnormality.  Mild multilevel bony cervical spondylosis. CTA HEAD: ANTERIOR CIRCULATION: No significant stenosis of the intracranial internal carotid, anterior cerebral, or middle cerebral arteries.  Bilateral posterior communicating arteries are present, anatomic variant.  No aneurysm. POSTERIOR CIRCULATION: No significant stenosis of the vertebral, basilar, or posterior cerebral arteries. No aneurysm. OTHER: No dural venous sinus thrombosis on this non-dedicated study. BRAIN: No mass effect or midline shift. No extra-axial fluid collection. The gray-white differentiation is maintained.     Unremarkable CTA of the head and neck.     CT HEAD WO CONTRAST    Result Date: 2/6/2024  EXAMINATION: CT OF THE HEAD WITHOUT CONTRAST  2/6/2024 1:51 pm TECHNIQUE: CT of the head was performed without the administration of intravenous contrast. Automated exposure control, iterative reconstruction, and/or weight based adjustment of the mA/kV was utilized to reduce the radiation dose to as low as reasonably achievable. COMPARISON: February 28, 2020 HISTORY: ORDERING SYSTEM PROVIDED HISTORY: atypical headache TECHNOLOGIST PROVIDED HISTORY: Reason for exam:->atypical headache Has a \"code stroke\" or \"stroke alert\" been called?->No Decision Support Exception - unselect if not a suspected or confirmed emergency medical condition->Emergency Medical Condition (MA) Reason for Exam: s/p cervical injection yesterday-c/o severe posterior h/a FINDINGS: BRAIN/VENTRICLES: There is no acute intracranial hemorrhage, mass effect or midline shift.  No abnormal extra-axial fluid collection.  The gray-white differentiation is maintained without evidence of an acute infarct.  There is no evidence of hydrocephalus. ORBITS: The visualized portion of the orbits demonstrate no acute abnormality. SINUSES: The visualized paranasal sinuses and mastoid air cells demonstrate no acute

## 2024-02-07 NOTE — PROGRESS NOTES
Hospital Medicine Progress Note      Date of Admission: 2/6/2024  Hospital Day: 2    Chief Admission Complaint: Headache     Subjective: Patient complaining of headache.  She states its helped some with the pain medication but it still severe.  Patient states her scalp is tender    Presenting Admission History:       Arlette Kate is a/an 43 y.o. female with a significant past medical history of anxiety and chronic back pain who presents to [facility]'s emergency department with complaint of ongoing headache over the last week, posterior in location, radiates up the back of her head and also down her neck into the left frontal pectoral region.  She states has been ongoing about 7 days, average pain 7 out of 10, was seen by pain specialist, had a cervical medial branch block, then felt pain substantially increased to 10 out of 10.  She denies any numbness or loss of function in either extremity, no slurred speech, no facial paralysis, no vision changes.  Her evaluation emergency room included laboratory studies, CT of the head, and CT of the head and neck, and MRI C-spine.  All radiographs were negative for any acute findings.  Patient received multiple medications in the emergency department to include pain medications, anti-inflammatories, and Reglan IV for headache termination.  Hospital team was consulted to admit this patient for intractable headache.    Assessment/Plan:      Current Principal Problem:  Intractable headache, unspecified chronicity pattern, unspecified headache type    1.  Intractable headache versus migraine.  Other possibilities include shingles.  Appreciate neurology input.  Will continue pain medication including Fioricet.  Will try to wean off tomorrow.  2.  Chronic pain.  Patient does see a pain specialist.  She did undergo cervical injections.  She will follow-up with her pain specialist on discharge.    Physical Exam Performed:      General appearance:  No apparent

## 2024-02-07 NOTE — CONSULTS
Neurology consultation note    Patient name: Arlette Kate      Chief Complaint:  Intractable headache.    History of present illness:  This is a 43 years old right-handed female.  The patient was brought to ED overnight due to new onset of severe headache.  The headache started about 7 days ago.  The patient described the headache as sharp pain over the left side of her neck radiating to the back of her head and her chest.  The patient denies significant nauseous, phonophobia or photophobia with the headache.  The patient also denies focal weakness or numbness elsewhere.  The patient was seen by pain specialist and received cervical nerve block prior to admission.  Unfortunately, the patient has been worse after the nerve block.    Past medical history:    Past Medical History:   Diagnosis Date    Abnormal Pap smear     WNL since    Anxiety     Back pain     herniated disc    Back pain     had back surgery (fusion of 3 discs) in march 2011    COVID 12/2021    Placenta previa     resolved at 28 weeks with this 3rd pregnancy    Tubal pregnancy     Pt states bilateral fallopian tubes removed       Past surgical history:    Past Surgical History:   Procedure Laterality Date    ANESTHESIA NERVE BLOCK Right 5/21/2020    RIGHT HIP INJECTION SITE CONFIRMED BY FLUOROSCOPY performed by Satya Garcia MD at [facility] OR    ANKLE ARTHROSCOPY Left 11/19/2020    VIDEO ARTHROSCOPY LEFT ANKLE, SYNOVECTOMY, LOOSE BODY REMOVAL, MICROFRACTURE -BLOCK- performed by Pk Valerio MD at [facility] OR    ANKLE FRACTURE SURGERY Right     CHOLECYSTECTOMY      laparoscopic    ECTOPIC PREGNANCY SURGERY  10/2017    LAPAROSCOPY  4/20/10    with salpingostomy  ( ectopic pregnancy)    LUMBAR DISC SURGERY      LUMBAR FUSION  3-2011    L3-L5 fusion    TONSILLECTOMY  2000    UPPER GASTROINTESTINAL ENDOSCOPY N/A 7/27/2021    EGD BIOPSY performed by Glenn Rock MD at [facility] ENDOSCOPY    UPPER GASTROINTESTINAL ENDOSCOPY N/A 8/16/2021  IntraVENous Q6H PRN Roshan Cherry APRN - CNP        polyethylene glycol (GLYCOLAX) packet 17 g  17 g Oral Daily PRN Roshan Cherry APRN - CNP        acetaminophen (TYLENOL) tablet 650 mg  650 mg Oral Q6H PRN Roshan Cherry APRN - CNP        Or    acetaminophen (TYLENOL) suppository 650 mg  650 mg Rectal Q6H PRN Roshan Cherry APRN - CNP        0.9 % sodium chloride infusion   IntraVENous Continuous Roshan Cherry APRN - CNP 75 mL/hr at 02/06/24 2243 New Bag at 02/06/24 2243    nicotine (NICODERM CQ) 14 MG/24HR 1 patch  1 patch TransDERmal Daily Roshan Cherry APRN - CNP        oxyCODONE-acetaminophen (PERCOCET) 5-325 MG per tablet 1 tablet  1 tablet Oral Q4H PRN Roshan Cherry APRN - CNP   1 tablet at 02/07/24 0509    HYDROmorphone HCl PF (DILAUDID) injection 0.5 mg  0.5 mg IntraVENous Q3H PRN Roshan Cherry APRN - CNP   0.5 mg at 02/07/24 0314       Allergies:    Allergies as of 02/06/2024 - Fully Reviewed 02/06/2024   Allergen Reaction Noted    Demerol Hives 12/30/2009    Doxycycline Nausea And Vomiting 12/30/2009    Lorazepam Other (See Comments) 08/04/2010    Vancomycin Hives 12/22/2011        Social history:     reports that she has been smoking cigarettes. She started smoking about 25 years ago. She has a 11.3 pack-year smoking history. She has never used smokeless tobacco. She reports current alcohol use of about 1.0 standard drink of alcohol per week. She reports that she does not use drugs.     Family history:    Family History   Problem Relation Age of Onset    High Blood Pressure Father     Diabetes Maternal Aunt         Review of system:  No chest pain, shortness of breath, palpitation, cough, fever, abdominal pain, vomiting, diarrhea, dysuria, vertigo, joint pain, change in speech/vision or new onset of weakness/numbness. Remaining as per HPI.      BP (!) 154/93   Pulse 71   Temp 98.2 °F (36.8 °C) (Oral)   Resp 18   Ht 1.6 m (5' 3\")   Wt 72.6 kg (160 lb)   LMP 01/30/2024

## 2024-02-08 PROCEDURE — 6370000000 HC RX 637 (ALT 250 FOR IP): Performed by: NURSE PRACTITIONER

## 2024-02-08 PROCEDURE — 6360000002 HC RX W HCPCS: Performed by: NURSE PRACTITIONER

## 2024-02-08 PROCEDURE — 6370000000 HC RX 637 (ALT 250 FOR IP): Performed by: INTERNAL MEDICINE

## 2024-02-08 PROCEDURE — 2580000003 HC RX 258: Performed by: NURSE PRACTITIONER

## 2024-02-08 PROCEDURE — 1200000000 HC SEMI PRIVATE

## 2024-02-08 PROCEDURE — 96376 TX/PRO/DX INJ SAME DRUG ADON: CPT

## 2024-02-08 PROCEDURE — G0378 HOSPITAL OBSERVATION PER HR: HCPCS

## 2024-02-08 PROCEDURE — 99233 SBSQ HOSP IP/OBS HIGH 50: CPT | Performed by: PSYCHIATRY & NEUROLOGY

## 2024-02-08 PROCEDURE — 6360000002 HC RX W HCPCS: Performed by: STUDENT IN AN ORGANIZED HEALTH CARE EDUCATION/TRAINING PROGRAM

## 2024-02-08 PROCEDURE — 6370000000 HC RX 637 (ALT 250 FOR IP): Performed by: PSYCHIATRY & NEUROLOGY

## 2024-02-08 RX ORDER — DIPHENHYDRAMINE HYDROCHLORIDE 50 MG/ML
25 INJECTION INTRAMUSCULAR; INTRAVENOUS ONCE
Status: COMPLETED | OUTPATIENT
Start: 2024-02-08 | End: 2024-02-08

## 2024-02-08 RX ORDER — ACYCLOVIR 200 MG/1
400 CAPSULE ORAL 3 TIMES DAILY
Status: DISCONTINUED | OUTPATIENT
Start: 2024-02-08 | End: 2024-02-09 | Stop reason: HOSPADM

## 2024-02-08 RX ADMIN — DICYCLOMINE HYDROCHLORIDE 20 MG: 20 TABLET ORAL at 17:59

## 2024-02-08 RX ADMIN — HYDROMORPHONE HYDROCHLORIDE 0.5 MG: 1 INJECTION, SOLUTION INTRAMUSCULAR; INTRAVENOUS; SUBCUTANEOUS at 09:20

## 2024-02-08 RX ADMIN — LAMOTRIGINE 150 MG: 100 TABLET ORAL at 20:51

## 2024-02-08 RX ADMIN — HYDROMORPHONE HYDROCHLORIDE 0.5 MG: 1 INJECTION, SOLUTION INTRAMUSCULAR; INTRAVENOUS; SUBCUTANEOUS at 16:13

## 2024-02-08 RX ADMIN — OXYCODONE HYDROCHLORIDE AND ACETAMINOPHEN 1 TABLET: 5; 325 TABLET ORAL at 18:30

## 2024-02-08 RX ADMIN — BUTALBITAL, ACETAMINOPHEN AND CAFFEINE 1 TABLET: 325; 50; 40 TABLET ORAL at 17:58

## 2024-02-08 RX ADMIN — LAMOTRIGINE 150 MG: 100 TABLET ORAL at 08:06

## 2024-02-08 RX ADMIN — PANTOPRAZOLE SODIUM 40 MG: 40 TABLET, DELAYED RELEASE ORAL at 19:29

## 2024-02-08 RX ADMIN — POLYETHYLENE GLYCOL 3350 17 G: 17 POWDER, FOR SOLUTION ORAL at 19:27

## 2024-02-08 RX ADMIN — CARBAMAZEPINE 100 MG: 100 TABLET, EXTENDED RELEASE ORAL at 08:06

## 2024-02-08 RX ADMIN — BUTALBITAL, ACETAMINOPHEN AND CAFFEINE 1 TABLET: 325; 50; 40 TABLET ORAL at 11:22

## 2024-02-08 RX ADMIN — HYDROMORPHONE HYDROCHLORIDE 0.5 MG: 1 INJECTION, SOLUTION INTRAMUSCULAR; INTRAVENOUS; SUBCUTANEOUS at 19:21

## 2024-02-08 RX ADMIN — DICYCLOMINE HYDROCHLORIDE 20 MG: 20 TABLET ORAL at 13:04

## 2024-02-08 RX ADMIN — OXYCODONE HYDROCHLORIDE AND ACETAMINOPHEN 1 TABLET: 5; 325 TABLET ORAL at 04:28

## 2024-02-08 RX ADMIN — ALPRAZOLAM 0.5 MG: 0.25 TABLET ORAL at 08:16

## 2024-02-08 RX ADMIN — ACYCLOVIR 400 MG: 200 CAPSULE ORAL at 13:04

## 2024-02-08 RX ADMIN — HYDROMORPHONE HYDROCHLORIDE 0.5 MG: 1 INJECTION, SOLUTION INTRAMUSCULAR; INTRAVENOUS; SUBCUTANEOUS at 12:54

## 2024-02-08 RX ADMIN — HYDROMORPHONE HYDROCHLORIDE 0.5 MG: 1 INJECTION, SOLUTION INTRAMUSCULAR; INTRAVENOUS; SUBCUTANEOUS at 01:38

## 2024-02-08 RX ADMIN — HYDROMORPHONE HYDROCHLORIDE 0.5 MG: 1 INJECTION, SOLUTION INTRAMUSCULAR; INTRAVENOUS; SUBCUTANEOUS at 22:20

## 2024-02-08 RX ADMIN — PANTOPRAZOLE SODIUM 40 MG: 40 TABLET, DELAYED RELEASE ORAL at 08:07

## 2024-02-08 RX ADMIN — BUTALBITAL, ACETAMINOPHEN AND CAFFEINE 1 TABLET: 325; 50; 40 TABLET ORAL at 04:28

## 2024-02-08 RX ADMIN — CARBAMAZEPINE 100 MG: 100 TABLET, EXTENDED RELEASE ORAL at 20:52

## 2024-02-08 RX ADMIN — ACYCLOVIR 400 MG: 200 CAPSULE ORAL at 09:25

## 2024-02-08 RX ADMIN — OXYCODONE HYDROCHLORIDE AND ACETAMINOPHEN 1 TABLET: 5; 325 TABLET ORAL at 11:52

## 2024-02-08 RX ADMIN — DIPHENHYDRAMINE HYDROCHLORIDE 25 MG: 50 INJECTION INTRAMUSCULAR; INTRAVENOUS at 04:50

## 2024-02-08 RX ADMIN — OXYCODONE HYDROCHLORIDE AND ACETAMINOPHEN 1 TABLET: 5; 325 TABLET ORAL at 00:37

## 2024-02-08 RX ADMIN — OXYCODONE HYDROCHLORIDE AND ACETAMINOPHEN 1 TABLET: 5; 325 TABLET ORAL at 23:42

## 2024-02-08 RX ADMIN — ACYCLOVIR 400 MG: 200 CAPSULE ORAL at 20:52

## 2024-02-08 RX ADMIN — SODIUM CHLORIDE, PRESERVATIVE FREE 10 ML: 5 INJECTION INTRAVENOUS at 08:03

## 2024-02-08 RX ADMIN — DICYCLOMINE HYDROCHLORIDE 20 MG: 20 TABLET ORAL at 19:28

## 2024-02-08 RX ADMIN — DICYCLOMINE HYDROCHLORIDE 20 MG: 20 TABLET ORAL at 08:07

## 2024-02-08 RX ADMIN — HYDROMORPHONE HYDROCHLORIDE 0.5 MG: 1 INJECTION, SOLUTION INTRAMUSCULAR; INTRAVENOUS; SUBCUTANEOUS at 05:40

## 2024-02-08 RX ADMIN — ALPRAZOLAM 0.5 MG: 0.25 TABLET ORAL at 20:56

## 2024-02-08 ASSESSMENT — PAIN SCALES - GENERAL
PAINLEVEL_OUTOF10: 10
PAINLEVEL_OUTOF10: 10
PAINLEVEL_OUTOF10: 7
PAINLEVEL_OUTOF10: 10
PAINLEVEL_OUTOF10: 10
PAINLEVEL_OUTOF10: 6
PAINLEVEL_OUTOF10: 5
PAINLEVEL_OUTOF10: 10
PAINLEVEL_OUTOF10: 9
PAINLEVEL_OUTOF10: 10
PAINLEVEL_OUTOF10: 8
PAINLEVEL_OUTOF10: 7
PAINLEVEL_OUTOF10: 8
PAINLEVEL_OUTOF10: 6
PAINLEVEL_OUTOF10: 5
PAINLEVEL_OUTOF10: 9

## 2024-02-08 ASSESSMENT — PAIN DESCRIPTION - DESCRIPTORS
DESCRIPTORS: ACHING;THROBBING
DESCRIPTORS: THROBBING;ACHING
DESCRIPTORS: ACHING

## 2024-02-08 ASSESSMENT — PAIN DESCRIPTION - LOCATION
LOCATION: NECK
LOCATION: SHOULDER;HEAD
LOCATION: HEAD
LOCATION: NECK
LOCATION: HEAD
LOCATION: NECK
LOCATION: NECK
LOCATION: SHOULDER;HEAD

## 2024-02-08 ASSESSMENT — PAIN DESCRIPTION - ORIENTATION
ORIENTATION: POSTERIOR

## 2024-02-08 NOTE — PROGRESS NOTES
Neurology Progress Note    ID: Arlette Kate is a 43 y.o. female    : 1980     LOS: 0 days     ASSESSMENT       1.  Intractable neck pain.  2.  Bilateral occipital neuralgia.  3.  New erythematous rashes.     The patient has no focal deficit during my assessment.  CT brain showed no acute pathology.  CTA of head and neck showed no significant hemodynamic stenosis.  MRI cervical spine showed minimal spinal stenosis at C4-C6.     From examination, the patient has evidence of bilateral occipital neuralgia.  The patient also had evidence of skin rash at the base of her scalp.    24: The patient reports some improvement of neck pain today.  The rash is not getting worse when compared to yesterday.     Plan:     1.  Carbamazepine 100 mg twice daily.  2.  May try valacyclovir with short course of steroid for 1 week for possible shingles.  3.  Continue pain medication as needed and at discharge.     The patient may be discharged if there is no other concern.  Follow-up with neurology in 4 to 6-week.  The patient may need bilateral occipital nerve block as an outpatient if the patient has no significant benefit from carbamazepine.       Medications:  Scheduled Meds:    acyclovir  400 mg Oral TID    carBAMazepine  100 mg Oral BID    dicyclomine  20 mg Oral 4x Daily    lamoTRIgine  150 mg Oral BID    pantoprazole  40 mg Oral BID    sodium chloride flush  5-40 mL IntraVENous 2 times per day    nicotine  1 patch TransDERmal Daily     Continuous Infusions:    sodium chloride      sodium chloride 75 mL/hr at 24 1048     PRN Meds: butalbital-acetaminophen-caffeine, diphenhydrAMINE, ALPRAZolam, diclofenac sodium, zolpidem, sodium chloride flush, sodium chloride, potassium chloride **OR** potassium alternative oral replacement **OR** potassium chloride, magnesium sulfate, ondansetron **OR** ondansetron, polyethylene glycol, acetaminophen **OR** acetaminophen, oxyCODONE-acetaminophen,

## 2024-02-08 NOTE — CARE COORDINATION
Chart reviewed, pt in Observation. Neuro and Hosp Md following. Dc 1-2 more days. Pt from home, has family support, has PCP, has insurance.  No needs noted prior to admission and no discharge needs noted at this time.  Please notify CM if needs arise.     Ryann Crum RN

## 2024-02-08 NOTE — PROGRESS NOTES
Hospital Medicine Progress Note      Date of Admission: 2/6/2024  Hospital Day: 3    Chief Admission Complaint: Headache     Subjective: Patient states scalp is  on right side but she does have sharp shooting pains on the left side.  Patient states neurology believes she may have shingles.  Patient is concerned about returning to work following discharge.    Presenting Admission History:       Arlette Kate is a 43 y.o. female with a significant past medical history of anxiety and chronic back pain who presents to [facility]'s emergency department with complaint of ongoing headache over the last week, posterior in location, radiates up the back of her head and also down her neck into the left frontal pectoral region.  She states has been ongoing about 7 days, average pain 7 out of 10, was seen by pain specialist, had a cervical medial branch block, then felt pain substantially increased to 10 out of 10.  She denies any numbness or loss of function in either extremity, no slurred speech, no facial paralysis, no vision changes.  Her evaluation emergency room included laboratory studies, CT of the head, and CT of the head and neck, and MRI C-spine.  All radiographs were negative for any acute findings.  Patient received multiple medications in the emergency department to include pain medications, anti-inflammatories, and Reglan IV for headache termination.  Hospital team was consulted to admit this patient for intractable headache.    Assessment/Plan:      Current Principal Problem:  Intractable headache, unspecified chronicity pattern, unspecified headache type    1.  Intractable headache versus migraine.  This may be more consistent with shingles.  Patient started on acyclovir.  Neurology following.  Patient started on Tegretol.  2.  Chronic pain.  Patient does see a pain specialist.  She did undergo cervical injections.  She will follow-up with her pain specialist on  02/07/24 1048     Scheduled Medications    acyclovir  400 mg Oral TID    carBAMazepine  100 mg Oral BID    dicyclomine  20 mg Oral 4x Daily    lamoTRIgine  150 mg Oral BID    pantoprazole  40 mg Oral BID    sodium chloride flush  5-40 mL IntraVENous 2 times per day    nicotine  1 patch TransDERmal Daily     PRN Meds: butalbital-acetaminophen-caffeine, diphenhydrAMINE, ALPRAZolam, diclofenac sodium, zolpidem, sodium chloride flush, sodium chloride, potassium chloride **OR** potassium alternative oral replacement **OR** potassium chloride, magnesium sulfate, ondansetron **OR** ondansetron, polyethylene glycol, acetaminophen **OR** acetaminophen, oxyCODONE-acetaminophen, HYDROmorphone     Labs:  Personally reviewed and interpreted for clinical significance.     Recent Labs     02/06/24  1310   WBC 10.7   HGB 13.2   HCT 39.7          Recent Labs     02/06/24  1310 02/07/24  0744    139   K 3.1* 4.3    104   CO2 24 25   BUN 19 12   CREATININE 0.8 0.6   CALCIUM 9.3 8.4       No results for input(s): \"PROBNP\", \"TROPHS\" in the last 72 hours.  No results for input(s): \"LABA1C\" in the last 72 hours.  Recent Labs     02/06/24  1310   AST 12*   ALT 11   BILITOT <0.2   ALKPHOS 51       No results for input(s): \"INR\", \"LACTA\", \"TSH\" in the last 72 hours.    Urine Cultures:   Lab Results   Component Value Date/Time    LABURIN No growth at 18-36 hours 02/28/2020 03:35 PM     Blood Cultures: No results found for: \"BC\"  No results found for: \"BLOODCULT2\"  Organism: No results found for: \"ORG\"      GERMAN WASHINGTON MD

## 2024-02-09 VITALS
HEART RATE: 80 BPM | BODY MASS INDEX: 28.35 KG/M2 | HEIGHT: 63 IN | RESPIRATION RATE: 12 BRPM | TEMPERATURE: 98.9 F | OXYGEN SATURATION: 99 % | WEIGHT: 160 LBS | DIASTOLIC BLOOD PRESSURE: 83 MMHG | SYSTOLIC BLOOD PRESSURE: 151 MMHG

## 2024-02-09 PROBLEM — G44.201 ACUTE INTRACTABLE TENSION-TYPE HEADACHE: Status: ACTIVE | Noted: 2024-02-09

## 2024-02-09 PROCEDURE — 6370000000 HC RX 637 (ALT 250 FOR IP): Performed by: INTERNAL MEDICINE

## 2024-02-09 PROCEDURE — 2580000003 HC RX 258: Performed by: NURSE PRACTITIONER

## 2024-02-09 PROCEDURE — 6360000002 HC RX W HCPCS: Performed by: NURSE PRACTITIONER

## 2024-02-09 PROCEDURE — G0378 HOSPITAL OBSERVATION PER HR: HCPCS

## 2024-02-09 PROCEDURE — 6370000000 HC RX 637 (ALT 250 FOR IP): Performed by: PSYCHIATRY & NEUROLOGY

## 2024-02-09 PROCEDURE — 99232 SBSQ HOSP IP/OBS MODERATE 35: CPT | Performed by: PSYCHIATRY & NEUROLOGY

## 2024-02-09 PROCEDURE — 6370000000 HC RX 637 (ALT 250 FOR IP): Performed by: NURSE PRACTITIONER

## 2024-02-09 RX ORDER — OXYCODONE HYDROCHLORIDE 5 MG/1
7.5 TABLET ORAL EVERY 4 HOURS PRN
Qty: 27 TABLET | Refills: 0 | Status: SHIPPED | OUTPATIENT
Start: 2024-02-09 | End: 2024-02-12

## 2024-02-09 RX ORDER — CARBAMAZEPINE 100 MG/1
100 TABLET, EXTENDED RELEASE ORAL 2 TIMES DAILY
Qty: 28 TABLET | Refills: 0 | Status: SHIPPED | OUTPATIENT
Start: 2024-02-09 | End: 2024-02-23

## 2024-02-09 RX ORDER — VALACYCLOVIR HYDROCHLORIDE 1 G/1
1000 TABLET, FILM COATED ORAL 3 TIMES DAILY
Qty: 21 TABLET | Refills: 0 | Status: SHIPPED | OUTPATIENT
Start: 2024-02-09 | End: 2024-02-16

## 2024-02-09 RX ORDER — BUTALBITAL, ACETAMINOPHEN AND CAFFEINE 50; 325; 40 MG/1; MG/1; MG/1
1 TABLET ORAL EVERY 6 HOURS PRN
Qty: 12 TABLET | Refills: 0 | Status: SHIPPED | OUTPATIENT
Start: 2024-02-09 | End: 2024-02-12

## 2024-02-09 RX ADMIN — SODIUM CHLORIDE, PRESERVATIVE FREE 10 ML: 5 INJECTION INTRAVENOUS at 09:18

## 2024-02-09 RX ADMIN — CARBAMAZEPINE 100 MG: 100 TABLET, EXTENDED RELEASE ORAL at 09:18

## 2024-02-09 RX ADMIN — POLYETHYLENE GLYCOL 3350 17 G: 17 POWDER, FOR SOLUTION ORAL at 09:17

## 2024-02-09 RX ADMIN — DICYCLOMINE HYDROCHLORIDE 20 MG: 20 TABLET ORAL at 09:18

## 2024-02-09 RX ADMIN — LAMOTRIGINE 150 MG: 100 TABLET ORAL at 09:18

## 2024-02-09 RX ADMIN — ALPRAZOLAM 0.5 MG: 0.25 TABLET ORAL at 09:18

## 2024-02-09 RX ADMIN — OXYCODONE HYDROCHLORIDE AND ACETAMINOPHEN 1 TABLET: 5; 325 TABLET ORAL at 07:53

## 2024-02-09 RX ADMIN — OXYCODONE HYDROCHLORIDE AND ACETAMINOPHEN 1 TABLET: 5; 325 TABLET ORAL at 12:02

## 2024-02-09 RX ADMIN — HYDROMORPHONE HYDROCHLORIDE 0.5 MG: 1 INJECTION, SOLUTION INTRAMUSCULAR; INTRAVENOUS; SUBCUTANEOUS at 01:36

## 2024-02-09 RX ADMIN — BUTALBITAL, ACETAMINOPHEN AND CAFFEINE 1 TABLET: 325; 50; 40 TABLET ORAL at 00:43

## 2024-02-09 RX ADMIN — ACYCLOVIR 400 MG: 200 CAPSULE ORAL at 09:18

## 2024-02-09 RX ADMIN — PANTOPRAZOLE SODIUM 40 MG: 40 TABLET, DELAYED RELEASE ORAL at 09:18

## 2024-02-09 RX ADMIN — BUTALBITAL, ACETAMINOPHEN AND CAFFEINE 1 TABLET: 325; 50; 40 TABLET ORAL at 07:53

## 2024-02-09 ASSESSMENT — PAIN SCALES - GENERAL
PAINLEVEL_OUTOF10: 6
PAINLEVEL_OUTOF10: 6
PAINLEVEL_OUTOF10: 10
PAINLEVEL_OUTOF10: 10
PAINLEVEL_OUTOF10: 6

## 2024-02-09 NOTE — PROGRESS NOTES
Pt d/c'd home.  Removed peripheral IV x2 and stopped bleeding.  Catheter intact. Pt tolerated well. No redness noted at site.  Notified [facility] and removed tele box. Reviewed d/c instructions, home meds, and  f/u information utilizing teach-back method.  Scripts for medications picked up from outpatient pharmacy by patient. Patient verbalized understanding. Electronically signed by Jamin Madison RN on 2/9/2024 at 1:57 PM

## 2024-02-09 NOTE — CARE COORDINATION
CASE MANAGEMENT DISCHARGE SUMMARY      Discharge to: Home w.domestic partner    Transportation:    Family/car: Private     Confirmed discharge plan with:     Patient: yes     Family:  yes per patient          RN, name: Jamin      Note: Discharging nurse to complete CHAYO, reconcile AVS, and place final copy with patient's discharge packet. RN to ensure that written prescriptions for  Level II medications are sent with patient to the facility as per protocol.

## 2024-02-09 NOTE — DISCHARGE SUMMARY
oxyCODONE (ROXICODONE) 5 MG immediate release tablet Take 1.5 tablets by mouth every 4 hours as needed for Pain for up to 3 days. Intended supply: 3 days. Take lowest dose possible to manage pain Max Daily Amount: 45 mg  Qty: 27 tablet, Refills: 0    Comments: Reduce doses taken as pain becomes manageable  Associated Diagnoses: Acute intractable tension-type headache      butalbital-acetaminophen-caffeine (FIORICET, ESGIC) -40 MG per tablet Take 1 tablet by mouth every 6 hours as needed for Headaches  Qty: 12 tablet, Refills: 0      carBAMazepine (TEGRETOL XR) 100 MG extended release tablet Take 1 tablet by mouth 2 times daily for 14 days  Qty: 28 tablet, Refills: 0      valACYclovir (VALTREX) 1 g tablet Take 1 tablet by mouth 3 times daily for 7 days  Qty: 21 tablet, Refills: 0           Current Discharge Medication List        Current Discharge Medication List        CONTINUE these medications which have NOT CHANGED    Details   dicyclomine (BENTYL) 20 MG tablet Take 1 tablet by mouth 4 times daily  Qty: 40 tablet, Refills: 0      diclofenac sodium (VOLTAREN) 1 % GEL Apply 4 g topically 4 times daily as needed for Pain  Qty: 100 g, Refills: 0      pantoprazole (PROTONIX) 40 MG tablet Take 1 tablet by mouth 2 times daily  Qty: 60 tablet, Refills: 0    Associated Diagnoses: PUD (peptic ulcer disease)      ALPRAZolam (XANAX) 0.5 MG tablet TAKE 1 TABLET BY MOUTH TWICE DAILY AS NEEDED      zolpidem (AMBIEN) 5 MG tablet Take 1 tablet by mouth nightly as needed for Sleep.      sucralfate (CARAFATE) 1 GM tablet Take 1 tablet by mouth 4 times daily  Qty: 120 tablet, Refills: 3    Associated Diagnoses: Peptic ulcer disease      lamoTRIgine (LAMICTAL) 150 MG tablet TAKE ONE TABLET BY MOUTH TWICE DAILY      propranolol (INDERAL) 10 MG tablet Take 1 tablet by mouth daily as needed (anxiety)           Current Discharge Medication List        STOP taking these medications       metoclopramide (REGLAN) 10 MG tablet  arteries.  Bilateral posterior communicating arteries are present, anatomic variant.  No aneurysm. POSTERIOR CIRCULATION: No significant stenosis of the vertebral, basilar, or posterior cerebral arteries. No aneurysm. OTHER: No dural venous sinus thrombosis on this non-dedicated study. BRAIN: No mass effect or midline shift. No extra-axial fluid collection. The gray-white differentiation is maintained.     Unremarkable CTA of the head and neck.     CT HEAD WO CONTRAST    Result Date: 2/6/2024  EXAMINATION: CT OF THE HEAD WITHOUT CONTRAST  2/6/2024 1:51 pm TECHNIQUE: CT of the head was performed without the administration of intravenous contrast. Automated exposure control, iterative reconstruction, and/or weight based adjustment of the mA/kV was utilized to reduce the radiation dose to as low as reasonably achievable. COMPARISON: February 28, 2020 HISTORY: ORDERING SYSTEM PROVIDED HISTORY: atypical headache TECHNOLOGIST PROVIDED HISTORY: Reason for exam:->atypical headache Has a \"code stroke\" or \"stroke alert\" been called?->No Decision Support Exception - unselect if not a suspected or confirmed emergency medical condition->Emergency Medical Condition (MA) Reason for Exam: s/p cervical injection yesterday-c/o severe posterior h/a FINDINGS: BRAIN/VENTRICLES: There is no acute intracranial hemorrhage, mass effect or midline shift.  No abnormal extra-axial fluid collection.  The gray-white differentiation is maintained without evidence of an acute infarct.  There is no evidence of hydrocephalus. ORBITS: The visualized portion of the orbits demonstrate no acute abnormality. SINUSES: The visualized paranasal sinuses and mastoid air cells demonstrate no acute abnormality. SOFT TISSUES/SKULL:  No acute abnormality of the visualized skull or soft tissues.     No acute intracranial abnormality.       Consults:     IP CONSULT TO NEUROLOGY    Labs:     Recent Labs     02/06/24  1310   WBC 10.7   HGB 13.2   HCT 39.7

## 2024-02-09 NOTE — PROGRESS NOTES
Neurology Progress Note    ID: Arlette Kate is a 43 y.o. female    : 1980     LOS: 0 days     ASSESSMENT       1.  Intractable neck pain.  2.  Bilateral occipital neuralgia.  3.  New erythematous rashes.     The patient has no focal deficit during my assessment.  CT brain showed no acute pathology.  CTA of head and neck showed no significant hemodynamic stenosis.  MRI cervical spine showed minimal spinal stenosis at C4-C6.     From examination, the patient has evidence of bilateral occipital neuralgia.  The patient also had evidence of skin rash at the base of her scalp.    24: The patient reports some improvement of neck pain today.  The rash is not getting worse when compared to yesterday.    24: The patient reports some improvement on her neck pain.  But the patient still has had as needed IV hydromorphone and oral Percocet with Fioricet.     Plan:     1.  Carbamazepine 100 mg twice daily.  2.  Continue valacyclovir.  3.  Continue pain medication as needed and at discharge.     The patient may be discharged if there is no other concern.  Follow-up with neurology in 4 to 6-week.  The patient may need bilateral occipital nerve block as an outpatient if the patient has no significant benefit from carbamazepine.       Medications:  Scheduled Meds:    acyclovir  400 mg Oral TID    carBAMazepine  100 mg Oral BID    dicyclomine  20 mg Oral 4x Daily    lamoTRIgine  150 mg Oral BID    pantoprazole  40 mg Oral BID    sodium chloride flush  5-40 mL IntraVENous 2 times per day    nicotine  1 patch TransDERmal Daily     Continuous Infusions:    sodium chloride       PRN Meds: butalbital-acetaminophen-caffeine, diphenhydrAMINE, ALPRAZolam, diclofenac sodium, zolpidem, sodium chloride flush, sodium chloride, potassium chloride **OR** potassium alternative oral replacement **OR** potassium chloride, magnesium sulfate, ondansetron **OR** ondansetron, polyethylene glycol, acetaminophen **OR**

## 2024-02-09 NOTE — PLAN OF CARE
Patient has remained free from falls throughout the shift, call light is within reach, wheels locked, bed in lowest position. Patient has plans to discharge home. Electronically signed by Jamin Madison RN on 2/9/2024 at 1:04 PM

## 2024-02-09 NOTE — DISCHARGE INSTRUCTIONS
Return to [facility] with new or worsening symptoms.   Recommend return to work on Monday. Use headcovering at work until rash resolved.

## 2024-02-09 NOTE — PLAN OF CARE
Problem: Discharge Planning  Goal: Discharge to home or other facility with appropriate resources  Outcome: Progressing  Flowsheets (Taken 2/8/2024 2258)  Discharge to home or other facility with appropriate resources: Identify barriers to discharge with patient and caregiver     Problem: Pain  Goal: Verbalizes/displays adequate comfort level or baseline comfort level  Outcome: Progressing  Flowsheets (Taken 2/8/2024 2258)  Verbalizes/displays adequate comfort level or baseline comfort level: Encourage patient to monitor pain and request assistance     Problem: Safety - Adult  Goal: Free from fall injury  Outcome: Progressing

## 2024-02-09 NOTE — PROGRESS NOTES
02/08/24 2049   Encounter Summary   Encounter Overview/Reason  Volunteer Encounter   Service Provided For: Patient   Last Encounter  02/08/24  (Pastoral Care Volunteer visitor)   Assessment/Intervention/Outcome   Intervention Prayer (assurance of)/[place]

## 2024-02-12 ENCOUNTER — CLINICAL DOCUMENTATION (OUTPATIENT)
Dept: PRIMARY CARE CLINIC | Age: 44
End: 2024-02-12

## 2024-02-14 ENCOUNTER — HOSPITAL ENCOUNTER (EMERGENCY)
Age: 44
Discharge: HOME | End: 2024-02-14
Payer: COMMERCIAL

## 2024-02-14 VITALS
DIASTOLIC BLOOD PRESSURE: 79 MMHG | WEIGHT: 164 LBS | HEIGHT: 63 IN | RESPIRATION RATE: 18 BRPM | HEART RATE: 67 BPM | BODY MASS INDEX: 29.06 KG/M2 | TEMPERATURE: 97.8 F | SYSTOLIC BLOOD PRESSURE: 120 MMHG | OXYGEN SATURATION: 100 %

## 2024-02-14 DIAGNOSIS — B02.9 HERPES ZOSTER WITHOUT COMPLICATION: ICD-10-CM

## 2024-02-14 DIAGNOSIS — R51.9 ACUTE NONINTRACTABLE HEADACHE, UNSPECIFIED HEADACHE TYPE: Primary | ICD-10-CM

## 2024-02-14 DIAGNOSIS — R74.01 TRANSAMINITIS: ICD-10-CM

## 2024-02-14 LAB
ALBUMIN SERPL-MCNC: 4.6 G/DL (ref 3.4–5)
ALBUMIN/GLOB SERPL: 1.9 {RATIO} (ref 1.1–2.2)
ALP SERPL-CCNC: 77 U/L (ref 40–129)
ALT SERPL-CCNC: 92 U/L (ref 10–40)
ANION GAP SERPL CALCULATED.3IONS-SCNC: 11 MMOL/L (ref 3–16)
AST SERPL-CCNC: 83 U/L (ref 15–37)
BASOPHILS # BLD: 0 K/UL (ref 0–0.2)
BASOPHILS NFR BLD: 0.6 %
BILIRUB SERPL-MCNC: <0.2 MG/DL (ref 0–1)
BUN SERPL-MCNC: 16 MG/DL (ref 7–20)
CALCIUM SERPL-MCNC: 9.7 MG/DL (ref 8.3–10.6)
CHLORIDE SERPL-SCNC: 100 MMOL/L (ref 99–110)
CO2 SERPL-SCNC: 26 MMOL/L (ref 21–32)
CREAT SERPL-MCNC: 0.7 MG/DL (ref 0.6–1.1)
DEPRECATED RDW RBC AUTO: 14.5 % (ref 12.4–15.4)
EOSINOPHIL # BLD: 0 K/UL (ref 0–0.6)
EOSINOPHIL NFR BLD: 0 %
GFR SERPLBLD CREATININE-BSD FMLA CKD-EPI: >60 ML/MIN/{1.73_M2}
GLUCOSE SERPL-MCNC: 81 MG/DL (ref 70–99)
HCG SERPL QL: NEGATIVE
HCT VFR BLD AUTO: 40.6 % (ref 36–48)
HGB BLD-MCNC: 13.4 G/DL (ref 12–16)
LYMPHOCYTES # BLD: 2.7 K/UL (ref 1–5.1)
LYMPHOCYTES NFR BLD: 47.4 %
MCH RBC QN AUTO: 30.2 PG (ref 26–34)
MCHC RBC AUTO-ENTMCNC: 33 G/DL (ref 31–36)
MCV RBC AUTO: 91.7 FL (ref 80–100)
MONOCYTES # BLD: 0.4 K/UL (ref 0–1.3)
MONOCYTES NFR BLD: 6.9 %
NEUTROPHILS # BLD: 2.6 K/UL (ref 1.7–7.7)
NEUTROPHILS NFR BLD: 45.1 %
PLATELET # BLD AUTO: 249 K/UL (ref 135–450)
PMV BLD AUTO: 8.4 FL (ref 5–10.5)
POTASSIUM SERPL-SCNC: 4.2 MMOL/L (ref 3.5–5.1)
PROT SERPL-MCNC: 7 G/DL (ref 6.4–8.2)
RBC # BLD AUTO: 4.43 M/UL (ref 4–5.2)
SODIUM SERPL-SCNC: 137 MMOL/L (ref 136–145)
WBC # BLD AUTO: 5.8 K/UL (ref 4–11)

## 2024-02-14 PROCEDURE — 6360000002 HC RX W HCPCS: Performed by: PHYSICIAN ASSISTANT

## 2024-02-14 PROCEDURE — 2580000003 HC RX 258: Performed by: PHYSICIAN ASSISTANT

## 2024-02-14 PROCEDURE — 96365 THER/PROPH/DIAG IV INF INIT: CPT

## 2024-02-14 PROCEDURE — 99284 EMERGENCY DEPT VISIT MOD MDM: CPT

## 2024-02-14 PROCEDURE — 84703 CHORIONIC GONADOTROPIN ASSAY: CPT

## 2024-02-14 PROCEDURE — 96375 TX/PRO/DX INJ NEW DRUG ADDON: CPT

## 2024-02-14 PROCEDURE — 80053 COMPREHEN METABOLIC PANEL: CPT

## 2024-02-14 PROCEDURE — 85025 COMPLETE CBC W/AUTO DIFF WBC: CPT

## 2024-02-14 RX ORDER — DIPHENHYDRAMINE HYDROCHLORIDE 50 MG/ML
25 INJECTION INTRAMUSCULAR; INTRAVENOUS ONCE
Status: COMPLETED | OUTPATIENT
Start: 2024-02-14 | End: 2024-02-14

## 2024-02-14 RX ORDER — KETOROLAC TROMETHAMINE 30 MG/ML
15 INJECTION, SOLUTION INTRAMUSCULAR; INTRAVENOUS ONCE
Status: COMPLETED | OUTPATIENT
Start: 2024-02-14 | End: 2024-02-14

## 2024-02-14 RX ORDER — METOCLOPRAMIDE HYDROCHLORIDE 5 MG/ML
10 INJECTION INTRAMUSCULAR; INTRAVENOUS ONCE
Status: COMPLETED | OUTPATIENT
Start: 2024-02-14 | End: 2024-02-14

## 2024-02-14 RX ORDER — MAGNESIUM SULFATE 1 G/100ML
1000 INJECTION INTRAVENOUS ONCE
Status: COMPLETED | OUTPATIENT
Start: 2024-02-14 | End: 2024-02-14

## 2024-02-14 RX ORDER — DEXAMETHASONE SODIUM PHOSPHATE 4 MG/ML
6 INJECTION, SOLUTION INTRA-ARTICULAR; INTRALESIONAL; INTRAMUSCULAR; INTRAVENOUS; SOFT TISSUE ONCE
Status: COMPLETED | OUTPATIENT
Start: 2024-02-14 | End: 2024-02-14

## 2024-02-14 RX ORDER — 0.9 % SODIUM CHLORIDE 0.9 %
1000 INTRAVENOUS SOLUTION INTRAVENOUS ONCE
Status: COMPLETED | OUTPATIENT
Start: 2024-02-14 | End: 2024-02-14

## 2024-02-14 RX ADMIN — KETOROLAC TROMETHAMINE 15 MG: 30 INJECTION INTRAMUSCULAR; INTRAVENOUS at 19:34

## 2024-02-14 RX ADMIN — DIPHENHYDRAMINE HYDROCHLORIDE 25 MG: 50 INJECTION INTRAMUSCULAR; INTRAVENOUS at 19:34

## 2024-02-14 RX ADMIN — METOCLOPRAMIDE HYDROCHLORIDE 10 MG: 5 INJECTION INTRAMUSCULAR; INTRAVENOUS at 19:34

## 2024-02-14 RX ADMIN — DEXAMETHASONE SODIUM PHOSPHATE 6 MG: 4 INJECTION, SOLUTION INTRAMUSCULAR; INTRAVENOUS at 19:34

## 2024-02-14 RX ADMIN — SODIUM CHLORIDE 1000 ML: 9 INJECTION, SOLUTION INTRAVENOUS at 19:31

## 2024-02-14 RX ADMIN — MAGNESIUM SULFATE HEPTAHYDRATE 1000 MG: 1 INJECTION, SOLUTION INTRAVENOUS at 19:32

## 2024-02-15 NOTE — ED NOTES
Intubation    Date/Time: 5/31/2023 7:06 AM  Performed by: Abel Pickens CRNA  Authorized by: Faizan Randall II, MD     Intubation:     Induction:  Rapid sequence induction    Intubated:  Postinduction    Mask Ventilation:  N/a    Attempts:  1    Attempted By:  CRNA    Method of Intubation:  Direct    Blade:  [place] 2    Laryngeal View Grade: Grade IIA - cords partially seen      Difficult Airway Encountered?: No      Complications:  None    Airway Device:  Oral endotracheal tube    Airway Device Size:  8.0    Style/Cuff Inflation:  Cuffed (inflated to minimal occlusive pressure)    Tube secured:  22    Secured at:  The lips    Placement Verified By:  Capnometry    Complicating Factors:  Oropharyngeal edema or fat    Findings Post-Intubation:  BS equal bilateral and atraumatic/condition of teeth unchanged     Patient sleeping in bed at this time. IV fluids to be infused and then patient to be discharged.    No

## 2024-02-15 NOTE — ED PROVIDER NOTES
and follow-up with her PCP. Employed shared decision making.     FINAL IMPRESSION:      1. Acute nonintractable headache, unspecified headache type    2. Herpes zoster without complication    3. Transaminitis          DISPOSITION/PLAN:   DISPOSITION Decision To Discharge      PATIENT REFERRED TO:  Christal Landaverde, APRN - CNP  [address] [place] [id number]  [phone]    Schedule an appointment as soon as possible for a visit         DISCHARGE MEDICATIONS:  Discharge Medication List as of 2/14/2024  8:33 PM                     (Please note thatportions of this note were completed with a voice recognition program.  Efforts were made to edit the dictations, but occasionally words are mis-transcribed.)    ZULEMA ATKINSON-C (electronicallysigned)              Samson Fisher PA  02/14/24 2355

## 2024-03-17 ENCOUNTER — HOSPITAL ENCOUNTER (EMERGENCY)
Age: 44
Discharge: HOME | End: 2024-03-17
Attending: EMERGENCY MEDICINE
Payer: COMMERCIAL

## 2024-03-17 VITALS
OXYGEN SATURATION: 96 % | WEIGHT: 155 LBS | HEART RATE: 87 BPM | BODY MASS INDEX: 27.46 KG/M2 | SYSTOLIC BLOOD PRESSURE: 134 MMHG | RESPIRATION RATE: 16 BRPM | DIASTOLIC BLOOD PRESSURE: 75 MMHG | TEMPERATURE: 97.8 F

## 2024-03-17 DIAGNOSIS — R51.9 NONINTRACTABLE HEADACHE, UNSPECIFIED CHRONICITY PATTERN, UNSPECIFIED HEADACHE TYPE: Primary | ICD-10-CM

## 2024-03-17 LAB
ALBUMIN SERPL-MCNC: 4.9 G/DL (ref 3.4–5)
ALBUMIN/GLOB SERPL: 2.6 {RATIO} (ref 1.1–2.2)
ALP SERPL-CCNC: 58 U/L (ref 40–129)
ALT SERPL-CCNC: 14 U/L (ref 10–40)
ANION GAP SERPL CALCULATED.3IONS-SCNC: 12 MMOL/L (ref 3–16)
AST SERPL-CCNC: 14 U/L (ref 15–37)
BASOPHILS # BLD: 0.1 K/UL (ref 0–0.2)
BASOPHILS NFR BLD: 1 %
BILIRUB SERPL-MCNC: 0.4 MG/DL (ref 0–1)
BUN SERPL-MCNC: 11 MG/DL (ref 7–20)
CALCIUM SERPL-MCNC: 9 MG/DL (ref 8.3–10.6)
CHLORIDE SERPL-SCNC: 100 MMOL/L (ref 99–110)
CO2 SERPL-SCNC: 24 MMOL/L (ref 21–32)
CREAT SERPL-MCNC: 0.8 MG/DL (ref 0.6–1.1)
DEPRECATED RDW RBC AUTO: 14.5 % (ref 12.4–15.4)
EOSINOPHIL # BLD: 0 K/UL (ref 0–0.6)
EOSINOPHIL NFR BLD: 0 %
GFR SERPLBLD CREATININE-BSD FMLA CKD-EPI: >60 ML/MIN/{1.73_M2}
GLUCOSE SERPL-MCNC: 90 MG/DL (ref 70–99)
HCT VFR BLD AUTO: 41.2 % (ref 36–48)
HGB BLD-MCNC: 13.5 G/DL (ref 12–16)
LYMPHOCYTES # BLD: 1.8 K/UL (ref 1–5.1)
LYMPHOCYTES NFR BLD: 26.2 %
MCH RBC QN AUTO: 30.1 PG (ref 26–34)
MCHC RBC AUTO-ENTMCNC: 32.9 G/DL (ref 31–36)
MCV RBC AUTO: 91.6 FL (ref 80–100)
MONOCYTES # BLD: 0.4 K/UL (ref 0–1.3)
MONOCYTES NFR BLD: 6.1 %
NEUTROPHILS # BLD: 4.6 K/UL (ref 1.7–7.7)
NEUTROPHILS NFR BLD: 66.7 %
PLATELET # BLD AUTO: 255 K/UL (ref 135–450)
PMV BLD AUTO: 7.8 FL (ref 5–10.5)
POTASSIUM SERPL-SCNC: 3.6 MMOL/L (ref 3.5–5.1)
PROT SERPL-MCNC: 6.8 G/DL (ref 6.4–8.2)
RBC # BLD AUTO: 4.49 M/UL (ref 4–5.2)
SODIUM SERPL-SCNC: 136 MMOL/L (ref 136–145)
WBC # BLD AUTO: 7 K/UL (ref 4–11)

## 2024-03-17 PROCEDURE — 96372 THER/PROPH/DIAG INJ SC/IM: CPT

## 2024-03-17 PROCEDURE — 85025 COMPLETE CBC W/AUTO DIFF WBC: CPT

## 2024-03-17 PROCEDURE — 6370000000 HC RX 637 (ALT 250 FOR IP): Performed by: PHYSICIAN ASSISTANT

## 2024-03-17 PROCEDURE — 6360000002 HC RX W HCPCS: Performed by: EMERGENCY MEDICINE

## 2024-03-17 PROCEDURE — 96376 TX/PRO/DX INJ SAME DRUG ADON: CPT

## 2024-03-17 PROCEDURE — 96365 THER/PROPH/DIAG IV INF INIT: CPT

## 2024-03-17 PROCEDURE — 6360000002 HC RX W HCPCS: Performed by: PHYSICIAN ASSISTANT

## 2024-03-17 PROCEDURE — 2580000003 HC RX 258: Performed by: PHYSICIAN ASSISTANT

## 2024-03-17 PROCEDURE — 80053 COMPREHEN METABOLIC PANEL: CPT

## 2024-03-17 PROCEDURE — 6370000000 HC RX 637 (ALT 250 FOR IP): Performed by: EMERGENCY MEDICINE

## 2024-03-17 PROCEDURE — 99284 EMERGENCY DEPT VISIT MOD MDM: CPT

## 2024-03-17 PROCEDURE — 96375 TX/PRO/DX INJ NEW DRUG ADDON: CPT

## 2024-03-17 RX ORDER — LIDOCAINE 4 G/G
1 PATCH TOPICAL ONCE
Status: DISCONTINUED | OUTPATIENT
Start: 2024-03-17 | End: 2024-03-17 | Stop reason: HOSPADM

## 2024-03-17 RX ORDER — METHYLPREDNISOLONE 4 MG/1
TABLET ORAL
Qty: 1 KIT | Refills: 0 | Status: SHIPPED | OUTPATIENT
Start: 2024-03-17 | End: 2024-03-23

## 2024-03-17 RX ORDER — KETOROLAC TROMETHAMINE 30 MG/ML
15 INJECTION, SOLUTION INTRAMUSCULAR; INTRAVENOUS ONCE
Status: COMPLETED | OUTPATIENT
Start: 2024-03-17 | End: 2024-03-17

## 2024-03-17 RX ORDER — 0.9 % SODIUM CHLORIDE 0.9 %
1000 INTRAVENOUS SOLUTION INTRAVENOUS ONCE
Status: COMPLETED | OUTPATIENT
Start: 2024-03-17 | End: 2024-03-17

## 2024-03-17 RX ORDER — DROPERIDOL 2.5 MG/ML
1.25 INJECTION, SOLUTION INTRAMUSCULAR; INTRAVENOUS ONCE
Status: COMPLETED | OUTPATIENT
Start: 2024-03-17 | End: 2024-03-17

## 2024-03-17 RX ORDER — MAGNESIUM SULFATE 1 G/100ML
1000 INJECTION INTRAVENOUS ONCE
Status: COMPLETED | OUTPATIENT
Start: 2024-03-17 | End: 2024-03-17

## 2024-03-17 RX ORDER — SUMATRIPTAN 6 MG/.5ML
6 INJECTION, SOLUTION SUBCUTANEOUS ONCE
Status: COMPLETED | OUTPATIENT
Start: 2024-03-17 | End: 2024-03-17

## 2024-03-17 RX ORDER — METHYLPREDNISOLONE 4 MG/1
TABLET ORAL
Qty: 1 KIT | Refills: 0 | Status: SHIPPED | OUTPATIENT
Start: 2024-03-17 | End: 2024-03-17

## 2024-03-17 RX ORDER — BUTALBITAL, ACETAMINOPHEN AND CAFFEINE 50; 325; 40 MG/1; MG/1; MG/1
1 TABLET ORAL ONCE
Status: COMPLETED | OUTPATIENT
Start: 2024-03-17 | End: 2024-03-17

## 2024-03-17 RX ORDER — CYCLOBENZAPRINE HCL 10 MG
10 TABLET ORAL 3 TIMES DAILY PRN
Qty: 20 TABLET | Refills: 0 | Status: SHIPPED | OUTPATIENT
Start: 2024-03-17 | End: 2024-03-27

## 2024-03-17 RX ORDER — CYCLOBENZAPRINE HCL 10 MG
10 TABLET ORAL 3 TIMES DAILY PRN
Qty: 20 TABLET | Refills: 0 | Status: SHIPPED | OUTPATIENT
Start: 2024-03-17 | End: 2024-03-17

## 2024-03-17 RX ORDER — METOCLOPRAMIDE HYDROCHLORIDE 5 MG/ML
10 INJECTION INTRAMUSCULAR; INTRAVENOUS ONCE
Status: COMPLETED | OUTPATIENT
Start: 2024-03-17 | End: 2024-03-17

## 2024-03-17 RX ORDER — DIAZEPAM 5 MG/ML
5 INJECTION, SOLUTION INTRAMUSCULAR; INTRAVENOUS ONCE
Status: COMPLETED | OUTPATIENT
Start: 2024-03-17 | End: 2024-03-17

## 2024-03-17 RX ORDER — DIPHENHYDRAMINE HYDROCHLORIDE 50 MG/ML
25 INJECTION INTRAMUSCULAR; INTRAVENOUS ONCE
Status: COMPLETED | OUTPATIENT
Start: 2024-03-17 | End: 2024-03-17

## 2024-03-17 RX ORDER — DIVALPROEX SODIUM 250 MG/1
1000 TABLET, DELAYED RELEASE ORAL ONCE
Status: COMPLETED | OUTPATIENT
Start: 2024-03-17 | End: 2024-03-17

## 2024-03-17 RX ORDER — DIPHENHYDRAMINE HYDROCHLORIDE 50 MG/ML
50 INJECTION INTRAMUSCULAR; INTRAVENOUS ONCE
Status: COMPLETED | OUTPATIENT
Start: 2024-03-17 | End: 2024-03-17

## 2024-03-17 RX ORDER — LIDOCAINE 50 MG/G
1 PATCH TOPICAL DAILY
Qty: 30 PATCH | Refills: 0 | Status: SHIPPED | OUTPATIENT
Start: 2024-03-17

## 2024-03-17 RX ORDER — DIHYDROERGOTAMINE MESYLATE 1 MG/ML
1 INJECTION, SOLUTION INTRAMUSCULAR; INTRAVENOUS; SUBCUTANEOUS ONCE
Status: DISCONTINUED | OUTPATIENT
Start: 2024-03-17 | End: 2024-03-17

## 2024-03-17 RX ORDER — ORPHENADRINE CITRATE 30 MG/ML
60 INJECTION INTRAMUSCULAR; INTRAVENOUS ONCE
Status: COMPLETED | OUTPATIENT
Start: 2024-03-17 | End: 2024-03-17

## 2024-03-17 RX ORDER — DEXAMETHASONE SODIUM PHOSPHATE 4 MG/ML
10 INJECTION, SOLUTION INTRA-ARTICULAR; INTRALESIONAL; INTRAMUSCULAR; INTRAVENOUS; SOFT TISSUE ONCE
Status: COMPLETED | OUTPATIENT
Start: 2024-03-17 | End: 2024-03-17

## 2024-03-17 RX ORDER — LIDOCAINE 50 MG/G
1 PATCH TOPICAL DAILY
Qty: 30 PATCH | Refills: 0 | Status: SHIPPED | OUTPATIENT
Start: 2024-03-17 | End: 2024-03-17

## 2024-03-17 RX ORDER — OXYCODONE HYDROCHLORIDE 5 MG/1
5 TABLET ORAL ONCE
Status: COMPLETED | OUTPATIENT
Start: 2024-03-17 | End: 2024-03-17

## 2024-03-17 RX ADMIN — KETOROLAC TROMETHAMINE 15 MG: 30 INJECTION, SOLUTION INTRAMUSCULAR; INTRAVENOUS at 15:56

## 2024-03-17 RX ADMIN — DIVALPROEX SODIUM 1000 MG: 250 TABLET, DELAYED RELEASE ORAL at 14:58

## 2024-03-17 RX ADMIN — ORPHENADRINE CITRATE 60 MG: 60 INJECTION INTRAMUSCULAR; INTRAVENOUS at 19:13

## 2024-03-17 RX ADMIN — OXYCODONE 5 MG: 5 TABLET ORAL at 19:14

## 2024-03-17 RX ADMIN — BUTALBITAL, ACETAMINOPHEN AND CAFFEINE 1 TABLET: 325; 50; 40 TABLET ORAL at 13:54

## 2024-03-17 RX ADMIN — MAGNESIUM SULFATE HEPTAHYDRATE 1000 MG: 1 INJECTION, SOLUTION INTRAVENOUS at 12:39

## 2024-03-17 RX ADMIN — DIPHENHYDRAMINE HYDROCHLORIDE 25 MG: 50 INJECTION, SOLUTION INTRAMUSCULAR; INTRAVENOUS at 15:55

## 2024-03-17 RX ADMIN — SODIUM CHLORIDE 1000 ML: 9 INJECTION, SOLUTION INTRAVENOUS at 12:32

## 2024-03-17 RX ADMIN — KETOROLAC TROMETHAMINE 15 MG: 30 INJECTION, SOLUTION INTRAMUSCULAR; INTRAVENOUS at 12:33

## 2024-03-17 RX ADMIN — SUMATRIPTAN 6 MG: 6 INJECTION, SOLUTION SUBCUTANEOUS at 15:57

## 2024-03-17 RX ADMIN — DROPERIDOL 1.25 MG: 2.5 INJECTION, SOLUTION INTRAMUSCULAR; INTRAVENOUS at 18:11

## 2024-03-17 RX ADMIN — DIAZEPAM 5 MG: 5 INJECTION, SOLUTION INTRAMUSCULAR; INTRAVENOUS at 17:10

## 2024-03-17 RX ADMIN — DEXAMETHASONE SODIUM PHOSPHATE 10 MG: 4 INJECTION, SOLUTION INTRAMUSCULAR; INTRAVENOUS at 12:35

## 2024-03-17 RX ADMIN — DIPHENHYDRAMINE HYDROCHLORIDE 50 MG: 50 INJECTION INTRAMUSCULAR; INTRAVENOUS at 12:34

## 2024-03-17 RX ADMIN — METOCLOPRAMIDE HYDROCHLORIDE 10 MG: 5 INJECTION INTRAMUSCULAR; INTRAVENOUS at 12:36

## 2024-03-17 ASSESSMENT — PAIN SCALES - GENERAL
PAINLEVEL_OUTOF10: 6
PAINLEVEL_OUTOF10: 0

## 2024-03-17 ASSESSMENT — PAIN DESCRIPTION - PAIN TYPE
TYPE: ACUTE PAIN

## 2024-03-17 ASSESSMENT — PAIN DESCRIPTION - DESCRIPTORS
DESCRIPTORS: ACHING
DESCRIPTORS: POUNDING
DESCRIPTORS: ACHING
DESCRIPTORS: ACHING

## 2024-03-17 ASSESSMENT — PAIN DESCRIPTION - LOCATION
LOCATION: HEAD

## 2024-03-17 ASSESSMENT — PAIN - FUNCTIONAL ASSESSMENT
PAIN_FUNCTIONAL_ASSESSMENT: ACTIVITIES ARE NOT PREVENTED
PAIN_FUNCTIONAL_ASSESSMENT: 0-10
PAIN_FUNCTIONAL_ASSESSMENT: PREVENTS OR INTERFERES SOME ACTIVE ACTIVITIES AND ADLS

## 2024-03-17 NOTE — ED PROVIDER NOTES
[facility]  ED  EMERGENCY DEPARTMENT ENCOUNTER        Pt Name: Arlette Kate  MRN: [id number]  Birthdate 1980  Date of evaluation: 3/17/2024  Provider: Travis Esparza PA-C  PCP: Christal Landaverde APRN - CNP  Note Started: 12:53 PM EDT 3/17/24       I have seen and evaluated this patient with my supervising physician Ebenezer Briggs MD.      CHIEF COMPLAINT       Chief Complaint   Patient presents with    Headache     Reports pounding headache for 4 days with onset of shingles to back of neck and scalp. Pt reports no relief with Aleve at home. States she hospitalized approx one month ago for similar symptoms.        HISTORY OF PRESENT ILLNESS: 1 or more Elements     History From: patient  Limitations to history : None    Arlette Kate is a 43 y.o. female who presents emergency department with a chief complaint of a posterior headache associated with some photophobia that began about 4 days ago.  Had similar headache last month.  She was admitted and treated for shingles and had a workup including CT head, CTA head and neck and MRI of the cervical spine that was all unremarkable.  Was discharged home with Tegretol, Roxicodone, Fioricet and Valtrex.  She returned here on 2/14 after being discharged on 2/9 for headache and received headache cocktail and states she had felt better for about 3 weeks until the headache began about 4 days ago.  She denies vomiting, fevers, difficulty moving her extremities, numbness, weakness, chest pain, abdominal pain, urinary or bowel symptoms or any other symptoms.  States that she has itchiness and pain at the posterior scalp and has began noticing some redness again.    Nursing Notes were all reviewed and agreed with or any disagreements were addressed in the HPI.    REVIEW OF SYSTEMS :      Review of Systems    Positives and Pertinent negatives as per HPI.     SURGICAL HISTORY     Past Surgical History:   Procedure Laterality Date    
03/17/24 1205  dexAMETHasone Sodium Phosphate injection 10 mg  ONCE         Last MAR action: Given - by NICOLÁS HERRING on 03/17/24 at 1235 DINORA MCKEON    03/17/24 1215 03/17/24 1205  metoclopramide (REGLAN) injection 10 mg  ONCE         Last MAR action: Given - by NICOLÁS HERRING on 03/17/24 at 1236 DINORA MCKEON    03/17/24 1215 03/17/24 1205  diphenhydrAMINE (BENADRYL) injection 50 mg  ONCE         Last MAR action: Given - by NICOLÁS HERRING on 03/17/24 at 1234 DINORA MCKEON    03/17/24 1215 03/17/24 1205  magnesium sulfate 1000 mg in dextrose 5% 100 mL IVPB  ONCE         Last MAR action: Stopped - by NICOLÁS HERRING on 03/17/24 at 1352 DINORA MCKEON    03/17/24 1215 03/17/24 1205  ketorolac (TORADOL) injection 15 mg  ONCE         Last MAR action: Given - by NICOLÁS HERRING on 03/17/24 at 1233 DINORA MCKEON            Past Medical History:   Active Ambulatory Problems     Diagnosis Date Noted    Bursitis of hip 08/15/2011    Displacement of lumbar intervertebral disc without myelopathy 05/10/2012    Displacement of lumbar intervertebral disc without myelopathy 05/10/2012    Vasovagal syncope 12/19/2019    Palpitations 12/19/2019    Lymphadenopathy, inguinal     Headache 02/29/2020    Right hip pain     Chronic pain of left ankle 08/07/2020    Osteochondral defect of ankle 08/14/2020    Osteochondritis dissecans 12/04/2020    Abdominal pain 07/27/2021    Abdominal pain, epigastric     PUD (peptic ulcer disease) 08/13/2021    Intractable headache, unspecified chronicity pattern, unspecified headache type 02/06/2024    Acute intractable tension-type headache 02/09/2024     Resolved Ambulatory Problems     Diagnosis Date Noted    Sprain of neck 05/10/2012     Past Medical History:   Diagnosis Date    Abnormal Pap smear     Anxiety     Back pain     Back pain     COVID 12/2021    Placenta previa     Tubal pregnancy        Chronic Conditions: Headaches    CONSULTS: (Who and What was

## 2024-05-23 ENCOUNTER — HOSPITAL ENCOUNTER (EMERGENCY)
Age: 44
Discharge: HOME | End: 2024-05-23
Attending: EMERGENCY MEDICINE
Payer: COMMERCIAL

## 2024-05-23 VITALS
RESPIRATION RATE: 16 BRPM | DIASTOLIC BLOOD PRESSURE: 65 MMHG | TEMPERATURE: 98.6 F | HEART RATE: 63 BPM | OXYGEN SATURATION: 97 % | SYSTOLIC BLOOD PRESSURE: 110 MMHG

## 2024-05-23 DIAGNOSIS — Z86.19 HISTORY OF SHINGLES: ICD-10-CM

## 2024-05-23 DIAGNOSIS — R51.9 ACUTE NONINTRACTABLE HEADACHE, UNSPECIFIED HEADACHE TYPE: Primary | ICD-10-CM

## 2024-05-23 LAB
EKG ATRIAL RATE: 66 BPM
EKG DIAGNOSIS: NORMAL
EKG P AXIS: 53 DEGREES
EKG P-R INTERVAL: 150 MS
EKG Q-T INTERVAL: 428 MS
EKG QRS DURATION: 76 MS
EKG QTC CALCULATION (BAZETT): 448 MS
EKG R AXIS: 35 DEGREES
EKG T AXIS: 45 DEGREES
EKG VENTRICULAR RATE: 66 BPM

## 2024-05-23 PROCEDURE — 96374 THER/PROPH/DIAG INJ IV PUSH: CPT

## 2024-05-23 PROCEDURE — 6360000002 HC RX W HCPCS: Performed by: EMERGENCY MEDICINE

## 2024-05-23 PROCEDURE — 93005 ELECTROCARDIOGRAM TRACING: CPT | Performed by: PHYSICIAN ASSISTANT

## 2024-05-23 PROCEDURE — 99284 EMERGENCY DEPT VISIT MOD MDM: CPT

## 2024-05-23 PROCEDURE — 6360000002 HC RX W HCPCS: Performed by: PHYSICIAN ASSISTANT

## 2024-05-23 PROCEDURE — 93010 ELECTROCARDIOGRAM REPORT: CPT | Performed by: INTERNAL MEDICINE

## 2024-05-23 PROCEDURE — 96375 TX/PRO/DX INJ NEW DRUG ADDON: CPT

## 2024-05-23 PROCEDURE — 2580000003 HC RX 258: Performed by: PHYSICIAN ASSISTANT

## 2024-05-23 RX ORDER — 0.9 % SODIUM CHLORIDE 0.9 %
1000 INTRAVENOUS SOLUTION INTRAVENOUS ONCE
Status: COMPLETED | OUTPATIENT
Start: 2024-05-23 | End: 2024-05-23

## 2024-05-23 RX ORDER — DROPERIDOL 2.5 MG/ML
0.62 INJECTION, SOLUTION INTRAMUSCULAR; INTRAVENOUS ONCE
Status: COMPLETED | OUTPATIENT
Start: 2024-05-23 | End: 2024-05-23

## 2024-05-23 RX ORDER — DIPHENHYDRAMINE HYDROCHLORIDE 50 MG/ML
25 INJECTION INTRAMUSCULAR; INTRAVENOUS ONCE
Status: COMPLETED | OUTPATIENT
Start: 2024-05-23 | End: 2024-05-23

## 2024-05-23 RX ORDER — KETOROLAC TROMETHAMINE 30 MG/ML
15 INJECTION, SOLUTION INTRAMUSCULAR; INTRAVENOUS ONCE
Status: COMPLETED | OUTPATIENT
Start: 2024-05-23 | End: 2024-05-23

## 2024-05-23 RX ADMIN — SODIUM CHLORIDE 1000 ML: 9 INJECTION, SOLUTION INTRAVENOUS at 10:02

## 2024-05-23 RX ADMIN — HYDROMORPHONE HYDROCHLORIDE 0.5 MG: 1 INJECTION, SOLUTION INTRAMUSCULAR; INTRAVENOUS; SUBCUTANEOUS at 10:02

## 2024-05-23 RX ADMIN — DIPHENHYDRAMINE HYDROCHLORIDE 25 MG: 50 INJECTION, SOLUTION INTRAMUSCULAR; INTRAVENOUS at 10:29

## 2024-05-23 RX ADMIN — KETOROLAC TROMETHAMINE 15 MG: 30 INJECTION, SOLUTION INTRAMUSCULAR at 10:02

## 2024-05-23 RX ADMIN — DROPERIDOL 0.62 MG: 2.5 INJECTION, SOLUTION INTRAMUSCULAR; INTRAVENOUS at 11:38

## 2024-05-23 ASSESSMENT — PAIN SCALES - GENERAL: PAINLEVEL_OUTOF10: 6

## 2024-05-23 ASSESSMENT — PAIN - FUNCTIONAL ASSESSMENT: PAIN_FUNCTIONAL_ASSESSMENT: 0-10

## 2024-05-23 NOTE — ED NOTES
Discharge paperwork given to and reviewed with pt. Pt verbalized understanding and all questions answered. Pt encouraged to return if having worsening symptoms or new symptoms discussed in discharge paperwork.  Pt to follow up with PCP  IV removed with catheter intact. Pt in NAD, RR even and unlabored. Pt off unit ambulatory with family

## 2024-05-23 NOTE — ED PROVIDER NOTES
Emergency Department Attending Provider Note  Location: [facility]  ED      Arlette Kate was evaluated in the Emergency Department. Although initial history and physical exam information was obtained by Lynne Cramer (who also dictated a record of this visit), I personally saw the patient and made/approved the management plan and take responsibility for the patient management.     Patient seen and evaluated.  Relevant records reviewed. Chronic medical conditions reviewed.    HPI: 43-year-old female presents with continued head pains and headache in the posterior head and scalp that have been ongoing for the past year.  She has been treated for shingles and also has been treated by pain management and primary care for chronic headaches and head pains.  She states that is not a headache per se but is a sharp pain in the back of her head.  She is on Vicodin and Valium at home.  She was even admitted here once for this due to intractable pain.  She has had an MRI of her neck that was negative.  She states her pain management doctor told her to come to the ER today to get an MRI.  The pain today is not different than it has been she just cannot put up with having the severe pain anymore at home.  No weakness or numbness of extremities.  No vision changes or speech deficit.  No nausea or vomiting.     Physical Exam: NIH stroke scale 0.  Patient describes a rash on her posterior scalp.  I see 1 small erythematous area at the base of her hairline on the left.  No vesicle or pustule.     MDM: Here the patient is well, cleared..  This has been a chronic issue for the patient.  She has been to the ER numerous times.  She is requesting Dilaudid.  I explained to the patient that given her normal neurologic exam a brain MRI is not warranted from the ER today.  She is requesting admission.  After multiple doses of pain medicine here and droperidol she was still requesting admission therefore we did 
mouth 2 times daily    PROPRANOLOL (INDERAL) 10 MG TABLET    Take 1 tablet by mouth daily as needed (anxiety)    SUCRALFATE (CARAFATE) 1 GM TABLET    Take 1 tablet by mouth 4 times daily    ZOLPIDEM (AMBIEN) 5 MG TABLET    Take 1 tablet by mouth nightly as needed for Sleep.       ALLERGIES     Demerol, Doxycycline, Lorazepam, and Vancomycin    FAMILYHISTORY       Family History   Problem Relation Age of Onset    High Blood Pressure Father     Diabetes Maternal Aunt         SOCIAL HISTORY       Social History     Tobacco Use    Smoking status: Every Day     Current packs/day: 0.00     Average packs/day: 0.5 packs/day for 22.5 years (11.3 ttl pk-yrs)     Types: Cigarettes     Start date: 1999     Last attempt to quit: 2021     Years since quittin.8    Smokeless tobacco: Never   Vaping Use    Vaping Use: Never used   Substance Use Topics    Alcohol use: Yes     Alcohol/week: 1.0 standard drink of alcohol     Types: 1 Cans of beer per week     Comment: social drinker    Drug use: No       SCREENINGS        [place] Coma Scale  Eye Opening: Spontaneous  Best Verbal Response: Oriented  Best Motor Response: Obeys commands  [place] Coma Scale Score: 15                CIWA Assessment  BP: 129/67  Pulse: 81           PHYSICAL EXAM  1 or more Elements     ED Triage Vitals   BP Temp Temp src Pulse Respirations SpO2 Height Weight   24 0817 24 0816 -- 24 0816 24 0816 24 0816 -- --   129/67 98.6 °F (37 °C)  81 16 100 %         Physical Exam  Vitals and nursing note reviewed.   Constitutional:       Appearance: Normal appearance. She is not diaphoretic.   HENT:      Head: Normocephalic and atraumatic.      Nose: Nose normal.      Mouth/Throat:      Mouth: Mucous membranes are moist.   Eyes:      General:         Right eye: No discharge.         Left eye: No discharge.      Extraocular Movements: Extraocular movements intact.   Pulmonary:      Effort: Pulmonary effort is normal. No

## 2024-05-24 ENCOUNTER — TELEPHONE (OUTPATIENT)
Dept: PRIMARY CARE CLINIC | Age: 44
End: 2024-05-24

## 2024-05-24 NOTE — TELEPHONE ENCOUNTER
It looks like patient was just at ER yesterday. I have not seen patient in office in almost 2 years. This seems to be a chronic issue, she will need to make appointment she might need to see neurology. Patient feels severe enough she can return to er it not needs in office appointment with me. Not appropriate for her to see another provider for a chronic issue.

## 2024-05-24 NOTE — TELEPHONE ENCOUNTER
Pt has been dealing with constant outbreaks of shingles. She has a pain doctor that is trying to help her pain get under control. She has been hospitalized with the Shingles. She has she now has severe headaches that are not migraines but they are quite painful. She is wondering if she should go back to the hospital or what should the next step be?

## 2024-06-18 ENCOUNTER — CLINICAL DOCUMENTATION (OUTPATIENT)
Dept: CASE MANAGEMENT | Age: 44
End: 2024-06-18

## 2024-06-18 NOTE — PROGRESS NOTES
Patient Management Plan          Pt. Name: Arlette Kate  : 1980      Date plan entered: 24      Patients Physicians:    Primary Care : Christal Landaverde APRN - CNP  Contact #:  [phone]  Specialists:    Contact #:              Summary      Reason for Referral: This patient has been provided a management plan for Medical Needs            Patient has had frequent visits for:    4 admits, intractable headache, asking for Valium which has been d/c'd.        24  It looks like patient was just at ER yesterday. I have not seen patient in office in almost 2 years. This seems to be a chronic issue, she will need to make appointment she might need to see neurology. Patient feels severe enough she can return to er it not needs in office appointment with me. Not appropriate for her to see another provider for a chronic issue.     24    CC/HPI Summary, DDx, ED Course, and Reassessment: Patient was evaluated in the emergency department today for concerns of a headache due to shingles. She does not have an obvious vesicular rash consistent with shingles at this time. She is neurologically intact. The patient has had multiple previous thorough workups for her headache and her imaging has all been very reassuring. Given the patient's complexity of her history of migraines and difficulty in treating I did ask my attending physician, Dr. Joseph to evaluate the patient.     The patient initially received Dilaudid and Toradol for pain control, she also received IV fluids and Benadryl. I reevaluated the patient multiple times and each time she was observed resting comfortably even sleeping in the exam room. The patient continues to complain of pain and states she does not feel well enough to go home. At this point we obtained an EKG the patient received droperidol. Again she was observed in the emergency department for quite a while and was observed sleeping resting comfortably. At this point

## 2024-09-03 DIAGNOSIS — R10.13 EPIGASTRIC PAIN: Primary | ICD-10-CM

## 2025-04-21 RX ORDER — DIAZEPAM 2 MG/1
2 TABLET ORAL 4 TIMES DAILY
COMMUNITY

## 2025-04-28 ENCOUNTER — ANESTHESIA EVENT (OUTPATIENT)
Dept: ENDOSCOPY | Age: 45
End: 2025-04-28
Payer: COMMERCIAL

## 2025-04-28 NOTE — ANESTHESIA PRE PROCEDURE
myelopathy M51.26   • Displacement of lumbar intervertebral disc without myelopathy M51.26   • Vasovagal syncope R55   • Palpitations R00.2   • Lymphadenopathy, inguinal R59.0   • Headache R51.9   • Right hip pain M25.551   • Chronic pain of left ankle M25.572, G89.29   • Osteochondral defect of ankle M95.8   • Osteochondritis dissecans M93.20   • Abdominal pain R10.9   • Abdominal pain, epigastric R10.13   • PUD (peptic ulcer disease) K27.9   • Intractable headache, unspecified chronicity pattern, unspecified headache type R51.9   • Acute intractable tension-type headache G44.201       Past Medical History:        Diagnosis Date   • Abnormal Pap smear     WNL since   • Anxiety    • Back pain     herniated disc   • Back pain     had back surgery (fusion of 3 discs) in march 2011   • COVID 12/2021   • Placenta previa     resolved at 28 weeks with this 3rd pregnancy   • Tubal pregnancy     Pt states bilateral fallopian tubes removed       Past Surgical History:        Procedure Laterality Date   • ANESTHESIA NERVE BLOCK Right 5/21/2020    RIGHT HIP INJECTION SITE CONFIRMED BY FLUOROSCOPY performed by Satya Garcia MD at [facility]   • ANKLE ARTHROSCOPY Left 11/19/2020    VIDEO ARTHROSCOPY LEFT ANKLE, SYNOVECTOMY, LOOSE BODY REMOVAL, MICROFRACTURE -BLOCK- performed by Pk Valerio MD at [facility] OR   • ANKLE FRACTURE SURGERY Right    • CHOLECYSTECTOMY      laparoscopic   • ECTOPIC PREGNANCY SURGERY  10/2017   • LAPAROSCOPY  4/20/10    with salpingostomy  ( ectopic pregnancy)   • LUMBAR DISC SURGERY     • LUMBAR FUSION  3-2011    L3-L5 fusion   • TONSILLECTOMY  2000   • UPPER GASTROINTESTINAL ENDOSCOPY N/A 7/27/2021    EGD BIOPSY performed by Glenn Rock MD at [facility] ENDOSCOPY   • UPPER GASTROINTESTINAL ENDOSCOPY N/A 8/16/2021    EGD BIOPSY performed by Ezequiel Zaman MD at [facility]   • UPPER GASTROINTESTINAL ENDOSCOPY N/A 11/9/2023    EGD BIOPSY performed by Juan Pompa MD at [facility]

## 2025-04-29 ENCOUNTER — ANESTHESIA (OUTPATIENT)
Dept: ENDOSCOPY | Age: 45
End: 2025-04-29
Payer: COMMERCIAL

## 2025-04-29 ENCOUNTER — HOSPITAL ENCOUNTER (OUTPATIENT)
Age: 45
Setting detail: OUTPATIENT SURGERY
Discharge: HOME | End: 2025-04-29
Attending: INTERNAL MEDICINE | Admitting: INTERNAL MEDICINE
Payer: COMMERCIAL

## 2025-04-29 VITALS
TEMPERATURE: 97.7 F | SYSTOLIC BLOOD PRESSURE: 139 MMHG | RESPIRATION RATE: 16 BRPM | DIASTOLIC BLOOD PRESSURE: 83 MMHG | HEIGHT: 63 IN | HEART RATE: 51 BPM | WEIGHT: 156 LBS | BODY MASS INDEX: 27.64 KG/M2 | OXYGEN SATURATION: 100 %

## 2025-04-29 DIAGNOSIS — K29.70 GASTRITIS, PRESENCE OF BLEEDING UNSPECIFIED, UNSPECIFIED CHRONICITY, UNSPECIFIED GASTRITIS TYPE: ICD-10-CM

## 2025-04-29 DIAGNOSIS — K25.9 ULCER OF PYLORIC ANTRUM, UNSPECIFIED CHRONICITY: ICD-10-CM

## 2025-04-29 LAB — HCG UR QL: NEGATIVE

## 2025-04-29 PROCEDURE — 7100000011 HC PHASE II RECOVERY - ADDTL 15 MIN: Performed by: INTERNAL MEDICINE

## 2025-04-29 PROCEDURE — 2709999900 HC NON-CHARGEABLE SUPPLY: Performed by: INTERNAL MEDICINE

## 2025-04-29 PROCEDURE — 84703 CHORIONIC GONADOTROPIN ASSAY: CPT

## 2025-04-29 PROCEDURE — 3700000001 HC ADD 15 MINUTES (ANESTHESIA): Performed by: INTERNAL MEDICINE

## 2025-04-29 PROCEDURE — 88305 TISSUE EXAM BY PATHOLOGIST: CPT

## 2025-04-29 PROCEDURE — 3609012400 HC EGD TRANSORAL BIOPSY SINGLE/MULTIPLE: Performed by: INTERNAL MEDICINE

## 2025-04-29 PROCEDURE — 7100000010 HC PHASE II RECOVERY - FIRST 15 MIN: Performed by: INTERNAL MEDICINE

## 2025-04-29 PROCEDURE — 6360000002 HC RX W HCPCS: Performed by: NURSE ANESTHETIST, CERTIFIED REGISTERED

## 2025-04-29 PROCEDURE — 2580000003 HC RX 258: Performed by: NURSE ANESTHETIST, CERTIFIED REGISTERED

## 2025-04-29 PROCEDURE — 3609015300 HC ESOPHAGEAL DILATION MALONEY: Performed by: INTERNAL MEDICINE

## 2025-04-29 PROCEDURE — 3700000000 HC ANESTHESIA ATTENDED CARE: Performed by: INTERNAL MEDICINE

## 2025-04-29 RX ORDER — SODIUM CHLORIDE, SODIUM LACTATE, POTASSIUM CHLORIDE, CALCIUM CHLORIDE 600; 310; 30; 20 MG/100ML; MG/100ML; MG/100ML; MG/100ML
INJECTION, SOLUTION INTRAVENOUS CONTINUOUS
Status: DISCONTINUED | OUTPATIENT
Start: 2025-04-29 | End: 2025-04-29 | Stop reason: HOSPADM

## 2025-04-29 RX ORDER — PROPOFOL 10 MG/ML
INJECTION, EMULSION INTRAVENOUS
Status: DISCONTINUED | OUTPATIENT
Start: 2025-04-29 | End: 2025-04-29 | Stop reason: SDUPTHER

## 2025-04-29 RX ORDER — SODIUM CHLORIDE 9 MG/ML
INJECTION, SOLUTION INTRAVENOUS PRN
Status: DISCONTINUED | OUTPATIENT
Start: 2025-04-29 | End: 2025-04-29 | Stop reason: HOSPADM

## 2025-04-29 RX ORDER — SODIUM CHLORIDE, SODIUM LACTATE, POTASSIUM CHLORIDE, CALCIUM CHLORIDE 600; 310; 30; 20 MG/100ML; MG/100ML; MG/100ML; MG/100ML
INJECTION, SOLUTION INTRAVENOUS
Status: DISCONTINUED | OUTPATIENT
Start: 2025-04-29 | End: 2025-04-29 | Stop reason: SDUPTHER

## 2025-04-29 RX ORDER — SODIUM CHLORIDE 0.9 % (FLUSH) 0.9 %
5-40 SYRINGE (ML) INJECTION EVERY 12 HOURS SCHEDULED
Status: DISCONTINUED | OUTPATIENT
Start: 2025-04-29 | End: 2025-04-29 | Stop reason: HOSPADM

## 2025-04-29 RX ORDER — LIDOCAINE HYDROCHLORIDE 10 MG/ML
INJECTION, SOLUTION EPIDURAL; INFILTRATION; INTRACAUDAL; PERINEURAL
Status: DISCONTINUED | OUTPATIENT
Start: 2025-04-29 | End: 2025-04-29 | Stop reason: SDUPTHER

## 2025-04-29 RX ORDER — LIDOCAINE HYDROCHLORIDE 10 MG/ML
0.3 INJECTION, SOLUTION EPIDURAL; INFILTRATION; INTRACAUDAL; PERINEURAL
Status: DISCONTINUED | OUTPATIENT
Start: 2025-04-29 | End: 2025-04-29 | Stop reason: HOSPADM

## 2025-04-29 RX ORDER — SODIUM CHLORIDE 0.9 % (FLUSH) 0.9 %
5-40 SYRINGE (ML) INJECTION PRN
Status: DISCONTINUED | OUTPATIENT
Start: 2025-04-29 | End: 2025-04-29 | Stop reason: HOSPADM

## 2025-04-29 RX ADMIN — LIDOCAINE HYDROCHLORIDE 60 MG: 10 INJECTION, SOLUTION EPIDURAL; INFILTRATION; INTRACAUDAL; PERINEURAL at 08:11

## 2025-04-29 RX ADMIN — PROPOFOL 200 MG: 10 INJECTION, EMULSION INTRAVENOUS at 08:11

## 2025-04-29 RX ADMIN — PROPOFOL 100 MG: 10 INJECTION, EMULSION INTRAVENOUS at 08:21

## 2025-04-29 RX ADMIN — SODIUM CHLORIDE, POTASSIUM CHLORIDE, SODIUM LACTATE AND CALCIUM CHLORIDE: 600; 310; 30; 20 INJECTION, SOLUTION INTRAVENOUS at 08:11

## 2025-04-29 ASSESSMENT — PAIN - FUNCTIONAL ASSESSMENT: PAIN_FUNCTIONAL_ASSESSMENT: 0-10

## 2025-04-29 NOTE — H&P
MD Laura   sucralfate (CARAFATE) 1 GM tablet Take 1 tablet by mouth 4 times daily 11/5/21   Palmira Sinha APRN - CNP   lamoTRIgine (LAMICTAL) 150 MG tablet TAKE ONE TABLET BY MOUTH TWICE DAILY 7/13/21   Laura Prater MD   propranolol (INDERAL) 10 MG tablet Take 1 tablet by mouth daily as needed (anxiety) 7/27/21   ProviderLaura MD       Review of Systems:  Weight Loss: No  Dysphagia: No  Dyspepsia: No    Physical Exam:   Vital Signs: Ht 1.6 m (5' 3\")   Wt 70.8 kg (156 lb)   BMI 27.63 kg/m²   Vital signs reviewed:Yes    HEENT:Normal  Cardiac:Normal  Chest:Normal  Abdomen:Normal  Exts: Normal  Neuro:Normal    Labs:  No visits with results within 12 Week(s) from this visit.   Latest known visit with results is:   Orders Only on 02/04/2025   Component Date Value Ref Range Status    Pregnancy, Urine 02/04/2025 NEGATIVE  NEGATIVE Final        Imaging:  No orders to display       ASA:2    Mallampati Score: II    Sedation planned:MAC    Patient in acceptable condition for procedure:Yes    5:03 AM 4/29/2025    Josue Santamaria, DO      Please note that some or all of this record was generated using voice recognition software. If there are any questions about the content of this document, please contact the author as some errors in transcription may have occurred.    The patient and I discussed that this is an elective procedure/surgery. We discussed the risks of the procedure/surgery, including but not limited to what is outlined in the signed informed consent. We also discussed the risk of sukumar COVID 19 while in the facility. We discussed the increased risk of a bad outcome should the patient contract COVID 19 during the post-procedural/post-operative period, given the patient’s current health condition, chronic conditions, and the added risk of COVID 19 in light of these conditions. The patient and I also discussed the risk of further postponing the procedure/surgery and other treatment

## 2025-04-29 NOTE — ANESTHESIA POSTPROCEDURE EVALUATION
Department of Anesthesiology  Postprocedure Note    Patient: Arlette Kate  MRN: [id number]  YOB: 1980  Date of evaluation: 4/29/2025    Procedure Summary       Date: 04/29/25 Room / Location: [address]    Anesthesia Start: 0808 Anesthesia Stop: 0830    Procedures:       ESOPHAGOGASTRODUODENOSCOPY BIOPSY      ESOPHAGEAL DILATION REJI Diagnosis:       Ulcer of pyloric antrum, unspecified chronicity      Gastritis, presence of bleeding unspecified, unspecified chronicity, unspecified gastritis type      (Ulcer of pyloric antrum, unspecified chronicity [K25.9])      (Gastritis, presence of bleeding unspecified, unspecified chronicity, unspecified gastritis type [K29.70])    Surgeons: Josue Santamaria DO Responsible Provider: Samuel Enirquez MD    Anesthesia Type: general ASA Status: 2            Anesthesia Type: No value filed.    Hannah Phase I: Hannah Score: 10    Hannah Phase II: Hannah Score: 9    Anesthesia Post Evaluation    Comments: Postoperative Anesthesia Note    Name:    Arlette Kate  MRN:      [id number]    Patient Vitals in the past 12 hrs:  04/29/25 0845, BP:139/83, Pulse:51, Resp:16, SpO2:100 %  04/29/25 0830, BP:121/88, Temp:97.7 °F (36.5 °C), Temp src:Oral, Pulse:53, Resp:16, SpO2:99 %  04/29/25 0702, BP:127/78, Temp:97.3 °F (36.3 °C), Temp src:Temporal, Pulse:54, Resp:16, SpO2:98 %, Height:1.6 m (5' 3\"), Weight:70.8 kg (156 lb)     LABS:    CBC  Lab Results       Component                Value               Date/Time                  WBC                      7.0                 03/17/2024 12:25 PM        HGB                      13.5                03/17/2024 12:25 PM        HCT                      41.2                03/17/2024 12:25 PM        PLT                      255                 03/17/2024 12:25 PM   RENAL  Lab Results       Component                Value               Date/Time                  NA

## 2025-04-29 NOTE — PROGRESS NOTES
Phase II:  1.  Patient is identified using name and the date of birth.  2.  The patient is free from signs and symptoms of chemical, electrical, laser, radiation, positioning, or transfer/transport injury.  3.  The patient receives appropriate medication(s), safely administered during the Perioperative period.  4.  The patient has wound/tissue perfusion consistent with or improved from baseline levels established preoperatively.  5.  The patient is at or returning to normothermia at the conclusion of the immediate postoperative period.  6.  The patient's fluid, electrolyte, and acid base balances are consistent with or improved from baseline levels established preoperatively.  7.  The patient's pulmonary function is consistent with or improved from baseline levels established preoperatively.  8.  The patient's cardiovascular status is consistent with or improved from baseline levels established preoperatively.  9.  The patient/caregiver demonstrates knowledge of nutritional management related to the operative or other invasive procedure.  10.  The patient/caregiver demonstrates knowledge of medication, pain, and wound management.  11.  The patient participates in the rehabilitation process as applicable.  12.  The patient/caregiver participates in decisions affection his or her Perioperative plan of care.  13.  The patient's care is consistent with the individualized Perioperative plan of care.  14.  The patient's right to privacy is maintained.  15.  The patient is the recipient of competent and ethical care within legal standards of practice.  16.  The patient's value system, lifestyle, ethnicity, and culture are considered, respected, and incorporated in the Perioperative plan of care and understands special services available.  17.  The patient demonstrates and/or reports adequate pain control throughout the the Perioperative period.  18.  The patient's neurological status is consistent with or improved from 
Pt arrived to PACU from ENDO in stable condition. Report received from Lucia WILKINSON and Bird PH[facility]. Phase II. Care of pt transferred at this time. Pt  placed on cont pulse ox and BP. Pt arrived to unit without any oxygen requirements. SPO2 99% on RA.   
given time.  Please no children under the age of 14 in the surgical department.   
phase.  Interventions- orient the patient to the environment, especially the location of the bathroom; provide treaded socks/non-skid footwear; demonstrate and teach back use of the nurse's call system; instruct the patient to call for help before getting out of bed; lock all movable equipment before transferring patient; keep bed in lowest position possible.

## 2025-06-04 ENCOUNTER — CLINICAL DOCUMENTATION (OUTPATIENT)
Dept: CASE MANAGEMENT | Age: 45
End: 2025-06-04

## 2025-06-04 NOTE — PROGRESS NOTES
Patient Management Plan              Pt. Name: Arlette Kate  : 1980        Date plan entered: 24        Patients Physicians:     Primary Care  : Christal Landaverde APRN - CNP  Contact #:  [phone]  Specialists:    Contact #:                                                   Summary        Reason for Referral: This patient has been provided a management plan for Medical Needs                 Patient has had frequent visits for:     4 admits, intractable headache, asking for Valium which has been d/c'd.           24  It looks like patient was just at ER yesterday. I have not seen patient in office in almost 2 years. This seems to be a chronic issue, she will need to make appointment she might need to see neurology. Patient feels severe enough she can return to er it not needs in office appointment with me. Not appropriate for her to see another provider for a chronic issue.      24    CC/HPI Summary, DDx, ED Course, and Reassessment: Patient was evaluated in the emergency department today for concerns of a headache due to shingles. She does not have an obvious vesicular rash consistent with shingles at this time. She is neurologically intact. The patient has had multiple previous thorough workups for her headache and her imaging has all been very reassuring. Given the patient's complexity of her history of migraines and difficulty in treating I did ask my attending physician, Dr. Joseph to evaluate the patient.     The patient initially received Dilaudid and Toradol for pain control, she also received IV fluids and Benadryl. I reevaluated the patient multiple times and each time she was observed resting comfortably even sleeping in the exam room. The patient continues to complain of pain and states she does not feel well enough to go home. At this point we obtained an EKG the patient received droperidol. Again she was observed in the

## (undated) DEVICE — SOLUTION IRRIG 5L LAC R BG

## (undated) DEVICE — MEDI-VAC NON-CONDUCTIVE SUCTION TUBING: Brand: CARDINAL HEALTH

## (undated) DEVICE — GLOVE SURG SZ 8 L12IN FNGR THK94MIL STD WHT LTX FREE

## (undated) DEVICE — FORCEP BX STD CAP 240CM RAD JAW 4

## (undated) DEVICE — CONMED SCOPE SAVER BITE BLOCK, 20X27 MM: Brand: SCOPE SAVER

## (undated) DEVICE — ENDOSCOPIC KIT 2 12 FT OP4 DE2 GWN SYR

## (undated) DEVICE — GLOVE SURG SZ 75 L12IN FNGR THK94MIL STD WHT LTX FREE

## (undated) DEVICE — TOWEL,OR,DSP,ST,BLUE,STD,4/PK,20PK/CS: Brand: MEDLINE

## (undated) DEVICE — INTENDED FOR TISSUE SEPARATION, AND OTHER PROCEDURES THAT REQUIRE A SHARP SURGICAL BLADE TO PUNCTURE OR CUT.: Brand: BARD-PARKER ® STAINLESS STEEL BLADES

## (undated) DEVICE — DRAPE EQUIP C ARM MINI 10000100] TIDI PRODUCTS INC]

## (undated) DEVICE — SOLUTION IV IRRIG 500ML 0.9% SODIUM CHL 2F7123

## (undated) DEVICE — TUBING PMP L8FT LNG W/ CONN FOR AR-6400 REDEUCE

## (undated) DEVICE — SET GRAV VENT NVENT CK VLV 3 NDL FREE PRT 10 GTT

## (undated) DEVICE — DYONICS 2.9 MM FULL RADIUS BLADES,                                    7 CM LENGTH, RED, PACKAGED 6 PER                                    BOX, STERILE: Brand: DYONICS POWERMINI

## (undated) DEVICE — SPLINT ORTH W4XL30IN LAYERED FBRGLS FOAM PD BRTH BK MOLD

## (undated) DEVICE — SOLUTION IV 1000ML LAC RINGERS PH 6.5 INJ USP VIAFLX PLAS

## (undated) DEVICE — GLOVE SURG SZ 65 L12IN FNGR THK79MIL GRN LTX FREE

## (undated) DEVICE — SET ADMIN PRIMING 7ML L30IN 7.35LB 20 GTT 2ND RLER CLMP

## (undated) DEVICE — PADDING CAST W4INXL4YD NONSTERILE COT RAYON MICROPLEATED

## (undated) DEVICE — CANNULA NSL L4M O2 AD FILTERLINE

## (undated) DEVICE — PACK EXTREMITY XR

## (undated) DEVICE — GLOVE SURG SZ 65 L12IN FNGR THK94MIL STD WHT LTX FREE

## (undated) DEVICE — T-DRAPE,EXTREMITY,STERILE: Brand: MEDLINE

## (undated) DEVICE — CANNULA NSL AD TBNG L7FT PVC STR NONFLARED PRNG O2 DEL W STD

## (undated) DEVICE — ZIMMER® STERILE DISPOSABLE TOURNIQUET CUFF WITH PLC, DUAL PORT, SINGLE BLADDER, 30 IN. (76 CM)

## (undated) DEVICE — ELECTRODE,ECG,STRESS,FOAM,3PK: Brand: MEDLINE

## (undated) DEVICE — ENDO CARRY-ON PROCEDURE KIT INCLUDES SUCTION TUBING, LUBRICANT, GAUZE, BIOHAZARD STICKER, TRANSPORT PAD AND INTERCEPT BEDSIDE KIT.: Brand: ENDO CARRY-ON PROCEDURE KIT

## (undated) DEVICE — SYRINGE MED 30ML STD CLR PLAS LUERLOCK TIP N CTRL DISP

## (undated) DEVICE — PACK COOL L W14XL6.5IN 3 LAYR CONSTR SFT CLP CLSR 4 TIE

## (undated) DEVICE — 3M™ STERI-DRAPE™ U-DRAPE, LONG 1019: Brand: STERI-DRAPE™

## (undated) DEVICE — DYONICS 3.5 MM INCISOR PLUS ELITE                                    BLADES, 7 CM LENGTH, SAGE GREEN,                                    PACKAGED 6 PER BOX, STERILE

## (undated) DEVICE — SUTURE ETHLN SZ 3-0 L18IN NONABSORBABLE BLK L24MM PS-1 3/8 1663G

## (undated) DEVICE — KENDALL SCD EXPRESS SLEEVES, KNEE LENGTH, MEDIUM: Brand: KENDALL SCD

## (undated) DEVICE — GUHL ANKLE DISTRACTOR FOOT STRAPS,                                    STERILE, LATEX FREE BOX OF 6

## (undated) DEVICE — FORCEPS BX L240CM JAW DIA2.8MM L CAP W/ NDL MIC MESH TOOTH

## (undated) DEVICE — GAUZE,SPONGE,4"X4",16PLY,STRL,LF,10/TRAY: Brand: MEDLINE

## (undated) DEVICE — TUBE IRRIG L8IN LNG PT W/ CONN FOR PMP SYS REDEUCE

## (undated) DEVICE — SUTURE ETHLN SZ 4-0 L18IN NONABSORBABLE BLK L19MM PS-2 3/8 1667H

## (undated) DEVICE — YANKAUER,BULB TIP,W/O VENT,RIGID,STERILE: Brand: MEDLINE

## (undated) DEVICE — NEEDLE SPNL L3.5IN PNK HUB S STL REG WALL FIT STYL W/ QNCKE

## (undated) DEVICE — CANNULA,OXY,ADULT,SUPERSOFT,W/7'TUB,SC: Brand: MEDLINE INDUSTRIES, INC.

## (undated) DEVICE — 3M™ TEGADERM™ TRANSPARENT FILM DRESSING FRAME STYLE, 1624W, 2-3/8 IN X 2-3/4 IN (6 CM X 7 CM), 100/CT 4CT/CASE: Brand: 3M™ TEGADERM™

## (undated) DEVICE — GLOVE,SURG,SENSICARE,ALOE,LF,PF,7: Brand: MEDLINE

## (undated) DEVICE — CATHETER IV 20GA L1.25IN PNK FEP SFTY STR HUB RADPQ DISP